# Patient Record
Sex: MALE | Race: BLACK OR AFRICAN AMERICAN | NOT HISPANIC OR LATINO | Employment: OTHER | URBAN - METROPOLITAN AREA
[De-identification: names, ages, dates, MRNs, and addresses within clinical notes are randomized per-mention and may not be internally consistent; named-entity substitution may affect disease eponyms.]

---

## 2017-01-10 ENCOUNTER — TELEPHONE (OUTPATIENT)
Dept: SMOKING CESSATION | Facility: CLINIC | Age: 67
End: 2017-01-10

## 2017-01-11 ENCOUNTER — TELEPHONE (OUTPATIENT)
Dept: SMOKING CESSATION | Facility: CLINIC | Age: 67
End: 2017-01-11

## 2017-01-17 ENCOUNTER — CLINICAL SUPPORT (OUTPATIENT)
Dept: SMOKING CESSATION | Facility: CLINIC | Age: 67
End: 2017-01-17
Payer: COMMERCIAL

## 2017-01-17 DIAGNOSIS — F17.200 NICOTINE DEPENDENCE: Primary | ICD-10-CM

## 2017-01-17 PROCEDURE — 99407 BEHAV CHNG SMOKING > 10 MIN: CPT | Mod: S$GLB,,,

## 2017-01-25 ENCOUNTER — TELEPHONE (OUTPATIENT)
Dept: SMOKING CESSATION | Facility: CLINIC | Age: 67
End: 2017-01-25

## 2017-02-15 ENCOUNTER — TELEPHONE (OUTPATIENT)
Dept: SMOKING CESSATION | Facility: CLINIC | Age: 67
End: 2017-02-15

## 2017-02-15 NOTE — TELEPHONE ENCOUNTER
Attempt to contact patient for missed scheduled appointment. He states that he forgot about his appointment and is at work. He stated that he was not in front of a calendar and could not reschedule at this time. Contact information given.

## 2017-02-20 ENCOUNTER — OFFICE VISIT (OUTPATIENT)
Dept: INTERNAL MEDICINE | Facility: CLINIC | Age: 67
End: 2017-02-20
Payer: MEDICARE

## 2017-02-20 ENCOUNTER — LAB VISIT (OUTPATIENT)
Dept: LAB | Facility: HOSPITAL | Age: 67
End: 2017-02-20
Attending: INTERNAL MEDICINE
Payer: MEDICARE

## 2017-02-20 VITALS
HEIGHT: 72 IN | SYSTOLIC BLOOD PRESSURE: 146 MMHG | TEMPERATURE: 99 F | WEIGHT: 182.75 LBS | OXYGEN SATURATION: 97 % | HEART RATE: 49 BPM | DIASTOLIC BLOOD PRESSURE: 82 MMHG | BODY MASS INDEX: 24.75 KG/M2

## 2017-02-20 DIAGNOSIS — Z12.5 SCREENING FOR PROSTATE CANCER: ICD-10-CM

## 2017-02-20 DIAGNOSIS — L29.0 ANAL PRURITUS: Primary | ICD-10-CM

## 2017-02-20 DIAGNOSIS — I10 ESSENTIAL HYPERTENSION, BENIGN: ICD-10-CM

## 2017-02-20 LAB
ALBUMIN SERPL BCP-MCNC: 3.5 G/DL
ALP SERPL-CCNC: 67 U/L
ALT SERPL W/O P-5'-P-CCNC: 23 U/L
ANION GAP SERPL CALC-SCNC: 3 MMOL/L
AST SERPL-CCNC: 22 U/L
BASOPHILS # BLD AUTO: 0.01 K/UL
BASOPHILS NFR BLD: 0.2 %
BILIRUB SERPL-MCNC: 0.4 MG/DL
BUN SERPL-MCNC: 12 MG/DL
CALCIUM SERPL-MCNC: 9.2 MG/DL
CHLORIDE SERPL-SCNC: 106 MMOL/L
CHOLEST/HDLC SERPL: 2.7 {RATIO}
CO2 SERPL-SCNC: 30 MMOL/L
COMPLEXED PSA SERPL-MCNC: 1.5 NG/ML
CREAT SERPL-MCNC: 1 MG/DL
DIFFERENTIAL METHOD: ABNORMAL
EOSINOPHIL # BLD AUTO: 0.2 K/UL
EOSINOPHIL NFR BLD: 4.5 %
ERYTHROCYTE [DISTWIDTH] IN BLOOD BY AUTOMATED COUNT: 13.5 %
EST. GFR  (AFRICAN AMERICAN): >60 ML/MIN/1.73 M^2
EST. GFR  (NON AFRICAN AMERICAN): >60 ML/MIN/1.73 M^2
GLUCOSE SERPL-MCNC: 91 MG/DL
HCT VFR BLD AUTO: 42.3 %
HDL/CHOLESTEROL RATIO: 36.4 %
HDLC SERPL-MCNC: 173 MG/DL
HDLC SERPL-MCNC: 63 MG/DL
HGB BLD-MCNC: 14.2 G/DL
LDLC SERPL CALC-MCNC: 86.2 MG/DL
LYMPHOCYTES # BLD AUTO: 1.6 K/UL
LYMPHOCYTES NFR BLD: 31.2 %
MCH RBC QN AUTO: 31.2 PG
MCHC RBC AUTO-ENTMCNC: 33.6 %
MCV RBC AUTO: 93 FL
MONOCYTES # BLD AUTO: 0.5 K/UL
MONOCYTES NFR BLD: 9.4 %
NEUTROPHILS # BLD AUTO: 2.8 K/UL
NEUTROPHILS NFR BLD: 54.5 %
NONHDLC SERPL-MCNC: 110 MG/DL
PLATELET # BLD AUTO: 221 K/UL
PMV BLD AUTO: 10.1 FL
POTASSIUM SERPL-SCNC: 4.3 MMOL/L
PROT SERPL-MCNC: 6.8 G/DL
RBC # BLD AUTO: 4.55 M/UL
SODIUM SERPL-SCNC: 139 MMOL/L
TRIGL SERPL-MCNC: 119 MG/DL
TSH SERPL DL<=0.005 MIU/L-ACNC: 0.62 UIU/ML
WBC # BLD AUTO: 5.13 K/UL

## 2017-02-20 PROCEDURE — 3079F DIAST BP 80-89 MM HG: CPT | Mod: S$GLB,,, | Performed by: NURSE PRACTITIONER

## 2017-02-20 PROCEDURE — 3077F SYST BP >= 140 MM HG: CPT | Mod: S$GLB,,, | Performed by: NURSE PRACTITIONER

## 2017-02-20 PROCEDURE — 1157F ADVNC CARE PLAN IN RCRD: CPT | Mod: S$GLB,,, | Performed by: NURSE PRACTITIONER

## 2017-02-20 PROCEDURE — 99999 PR PBB SHADOW E&M-EST. PATIENT-LVL III: CPT | Mod: PBBFAC,,, | Performed by: NURSE PRACTITIONER

## 2017-02-20 PROCEDURE — 1159F MED LIST DOCD IN RCRD: CPT | Mod: S$GLB,,, | Performed by: NURSE PRACTITIONER

## 2017-02-20 PROCEDURE — 99213 OFFICE O/P EST LOW 20 MIN: CPT | Mod: S$GLB,,, | Performed by: NURSE PRACTITIONER

## 2017-02-20 PROCEDURE — 1126F AMNT PAIN NOTED NONE PRSNT: CPT | Mod: S$GLB,,, | Performed by: NURSE PRACTITIONER

## 2017-02-20 RX ORDER — METHOCARBAMOL 500 MG/1
TABLET, FILM COATED ORAL
Refills: 0 | COMMUNITY
Start: 2017-01-29 | End: 2017-08-28

## 2017-02-20 RX ORDER — DIBUCAINE 0.28 G/28G
OINTMENT TOPICAL 2 TIMES DAILY
Refills: 0 | COMMUNITY
Start: 2017-02-20 | End: 2017-08-28

## 2017-02-20 NOTE — PROGRESS NOTES
Subjective:      Patient ID: Harpreet Perez is a 66 y.o. male.    Chief Complaint: Anal Itching    HPI:  Patient states he is having a lot of anal itching, especially at night.  He says he is constipated a lot, has tried prep H.  Not sure if he has hemorrhoids    Past Medical History   Diagnosis Date    Alcohol dependence     Back pain     History of colonic polyps     Hypertrophy of prostate without urinary obstruction and other lower urinary tract symptoms (LUTS)     Osteoarthritis of multiple joints     Tobacco dependence      quit then restarted       Past Surgical History   Procedure Laterality Date    Lipoma resection Right 8/17/12     posterior shoulder    Colonoscopy N/A 5/25/2016     Procedure: COLONOSCOPY;  Surgeon: Sergey Jensen MD;  Location: [facility];  Service: Endoscopy;  Laterality: N/A;       Lab Results   Component Value Date    WBC 5.82 02/19/2016    HGB 14.5 02/19/2016    HCT 42.6 02/19/2016     02/19/2016    CHOL 188 02/19/2016    TRIG 110 02/19/2016    HDL 75 02/19/2016    ALT 21 02/19/2016    AST 23 02/19/2016     02/19/2016    K 4.3 02/19/2016     02/19/2016    CREATININE 1.1 02/19/2016    BUN 13 02/19/2016    CO2 25 02/19/2016    TSH 0.829 02/19/2016    PSA 1.5 02/19/2016       Visit Vitals    BP (!) 146/82    Pulse (!) 49  Comment: radial    Temp 98.6 °F (37 °C) (Tympanic)    Ht 6' (1.829 m)    Wt 82.9 kg (182 lb 12.2 oz)    SpO2 97%    BMI 24.79 kg/m2         Review of Systems   Constitutional: Negative for appetite change, chills, diaphoresis and fever.   HENT: Negative for congestion, ear pain, postnasal drip, rhinorrhea, sneezing, sore throat and trouble swallowing.    Eyes: Negative for photophobia, pain and visual disturbance.   Respiratory: Negative for apnea, cough, choking, chest tightness, shortness of breath and wheezing.    Cardiovascular: Negative for chest pain, palpitations and leg swelling.   Gastrointestinal: Negative for abdominal pain,  constipation, diarrhea, nausea and vomiting.   Genitourinary: Negative for decreased urine volume, difficulty urinating, dysuria, hematuria and urgency.   Musculoskeletal: Negative for arthralgias, gait problem, joint swelling and myalgias.   Skin: Negative for rash.   Neurological: Negative for dizziness, tremors, seizures, syncope, weakness, light-headedness, numbness and headaches.   Psychiatric/Behavioral: Negative for agitation, confusion, decreased concentration, hallucinations and sleep disturbance. The patient is not nervous/anxious.       Objective:     Physical Exam   Constitutional: He is oriented to person, place, and time. He appears well-developed and well-nourished. No distress.   Genitourinary: Rectum normal.   Genitourinary Comments: No internal hemorrhoids noted, no fissures, redness, or swelling to anus   Musculoskeletal:   Normal gait   Neurological: He is alert and oriented to person, place, and time.   Skin: Skin is warm and dry.   Psychiatric: He has a normal mood and affect. His behavior is normal.     Assessment:      1. Anal pruritus      Plan:   Anal pruritus    Other orders  -     dibucaine 1% 1 % Oint; Place rectally 2 (two) times daily.; Refill: 0    Will use Clear lax one capful a day with 8 oz of water to follow    Can use stool softener also prn      Current Outpatient Prescriptions:     meloxicam (MOBIC) 15 MG tablet, Take 1 tablet (15 mg total) by mouth once daily., Disp: 30 tablet, Rfl: 11    tamsulosin (FLOMAX) 0.4 mg Cp24, Take 1 capsule (0.4 mg total) by mouth once daily., Disp: 30 capsule, Rfl: 11    dibucaine 1% 1 % Oint, Place rectally 2 (two) times daily., Disp: , Rfl: 0    methocarbamol (ROBAXIN) 500 MG Tab, , Disp: , Rfl: 0    nicotine (NICODERM CQ) 14 mg/24 hr, Place 1 patch onto the skin once daily., Disp: 21 patch, Rfl: 1

## 2017-02-20 NOTE — MR AVS SNAPSHOT
[facility]  [id number] [place] [address]  Phone: [phone]                  Harpreet Perez   2017 8:40 AM   Office Visit    Description:  Male : 1950   Provider:  Navin Chirinos NP   Department:  [facility]           Reason for Visit     Anal Itching                To Do List           Future Appointments        Provider Department Dept Phone    2017 7:40 AM Navin Chirinos NP [facility] Internal Medicine [phone]    2017 1:00 PM Alejandro Goins OD [facility] [phone]      Goals (5 Years of Data)     None      PURCHASE these Medications (No prescription required)        Start End    dibucaine 1% 1 % Oint 2017     Sig - Route: Place rectally 2 (two) times daily. - Rectal    Class: OTC      [facility]     [facility] Nurse Care [facility] -  Assistance  Registered nurses in the [facility] provide clinical advisement, health education, appointment booking, and other advisory services.  Call for this free service at [phone].             Medications           Message regarding Medications     Verify the changes and/or additions to your medication regime listed below are the same as discussed with your clinician today.  If any of these changes or additions are incorrect, please notify your healthcare provider.        START taking these NEW medications        Refills    dibucaine 1% 1 % Oint 0    Sig: Place rectally 2 (two) times daily.    Class: OTC    Route: Rectal           Verify that the below list of medications is an accurate representation of the medications you are currently taking.  If none reported, the list may be blank. If incorrect, please contact your healthcare provider. Carry this list with you in case of emergency.           Current Medications     dibucaine 1% 1 % Oint Place rectally 2 (two) times daily.    meloxicam (MOBIC) 15 MG tablet Take 1 tablet (15 mg total) by mouth  once daily.    methocarbamol (ROBAXIN) 500 MG Tab     nicotine (NICODERM CQ) 14 mg/24 hr Place 1 patch onto the skin once daily.    tamsulosin (FLOMAX) 0.4 mg Cp24 Take 1 capsule (0.4 mg total) by mouth once daily.           Clinical Reference Information           Your Vitals Were     BP Pulse Temp Height Weight SpO2    146/82 49 98.6 °F (37 °C) (Tympanic) 6' (1.829 m) 82.9 kg (182 lb 12.2 oz) 97%    BMI                24.79 kg/m2          Blood Pressure          Most Recent Value    BP  (!)  146/82      Allergies as of 2/20/2017     No Known Allergies      Immunizations Administered on Date of Encounter - 2/20/2017     None      Instructions    Mirilax one capful a day     Stool softener  Colace        Smoking Cessation     If you would like to quit smoking:   You may be eligible for free services if you are a [place] resident and started smoking cigarettes before September 1, 1988.  Call the Smoking Cessation Trust ([employer]) toll free at (052) 752-3270 or (552) 425-1271.   Call 1-800-QUIT-NOW if you do not meet the above criteria.            Language Assistance Services     ATTENTION: Language assistance services are available, free of charge. Please call [phone].      ATENCIÓN: Si habla miriamañol, tiene a velasquez disposición servicios gratuitos de asistencia lingüística. Llame al [phone].     CHÚ Ý: N?u b?n nói Ti?ng Vi?t, có các d?ch v? h? tr? ngôn ng? mi?n phí dành cho b?n. G?i s? [phone].         [facility] Internal Medicine complies with applicable Federal civil rights laws and does not discriminate on the basis of race, color, national origin, age, disability, or sex.

## 2017-02-27 ENCOUNTER — OFFICE VISIT (OUTPATIENT)
Dept: INTERNAL MEDICINE | Facility: CLINIC | Age: 67
End: 2017-02-27
Payer: MEDICARE

## 2017-02-27 ENCOUNTER — OFFICE VISIT (OUTPATIENT)
Dept: OPHTHALMOLOGY | Facility: CLINIC | Age: 67
End: 2017-02-27
Payer: MEDICARE

## 2017-02-27 VITALS
OXYGEN SATURATION: 96 % | DIASTOLIC BLOOD PRESSURE: 82 MMHG | WEIGHT: 185.19 LBS | HEIGHT: 72 IN | BODY MASS INDEX: 25.08 KG/M2 | TEMPERATURE: 98 F | SYSTOLIC BLOOD PRESSURE: 136 MMHG | HEART RATE: 66 BPM

## 2017-02-27 DIAGNOSIS — Z00.00 ROUTINE CHECK-UP: Primary | ICD-10-CM

## 2017-02-27 DIAGNOSIS — H52.03 HYPEROPIA, BILATERAL: ICD-10-CM

## 2017-02-27 DIAGNOSIS — H52.4 BILATERAL PRESBYOPIA: ICD-10-CM

## 2017-02-27 DIAGNOSIS — N40.0 BENIGN NON-NODULAR PROSTATIC HYPERPLASIA WITHOUT LOWER URINARY TRACT SYMPTOMS: Chronic | ICD-10-CM

## 2017-02-27 PROCEDURE — 3075F SYST BP GE 130 - 139MM HG: CPT | Mod: S$GLB,,, | Performed by: NURSE PRACTITIONER

## 2017-02-27 PROCEDURE — 92014 COMPRE OPH EXAM EST PT 1/>: CPT | Mod: S$GLB,,, | Performed by: OPTOMETRIST

## 2017-02-27 PROCEDURE — 1159F MED LIST DOCD IN RCRD: CPT | Mod: S$GLB,,, | Performed by: NURSE PRACTITIONER

## 2017-02-27 PROCEDURE — 1160F RVW MEDS BY RX/DR IN RCRD: CPT | Mod: S$GLB,,, | Performed by: NURSE PRACTITIONER

## 2017-02-27 PROCEDURE — 99999 PR PBB SHADOW E&M-EST. PATIENT-LVL I: CPT | Mod: PBBFAC,,, | Performed by: OPTOMETRIST

## 2017-02-27 PROCEDURE — 99214 OFFICE O/P EST MOD 30 MIN: CPT | Mod: S$GLB,,, | Performed by: NURSE PRACTITIONER

## 2017-02-27 PROCEDURE — 92015 DETERMINE REFRACTIVE STATE: CPT | Mod: S$GLB,,, | Performed by: OPTOMETRIST

## 2017-02-27 PROCEDURE — 3079F DIAST BP 80-89 MM HG: CPT | Mod: S$GLB,,, | Performed by: NURSE PRACTITIONER

## 2017-02-27 PROCEDURE — 1157F ADVNC CARE PLAN IN RCRD: CPT | Mod: S$GLB,,, | Performed by: NURSE PRACTITIONER

## 2017-02-27 PROCEDURE — 3078F DIAST BP <80 MM HG: CPT | Mod: S$GLB,,, | Performed by: OPTOMETRIST

## 2017-02-27 PROCEDURE — 1126F AMNT PAIN NOTED NONE PRSNT: CPT | Mod: S$GLB,,, | Performed by: NURSE PRACTITIONER

## 2017-02-27 PROCEDURE — 3074F SYST BP LT 130 MM HG: CPT | Mod: S$GLB,,, | Performed by: OPTOMETRIST

## 2017-02-27 PROCEDURE — 99999 PR PBB SHADOW E&M-EST. PATIENT-LVL III: CPT | Mod: PBBFAC,,, | Performed by: NURSE PRACTITIONER

## 2017-02-27 RX ORDER — TAMSULOSIN HYDROCHLORIDE 0.4 MG/1
0.4 CAPSULE ORAL DAILY
Qty: 90 CAPSULE | Refills: 3 | Status: SHIPPED | OUTPATIENT
Start: 2017-02-27 | End: 2017-08-28 | Stop reason: SDUPTHER

## 2017-02-27 NOTE — PROGRESS NOTES
HPI     No visual complaints. Annual eye exam//////NS check. Last eye exam   02/26/2016 TRF. Update glasses RX.       Last edited by Lea Fischer on 2/27/2017  1:06 PM.         Assessment /Plan     For exam results, see Encounter Report.    Nuclear cataract, bilateral    Hyperopia, bilateral    Bilateral presbyopia      Moderate cataracts OU, not surgical    Refresh Optive prn for stinging and itching.    Dispense Final Rx for glasses.  RTC 1 year

## 2017-02-27 NOTE — MR AVS SNAPSHOT
[facility]  [address] [address]  Phone: [phone]                  Harpreet Perez   2017 7:40 AM   Office Visit    Description:  Male : 1950   Provider:  Navin Chirinos NP   Department:  [facility]           Reason for Visit     Follow-up           Diagnoses this Visit        Comments    Routine check-up    -  Primary            To Do List           Future Appointments        Provider Department Dept Phone    2017 1:00 PM Alejandro Goins OD [facility] - Ophthalmology [phone]    2017 8:50 AM LABORATORY, [facility] Laboratory [phone]    2017 8:40 AM Srinivas Zabala MD [facility] Internal Medicine [phone]      Goals (5 Years of Data)     None      Follow-Up and Disposition     Return in about 6 months (around 2017).       These Medications        Disp Refills Start End    tamsulosin (FLOMAX) 0.4 mg Cp24 90 capsule 3 2017    Take 1 capsule (0.4 mg total) by mouth once daily. - Oral    [facility] [address] Ph #: [phone]         [facility] On [facility]     [facility] On Call Nurse Care Line -  Assistance  Registered nurses in the [facility] provide clinical advisement, health education, appointment booking, and other advisory services.  Call for this free service at [phone].             Medications           Message regarding Medications     Verify the changes and/or additions to your medication regime listed below are the same as discussed with your clinician today.  If any of these changes or additions are incorrect, please notify your healthcare provider.             Verify that the below list of medications is an accurate representation of the medications you are currently taking.  If none reported, the list may be blank. If incorrect, please contact your healthcare provider. Carry this list with you in case of  emergency.           Current Medications     dibucaine 1% 1 % Oint Place rectally 2 (two) times daily.    meloxicam (MOBIC) 15 MG tablet Take 1 tablet (15 mg total) by mouth once daily.    methocarbamol (ROBAXIN) 500 MG Tab     nicotine (NICODERM CQ) 14 mg/24 hr Place 1 patch onto the skin once daily.    tamsulosin (FLOMAX) 0.4 mg Cp24 Take 1 capsule (0.4 mg total) by mouth once daily.           Clinical Reference Information           Your Vitals Were     BP                   136/82           Blood Pressure          Most Recent Value    BP  136/82      Allergies as of 2/27/2017     No Known Allergies      Immunizations Administered on Date of Encounter - 2/27/2017     None      Orders Placed During Today's Visit     Future Labs/Procedures Expected by Expires    Comprehensive metabolic panel  8/26/2017 (Approximate) 4/28/2018      Smoking Cessation     If you would like to quit smoking:   You may be eligible for free services if you are a [place] resident and started smoking cigarettes before September 1, 1988.  Call the Smoking Cessation Trust ([facility]) toll free at (059) 279-6494 or (607) 910-8887.   Call 7-595-QUIT-NOW if you do not meet the above criteria.            Language Assistance Services     ATTENTION: Language assistance services are available, free of charge. Please call [phone].      ATENCIÓN: Si umm pinedadelvis, tiene a velasquez disposición servicios gratuitos de asistencia lingüística. Llame al [phone].     [facility] Ý: N?u b?n nói Ti?ng Vi?t, có các d?ch v? h? tr? ngôn ng? mi?n phí dành cho b?n. G?i s? [phone].         [facility] - Internal [facility] complies with applicable Federal civil rights laws and does not discriminate on the basis of race, color, national origin, age, disability, or sex.

## 2017-02-27 NOTE — PROGRESS NOTES
"Subjective:      Patient ID: Harpreet Perez is a 66 y.o. male.    Chief Complaint: Follow-up (6 month)    HPI:  Patient is here for a follow up of his labs.  He has no complaints today.  He is taking his meds as ordered.  His BP is controlled.      Past Medical History:   Diagnosis Date    Alcohol dependence     Back pain     History of colonic polyps     Hypertrophy of prostate without urinary obstruction and other lower urinary tract symptoms (LUTS)     Osteoarthritis of multiple joints     Tobacco dependence     quit then restarted       Past Surgical History:   Procedure Laterality Date    COLONOSCOPY N/A 5/25/2016    Procedure: COLONOSCOPY;  Surgeon: Sergey Jensen MD;  Location: [facility];  Service: Endoscopy;  Laterality: N/A;    LIPOMA RESECTION Right 8/17/12    posterior shoulder       Lab Results   Component Value Date    WBC 5.13 02/20/2017    HGB 14.2 02/20/2017    HCT 42.3 02/20/2017     02/20/2017    CHOL 173 02/20/2017    TRIG 119 02/20/2017    HDL 63 02/20/2017    ALT 23 02/20/2017    AST 22 02/20/2017     02/20/2017    K 4.3 02/20/2017     02/20/2017    CREATININE 1.0 02/20/2017    BUN 12 02/20/2017    CO2 30 (H) 02/20/2017    TSH 0.617 02/20/2017    PSA 1.5 02/20/2017       /82  Pulse 66  Temp 98.4 °F (36.9 °C) (Tympanic)   Ht 5' 11.5" (1.816 m)  Wt 84 kg (185 lb 3 oz)  SpO2 96%  BMI 25.47 kg/m2      Review of Systems   Constitutional: Negative for appetite change, chills, diaphoresis and fever.   HENT: Negative for congestion, ear pain, postnasal drip, rhinorrhea, sneezing, sore throat and trouble swallowing.    Eyes: Negative for photophobia, pain and visual disturbance.   Respiratory: Negative for apnea, cough, choking, chest tightness, shortness of breath and wheezing.    Cardiovascular: Negative for chest pain, palpitations and leg swelling.   Gastrointestinal: Negative for abdominal pain, constipation, diarrhea, nausea and vomiting.   Genitourinary: Negative " for decreased urine volume, difficulty urinating, dysuria, hematuria and urgency.   Musculoskeletal: Negative for arthralgias, gait problem, joint swelling and myalgias.   Skin: Negative for rash.   Neurological: Negative for dizziness, tremors, seizures, syncope, weakness, light-headedness, numbness and headaches.   Psychiatric/Behavioral: Negative for agitation, confusion, decreased concentration, hallucinations and sleep disturbance. The patient is not nervous/anxious.       Objective:     Physical Exam   Constitutional: He is oriented to person, place, and time. He appears well-developed and well-nourished. No distress.   Musculoskeletal:   Normal gait   Neurological: He is alert and oriented to person, place, and time.   Skin: Skin is warm and dry.   Psychiatric: He has a normal mood and affect. His behavior is normal.     Assessment:      1. Routine check-up      Plan:   Routine check-up  -     Comprehensive metabolic panel; Future; Expected date: 8/26/17    Other orders  -     tamsulosin (FLOMAX) 0.4 mg Cp24; Take 1 capsule (0.4 mg total) by mouth once daily.  Dispense: 90 capsule; Refill: 3    will see dr ayala in 6 months for a physical and labs      Current Outpatient Prescriptions:     dibucaine 1% 1 % Oint, Place rectally 2 (two) times daily., Disp: , Rfl: 0    meloxicam (MOBIC) 15 MG tablet, Take 1 tablet (15 mg total) by mouth once daily., Disp: 30 tablet, Rfl: 11    methocarbamol (ROBAXIN) 500 MG Tab, , Disp: , Rfl: 0    nicotine (NICODERM CQ) 14 mg/24 hr, Place 1 patch onto the skin once daily., Disp: 21 patch, Rfl: 1    tamsulosin (FLOMAX) 0.4 mg Cp24, Take 1 capsule (0.4 mg total) by mouth once daily., Disp: 90 capsule, Rfl: 3

## 2017-03-07 ENCOUNTER — TELEPHONE (OUTPATIENT)
Dept: INTERNAL MEDICINE | Facility: CLINIC | Age: 67
End: 2017-03-07

## 2017-03-07 NOTE — TELEPHONE ENCOUNTER
----- Message from Reymundo Fischer sent at 3/7/2017 12:05 PM CST -----  Contact: Pt   Pt states he is returning a missed call from yesterday./ Pt can be reached at [phone]

## 2017-03-07 NOTE — TELEPHONE ENCOUNTER
"Returned call to pt. Pt stated, " Someone called me to do something for Dr. Zabala yesterday". I advsied that I don't see any notes in system and checked with other staff and noone had info pertaining to this. Advised that I will contact pt if needed/TGD  "

## 2017-04-07 ENCOUNTER — TELEPHONE (OUTPATIENT)
Dept: INTERNAL MEDICINE | Facility: CLINIC | Age: 67
End: 2017-04-07

## 2017-04-07 RX ORDER — TRIAMCINOLONE ACETONIDE 1 MG/G
CREAM TOPICAL 2 TIMES DAILY
Qty: 45 G | Refills: 1 | Status: SHIPPED | OUTPATIENT
Start: 2017-04-07 | End: 2017-08-28

## 2017-04-07 NOTE — TELEPHONE ENCOUNTER
Returned pt's phone call and pt states that he was given a cream for itching of the rectum and and he hasn't seen any improvement since using the cream pt states that his itching is so severe that he unable to sleep at night. Pt is requesting a new cream be sent to his pharmacy. Please advise./db**

## 2017-04-07 NOTE — TELEPHONE ENCOUNTER
----- Message from Agatha Kerns sent at 4/7/2017  8:48 AM CDT -----  Pt needs to know if something else can be called in for the itching in his rectum because the meds he has arent  working / please call [phone]/ma

## 2017-04-13 ENCOUNTER — OFFICE VISIT (OUTPATIENT)
Dept: INTERNAL MEDICINE | Facility: CLINIC | Age: 67
End: 2017-04-13
Payer: MEDICARE

## 2017-04-13 VITALS
HEART RATE: 52 BPM | HEIGHT: 72 IN | OXYGEN SATURATION: 95 % | DIASTOLIC BLOOD PRESSURE: 82 MMHG | WEIGHT: 184.75 LBS | TEMPERATURE: 99 F | SYSTOLIC BLOOD PRESSURE: 142 MMHG | BODY MASS INDEX: 25.02 KG/M2

## 2017-04-13 DIAGNOSIS — L02.91 ABSCESS: Primary | ICD-10-CM

## 2017-04-13 PROCEDURE — 1125F AMNT PAIN NOTED PAIN PRSNT: CPT | Mod: S$GLB,,, | Performed by: NURSE PRACTITIONER

## 2017-04-13 PROCEDURE — 10060 I&D ABSCESS SIMPLE/SINGLE: CPT | Mod: S$GLB,,, | Performed by: NURSE PRACTITIONER

## 2017-04-13 PROCEDURE — 3079F DIAST BP 80-89 MM HG: CPT | Mod: S$GLB,,, | Performed by: NURSE PRACTITIONER

## 2017-04-13 PROCEDURE — 99999 PR PBB SHADOW E&M-EST. PATIENT-LVL III: CPT | Mod: PBBFAC,,, | Performed by: NURSE PRACTITIONER

## 2017-04-13 PROCEDURE — 1160F RVW MEDS BY RX/DR IN RCRD: CPT | Mod: S$GLB,,, | Performed by: NURSE PRACTITIONER

## 2017-04-13 PROCEDURE — 1159F MED LIST DOCD IN RCRD: CPT | Mod: S$GLB,,, | Performed by: NURSE PRACTITIONER

## 2017-04-13 PROCEDURE — 3077F SYST BP >= 140 MM HG: CPT | Mod: S$GLB,,, | Performed by: NURSE PRACTITIONER

## 2017-04-13 PROCEDURE — 1157F ADVNC CARE PLAN IN RCRD: CPT | Mod: S$GLB,,, | Performed by: NURSE PRACTITIONER

## 2017-04-13 PROCEDURE — 99214 OFFICE O/P EST MOD 30 MIN: CPT | Mod: 25,S$GLB,, | Performed by: NURSE PRACTITIONER

## 2017-04-13 RX ORDER — MUPIROCIN 20 MG/G
OINTMENT TOPICAL 2 TIMES DAILY
Qty: 22 G | Refills: 0 | Status: SHIPPED | OUTPATIENT
Start: 2017-04-13 | End: 2017-08-28

## 2017-04-13 RX ORDER — SULFAMETHOXAZOLE AND TRIMETHOPRIM 800; 160 MG/1; MG/1
1 TABLET ORAL 2 TIMES DAILY
Qty: 20 TABLET | Refills: 0 | Status: SHIPPED | OUTPATIENT
Start: 2017-04-13 | End: 2017-08-28

## 2017-04-13 NOTE — PROCEDURES
"Incision & Drainage  Date/Time: 4/13/2017 7:56 AM  Performed by: KWESI PUGA.  Authorized by: KWESI PUGA     Time out: Immediately prior to procedure a "time out" was called to verify the correct patient, procedure, equipment, support staff and site/side marked as required.    Consent Done?:  Yes (Verbal)    Type:  Abscess  Location: anal.  Anesthesia:  Local infiltration  Local anesthetic: lidocaine 1% without epinephrine  Anesthetic total (ml):  3  Scalpel size:  11  Incision type:  Single straight  Complexity:  Simple  Drainage:  Pus  Drainage amount:  Scant  Wound treatment:  Expression of material  Packing material:  None  Patient tolerance:  Patient tolerated the procedure well with no immediate complications      "

## 2017-04-13 NOTE — MR AVS SNAPSHOT
[facility] Internal Medicine  [address] [address]  Phone: [phone]                  Harpreet Perez   2017 7:20 AM   Office Visit    Description:  Male : 1950   Provider:  Navin Chirinos NP   Department:  [facility]           Reason for Visit     Recurrent Skin Infections           Diagnoses this Visit        Comments    Abscess    -  Primary            To Do List           Future Appointments        Provider Department Dept Phone    2017 8:50 AM LABORATORY, [facility] Laboratory [phone]    2017 8:40 AM Srinivas Zabala MD [facility] Internal Medicine [phone]    2018 1:00 PM Alejandro Goins OD [facility] - Ophthalmology [phone]      Goals (5 Years of Data)     None       These Medications        Disp Refills Start End    sulfamethoxazole-trimethoprim 800-160mg (BACTRIM DS) 800-160 mg Tab 20 tablet 0 2017     Take 1 tablet by mouth 2 (two) times daily. - Oral    [facility] [address] Ph #: [phone]       mupirocin (BACTROBAN) 2 % ointment 22 g 0 2017     Apply topically 2 (two) times daily. - Topical (Top)    Pharmacy: [facility] . - [address] Ph #: [phone]         [facility]     [facility] On Call Nurse Care Line -  Assistance  Unless otherwise directed by your provider, please contact [facility], our nurse care line that is available for  assistance.     Registered nurses in the [facility] provide: appointment scheduling, clinical advisement, health education, and other advisory services.  Call: [phone] (toll free)               Medications           Message regarding Medications     Verify the changes and/or additions to your medication regime listed below are the same as discussed with your clinician today.  If any of these changes or additions are incorrect, please notify  your healthcare provider.        START taking these NEW medications        Refills    sulfamethoxazole-trimethoprim 800-160mg (BACTRIM DS) 800-160 mg Tab 0    Sig: Take 1 tablet by mouth 2 (two) times daily.    Class: Normal    Route: Oral    mupirocin (BACTROBAN) 2 % ointment 0    Sig: Apply topically 2 (two) times daily.    Class: Normal    Route: Topical (Top)      STOP taking these medications     meloxicam (MOBIC) 15 MG tablet Take 1 tablet (15 mg total) by mouth once daily.    nicotine (NICODERM CQ) 14 mg/24 hr Place 1 patch onto the skin once daily.           Verify that the below list of medications is an accurate representation of the medications you are currently taking.  If none reported, the list may be blank. If incorrect, please contact your healthcare provider. Carry this list with you in case of emergency.           Current Medications     dibucaine 1% 1 % Oint Place rectally 2 (two) times daily.    methocarbamol (ROBAXIN) 500 MG Tab     tamsulosin (FLOMAX) 0.4 mg Cp24 Take 1 capsule (0.4 mg total) by mouth once daily.    triamcinolone acetonide 0.1% (KENALOG) 0.1 % cream Apply topically 2 (two) times daily.    mupirocin (BACTROBAN) 2 % ointment Apply topically 2 (two) times daily.    sulfamethoxazole-trimethoprim 800-160mg (BACTRIM DS) 800-160 mg Tab Take 1 tablet by mouth 2 (two) times daily.           Clinical Reference Information           Your Vitals Were     BP Pulse Temp Height Weight SpO2    142/82 52 98.5 °F (36.9 °C) (Tympanic) 6' (1.829 m) 83.8 kg (184 lb 11.9 oz) 95%    BMI                25.06 kg/m2          Blood Pressure          Most Recent Value    BP  (!)  142/82      Allergies as of 4/13/2017     No Known Allergies      Immunizations Administered on Date of Encounter - 4/13/2017     None      Orders Placed During Today's Visit      Normal Orders This Visit    INCISION AND DRAINAGE       Smoking Cessation     If you would like to quit smoking:   You may be eligible for free  services if you are a [place] resident and started smoking cigarettes before September 1, 1988.  Call the Smoking Cessation Trust ([employer]) toll free at (234) 074-0673 or (529) 945-6108.   Call 1-800-QUIT-NOW if you do not meet the above criteria.   Contact us via email: [email or web address] ([email or web address])   View our website for more information: [email or web address]        Language Assistance Services     ATTENTION: Language assistance services are available, free of charge. Please call [phone].      ATENCIÓN: Si habla español, tiene a velasquez disposición servicios gratuitos de asistencia lingüística. Llame al [phone].     CHÚ Ý: N?u b?n nói Ti?ng Vi?t, có các d?ch v? h? tr? ngôn ng? mi?n phí dành cho b?n. G?i s? [phone].         [facility] Internal Medicine complies with applicable Federal civil rights laws and does not discriminate on the basis of race, color, national origin, age, disability, or sex.

## 2017-04-13 NOTE — PROGRESS NOTES
Subjective:      Patient ID: Harpreet Perez is a 66 y.o. male.    Chief Complaint: Recurrent Skin Infections (buttocks)    HPI:  Patient states he thinks he has a boil on his buttocks.  He feels a tender lump to the buttock area.  He still has some anal pruritis, is using the cream.      Past Medical History:   Diagnosis Date    Alcohol dependence     Back pain     History of colonic polyps     Hypertrophy of prostate without urinary obstruction and other lower urinary tract symptoms (LUTS)     Osteoarthritis of multiple joints     Tobacco dependence     quit then restarted       Past Surgical History:   Procedure Laterality Date    COLONOSCOPY N/A 5/25/2016    Procedure: COLONOSCOPY;  Surgeon: Sergey Jensen MD;  Location: [facility];  Service: Endoscopy;  Laterality: N/A;    LIPOMA RESECTION Right 8/17/12    posterior shoulder       Lab Results   Component Value Date    WBC 5.13 02/20/2017    HGB 14.2 02/20/2017    HCT 42.3 02/20/2017     02/20/2017    CHOL 173 02/20/2017    TRIG 119 02/20/2017    HDL 63 02/20/2017    ALT 23 02/20/2017    AST 22 02/20/2017     02/20/2017    K 4.3 02/20/2017     02/20/2017    CREATININE 1.0 02/20/2017    BUN 12 02/20/2017    CO2 30 (H) 02/20/2017    TSH 0.617 02/20/2017    PSA 1.5 02/20/2017       BP (!) 142/82  Pulse (!) 52  Temp 98.5 °F (36.9 °C) (Tympanic)   Ht 6' (1.829 m)  Wt 83.8 kg (184 lb 11.9 oz)  SpO2 95%  BMI 25.06 kg/m2      Review of Systems   Constitutional: Negative for appetite change, chills, diaphoresis and fever.   HENT: Negative for congestion, ear pain, postnasal drip, rhinorrhea, sneezing, sore throat and trouble swallowing.    Eyes: Negative for photophobia, pain and visual disturbance.   Respiratory: Negative for apnea, cough, choking, chest tightness, shortness of breath and wheezing.    Cardiovascular: Negative for chest pain, palpitations and leg swelling.   Gastrointestinal: Negative for abdominal pain, constipation, diarrhea,  nausea and vomiting.   Genitourinary: Negative for decreased urine volume, difficulty urinating, dysuria, hematuria and urgency.   Musculoskeletal: Negative for arthralgias, gait problem, joint swelling and myalgias.   Skin: Negative for rash.   Neurological: Negative for dizziness, tremors, seizures, syncope, weakness, light-headedness, numbness and headaches.   Psychiatric/Behavioral: Negative for agitation, confusion, decreased concentration, hallucinations and sleep disturbance. The patient is not nervous/anxious.       Objective:     Physical Exam   Constitutional: He is oriented to person, place, and time. He appears well-developed and well-nourished. No distress.   Musculoskeletal:   Normal gait   Neurological: He is alert and oriented to person, place, and time.   Skin: Skin is warm and dry.   2cm infected skin cyst to the left gluteal fold.  See procedure note.  Tolerated well.  Antibacterial ointment and gauze applied.     Psychiatric: He has a normal mood and affect. His behavior is normal.     Assessment:      1. Abscess      Plan:   Abscess  -     INCISION AND DRAINAGE    Other orders  -     sulfamethoxazole-trimethoprim 800-160mg (BACTRIM DS) 800-160 mg Tab; Take 1 tablet by mouth 2 (two) times daily.  Dispense: 20 tablet; Refill: 0  -     mupirocin (BACTROBAN) 2 % ointment; Apply topically 2 (two) times daily.  Dispense: 22 g; Refill: 0    advised to clean well after each BM, reapply the ointment.  Can use Sitz baths.  Finish the antibiotic.  Change dressing daily and as needed.  To UC over the holiday if any problems.  Try [place] anti-itch cream      Current Outpatient Prescriptions:     dibucaine 1% 1 % Oint, Place rectally 2 (two) times daily., Disp: , Rfl: 0    methocarbamol (ROBAXIN) 500 MG Tab, , Disp: , Rfl: 0    tamsulosin (FLOMAX) 0.4 mg Cp24, Take 1 capsule (0.4 mg total) by mouth once daily., Disp: 90 capsule, Rfl: 3    triamcinolone acetonide 0.1% (KENALOG) 0.1 % cream, Apply  topically 2 (two) times daily., Disp: 45 g, Rfl: 1    mupirocin (BACTROBAN) 2 % ointment, Apply topically 2 (two) times daily., Disp: 22 g, Rfl: 0    sulfamethoxazole-trimethoprim 800-160mg (BACTRIM DS) 800-160 mg Tab, Take 1 tablet by mouth 2 (two) times daily., Disp: 20 tablet, Rfl: 0

## 2017-07-18 ENCOUNTER — TELEPHONE (OUTPATIENT)
Dept: SMOKING CESSATION | Facility: CLINIC | Age: 67
End: 2017-07-18

## 2017-07-19 ENCOUNTER — CLINICAL SUPPORT (OUTPATIENT)
Dept: SMOKING CESSATION | Facility: CLINIC | Age: 67
End: 2017-07-19
Payer: COMMERCIAL

## 2017-07-19 DIAGNOSIS — F17.200 NICOTINE DEPENDENCE: Primary | ICD-10-CM

## 2017-07-19 PROCEDURE — 99407 BEHAV CHNG SMOKING > 10 MIN: CPT | Mod: S$GLB,,,

## 2017-08-01 ENCOUNTER — TELEPHONE (OUTPATIENT)
Dept: SMOKING CESSATION | Facility: CLINIC | Age: 67
End: 2017-08-01

## 2017-08-01 NOTE — TELEPHONE ENCOUNTER
Attempt to contact patient in regards to his missed appointment. Recorded message left with return contact information.

## 2017-08-21 ENCOUNTER — LAB VISIT (OUTPATIENT)
Dept: LAB | Facility: HOSPITAL | Age: 67
End: 2017-08-21
Attending: INTERNAL MEDICINE
Payer: MEDICARE

## 2017-08-21 DIAGNOSIS — Z00.00 ROUTINE CHECK-UP: ICD-10-CM

## 2017-08-21 LAB
ALBUMIN SERPL BCP-MCNC: 3.3 G/DL
ALP SERPL-CCNC: 66 U/L
ALT SERPL W/O P-5'-P-CCNC: 16 U/L
ANION GAP SERPL CALC-SCNC: 11 MMOL/L
AST SERPL-CCNC: 18 U/L
BILIRUB SERPL-MCNC: 0.4 MG/DL
BUN SERPL-MCNC: 15 MG/DL
CALCIUM SERPL-MCNC: 9.1 MG/DL
CHLORIDE SERPL-SCNC: 106 MMOL/L
CO2 SERPL-SCNC: 25 MMOL/L
CREAT SERPL-MCNC: 1.1 MG/DL
EST. GFR  (AFRICAN AMERICAN): >60 ML/MIN/1.73 M^2
EST. GFR  (NON AFRICAN AMERICAN): >60 ML/MIN/1.73 M^2
GLUCOSE SERPL-MCNC: 56 MG/DL
POTASSIUM SERPL-SCNC: 4 MMOL/L
PROT SERPL-MCNC: 6.6 G/DL
SODIUM SERPL-SCNC: 142 MMOL/L

## 2017-08-21 PROCEDURE — 36415 COLL VENOUS BLD VENIPUNCTURE: CPT | Mod: PO

## 2017-08-21 PROCEDURE — 80053 COMPREHEN METABOLIC PANEL: CPT

## 2017-08-28 ENCOUNTER — OFFICE VISIT (OUTPATIENT)
Dept: INTERNAL MEDICINE | Facility: CLINIC | Age: 67
End: 2017-08-28
Payer: MEDICARE

## 2017-08-28 VITALS
DIASTOLIC BLOOD PRESSURE: 78 MMHG | OXYGEN SATURATION: 98 % | HEIGHT: 72 IN | HEART RATE: 53 BPM | TEMPERATURE: 98 F | SYSTOLIC BLOOD PRESSURE: 138 MMHG | BODY MASS INDEX: 25.14 KG/M2 | WEIGHT: 185.63 LBS

## 2017-08-28 DIAGNOSIS — M15.9 PRIMARY OSTEOARTHRITIS INVOLVING MULTIPLE JOINTS: ICD-10-CM

## 2017-08-28 DIAGNOSIS — Z72.0 TOBACCO ABUSE: Chronic | ICD-10-CM

## 2017-08-28 DIAGNOSIS — Z86.010 HISTORY OF COLONIC POLYPS: ICD-10-CM

## 2017-08-28 DIAGNOSIS — E78.5 HYPERLIPIDEMIA, UNSPECIFIED HYPERLIPIDEMIA TYPE: ICD-10-CM

## 2017-08-28 DIAGNOSIS — Z00.00 ROUTINE GENERAL MEDICAL EXAMINATION AT A HEALTH CARE FACILITY: Primary | ICD-10-CM

## 2017-08-28 DIAGNOSIS — Z12.5 PROSTATE CANCER SCREENING: ICD-10-CM

## 2017-08-28 DIAGNOSIS — N40.0 BENIGN PROSTATIC HYPERPLASIA WITHOUT LOWER URINARY TRACT SYMPTOMS: Chronic | ICD-10-CM

## 2017-08-28 PROCEDURE — 99999 PR PBB SHADOW E&M-EST. PATIENT-LVL III: CPT | Mod: PBBFAC,,, | Performed by: INTERNAL MEDICINE

## 2017-08-28 PROCEDURE — 99397 PER PM REEVAL EST PAT 65+ YR: CPT | Mod: S$GLB,,, | Performed by: INTERNAL MEDICINE

## 2017-08-28 PROCEDURE — 99499 UNLISTED E&M SERVICE: CPT | Mod: S$GLB,,, | Performed by: INTERNAL MEDICINE

## 2017-08-28 RX ORDER — TAMSULOSIN HYDROCHLORIDE 0.4 MG/1
0.4 CAPSULE ORAL DAILY
Qty: 90 CAPSULE | Refills: 3 | Status: SHIPPED | OUTPATIENT
Start: 2017-08-28 | End: 2018-08-28 | Stop reason: SDUPTHER

## 2017-08-28 RX ORDER — CELECOXIB 200 MG/1
200 CAPSULE ORAL 2 TIMES DAILY
Qty: 60 CAPSULE | Refills: 11 | Status: SHIPPED | OUTPATIENT
Start: 2017-08-28 | End: 2018-02-28

## 2017-08-28 NOTE — PROGRESS NOTES
HPI:  Patient is a 66-year-old man who comes in today for his annual physical exam.  At this time, he primarily has problems with arthritis in his knees.  He currently is not taking anything for it.  Otherwise, he is been doing well.  He's had no other problems or complaints.  He states that his wife checked his blood pressure once a week and it's always normal    Current MEDS: medcard review, verified and update  Allergies: Per the electronic medical record    Past Medical History:   Diagnosis Date    Alcohol dependence     Back pain     History of colonic polyps     Hypertrophy of prostate without urinary obstruction and other lower urinary tract symptoms (LUTS)     Osteoarthritis of multiple joints     Tobacco dependence     quit then restarted       Past Surgical History:   Procedure Laterality Date    COLONOSCOPY N/A 5/25/2016    Procedure: COLONOSCOPY;  Surgeon: Sergey Jensen MD;  Location: [facility];  Service: Endoscopy;  Laterality: N/A;    LIPOMA RESECTION Right 8/17/12    posterior shoulder       SHx: per the electronic medical record    FHx: recorded in the electronic medical record    ROS:    denies any chest pains or shortness of breath. Denies any nausea, vomiting or diarrhea. Denies any fever, chills or sweats. Denies any change in weight, voice, stool, skin or hair. Denies any dysuria, dyspepsia or dysphagia. Denies any change in vision, hearing or headaches. Denies any swollen lymph nodes or loss of memory.    PE:  /78   Pulse (!) 53   Temp 97.7 °F (36.5 °C) (Tympanic)   Ht 6' (1.829 m)   Wt 84.2 kg (185 lb 10 oz)   SpO2 98%   BMI 25.18 kg/m²   Gen: Well-developed, well-nourished, male, in no acute distress, oriented x3  HEENT: neck is supple, no adenopathy, carotids 2+ equal without bruits, thyroid exam normal size without nodules.  CHEST: clear to auscultation and percussion  CVS: regular rate and rhythm without significant murmur, gallop, or rubs  ABD: soft, benign, no rebound  no guarding, no distention.  Bowel sounds are normal.     nontender.  No palpable masses.  No organomegaly and no audible bruits.  RECTAL: no masses.  Prostate 30  Grams without nodules.  EXT: no clubbing, cyanosis, or edema  LYMPH: no cervical, inguinal, or axillary adenopathy  FEET: no loss of sensation.  No ulcers or pressure sores.  NEURO: gait normal.  Cranial nerves II- XII intact. No nystagmus.  Speech normal.   Gross motor and sensory unremarkable.    Lab Results   Component Value Date    WBC 5.13 02/20/2017    HGB 14.2 02/20/2017    HCT 42.3 02/20/2017     02/20/2017    CHOL 173 02/20/2017    TRIG 119 02/20/2017    HDL 63 02/20/2017    ALT 16 08/21/2017    AST 18 08/21/2017     08/21/2017    K 4.0 08/21/2017     08/21/2017    CREATININE 1.1 08/21/2017    BUN 15 08/21/2017    CO2 25 08/21/2017    TSH 0.617 02/20/2017    PSA 1.5 02/20/2017       Impression:  Multiple medical problems below, stable  Patient Active Problem List   Diagnosis    Benign prostatic hyperplasia without lower urinary tract symptoms    Tobacco abuse    Alcohol dependence    Spondylosis of lumbar region without myelopathy or radiculopathy    Osteoarthritis of multiple joints    DDD (degenerative disc disease), lumbar    History of colonic polyps       Plan:   Orders Placed This Encounter    Comprehensive metabolic panel    Lipid panel    TSH    CBC auto differential    PSA, Screening    celecoxib (CELEBREX) 200 MG capsule    tamsulosin (FLOMAX) 0.4 mg Cp24     He will have the above lab work and be seen in 6 months.  I've encouraged him to bring blood pressure readings with him.  He was started on Celebrex for his arthritis.  He will continue with the Flomax.

## 2017-10-30 ENCOUNTER — TELEPHONE (OUTPATIENT)
Dept: INTERNAL MEDICINE | Facility: CLINIC | Age: 67
End: 2017-10-30

## 2017-10-30 NOTE — TELEPHONE ENCOUNTER
Patient notified he is not due for pneumonia vaccine at this time. He is due for his flu shot and can call to schedule appointment.

## 2017-10-30 NOTE — TELEPHONE ENCOUNTER
----- Message from Eugenia Cyr sent at 10/30/2017  9:20 AM CDT -----  Contact: Pt  Pt called and stated he needed to speak to the nurse. He stated he needs to schedule a nurse visit for a  pneumonia shot. He also stated that he wants the shot that lasts 5 years. He can be reached at [phone].    Thanks,  TF

## 2018-01-05 RX ORDER — METHOCARBAMOL 500 MG/1
TABLET, FILM COATED ORAL
Qty: 90 TABLET | Refills: 3 | Status: SHIPPED | OUTPATIENT
Start: 2018-01-05 | End: 2018-02-28

## 2018-02-14 ENCOUNTER — PATIENT OUTREACH (OUTPATIENT)
Dept: ADMINISTRATIVE | Facility: HOSPITAL | Age: 68
End: 2018-02-14

## 2018-02-21 ENCOUNTER — LAB VISIT (OUTPATIENT)
Dept: LAB | Facility: HOSPITAL | Age: 68
End: 2018-02-21
Attending: INTERNAL MEDICINE
Payer: MEDICARE

## 2018-02-21 ENCOUNTER — PES CALL (OUTPATIENT)
Dept: ADMINISTRATIVE | Facility: CLINIC | Age: 68
End: 2018-02-21

## 2018-02-21 DIAGNOSIS — Z00.00 ROUTINE GENERAL MEDICAL EXAMINATION AT A HEALTH CARE FACILITY: ICD-10-CM

## 2018-02-21 DIAGNOSIS — N40.0 BENIGN PROSTATIC HYPERPLASIA WITHOUT LOWER URINARY TRACT SYMPTOMS: Chronic | ICD-10-CM

## 2018-02-21 DIAGNOSIS — E78.5 HYPERLIPIDEMIA, UNSPECIFIED HYPERLIPIDEMIA TYPE: ICD-10-CM

## 2018-02-21 DIAGNOSIS — Z72.0 TOBACCO ABUSE: Chronic | ICD-10-CM

## 2018-02-21 DIAGNOSIS — Z12.5 PROSTATE CANCER SCREENING: ICD-10-CM

## 2018-02-21 DIAGNOSIS — Z86.010 HISTORY OF COLONIC POLYPS: ICD-10-CM

## 2018-02-21 LAB
ALBUMIN SERPL BCP-MCNC: 3.4 G/DL
ALP SERPL-CCNC: 76 U/L
ALT SERPL W/O P-5'-P-CCNC: 16 U/L
ANION GAP SERPL CALC-SCNC: 7 MMOL/L
AST SERPL-CCNC: 21 U/L
BASOPHILS # BLD AUTO: 0.06 K/UL
BASOPHILS NFR BLD: 1.2 %
BILIRUB SERPL-MCNC: 0.4 MG/DL
BUN SERPL-MCNC: 13 MG/DL
CALCIUM SERPL-MCNC: 8.9 MG/DL
CHLORIDE SERPL-SCNC: 107 MMOL/L
CHOLEST SERPL-MCNC: 185 MG/DL
CHOLEST/HDLC SERPL: 2.5 {RATIO}
CO2 SERPL-SCNC: 27 MMOL/L
COMPLEXED PSA SERPL-MCNC: 1.6 NG/ML
CREAT SERPL-MCNC: 1 MG/DL
DIFFERENTIAL METHOD: ABNORMAL
EOSINOPHIL # BLD AUTO: 0.2 K/UL
EOSINOPHIL NFR BLD: 3.9 %
ERYTHROCYTE [DISTWIDTH] IN BLOOD BY AUTOMATED COUNT: 13 %
EST. GFR  (AFRICAN AMERICAN): >60 ML/MIN/1.73 M^2
EST. GFR  (NON AFRICAN AMERICAN): >60 ML/MIN/1.73 M^2
GLUCOSE SERPL-MCNC: 86 MG/DL
HCT VFR BLD AUTO: 41.4 %
HDLC SERPL-MCNC: 75 MG/DL
HDLC SERPL: 40.5 %
HGB BLD-MCNC: 13.9 G/DL
IMM GRANULOCYTES # BLD AUTO: 0.01 K/UL
IMM GRANULOCYTES NFR BLD AUTO: 0.2 %
LDLC SERPL CALC-MCNC: 98.6 MG/DL
LYMPHOCYTES # BLD AUTO: 1.6 K/UL
LYMPHOCYTES NFR BLD: 32.9 %
MCH RBC QN AUTO: 30.7 PG
MCHC RBC AUTO-ENTMCNC: 33.6 G/DL
MCV RBC AUTO: 91 FL
MONOCYTES # BLD AUTO: 0.5 K/UL
MONOCYTES NFR BLD: 9.6 %
NEUTROPHILS # BLD AUTO: 2.6 K/UL
NEUTROPHILS NFR BLD: 52.2 %
NONHDLC SERPL-MCNC: 110 MG/DL
NRBC BLD-RTO: 0 /100 WBC
PLATELET # BLD AUTO: 263 K/UL
PMV BLD AUTO: 10.2 FL
POTASSIUM SERPL-SCNC: 4.2 MMOL/L
PROT SERPL-MCNC: 6.7 G/DL
RBC # BLD AUTO: 4.53 M/UL
SODIUM SERPL-SCNC: 141 MMOL/L
TRIGL SERPL-MCNC: 57 MG/DL
TSH SERPL DL<=0.005 MIU/L-ACNC: 0.97 UIU/ML
WBC # BLD AUTO: 4.92 K/UL

## 2018-02-21 PROCEDURE — 80061 LIPID PANEL: CPT

## 2018-02-21 PROCEDURE — 85025 COMPLETE CBC W/AUTO DIFF WBC: CPT

## 2018-02-21 PROCEDURE — 84153 ASSAY OF PSA TOTAL: CPT

## 2018-02-21 PROCEDURE — 80053 COMPREHEN METABOLIC PANEL: CPT

## 2018-02-21 PROCEDURE — 36415 COLL VENOUS BLD VENIPUNCTURE: CPT | Mod: PO

## 2018-02-21 PROCEDURE — 84443 ASSAY THYROID STIM HORMONE: CPT

## 2018-02-28 ENCOUNTER — OFFICE VISIT (OUTPATIENT)
Dept: INTERNAL MEDICINE | Facility: CLINIC | Age: 68
End: 2018-02-28
Payer: MEDICARE

## 2018-02-28 VITALS
DIASTOLIC BLOOD PRESSURE: 70 MMHG | HEIGHT: 72 IN | SYSTOLIC BLOOD PRESSURE: 128 MMHG | TEMPERATURE: 97 F | HEART RATE: 50 BPM | WEIGHT: 181.88 LBS | OXYGEN SATURATION: 99 % | BODY MASS INDEX: 24.63 KG/M2

## 2018-02-28 DIAGNOSIS — M15.9 PRIMARY OSTEOARTHRITIS INVOLVING MULTIPLE JOINTS: ICD-10-CM

## 2018-02-28 DIAGNOSIS — H61.23 CERUMEN DEBRIS ON TYMPANIC MEMBRANE OF BOTH EARS: ICD-10-CM

## 2018-02-28 DIAGNOSIS — N40.0 BENIGN PROSTATIC HYPERPLASIA WITHOUT LOWER URINARY TRACT SYMPTOMS: Primary | Chronic | ICD-10-CM

## 2018-02-28 DIAGNOSIS — Z72.0 TOBACCO ABUSE: Chronic | ICD-10-CM

## 2018-02-28 PROCEDURE — 1159F MED LIST DOCD IN RCRD: CPT | Mod: S$GLB,,, | Performed by: NURSE PRACTITIONER

## 2018-02-28 PROCEDURE — 1126F AMNT PAIN NOTED NONE PRSNT: CPT | Mod: S$GLB,,, | Performed by: NURSE PRACTITIONER

## 2018-02-28 PROCEDURE — 99999 PR PBB SHADOW E&M-EST. PATIENT-LVL III: CPT | Mod: PBBFAC,,, | Performed by: NURSE PRACTITIONER

## 2018-02-28 PROCEDURE — 3008F BODY MASS INDEX DOCD: CPT | Mod: S$GLB,,, | Performed by: NURSE PRACTITIONER

## 2018-02-28 PROCEDURE — 99214 OFFICE O/P EST MOD 30 MIN: CPT | Mod: S$GLB,,, | Performed by: NURSE PRACTITIONER

## 2018-02-28 NOTE — PROGRESS NOTES
Subjective:      Patient ID: Harpreet Perez is a 67 y.o. male.    Chief Complaint: 6 month follow up    HPI: Patient is here for a follow up of his labs.  They are all acceptable.  Takes his flomax as ordered, says sometimes has pain to his wrists, he does [employer] for a living.  His BP is stable, is up to date with his immunizations.  Says ears feel clogged    Past Medical History:   Diagnosis Date    Alcohol dependence     Back pain     History of colonic polyps     Hypertrophy of prostate without urinary obstruction and other lower urinary tract symptoms (LUTS)     Osteoarthritis of multiple joints     Tobacco dependence     quit then restarted       Past Surgical History:   Procedure Laterality Date    COLONOSCOPY N/A 5/25/2016    Procedure: COLONOSCOPY;  Surgeon: Sergey Jensen MD;  Location: [facility];  Service: Endoscopy;  Laterality: N/A;    LIPOMA RESECTION Right 8/17/12    posterior shoulder       Lab Results   Component Value Date    WBC 4.92 02/21/2018    HGB 13.9 (L) 02/21/2018    HCT 41.4 02/21/2018     02/21/2018    CHOL 185 02/21/2018    TRIG 57 02/21/2018    HDL 75 02/21/2018    ALT 16 02/21/2018    AST 21 02/21/2018     02/21/2018    K 4.2 02/21/2018     02/21/2018    CREATININE 1.0 02/21/2018    BUN 13 02/21/2018    CO2 27 02/21/2018    TSH 0.975 02/21/2018    PSA 1.6 02/21/2018       /70 (BP Location: Left arm)   Pulse (!) 50   Temp 96.5 °F (35.8 °C) (Tympanic)   Ht 6' (1.829 m)   Wt 82.5 kg (181 lb 14.1 oz)   SpO2 99%   BMI 24.67 kg/m²       Review of Systems   Constitutional: Negative for appetite change, chills, diaphoresis and fever.   HENT: Negative for congestion, ear pain, postnasal drip, rhinorrhea, sneezing, sore throat and trouble swallowing.    Eyes: Negative for photophobia, pain and visual disturbance.   Respiratory: Negative for apnea, cough, choking, chest tightness, shortness of breath and wheezing.    Cardiovascular: Negative for chest pain,  palpitations and leg swelling.   Gastrointestinal: Negative for abdominal pain, constipation, diarrhea, nausea and vomiting.   Genitourinary: Negative for decreased urine volume, difficulty urinating, dysuria, hematuria and urgency.   Musculoskeletal: Negative for arthralgias, gait problem, joint swelling and myalgias.   Skin: Negative for rash.   Neurological: Negative for dizziness, tremors, seizures, syncope, weakness, light-headedness, numbness and headaches.   Psychiatric/Behavioral: Negative for agitation, confusion, decreased concentration, hallucinations and sleep disturbance. The patient is not nervous/anxious.       Objective:     Physical Exam   Constitutional: He is oriented to person, place, and time. He appears well-developed and well-nourished. No distress.   HENT:   Bilateral cerumen to ears, attempted flushing with 1/2 peroxide and warm water.  Unable to remove hard wax at this time   Musculoskeletal:   Normal gait   Neurological: He is alert and oriented to person, place, and time.   Skin: Skin is warm and dry.   Psychiatric: He has a normal mood and affect. His behavior is normal.     Assessment:      1. Benign prostatic hyperplasia without lower urinary tract symptoms    2. Primary osteoarthritis involving multiple joints    3. Tobacco abuse    4. Cerumen debris on tympanic membrane of both ears      Plan:   Benign prostatic hyperplasia without lower urinary tract symptoms    Primary osteoarthritis involving multiple joints    Tobacco abuse    Cerumen debris on tympanic membrane of both ears    will use Debrox later today since we have softened the wax,  Will see dr ayala in 6 months for a physical      Current Outpatient Prescriptions:     tamsulosin (FLOMAX) 0.4 mg Cp24, Take 1 capsule (0.4 mg total) by mouth once daily., Disp: 90 capsule, Rfl: 3

## 2018-03-16 ENCOUNTER — OFFICE VISIT (OUTPATIENT)
Dept: OPHTHALMOLOGY | Facility: CLINIC | Age: 68
End: 2018-03-16
Payer: MEDICARE

## 2018-03-16 DIAGNOSIS — H10.13 ALLERGIC CONJUNCTIVITIS, BILATERAL: ICD-10-CM

## 2018-03-16 DIAGNOSIS — H52.4 BILATERAL PRESBYOPIA: ICD-10-CM

## 2018-03-16 DIAGNOSIS — H52.03 HYPEROPIA, BILATERAL: ICD-10-CM

## 2018-03-16 DIAGNOSIS — H25.13 CATARACT, NUCLEAR SCLEROTIC SENILE, BILATERAL: Primary | ICD-10-CM

## 2018-03-16 PROCEDURE — 99999 PR PBB SHADOW E&M-EST. PATIENT-LVL II: CPT | Mod: PBBFAC,,, | Performed by: OPTOMETRIST

## 2018-03-16 PROCEDURE — 92014 COMPRE OPH EXAM EST PT 1/>: CPT | Mod: S$GLB,,, | Performed by: OPTOMETRIST

## 2018-03-16 PROCEDURE — 92015 DETERMINE REFRACTIVE STATE: CPT | Mod: S$GLB,,, | Performed by: OPTOMETRIST

## 2018-03-16 NOTE — PATIENT INSTRUCTIONS
Cataract, nuclear sclerotic senile, bilateral    Allergic conjunctivitis, bilateral    Hyperopia, bilateral    Bilateral presbyopia      Mild cataracts OU, not surgical.  Myopic shift OU    Alaway bid OU prn    Dispense Final Rx for glasses.  RTC 1 year  Discussed above and answered questions.

## 2018-03-16 NOTE — PROGRESS NOTES
HPI     Eyes burns and itchy sometimes that comes and goes for a while. Annual   eye exam.last eye exam 02/27/2017 TRF. Update glasses RX. HX of nuclear   cataract bilateral.    Last edited by Lea Fischer on 3/16/2018  9:11 AM. (History)            Assessment /Plan     For exam results, see Encounter Report.    Cataract, nuclear sclerotic senile, bilateral    Allergic conjunctivitis, bilateral    Hyperopia, bilateral    Bilateral presbyopia      Mild cataracts OU, not surgical.  Myopic shift OU    Alaway bid OU prn    Dispense Final Rx for glasses.  RTC 1 year  Discussed above and answered questions.

## 2018-03-27 ENCOUNTER — OFFICE VISIT (OUTPATIENT)
Dept: INTERNAL MEDICINE | Facility: CLINIC | Age: 68
End: 2018-03-27
Payer: MEDICARE

## 2018-03-27 VITALS
HEART RATE: 53 BPM | SYSTOLIC BLOOD PRESSURE: 138 MMHG | WEIGHT: 181.19 LBS | HEIGHT: 70 IN | BODY MASS INDEX: 25.94 KG/M2 | OXYGEN SATURATION: 98 % | TEMPERATURE: 97 F | DIASTOLIC BLOOD PRESSURE: 82 MMHG

## 2018-03-27 DIAGNOSIS — F17.200 TOBACCO DEPENDENCE: ICD-10-CM

## 2018-03-27 DIAGNOSIS — Z00.00 ENCOUNTER FOR PREVENTIVE HEALTH EXAMINATION: Primary | ICD-10-CM

## 2018-03-27 DIAGNOSIS — I77.1 TORTUOUS AORTA: ICD-10-CM

## 2018-03-27 DIAGNOSIS — M51.36 DDD (DEGENERATIVE DISC DISEASE), LUMBAR: ICD-10-CM

## 2018-03-27 DIAGNOSIS — Z72.0 TOBACCO ABUSE: Chronic | ICD-10-CM

## 2018-03-27 DIAGNOSIS — M15.9 PRIMARY OSTEOARTHRITIS INVOLVING MULTIPLE JOINTS: ICD-10-CM

## 2018-03-27 DIAGNOSIS — F10.20 UNCOMPLICATED ALCOHOL DEPENDENCE: Chronic | ICD-10-CM

## 2018-03-27 PROCEDURE — G0439 PPPS, SUBSEQ VISIT: HCPCS | Mod: S$GLB,,, | Performed by: NURSE PRACTITIONER

## 2018-03-27 PROCEDURE — 99999 PR PBB SHADOW E&M-EST. PATIENT-LVL IV: CPT | Mod: PBBFAC,,, | Performed by: NURSE PRACTITIONER

## 2018-03-27 PROCEDURE — 99499 UNLISTED E&M SERVICE: CPT | Mod: S$GLB,,, | Performed by: NURSE PRACTITIONER

## 2018-03-27 RX ORDER — MELOXICAM 15 MG/1
15 TABLET ORAL DAILY
Qty: 30 TABLET | Refills: 5 | Status: SHIPPED | OUTPATIENT
Start: 2018-03-27 | End: 2018-05-31

## 2018-03-27 NOTE — PATIENT INSTRUCTIONS
Counseling and Referral of Other Preventative  (Italic type indicates deductible and co-insurance are waived)    Patient Name: Harpreet Perez  Today's Date: 3/27/2018    Health Maintenance       Date Due Completion Date    High Dose Statin 12/27/1970 ---    Pneumococcal (65+) (2 of 2 - PPSV23) 12/04/2019 8/26/2016    TETANUS VACCINE 02/24/2021 2/24/2011    Colonoscopy 05/25/2021 5/25/2016    Lipid Panel 02/21/2023 2/21/2018        No orders of the defined types were placed in this encounter.    The following information is provided to all patients.  This information is to help you find resources for any of the problems found today that may be affecting your health:                Living healthy guide: www.[facility].[place].gov      Understanding Diabetes: www.diabetes.org      Eating healthy: www.cdc.gov/healthyweight      [facility] home safety checklist: www.cdc.gov/steadi/patient.html      Agency on Aging: www.goea.[place].gov      Alcoholics anonymous (AA): www.aa.org      Physical Activity: www.reanna.nih.gov/cp8jyiw      Tobacco use: www.quitwithusla.org

## 2018-03-27 NOTE — PROGRESS NOTES
"Harpreet Perez presented for a  Medicare AWV and comprehensive Health Risk Assessment today. The following components were reviewed and updated:    · Medical history  · Family History  · Social history  · Allergies and Current Medications  · Health Risk Assessment  · Health Maintenance  · Care Team     ** See Completed Assessments for Annual Wellness Visit within the encounter summary.**       The following assessments were completed:  · Living Situation  · CAGE  · Depression Screening  · Timed Get Up and Go  · Whisper Test  · Cognitive Function Screening  · Nutrition Screening  · ADL Screening  · PAQ Screening    Vitals:    03/27/18 0934   BP: 138/82   BP Location: Left arm   Patient Position: Sitting   BP Method: Medium (Manual)   Pulse: (!) 53   Temp: 97.3 °F (36.3 °C)   TempSrc: Tympanic   SpO2: 98%   Weight: 82.2 kg (181 lb 3.5 oz)   Height: 5' 10" (1.778 m)     Body mass index is 26 kg/m².  Physical Exam   Constitutional: He is oriented to person, place, and time. He appears well-developed and well-nourished. No distress.   HENT:   Head: Normocephalic and atraumatic.   Nose: Nose normal.   Mouth/Throat: Oropharynx is clear and moist. No oropharyngeal exudate.   Bilateral TMs normal   Eyes: Conjunctivae are normal.   Neck: Normal range of motion. Neck supple. No JVD present. No tracheal deviation present. No thyromegaly present.   No carotid bruits   Cardiovascular: Normal rate, regular rhythm, normal heart sounds and intact distal pulses.  Exam reveals no gallop and no friction rub.    No murmur heard.  Pulmonary/Chest: Effort normal and breath sounds normal. No respiratory distress. He has no wheezes. He has no rales. He exhibits no tenderness.   Abdominal: Soft. Bowel sounds are normal. He exhibits no distension and no mass. There is no tenderness. There is no rebound and no guarding. No hernia.   No abd bruits   Musculoskeletal: Normal range of motion. He exhibits no edema or tenderness.   Lymphadenopathy: "     He has no cervical adenopathy.   Neurological: He is alert and oriented to person, place, and time. No cranial nerve deficit.   Skin: Skin is warm and dry. He is not diaphoretic.   Psychiatric: He has a normal mood and affect. His behavior is normal.         Diagnoses and health risks identified today and associated recommendations/orders:    1. Tobacco dependence  Interested in quitting smoking, asking for help  - Ambulatory referral to Smoking Cessation Program    2. Encounter for preventive health examination  Screenings performed, as noted above.  Personal preventative testing needs reviewed.     3. Tortuous aorta  Monitored/treated on meds, continue the same tx, sees pcp regularly    4. Uncomplicated alcohol dependence  Monitored/treated on meds, continue the same tx, discuss with pcp    5. Tobacco abuse  Monitored/treated on meds, continue the same tx,  Smoking cessation ordered    6. Primary osteoarthritis involving multiple joints  Monitored/treated on meds, continue the same tx,  Takes mobic    7. DDD (degenerative disc disease), lumbar  Monitored/treated on meds, continue the same tx, no back complaints today      Provided Harpreet with a 5-10 year written screening schedule and personal prevention plan. Recommendations were developed using the USPSTF age appropriate recommendations. Education, counseling, and referrals were provided as needed. After Visit Summary printed and given to patient which includes a list of additional screenings\tests needed.    No Follow-up on file.    Navin Chirinos NP

## 2018-03-27 NOTE — PROGRESS NOTES
"Subjective:      Patient ID: Harpreet Perez is a 67 y.o. male.    Chief Complaint: Health Risk Assessment    HPI:    Past Medical History:   Diagnosis Date    Alcohol dependence     Back pain     History of colonic polyps     Hypertrophy of prostate without urinary obstruction and other lower urinary tract symptoms (LUTS)     Osteoarthritis of multiple joints     Tobacco dependence     quit then restarted       Past Surgical History:   Procedure Laterality Date    COLONOSCOPY N/A 5/25/2016    Procedure: COLONOSCOPY;  Surgeon: Sergey Jensen MD;  Location: [facility];  Service: Endoscopy;  Laterality: N/A;    LIPOMA RESECTION Right 8/17/12    posterior shoulder       Lab Results   Component Value Date    WBC 4.92 02/21/2018    HGB 13.9 (L) 02/21/2018    HCT 41.4 02/21/2018     02/21/2018    CHOL 185 02/21/2018    TRIG 57 02/21/2018    HDL 75 02/21/2018    ALT 16 02/21/2018    AST 21 02/21/2018     02/21/2018    K 4.2 02/21/2018     02/21/2018    CREATININE 1.0 02/21/2018    BUN 13 02/21/2018    CO2 27 02/21/2018    TSH 0.975 02/21/2018    PSA 1.6 02/21/2018       /82 (BP Location: Left arm, Patient Position: Sitting, BP Method: Medium (Manual))   Pulse (!) 53   Temp 97.3 °F (36.3 °C) (Tympanic)   Ht 5' 10" (1.778 m)   Wt 82.2 kg (181 lb 3.5 oz)   SpO2 98%   BMI 26.00 kg/m²       Review of Systems   Objective:     Physical Exam  Assessment:      1. Encounter for preventive health examination    2. Tobacco dependence    3. Tortuous aorta    4. Uncomplicated alcohol dependence    5. Tobacco abuse    6. Primary osteoarthritis involving multiple joints    7. DDD (degenerative disc disease), lumbar      Plan:   Encounter for preventive health examination    Tobacco dependence  -     Cancel: Ambulatory referral to Smoking Cessation Program  -     Ambulatory referral to Smoking Cessation Program    Tortuous aorta    Uncomplicated alcohol dependence    Tobacco abuse    Primary osteoarthritis " involving multiple joints    DDD (degenerative disc disease), lumbar    Other orders  -     meloxicam (MOBIC) 15 MG tablet; Take 1 tablet (15 mg total) by mouth once daily.  Dispense: 30 tablet; Refill: 5          Current Outpatient Prescriptions:     tamsulosin (FLOMAX) 0.4 mg Cp24, Take 1 capsule (0.4 mg total) by mouth once daily., Disp: 90 capsule, Rfl: 3    meloxicam (MOBIC) 15 MG tablet, Take 1 tablet (15 mg total) by mouth once daily., Disp: 30 tablet, Rfl: 5

## 2018-05-31 ENCOUNTER — HOSPITAL ENCOUNTER (EMERGENCY)
Facility: HOSPITAL | Age: 68
Discharge: HOME | End: 2018-05-31
Payer: MEDICARE

## 2018-05-31 VITALS
RESPIRATION RATE: 16 BRPM | BODY MASS INDEX: 25.68 KG/M2 | WEIGHT: 189.63 LBS | OXYGEN SATURATION: 95 % | SYSTOLIC BLOOD PRESSURE: 160 MMHG | DIASTOLIC BLOOD PRESSURE: 88 MMHG | HEIGHT: 72 IN | HEART RATE: 58 BPM | TEMPERATURE: 99 F

## 2018-05-31 DIAGNOSIS — S61.217A LACERATION OF LEFT LITTLE FINGER WITHOUT FOREIGN BODY WITHOUT DAMAGE TO NAIL, INITIAL ENCOUNTER: Primary | ICD-10-CM

## 2018-05-31 PROCEDURE — 12002 RPR S/N/AX/GEN/TRNK2.6-7.5CM: CPT | Mod: F4

## 2018-05-31 PROCEDURE — 90471 IMMUNIZATION ADMIN: CPT | Performed by: REGISTERED NURSE

## 2018-05-31 PROCEDURE — 63600175 PHARM REV CODE 636 W HCPCS: Performed by: REGISTERED NURSE

## 2018-05-31 PROCEDURE — 25000003 PHARM REV CODE 250: Performed by: REGISTERED NURSE

## 2018-05-31 PROCEDURE — 99283 EMERGENCY DEPT VISIT LOW MDM: CPT | Mod: 25

## 2018-05-31 PROCEDURE — 90715 TDAP VACCINE 7 YRS/> IM: CPT | Performed by: REGISTERED NURSE

## 2018-05-31 PROCEDURE — S0020 INJECTION, BUPIVICAINE HYDRO: HCPCS | Performed by: REGISTERED NURSE

## 2018-05-31 RX ORDER — BUPIVACAINE HYDROCHLORIDE 5 MG/ML
5 INJECTION, SOLUTION EPIDURAL; INTRACAUDAL
Status: COMPLETED | OUTPATIENT
Start: 2018-05-31 | End: 2018-05-31

## 2018-05-31 RX ORDER — HYDROCODONE BITARTRATE AND ACETAMINOPHEN 5; 325 MG/1; MG/1
1 TABLET ORAL EVERY 4 HOURS PRN
Qty: 18 TABLET | Refills: 0 | Status: SHIPPED | OUTPATIENT
Start: 2018-05-31 | End: 2019-02-21

## 2018-05-31 RX ORDER — CEPHALEXIN 500 MG/1
500 CAPSULE ORAL 4 TIMES DAILY
Qty: 20 CAPSULE | Refills: 0 | Status: SHIPPED | OUTPATIENT
Start: 2018-05-31 | End: 2018-06-05

## 2018-05-31 RX ORDER — LIDOCAINE HYDROCHLORIDE 10 MG/ML
5 INJECTION, SOLUTION EPIDURAL; INFILTRATION; INTRACAUDAL; PERINEURAL
Status: COMPLETED | OUTPATIENT
Start: 2018-05-31 | End: 2018-05-31

## 2018-05-31 RX ADMIN — BUPIVACAINE HYDROCHLORIDE 25 MG: 5 INJECTION, SOLUTION EPIDURAL; INTRACAUDAL; PERINEURAL at 04:05

## 2018-05-31 RX ADMIN — CLOSTRIDIUM TETANI TOXOID ANTIGEN (FORMALDEHYDE INACTIVATED), CORYNEBACTERIUM DIPHTHERIAE TOXOID ANTIGEN (FORMALDEHYDE INACTIVATED), BORDETELLA PERTUSSIS TOXOID ANTIGEN (GLUTARALDEHYDE INACTIVATED), BORDETELLA PERTUSSIS FILAMENTOUS HEMAGGLUTININ ANTIGEN (FORMALDEHYDE INACTIVATED), BORDETELLA PERTUSSIS PERTACTIN ANTIGEN, AND BORDETELLA PERTUSSIS FIMBRIAE 2/3 ANTIGEN 0.5 ML: 5; 2; 2.5; 5; 3; 5 INJECTION, SUSPENSION INTRAMUSCULAR at 04:05

## 2018-05-31 RX ADMIN — LIDOCAINE HYDROCHLORIDE 50 MG: 10 INJECTION, SOLUTION EPIDURAL; INFILTRATION; INTRACAUDAL; PERINEURAL at 04:05

## 2018-05-31 NOTE — ED NOTES
Wound cleaned w/ NS large bandaid applied and sven frog splint applied as directed by Dominic FATIMA

## 2018-05-31 NOTE — ED PROVIDER NOTES
SCRIBE #1 NOTE: I, Lelia Martinez, am scribing for, and in the presence of, Dominic Pérez NP. I have scribed the entire note.      History      Chief Complaint   Patient presents with    Finger Injury     Left 5th finger on chop saw.  Referred from urgent care       Review of patient's allergies indicates:  No Known Allergies     HPI   HPI    5/31/2018, 4:15 PM   History obtained from the patient      History of Present Illness: Harpreet Perez is a 67 y.o. male patient who presents to the Emergency Department for injury to L 5th finger which onset suddenly 1 hour PTA. Symptoms are constant and moderate in severity. Pt reports cutting his L 5th finger on a chop saw. Pt reports going to Urgent Care for sxs but was referred to ED. No mitigating or exacerbating factors reported. Pt reports not being UTD on tetanus shot. No associated sxs at this time. Patient denies any CP, SOB, fever, chills, n/v/d, numbness/weakness, dizziness, and all other sxs at this time. No prior Tx. No further complaints or concerns at this time.       Arrival mode: Personal vehicle    PCP: Srinivas Zabala MD       Past Medical History:  Past Medical History:   Diagnosis Date    Alcohol dependence     Back pain     History of colonic polyps     Hypertrophy of prostate without urinary obstruction and other lower urinary tract symptoms (LUTS)     Osteoarthritis of multiple joints     Tobacco dependence     quit then restarted       Past Surgical History:  Past Surgical History:   Procedure Laterality Date    COLONOSCOPY N/A 5/25/2016    Procedure: COLONOSCOPY;  Surgeon: Sergey Jensen MD;  Location: [facility];  Service: Endoscopy;  Laterality: N/A;    LIPOMA RESECTION Right 8/17/12    posterior shoulder         Family History:  Family History   Problem Relation Age of Onset    Stroke Father     Heart disease Brother 35        MI    Heart disease Other 33        MI    Hyperlipidemia Brother     Hypertension Brother     Hypertension  Brother     Heart disease Brother         cardiac stents    Kidney disease Other         dialysis    Cataracts Mother     Cataracts Sister     Alcohol abuse Neg Hx     Asthma Neg Hx     Birth defects Neg Hx     Cancer Neg Hx     COPD Neg Hx     Depression Neg Hx     Diabetes Neg Hx     Drug abuse Neg Hx     Mental retardation Neg Hx     Mental illness Neg Hx     Learning disabilities Neg Hx     Miscarriages / Stillbirths Neg Hx     Vision loss Neg Hx        Social History:  Social History     Social History Main Topics    Smoking status: Current Every Day Smoker     Packs/day: 1.00     Years: 35.00     Types: Cigarettes     Start date: 1/1/1979     Last attempt to quit: 11/27/2014    Smokeless tobacco: Never Used      Comment: resarted 2 weeks ago after being quit x 6 months. wife also smkes     Alcohol use 12.0 oz/week     20 Cans of beer per week      Comment: case per week or up to 30 per week    Drug use: No    Sexual activity: Yes     Partners: Female       ROS   Review of Systems   Constitutional: Negative for chills and fever.   HENT: Negative for sore throat.    Respiratory: Negative for shortness of breath.    Cardiovascular: Negative for chest pain.   Gastrointestinal: Negative for abdominal pain, diarrhea, nausea and vomiting.   Genitourinary: Negative for dysuria.   Musculoskeletal: Negative for back pain and neck pain.   Skin: Negative for rash.        (+) Laceration to L 5th finger   Neurological: Negative for dizziness, weakness, numbness and headaches.   Hematological: Does not bruise/bleed easily.   All other systems reviewed and are negative.      Physical Exam      Initial Vitals [05/31/18 1603]   BP Pulse Resp Temp SpO2   (!) 160/88 (!) 58 16 98.8 °F (37.1 °C) 95 %      MAP       112          Physical Exam  Nursing Notes and Vital Signs Reviewed.  Constitutional: Patient is in no acute distress. Well-developed and well-nourished.  Head: Atraumatic. Normocephalic.  Eyes:  PERRL. EOM intact. Conjunctivae are not pale. No scleral icterus.  ENT: Mucous membranes are moist. Oropharynx is clear and symmetric.    Neck: Supple. Full ROM. No lymphadenopathy.  Cardiovascular: Regular rate. Regular rhythm. No murmurs, rubs, or gallops. Distal pulses are 2+ and symmetric.  Pulmonary/Chest: No respiratory distress. Clear to auscultation bilaterally. No wheezing or rales.  Abdominal: Soft and non-distended.  There is no tenderness.  No rebound, guarding, or rigidity. Good bowel sounds.  Genitourinary: No CVA tenderness  Musculoskeletal: Moves all extremities. No obvious deformities. No edema. No calf tenderness.  Left Hand: No obvious deformity. 4 cm laceration to L 5th digit just over PIP on dorsum of finger. Full ROM of finger. Full flexion and extension of the wrist. Radial, median, and ulnar nerves are intact. Radial and ulnar pulses are 2+. Normal capillary refill.  Distal sensation is intact.  Skin: Warm and dry.  Neurological:  Alert, awake, and appropriate.  Normal speech.  No acute focal neurological deficits are appreciated.  Psychiatric: Normal affect. Good eye contact. Appropriate in content.    ED Course    Lac Repair  Date/Time: 5/31/2018 5:47 PM  Performed by: CHARIS MAE JR  Authorized by: DEVON MEDINA   Body area: upper extremity  Location details: left small finger  Laceration length: 4 cm  Foreign bodies: no foreign bodies  Tendon involvement: none  Nerve involvement: none  Vascular damage: no  Anesthesia: digital block    Anesthesia:  Local Anesthetic: lidocaine 1% without epinephrine  Patient sedated: no  Preparation: Patient was prepped and draped in the usual sterile fashion.  Irrigation solution: saline  Irrigation method: syringe  Amount of cleaning: standard  Debridement: none  Degree of undermining: none  Skin closure: Ethilon  Number of sutures: 7  Technique: simple  Approximation: close  Approximation difficulty: simple  Dressing: splint for  protection and non-stick sterile dressing  Patient tolerance: Patient tolerated the procedure well with no immediate complications        ED Vital Signs:  Vitals:    05/31/18 1603   BP: (!) 160/88   Pulse: (!) 58   Resp: 16   Temp: 98.8 °F (37.1 °C)   TempSrc: Oral   SpO2: 95%   Weight: 86 kg (189 lb 9.5 oz)   Height: 6' (1.829 m)         Imaging Results:  Imaging Results          X-Ray Finger 2 or More Views Left (Final result)  Result time 05/31/18 17:16:58    Final result by Spring Power MD (Timothy) (05/31/18 17:16:58)                 Impression:      No fractures.      Electronically signed by: Spring Power MD  Date:    05/31/2018  Time:    17:16             Narrative:    EXAMINATION:  XR FINGER 2 OR MORE VIEWS LEFT    CLINICAL HISTORY:  laceration finger;    COMPARISON:  None    FINDINGS:  No bony abnormalities.  No radiopaque foreign body.  There appears to be a laceration involving the soft tissues at the level of the middle phalanx of the left 5th finger.                                        The Emergency Provider reviewed the vital signs and test results, which are outlined above.    ED Discussion     5:49 PM: Reassessed pt at this time. Discussed with pt all pertinent ED information and results. Discussed pt dx and plan of tx. Gave pt all f/u and return to the ED instructions. All questions and concerns were addressed at this time. Pt expresses understanding of information and instructions, and is comfortable with plan to discharge. Pt is stable for discharge.    I discussed wound care precautions with patient and/or family/caretaker; specifically that all wounds have risk of infection despite efforts to cleanse and debride the wound; and there is a risk of an occult foreign body (and thus increased risk of infection) despite a negative examination.  I discussed with patient need to return for any signs of infection, specifically redness, increased pain, fever, drainage of pus, or any concern,  immediately.        ED Medication(s):  Medications   lidocaine (PF) 10 mg/ml (1%) injection 50 mg (50 mg Infiltration Given 5/31/18 1633)   bupivacaine (PF) injection 25 mg (25 mg Subcutaneous Given 5/31/18 1630)   Tdap vaccine injection 0.5 mL (0.5 mLs Intramuscular Given 5/31/18 1630)       Discharge Medication List as of 5/31/2018  5:53 PM      START taking these medications    Details   cephALEXin (KEFLEX) 500 MG capsule Take 1 capsule (500 mg total) by mouth 4 (four) times daily., Starting Thu 5/31/2018, Until Tue 6/5/2018, Print      HYDROcodone-acetaminophen (NORCO) 5-325 mg per tablet Take 1 tablet by mouth every 4 (four) hours as needed for Pain., Starting Thu 5/31/2018, Print             Follow-up Information     Srinivas Zabala MD In 10 days.    Specialty:  Internal Medicine  Why:  For suture removal  Contact information:  [address] [facility]  [address]  [place] [id number] [phone]                     Medical Decision Making    Medical Decision Making:   Clinical Tests:   Radiological Study: Ordered and Reviewed           Scribe Attestation:   Scribe #1: I performed the above scribed service and the documentation accurately describes the services I performed. I attest to the accuracy of the note.    Attending:   Physician Attestation Statement for Scribe #1: I, Dominic Pérez NP, personally performed the services described in this documentation, as scribed by Lelia Martinez, in my presence, and it is both accurate and complete.          Clinical Impression       ICD-10-CM ICD-9-CM   1. Laceration of left little finger without foreign body without damage to nail, initial encounter S61.217A 883.0       Disposition:   Disposition: Discharged  Condition: Stable         Dominic Pérez Jr., FNP  05/31/18 2101

## 2018-06-13 ENCOUNTER — OFFICE VISIT (OUTPATIENT)
Dept: INTERNAL MEDICINE | Facility: CLINIC | Age: 68
End: 2018-06-13
Payer: MEDICARE

## 2018-06-13 VITALS
SYSTOLIC BLOOD PRESSURE: 142 MMHG | HEART RATE: 50 BPM | TEMPERATURE: 99 F | BODY MASS INDEX: 24.13 KG/M2 | DIASTOLIC BLOOD PRESSURE: 84 MMHG | WEIGHT: 178.13 LBS | OXYGEN SATURATION: 98 % | HEIGHT: 72 IN

## 2018-06-13 DIAGNOSIS — S61.211A LACERATION OF LEFT INDEX FINGER WITHOUT FOREIGN BODY WITHOUT DAMAGE TO NAIL, INITIAL ENCOUNTER: Primary | ICD-10-CM

## 2018-06-13 PROCEDURE — 3079F DIAST BP 80-89 MM HG: CPT | Mod: CPTII,S$GLB,, | Performed by: NURSE PRACTITIONER

## 2018-06-13 PROCEDURE — 99999 PR PBB SHADOW E&M-EST. PATIENT-LVL III: CPT | Mod: PBBFAC,,, | Performed by: NURSE PRACTITIONER

## 2018-06-13 PROCEDURE — 3077F SYST BP >= 140 MM HG: CPT | Mod: CPTII,S$GLB,, | Performed by: NURSE PRACTITIONER

## 2018-06-13 PROCEDURE — 99213 OFFICE O/P EST LOW 20 MIN: CPT | Mod: S$GLB,,, | Performed by: NURSE PRACTITIONER

## 2018-06-13 NOTE — PROGRESS NOTES
"Subjective:      Patient ID: Harpreet Perez is a 67 y.o. male.    Chief Complaint: Suture / Staple Removal    HPI:  Patient was seen in the ER on 5/31 for a left index finger laceration from a chop saw required 7 stitches.  He is here today to remove the stitches.  Says he has been keeping it clean, he finished the antibiotics, he had a tetanus shot in the ER    Past Medical History:   Diagnosis Date    Alcohol dependence     Back pain     History of colonic polyps     Hypertrophy of prostate without urinary obstruction and other lower urinary tract symptoms (LUTS)     Osteoarthritis of multiple joints     Tobacco dependence     quit then restarted       Past Surgical History:   Procedure Laterality Date    COLONOSCOPY N/A 5/25/2016    Procedure: COLONOSCOPY;  Surgeon: Sergey Jensen MD;  Location: [facility];  Service: Endoscopy;  Laterality: N/A;    LIPOMA RESECTION Right 8/17/12    posterior shoulder       Lab Results   Component Value Date    WBC 4.92 02/21/2018    HGB 13.9 (L) 02/21/2018    HCT 41.4 02/21/2018     02/21/2018    CHOL 185 02/21/2018    TRIG 57 02/21/2018    HDL 75 02/21/2018    ALT 16 02/21/2018    AST 21 02/21/2018     02/21/2018    K 4.2 02/21/2018     02/21/2018    CREATININE 1.0 02/21/2018    BUN 13 02/21/2018    CO2 27 02/21/2018    TSH 0.975 02/21/2018    PSA 1.6 02/21/2018       BP (!) 142/84 (BP Location: Left arm, Patient Position: Sitting, BP Method: Medium (Manual))   Pulse (!) 50   Temp 98.6 °F (37 °C) (Tympanic)   Ht 5' 11.5" (1.816 m)   Wt 80.8 kg (178 lb 2.1 oz)   SpO2 98%   BMI 24.50 kg/m²       Review of Systems   Constitutional: Negative for appetite change, chills, diaphoresis and fever.   HENT: Negative for congestion, ear pain, postnasal drip, rhinorrhea, sneezing, sore throat and trouble swallowing.    Eyes: Negative for photophobia, pain and visual disturbance.   Respiratory: Negative for apnea, cough, choking, chest tightness, shortness of " breath and wheezing.    Cardiovascular: Negative for chest pain, palpitations and leg swelling.   Gastrointestinal: Negative for abdominal pain, constipation, diarrhea, nausea and vomiting.   Genitourinary: Negative for decreased urine volume, difficulty urinating, dysuria, hematuria and urgency.   Musculoskeletal: Negative for arthralgias, gait problem, joint swelling and myalgias.   Skin: Positive for wound. Negative for rash.   Neurological: Negative for dizziness, tremors, seizures, syncope, weakness, light-headedness, numbness and headaches.   Psychiatric/Behavioral: Negative for agitation, confusion, decreased concentration, hallucinations and sleep disturbance. The patient is not nervous/anxious.       Objective:     Physical Exam   Constitutional: He is oriented to person, place, and time. He appears well-developed and well-nourished. No distress.   Musculoskeletal:   Normal gait   Neurological: He is alert and oriented to person, place, and time.   Skin: Skin is warm and dry.   Left index finger 7 stitches easily removed, 2 steri-strips applied, dressed and finger immobilizer reapplied   Psychiatric: He has a normal mood and affect. His behavior is normal.     Assessment:      1. Laceration of left index finger without foreign body without damage to nail, initial encounter      Plan:   Laceration of left index finger without foreign body without damage to nail, initial encounter    instructed to let the steri-strips fall off on their own, keep clean      Current Outpatient Prescriptions:     tamsulosin (FLOMAX) 0.4 mg Cp24, Take 1 capsule (0.4 mg total) by mouth once daily., Disp: 90 capsule, Rfl: 3    HYDROcodone-acetaminophen (NORCO) 5-325 mg per tablet, Take 1 tablet by mouth every 4 (four) hours as needed for Pain., Disp: 18 tablet, Rfl: 0

## 2018-08-28 ENCOUNTER — OFFICE VISIT (OUTPATIENT)
Dept: INTERNAL MEDICINE | Facility: CLINIC | Age: 68
End: 2018-08-28
Payer: MEDICARE

## 2018-08-28 VITALS
RESPIRATION RATE: 16 BRPM | DIASTOLIC BLOOD PRESSURE: 70 MMHG | WEIGHT: 183 LBS | SYSTOLIC BLOOD PRESSURE: 130 MMHG | BODY MASS INDEX: 25.62 KG/M2 | OXYGEN SATURATION: 99 % | TEMPERATURE: 98 F | HEIGHT: 71 IN

## 2018-08-28 DIAGNOSIS — I77.1 TORTUOUS AORTA: ICD-10-CM

## 2018-08-28 DIAGNOSIS — Z00.00 ROUTINE GENERAL MEDICAL EXAMINATION AT A HEALTH CARE FACILITY: Primary | ICD-10-CM

## 2018-08-28 DIAGNOSIS — M15.9 PRIMARY OSTEOARTHRITIS INVOLVING MULTIPLE JOINTS: ICD-10-CM

## 2018-08-28 DIAGNOSIS — F10.20 UNCOMPLICATED ALCOHOL DEPENDENCE: Chronic | ICD-10-CM

## 2018-08-28 DIAGNOSIS — M47.816 SPONDYLOSIS OF LUMBAR REGION WITHOUT MYELOPATHY OR RADICULOPATHY: ICD-10-CM

## 2018-08-28 DIAGNOSIS — Z86.010 HISTORY OF COLONIC POLYPS: ICD-10-CM

## 2018-08-28 PROCEDURE — 99397 PER PM REEVAL EST PAT 65+ YR: CPT | Mod: S$GLB,,, | Performed by: INTERNAL MEDICINE

## 2018-08-28 PROCEDURE — 3075F SYST BP GE 130 - 139MM HG: CPT | Mod: CPTII,S$GLB,, | Performed by: INTERNAL MEDICINE

## 2018-08-28 PROCEDURE — 99999 PR PBB SHADOW E&M-EST. PATIENT-LVL III: CPT | Mod: PBBFAC,,, | Performed by: INTERNAL MEDICINE

## 2018-08-28 PROCEDURE — 3078F DIAST BP <80 MM HG: CPT | Mod: CPTII,S$GLB,, | Performed by: INTERNAL MEDICINE

## 2018-08-28 RX ORDER — TAMSULOSIN HYDROCHLORIDE 0.4 MG/1
0.4 CAPSULE ORAL DAILY
Qty: 90 CAPSULE | Refills: 3 | Status: SHIPPED | OUTPATIENT
Start: 2018-08-28 | End: 2019-06-03 | Stop reason: SDUPTHER

## 2018-08-28 NOTE — PROGRESS NOTES
"HPI:  Patient is a 67-year-old man who comes today for his annual physical.  Patient is been doing well.  He reports no problems or complaints.      Current MEDS: medcard review, verified and update  Allergies: Per the electronic medical record    Past Medical History:   Diagnosis Date    Alcohol dependence     Back pain     History of colonic polyps     Hypertrophy of prostate without urinary obstruction and other lower urinary tract symptoms (LUTS)     Osteoarthritis of multiple joints     Tobacco dependence     quit then restarted       Past Surgical History:   Procedure Laterality Date    LIPOMA RESECTION Right 8/17/12    posterior shoulder       SHx: per the electronic medical record    FHx: recorded in the electronic medical record    ROS:    denies any chest pains or shortness of breath. Denies any nausea, vomiting or diarrhea. Denies any fever, chills or sweats. Denies any change in weight, voice, stool, skin or hair. Denies any dysuria, dyspepsia or dysphagia. Denies any change in vision, hearing or headaches. Denies any swollen lymph nodes or loss of memory.    PE:  /70   Temp 97.7 °F (36.5 °C)   Resp 16   Ht 5' 11" (1.803 m)   Wt 83 kg (182 lb 15.7 oz)   SpO2 99%   BMI 25.52 kg/m²   Gen: Well-developed, well-nourished, male, in no acute distress, oriented x3  HEENT: neck is supple, no adenopathy, carotids 2+ equal without bruits, thyroid exam normal size without nodules.  CHEST: clear to auscultation and percussion  CVS: regular rate and rhythm without significant murmur, gallop, or rubs  ABD: soft, benign, no rebound no guarding, no distention.  Bowel sounds are normal.     nontender.  No palpable masses.  No organomegaly and no audible bruits.  RECTAL: no masses.  Prostate  30 Grams without nodules.  EXT: no clubbing, cyanosis, or edema  LYMPH: no cervical, inguinal, or axillary adenopathy  FEET: no loss of sensation.  No ulcers or pressure sores.  NEURO: gait normal.  Cranial nerves " II- XII intact. No nystagmus.  Speech normal.   Gross motor and sensory unremarkable.    Lab Results   Component Value Date    WBC 4.92 02/21/2018    HGB 13.9 (L) 02/21/2018    HCT 41.4 02/21/2018     02/21/2018    CHOL 185 02/21/2018    TRIG 57 02/21/2018    HDL 75 02/21/2018    ALT 16 02/21/2018    AST 21 02/21/2018     02/21/2018    K 4.2 02/21/2018     02/21/2018    CREATININE 1.0 02/21/2018    BUN 13 02/21/2018    CO2 27 02/21/2018    TSH 0.975 02/21/2018    PSA 1.6 02/21/2018       Impression:  Multiple problems below, stable  Patient Active Problem List   Diagnosis    Benign prostatic hyperplasia without lower urinary tract symptoms    Tobacco abuse    Alcohol dependence    Spondylosis of lumbar region without myelopathy or radiculopathy    Osteoarthritis of multiple joints    DDD (degenerative disc disease), lumbar    History of colonic polyps    Tortuous aorta       Plan:   Orders Placed This Encounter    tamsulosin (FLOMAX) 0.4 mg Cap     Medications remain the same.  He will be seen again in 1 year.  He has been encouraged to quit smoking.  He already has markedly cut back on his alcohol.  He will see me otherwise as needed.

## 2018-09-24 ENCOUNTER — OFFICE VISIT (OUTPATIENT)
Dept: INTERNAL MEDICINE | Facility: CLINIC | Age: 68
End: 2018-09-24
Payer: MEDICARE

## 2018-09-24 VITALS
DIASTOLIC BLOOD PRESSURE: 82 MMHG | SYSTOLIC BLOOD PRESSURE: 148 MMHG | OXYGEN SATURATION: 98 % | TEMPERATURE: 97 F | HEART RATE: 55 BPM | WEIGHT: 184.31 LBS | HEIGHT: 71 IN | BODY MASS INDEX: 25.8 KG/M2

## 2018-09-24 DIAGNOSIS — L30.9 DERMATITIS: Primary | ICD-10-CM

## 2018-09-24 PROCEDURE — 1101F PT FALLS ASSESS-DOCD LE1/YR: CPT | Mod: CPTII,,, | Performed by: NURSE PRACTITIONER

## 2018-09-24 PROCEDURE — 96372 THER/PROPH/DIAG INJ SC/IM: CPT | Mod: PBBFAC,PO

## 2018-09-24 PROCEDURE — 99213 OFFICE O/P EST LOW 20 MIN: CPT | Mod: PBBFAC,PO | Performed by: NURSE PRACTITIONER

## 2018-09-24 PROCEDURE — 99999 PR PBB SHADOW E&M-EST. PATIENT-LVL III: CPT | Mod: PBBFAC,,, | Performed by: NURSE PRACTITIONER

## 2018-09-24 PROCEDURE — 99213 OFFICE O/P EST LOW 20 MIN: CPT | Mod: 25,S$PBB,, | Performed by: NURSE PRACTITIONER

## 2018-09-24 PROCEDURE — 3077F SYST BP >= 140 MM HG: CPT | Mod: CPTII,,, | Performed by: NURSE PRACTITIONER

## 2018-09-24 PROCEDURE — 3079F DIAST BP 80-89 MM HG: CPT | Mod: CPTII,,, | Performed by: NURSE PRACTITIONER

## 2018-09-24 RX ORDER — HYDROXYZINE PAMOATE 25 MG/1
25 CAPSULE ORAL NIGHTLY PRN
Qty: 30 CAPSULE | Refills: 0 | Status: SHIPPED | OUTPATIENT
Start: 2018-09-24 | End: 2019-02-21

## 2018-09-24 RX ORDER — METHYLPREDNISOLONE ACETATE 80 MG/ML
80 INJECTION, SUSPENSION INTRA-ARTICULAR; INTRALESIONAL; INTRAMUSCULAR; SOFT TISSUE
Status: COMPLETED | OUTPATIENT
Start: 2018-09-24 | End: 2018-09-24

## 2018-09-24 RX ORDER — METHYLPREDNISOLONE 4 MG/1
TABLET ORAL
Qty: 1 PACKAGE | Refills: 0 | Status: SHIPPED | OUTPATIENT
Start: 2018-09-24 | End: 2018-10-15

## 2018-09-24 RX ADMIN — METHYLPREDNISOLONE ACETATE 80 MG: 80 INJECTION, SUSPENSION INTRALESIONAL; INTRAMUSCULAR; INTRASYNOVIAL; SOFT TISSUE at 08:09

## 2018-09-24 NOTE — PROGRESS NOTES
Depo-Medrol 80 mg/ml IM left ventrogluteal.  [place] [facility] [place] [id number].  Pt advised to wait in clinic 15 minutes to monitor for side effects.  Pt voiced understanding and tolerated injection well.

## 2018-09-24 NOTE — PROGRESS NOTES
"Subjective:      Patient ID: Harpreet Perez is a 67 y.o. male.    Chief Complaint: Rash (poss poision oak/ivy)    HPI:  Patient states about 3 days ago he was cleaning out his neighbor's yard, pulling kelly and weeds.  The next day noticed a rash to both arms, has been itching.  He says he has been trying not to scratch it, has seen a clear drainage from some of them.  Has not taken anything OTC for this.      Past Medical History:   Diagnosis Date    Alcohol dependence     Back pain     History of colonic polyps     Hypertrophy of prostate without urinary obstruction and other lower urinary tract symptoms (LUTS)     Osteoarthritis of multiple joints     Tobacco dependence     quit then restarted       Past Surgical History:   Procedure Laterality Date    COLONOSCOPY N/A 5/25/2016    Procedure: COLONOSCOPY;  Surgeon: Sergey Jensen MD;  Location: [facility] ENDO;  Service: Endoscopy;  Laterality: N/A;    COLONOSCOPY N/A 5/25/2016    Performed by Sergey Jensen MD at [facility] ENDO    LIPOMA RESECTION Right 8/17/12    posterior shoulder       Lab Results   Component Value Date    WBC 4.92 02/21/2018    HGB 13.9 (L) 02/21/2018    HCT 41.4 02/21/2018     02/21/2018    CHOL 185 02/21/2018    TRIG 57 02/21/2018    HDL 75 02/21/2018    ALT 16 02/21/2018    AST 21 02/21/2018     02/21/2018    K 4.2 02/21/2018     02/21/2018    CREATININE 1.0 02/21/2018    BUN 13 02/21/2018    CO2 27 02/21/2018    TSH 0.975 02/21/2018    PSA 1.6 02/21/2018       BP (!) 148/82 (BP Location: Right arm, Patient Position: Sitting, BP Method: Medium (Manual))   Pulse (!) 55   Temp 97.4 °F (36.3 °C) (Tympanic)   Ht 5' 10.5" (1.791 m)   Wt 83.6 kg (184 lb 4.9 oz)   SpO2 98%   BMI 26.07 kg/m²       Review of Systems   Constitutional: Negative for appetite change, chills, diaphoresis and fever.   HENT: Negative for congestion, ear pain, postnasal drip, rhinorrhea, sneezing, sore throat and trouble swallowing.    Eyes: Negative for " photophobia, pain and visual disturbance.   Respiratory: Negative for apnea, cough, choking, chest tightness, shortness of breath and wheezing.    Cardiovascular: Negative for chest pain, palpitations and leg swelling.   Gastrointestinal: Negative for abdominal pain, constipation, diarrhea, nausea and vomiting.   Genitourinary: Negative for decreased urine volume, difficulty urinating, dysuria, hematuria and urgency.   Musculoskeletal: Negative for arthralgias, gait problem, joint swelling and myalgias.   Skin: Positive for rash.   Neurological: Negative for dizziness, tremors, seizures, syncope, weakness, light-headedness, numbness and headaches.   Psychiatric/Behavioral: Negative for agitation, confusion, decreased concentration, hallucinations and sleep disturbance. The patient is not nervous/anxious.       Objective:     Physical Exam   Constitutional: He is oriented to person, place, and time. He appears well-developed and well-nourished. No distress.   Musculoskeletal:   Normal gait   Neurological: He is alert and oriented to person, place, and time.   Skin: Skin is warm and dry. Rash noted.   Red papular rash, some linear patterns noted to bilateral arms, no rash elsewhere on body, some clear drainage noted   Psychiatric: He has a normal mood and affect. His behavior is normal.     Assessment:      1. Dermatitis      Plan:   Dermatitis    Other orders  -     methylPREDNISolone acetate injection 80 mg; Inject 1 mL (80 mg total) into the muscle one time.  -     methylPREDNISolone (MEDROL DOSEPACK) 4 mg tablet; use as directed  Dispense: 1 Package; Refill: 0  -     hydrOXYzine pamoate (VISTARIL) 25 MG Cap; Take 1 capsule (25 mg total) by mouth nightly as needed.  Dispense: 30 capsule; Refill: 0    most likely plant-base allergic reaction.  Start pack tomorrow, voiced understanding.  Vistaril at night.  Cool compresses, wear long sleeve thin shirts to stop scratching, can spread, good handwashing      Current  Outpatient Medications:     HYDROcodone-acetaminophen (NORCO) 5-325 mg per tablet, Take 1 tablet by mouth every 4 (four) hours as needed for Pain., Disp: 18 tablet, Rfl: 0    tamsulosin (FLOMAX) 0.4 mg Cap, Take 1 capsule (0.4 mg total) by mouth once daily., Disp: 90 capsule, Rfl: 3    hydrOXYzine pamoate (VISTARIL) 25 MG Cap, Take 1 capsule (25 mg total) by mouth nightly as needed., Disp: 30 capsule, Rfl: 0    methylPREDNISolone (MEDROL DOSEPACK) 4 mg tablet, use as directed, Disp: 1 Package, Rfl: 0  No current facility-administered medications for this visit.

## 2018-10-24 ENCOUNTER — OFFICE VISIT (OUTPATIENT)
Dept: INTERNAL MEDICINE | Facility: CLINIC | Age: 68
End: 2018-10-24
Payer: MEDICARE

## 2018-10-24 VITALS
SYSTOLIC BLOOD PRESSURE: 140 MMHG | HEART RATE: 71 BPM | RESPIRATION RATE: 16 BRPM | HEIGHT: 71 IN | WEIGHT: 180.75 LBS | TEMPERATURE: 97 F | OXYGEN SATURATION: 99 % | BODY MASS INDEX: 25.31 KG/M2 | DIASTOLIC BLOOD PRESSURE: 80 MMHG

## 2018-10-24 DIAGNOSIS — M15.9 PRIMARY OSTEOARTHRITIS INVOLVING MULTIPLE JOINTS: Primary | ICD-10-CM

## 2018-10-24 PROCEDURE — 99999 PR PBB SHADOW E&M-EST. PATIENT-LVL III: CPT | Mod: PBBFAC,,, | Performed by: INTERNAL MEDICINE

## 2018-10-24 PROCEDURE — 90662 IIV NO PRSV INCREASED AG IM: CPT | Mod: PBBFAC,PO

## 2018-10-24 PROCEDURE — 3077F SYST BP >= 140 MM HG: CPT | Mod: CPTII,,, | Performed by: INTERNAL MEDICINE

## 2018-10-24 PROCEDURE — 3079F DIAST BP 80-89 MM HG: CPT | Mod: CPTII,,, | Performed by: INTERNAL MEDICINE

## 2018-10-24 PROCEDURE — 1101F PT FALLS ASSESS-DOCD LE1/YR: CPT | Mod: CPTII,,, | Performed by: INTERNAL MEDICINE

## 2018-10-24 PROCEDURE — 99213 OFFICE O/P EST LOW 20 MIN: CPT | Mod: PBBFAC,PO | Performed by: INTERNAL MEDICINE

## 2018-10-24 PROCEDURE — 99213 OFFICE O/P EST LOW 20 MIN: CPT | Mod: 25,S$PBB,, | Performed by: INTERNAL MEDICINE

## 2018-10-24 RX ORDER — MELOXICAM 15 MG/1
15 TABLET ORAL DAILY
Qty: 30 TABLET | Refills: 11 | Status: SHIPPED | OUTPATIENT
Start: 2018-10-24 | End: 2019-02-21

## 2018-10-24 NOTE — PROGRESS NOTES
"HPI:  Patient is a 67-year-old man who comes today for complaints of pain in his right wrist.  He states has been a long-term problem.  He is not taking anything for it.  He denies any trauma or currently are in the past to his wrist.    Current meds have been verified and updated per the EMR  Exam:BP (!) 140/80   Pulse 71   Temp 97.1 °F (36.2 °C)   Resp 16   Ht 5' 11" (1.803 m)   Wt 82 kg (180 lb 12.4 oz)   SpO2 99%   BMI 25.21 kg/m²   He has significant loss of range of motion in the wrist in all directions.  There is no warmth, erythema, or swelling in the wrist.    Lab Results   Component Value Date    WBC 4.92 02/21/2018    HGB 13.9 (L) 02/21/2018    HCT 41.4 02/21/2018     02/21/2018    CHOL 185 02/21/2018    TRIG 57 02/21/2018    HDL 75 02/21/2018    ALT 16 02/21/2018    AST 21 02/21/2018     02/21/2018    K 4.2 02/21/2018     02/21/2018    CREATININE 1.0 02/21/2018    BUN 13 02/21/2018    CO2 27 02/21/2018    TSH 0.975 02/21/2018    PSA 1.6 02/21/2018       Impression:  Significant osteoarthritis of the wrist  Patient Active Problem List   Diagnosis    Benign prostatic hyperplasia without lower urinary tract symptoms    Tobacco abuse    Alcohol dependence    Spondylosis of lumbar region without myelopathy or radiculopathy    Osteoarthritis of multiple joints    DDD (degenerative disc disease), lumbar    History of colonic polyps    Tortuous aorta       Plan:  Orders Placed This Encounter    Influenza - High Dose (65+) (PF) (IM)    meloxicam (MOBIC) 15 MG tablet     Patient was given some meloxicam to take once a day.  Patient was given high-dose influenza vaccine today.  He will see me at his regular scheduled appointment    "

## 2019-02-21 ENCOUNTER — OFFICE VISIT (OUTPATIENT)
Dept: INTERNAL MEDICINE | Facility: CLINIC | Age: 69
End: 2019-02-21
Payer: MEDICARE

## 2019-02-21 VITALS
DIASTOLIC BLOOD PRESSURE: 70 MMHG | SYSTOLIC BLOOD PRESSURE: 116 MMHG | RESPIRATION RATE: 16 BRPM | OXYGEN SATURATION: 99 % | WEIGHT: 185.88 LBS | HEIGHT: 71 IN | BODY MASS INDEX: 26.02 KG/M2 | HEART RATE: 53 BPM | TEMPERATURE: 98 F

## 2019-02-21 DIAGNOSIS — N40.0 BENIGN PROSTATIC HYPERPLASIA WITHOUT LOWER URINARY TRACT SYMPTOMS: Chronic | ICD-10-CM

## 2019-02-21 DIAGNOSIS — Z12.9 SCREENING FOR CANCER: ICD-10-CM

## 2019-02-21 DIAGNOSIS — M15.9 PRIMARY OSTEOARTHRITIS INVOLVING MULTIPLE JOINTS: ICD-10-CM

## 2019-02-21 DIAGNOSIS — Z87.891 PERSONAL HISTORY OF NICOTINE DEPENDENCE: ICD-10-CM

## 2019-02-21 DIAGNOSIS — Z72.0 TOBACCO USE: ICD-10-CM

## 2019-02-21 DIAGNOSIS — Z00.00 ENCOUNTER FOR PREVENTIVE HEALTH EXAMINATION: Primary | ICD-10-CM

## 2019-02-21 DIAGNOSIS — M51.36 DDD (DEGENERATIVE DISC DISEASE), LUMBAR: ICD-10-CM

## 2019-02-21 DIAGNOSIS — Z72.0 TOBACCO ABUSE: Chronic | ICD-10-CM

## 2019-02-21 DIAGNOSIS — I77.1 TORTUOUS AORTA: ICD-10-CM

## 2019-02-21 PROCEDURE — 3074F PR MOST RECENT SYSTOLIC BLOOD PRESSURE < 130 MM HG: ICD-10-PCS | Mod: HCNC,CPTII,S$GLB, | Performed by: NURSE PRACTITIONER

## 2019-02-21 PROCEDURE — 99499 UNLISTED E&M SERVICE: CPT | Mod: HCNC,S$GLB,, | Performed by: NURSE PRACTITIONER

## 2019-02-21 PROCEDURE — 3074F SYST BP LT 130 MM HG: CPT | Mod: HCNC,CPTII,S$GLB, | Performed by: NURSE PRACTITIONER

## 2019-02-21 PROCEDURE — 99999 PR PBB SHADOW E&M-EST. PATIENT-LVL IV: ICD-10-PCS | Mod: PBBFAC,HCNC,, | Performed by: NURSE PRACTITIONER

## 2019-02-21 PROCEDURE — G0439 PR MEDICARE ANNUAL WELLNESS SUBSEQUENT VISIT: ICD-10-PCS | Mod: HCNC,S$GLB,, | Performed by: NURSE PRACTITIONER

## 2019-02-21 PROCEDURE — G0439 PPPS, SUBSEQ VISIT: HCPCS | Mod: HCNC,S$GLB,, | Performed by: NURSE PRACTITIONER

## 2019-02-21 PROCEDURE — 99499 RISK ADDL DX/OHS AUDIT: ICD-10-PCS | Mod: HCNC,S$GLB,, | Performed by: NURSE PRACTITIONER

## 2019-02-21 PROCEDURE — 3078F DIAST BP <80 MM HG: CPT | Mod: HCNC,CPTII,S$GLB, | Performed by: NURSE PRACTITIONER

## 2019-02-21 PROCEDURE — 99999 PR PBB SHADOW E&M-EST. PATIENT-LVL IV: CPT | Mod: PBBFAC,HCNC,, | Performed by: NURSE PRACTITIONER

## 2019-02-21 PROCEDURE — 3078F PR MOST RECENT DIASTOLIC BLOOD PRESSURE < 80 MM HG: ICD-10-PCS | Mod: HCNC,CPTII,S$GLB, | Performed by: NURSE PRACTITIONER

## 2019-02-21 NOTE — PATIENT INSTRUCTIONS
Counseling and Referral of Other Preventative  (Italic type indicates deductible and co-insurance are waived)    Patient Name: Harpreet Perez  Today's Date: 2/21/2019    Health Maintenance       Date Due Completion Date    LDCT Lung Screen 12/27/2004 ---    Pneumococcal Vaccine (65+ Low/Medium Risk) (2 of 2 - PPSV23) 12/04/2019 8/26/2016    Colonoscopy 05/25/2021 5/25/2016    Lipid Panel 02/21/2023 2/21/2018    TETANUS VACCINE 05/31/2028 5/31/2018        Orders Placed This Encounter   Procedures    CT Chest Lung Screening Low Dose     The following information is provided to all patients.  This information is to help you find resources for any of the problems found today that may be affecting your health:                Living healthy guide: www.[facility].[place].gov      Understanding Diabetes: www.diabetes.org      Eating healthy: www.cdc.gov/healthyweight      [facility] home safety checklist: www.cdc.gov/steadi/patient.html      Agency on Aging: www.goea.[place].gov      Alcoholics anonymous (AA): www.aa.org      Physical Activity: www.reanna.nih.gov/hg4drod      Tobacco use: www.quitwithusla.org

## 2019-02-21 NOTE — PROGRESS NOTES
"Harpreet Perez presented for a  Medicare AWV and comprehensive Health Risk Assessment today. The following components were reviewed and updated:    · Medical history  · Family History  · Social history  · Allergies and Current Medications  · Health Risk Assessment  · Health Maintenance  · Care Team     ** See Completed Assessments for Annual Wellness Visit within the encounter summary.**       The following assessments were completed:  · Living Situation  · CAGE  · Depression Screening  · Timed Get Up and Go  · Whisper Test  · Cognitive Function Screening  · Nutrition Screening  · ADL Screening  · PAQ Screening    Vitals:    02/21/19 0844   BP: 116/70   Pulse: (!) 53   Resp: 16   Temp: 97.6 °F (36.4 °C)   SpO2: 99%   Weight: 84.3 kg (185 lb 13.6 oz)   Height: 5' 11.26" (1.81 m)     Body mass index is 25.73 kg/m².  Physical Exam   Constitutional: He is oriented to person, place, and time. He appears well-developed and well-nourished. No distress.   HENT:   Head: Normocephalic and atraumatic.   Nose: Nose normal.   Mouth/Throat: Oropharynx is clear and moist. No oropharyngeal exudate.   Eyes: Conjunctivae are normal.   Neck: Normal range of motion. Neck supple. No JVD present. No tracheal deviation present. No thyromegaly present.   No carotid bruits   Cardiovascular: Normal rate, regular rhythm and normal heart sounds. Exam reveals no gallop and no friction rub.   No murmur heard.  Pulmonary/Chest: Effort normal and breath sounds normal. No respiratory distress. He has no wheezes. He has no rales. He exhibits no tenderness.   Abdominal: Soft. Bowel sounds are normal. He exhibits no distension and no mass. There is no tenderness. There is no rebound and no guarding. No hernia.   No abd bruits   Musculoskeletal: Normal range of motion. He exhibits no edema or tenderness.   Lymphadenopathy:     He has no cervical adenopathy.   Neurological: He is alert and oriented to person, place, and time. No cranial nerve deficit. "   Skin: Skin is warm and dry. He is not diaphoretic.   Psychiatric: He has a normal mood and affect. His behavior is normal.         Diagnoses and health risks identified today and associated recommendations/orders:    1. Screening for cancer  - CT Chest Lung Screening Low Dose; Future    2. Tobacco use  - CT Chest Lung Screening Low Dose; Future, refused smoking cessation today, says cut back to 1/2 ppd    3. Personal history of nicotine dependence   - CT Chest Lung Screening Low Dose; Future    4. Encounter for preventive health examination  Screenings performed, as noted above.  Personal preventative testing needs reviewed.     5. Tortuous aorta  Monitored/treated on meds, continue the same tx, no chest pain    6. Benign prostatic hyperplasia without lower urinary tract symptoms  Monitored/treated on meds, continue the same tx, stable    7. Primary osteoarthritis involving multiple joints  Monitored/treated on meds, continue the same tx, no c/o pain today    8. Tobacco abuse  Monitored/treated on meds, continue the same tx, advised we have several options to help quit smoking, will let us know when he is ready    9. DDD (degenerative disc disease), lumbar  Monitored/treated on meds, continue the same tx, stable, will try tumeric      Provided Harpreet with a 5-10 year written screening schedule and personal prevention plan. Recommendations were developed using the USPSTF age appropriate recommendations. Education, counseling, and referrals were provided as needed. After Visit Summary printed and given to patient which includes a list of additional screenings\tests needed.    No Follow-up on file.    Navin Chirinos NP

## 2019-03-20 ENCOUNTER — TELEPHONE (OUTPATIENT)
Dept: INTERNAL MEDICINE | Facility: CLINIC | Age: 69
End: 2019-03-20

## 2019-03-20 ENCOUNTER — HOSPITAL ENCOUNTER (OUTPATIENT)
Dept: RADIOLOGY | Facility: HOSPITAL | Age: 69
Discharge: HOME | End: 2019-03-20
Attending: NURSE PRACTITIONER
Payer: MEDICARE

## 2019-03-20 DIAGNOSIS — Z72.0 TOBACCO USE: ICD-10-CM

## 2019-03-20 DIAGNOSIS — Z87.891 PERSONAL HISTORY OF NICOTINE DEPENDENCE: ICD-10-CM

## 2019-03-20 DIAGNOSIS — Z12.9 SCREENING FOR CANCER: ICD-10-CM

## 2019-03-20 PROCEDURE — G0297 CT CHEST LUNG SCREENING LOW DOSE: ICD-10-PCS | Mod: 26,HCNC,, | Performed by: RADIOLOGY

## 2019-03-20 PROCEDURE — G0297 LDCT FOR LUNG CA SCREEN: HCPCS | Mod: 26,HCNC,, | Performed by: RADIOLOGY

## 2019-03-20 PROCEDURE — G0297 LDCT FOR LUNG CA SCREEN: HCPCS | Mod: TC,HCNC

## 2019-03-20 NOTE — TELEPHONE ENCOUNTER
----- Message from Srinivas Zabala MD sent at 3/20/2019 11:10 AM CDT -----  Your lung CT scan was normal.

## 2019-03-29 ENCOUNTER — OFFICE VISIT (OUTPATIENT)
Dept: OPHTHALMOLOGY | Facility: CLINIC | Age: 69
End: 2019-03-29
Payer: MEDICARE

## 2019-03-29 DIAGNOSIS — H52.4 BILATERAL PRESBYOPIA: ICD-10-CM

## 2019-03-29 DIAGNOSIS — H04.123 DRY EYES, BILATERAL: ICD-10-CM

## 2019-03-29 DIAGNOSIS — H52.03 HYPEROPIA, BILATERAL: ICD-10-CM

## 2019-03-29 DIAGNOSIS — H25.13 CATARACT, NUCLEAR SCLEROTIC SENILE, BILATERAL: Primary | ICD-10-CM

## 2019-03-29 PROCEDURE — 99499 UNLISTED E&M SERVICE: CPT | Mod: HCNC,S$GLB,, | Performed by: OPTOMETRIST

## 2019-03-29 PROCEDURE — 92015 DETERMINE REFRACTIVE STATE: CPT | Mod: HCNC,S$GLB,, | Performed by: OPTOMETRIST

## 2019-03-29 PROCEDURE — 92014 PR EYE EXAM, EST PATIENT,COMPREHESV: ICD-10-PCS | Mod: HCNC,S$GLB,, | Performed by: OPTOMETRIST

## 2019-03-29 PROCEDURE — 92015 PR REFRACTION: ICD-10-PCS | Mod: HCNC,S$GLB,, | Performed by: OPTOMETRIST

## 2019-03-29 PROCEDURE — 99499 RISK ADDL DX/OHS AUDIT: ICD-10-PCS | Mod: HCNC,S$GLB,, | Performed by: OPTOMETRIST

## 2019-03-29 PROCEDURE — 99999 PR PBB SHADOW E&M-EST. PATIENT-LVL I: CPT | Mod: PBBFAC,HCNC,, | Performed by: OPTOMETRIST

## 2019-03-29 PROCEDURE — 99999 PR PBB SHADOW E&M-EST. PATIENT-LVL I: ICD-10-PCS | Mod: PBBFAC,HCNC,, | Performed by: OPTOMETRIST

## 2019-03-29 PROCEDURE — 92014 COMPRE OPH EXAM EST PT 1/>: CPT | Mod: HCNC,S$GLB,, | Performed by: OPTOMETRIST

## 2019-03-29 NOTE — PROGRESS NOTES
HPI     No visual complaints.   Eyes burns and sting sometimes in the morning.  Last eye exam 03/16/2018 TRF.   Update glasses RX.    Last edited by Lea Fischer on 3/29/2019  8:48 AM. (History)            Assessment /Plan     For exam results, see Encounter Report.    Cataract, nuclear sclerotic senile, bilateral    Dry eyes, bilateral    Hyperopia, bilateral    Bilateral presbyopia      Moderate cataracts OU, not surgical    Systane Gel hs OU, coupon given    Dispense Final Rx for glasses.  RTC 1 year  Discussed above and answered questions.

## 2019-05-20 ENCOUNTER — NURSE TRIAGE (OUTPATIENT)
Dept: ADMINISTRATIVE | Facility: CLINIC | Age: 69
End: 2019-05-20

## 2019-05-20 NOTE — TELEPHONE ENCOUNTER
Pt quit smoking cold turkey and has felt fatigued, he wants an appointment with Dr. Zabala, I scheduled one for him on May 29th at 2 pm, advised caller to call back with any needs or concerns, caller agrees

## 2019-05-30 ENCOUNTER — OFFICE VISIT (OUTPATIENT)
Dept: INTERNAL MEDICINE | Facility: CLINIC | Age: 69
End: 2019-05-30
Payer: MEDICARE

## 2019-05-30 VITALS
WEIGHT: 191.81 LBS | HEIGHT: 71 IN | BODY MASS INDEX: 26.85 KG/M2 | OXYGEN SATURATION: 97 % | HEART RATE: 96 BPM | SYSTOLIC BLOOD PRESSURE: 120 MMHG | TEMPERATURE: 98 F | DIASTOLIC BLOOD PRESSURE: 68 MMHG

## 2019-05-30 DIAGNOSIS — Z00.00 ROUTINE ADULT HEALTH MAINTENANCE: Primary | ICD-10-CM

## 2019-05-30 DIAGNOSIS — R53.83 FATIGUE, UNSPECIFIED TYPE: ICD-10-CM

## 2019-05-30 DIAGNOSIS — Z12.5 SCREENING FOR PROSTATE CANCER: ICD-10-CM

## 2019-05-30 DIAGNOSIS — E78.5 HYPERLIPIDEMIA, UNSPECIFIED HYPERLIPIDEMIA TYPE: ICD-10-CM

## 2019-05-30 PROCEDURE — 99999 PR PBB SHADOW E&M-EST. PATIENT-LVL III: CPT | Mod: PBBFAC,HCNC,, | Performed by: NURSE PRACTITIONER

## 2019-05-30 PROCEDURE — 3074F SYST BP LT 130 MM HG: CPT | Mod: HCNC,CPTII,S$GLB, | Performed by: NURSE PRACTITIONER

## 2019-05-30 PROCEDURE — 1101F PT FALLS ASSESS-DOCD LE1/YR: CPT | Mod: HCNC,CPTII,S$GLB, | Performed by: NURSE PRACTITIONER

## 2019-05-30 PROCEDURE — 3078F PR MOST RECENT DIASTOLIC BLOOD PRESSURE < 80 MM HG: ICD-10-PCS | Mod: HCNC,CPTII,S$GLB, | Performed by: NURSE PRACTITIONER

## 2019-05-30 PROCEDURE — 3074F PR MOST RECENT SYSTOLIC BLOOD PRESSURE < 130 MM HG: ICD-10-PCS | Mod: HCNC,CPTII,S$GLB, | Performed by: NURSE PRACTITIONER

## 2019-05-30 PROCEDURE — 99213 PR OFFICE/OUTPT VISIT, EST, LEVL III, 20-29 MIN: ICD-10-PCS | Mod: HCNC,S$GLB,, | Performed by: NURSE PRACTITIONER

## 2019-05-30 PROCEDURE — 99999 PR PBB SHADOW E&M-EST. PATIENT-LVL III: ICD-10-PCS | Mod: PBBFAC,HCNC,, | Performed by: NURSE PRACTITIONER

## 2019-05-30 PROCEDURE — 3078F DIAST BP <80 MM HG: CPT | Mod: HCNC,CPTII,S$GLB, | Performed by: NURSE PRACTITIONER

## 2019-05-30 PROCEDURE — 99213 OFFICE O/P EST LOW 20 MIN: CPT | Mod: HCNC,S$GLB,, | Performed by: NURSE PRACTITIONER

## 2019-05-30 PROCEDURE — 1101F PR PT FALLS ASSESS DOC 0-1 FALLS W/OUT INJ PAST YR: ICD-10-PCS | Mod: HCNC,CPTII,S$GLB, | Performed by: NURSE PRACTITIONER

## 2019-05-30 NOTE — PROGRESS NOTES
"Subjective:      Patient ID: Harperet Perez is a 68 y.o. male.    Chief Complaint: Fatigue    HPI:  Patient states he feels fatigued.  Feels he is not bouncing back as he used to.  Says he was sick a lot in Feb with sinus and colds, really has been fatigued since then.  Has a hx of bradycardia, denies dizziness, denies any ED trouble, sleep apnea, or depression.  Says has the same exercise tolerance, no increase sob.  Recently quit smoking and cut back a lot on his drinking.    Past Medical History:   Diagnosis Date    Alcohol dependence     Back pain     History of colonic polyps     Hypertrophy of prostate without urinary obstruction and other lower urinary tract symptoms (LUTS)     Osteoarthritis of multiple joints     Tobacco dependence     quit then restarted       Past Surgical History:   Procedure Laterality Date    COLONOSCOPY N/A 5/25/2016    [facility]    LIPOMA RESECTION Right 8/17/12    posterior shoulder       Lab Results   Component Value Date    WBC 4.92 02/21/2018    HGB 13.9 (L) 02/21/2018    HCT 41.4 02/21/2018     02/21/2018    CHOL 185 02/21/2018    TRIG 57 02/21/2018    HDL 75 02/21/2018    ALT 16 02/21/2018    AST 21 02/21/2018     02/21/2018    K 4.2 02/21/2018     02/21/2018    CREATININE 1.0 02/21/2018    BUN 13 02/21/2018    CO2 27 02/21/2018    TSH 0.975 02/21/2018    PSA 1.6 02/21/2018       /68   Pulse 96   Temp 98.4 °F (36.9 °C)   Ht 5' 11" (1.803 m)   Wt 87 kg (191 lb 12.8 oz)   SpO2 97%   BMI 26.75 kg/m²       Review of Systems   Constitutional: Positive for fatigue. Negative for appetite change, chills, diaphoresis and fever.   HENT: Negative for congestion, ear pain, postnasal drip, rhinorrhea, sneezing, sore throat and trouble swallowing.    Eyes: Negative for photophobia, pain and visual disturbance.   Respiratory: Negative for apnea, cough, choking, chest tightness, shortness of breath and wheezing.  "   Cardiovascular: Negative for chest pain, palpitations and leg swelling.   Gastrointestinal: Negative for abdominal pain, constipation, diarrhea, nausea and vomiting.   Genitourinary: Negative for decreased urine volume, difficulty urinating, dysuria, hematuria and urgency.   Musculoskeletal: Negative for arthralgias, gait problem, joint swelling and myalgias.   Skin: Negative for rash.   Neurological: Negative for dizziness, tremors, seizures, syncope, weakness, light-headedness, numbness and headaches.   Psychiatric/Behavioral: Negative for agitation, confusion, decreased concentration, hallucinations and sleep disturbance. The patient is not nervous/anxious.       Objective:     Physical Exam   Constitutional: He is oriented to person, place, and time. He appears well-developed and well-nourished. No distress.   HENT:   Head: Normocephalic and atraumatic.   Cardiovascular: Normal rate, regular rhythm and normal heart sounds. Exam reveals no gallop and no friction rub.   No murmur heard.  Pulmonary/Chest: Effort normal and breath sounds normal. No respiratory distress. He has no wheezes. He has no rales. He exhibits no tenderness.   Musculoskeletal: Normal range of motion. He exhibits no edema or tenderness.   Neurological: He is alert and oriented to person, place, and time. No cranial nerve deficit.   Skin: Skin is warm and dry. He is not diaphoretic.   Psychiatric: He has a normal mood and affect. His behavior is normal.     Assessment:      1. Routine adult health maintenance    2. Fatigue, unspecified type    3. Screening for prostate cancer    4. Hyperlipidemia, unspecified hyperlipidemia type      Plan:   Routine adult health maintenance  -     Lipid panel; Future; Expected date: 05/30/2019  -     Comprehensive metabolic panel; Future; Expected date: 05/30/2019  -     TSH; Future; Expected date: 05/30/2019  -     CBC auto differential; Future; Expected date: 05/30/2019  -     PSA, Screening; Future;  Expected date: 05/30/2019  -     Testosterone Panel; Future; Expected date: 05/30/2019    Fatigue, unspecified type  -     TSH; Future; Expected date: 05/30/2019  -     CBC auto differential; Future; Expected date: 05/30/2019    Screening for prostate cancer  -     PSA, Screening; Future; Expected date: 05/30/2019    Hyperlipidemia, unspecified hyperlipidemia type  -     Lipid panel; Future; Expected date: 05/30/2019      Will have fasting labs and see his pcp for a physical, he is due    Current Outpatient Medications:     tamsulosin (FLOMAX) 0.4 mg Cap, Take 1 capsule (0.4 mg total) by mouth once daily., Disp: 90 capsule, Rfl: 3

## 2019-05-31 ENCOUNTER — LAB VISIT (OUTPATIENT)
Dept: LAB | Facility: HOSPITAL | Age: 69
End: 2019-05-31
Attending: INTERNAL MEDICINE
Payer: MEDICARE

## 2019-05-31 DIAGNOSIS — E78.5 HYPERLIPIDEMIA, UNSPECIFIED HYPERLIPIDEMIA TYPE: ICD-10-CM

## 2019-05-31 DIAGNOSIS — R53.83 FATIGUE, UNSPECIFIED TYPE: ICD-10-CM

## 2019-05-31 DIAGNOSIS — Z00.00 ROUTINE ADULT HEALTH MAINTENANCE: ICD-10-CM

## 2019-05-31 DIAGNOSIS — Z12.5 SCREENING FOR PROSTATE CANCER: ICD-10-CM

## 2019-05-31 LAB
ALBUMIN SERPL BCP-MCNC: 3.5 G/DL (ref 3.5–5.2)
ALP SERPL-CCNC: 77 U/L (ref 55–135)
ALT SERPL W/O P-5'-P-CCNC: 15 U/L (ref 10–44)
ANION GAP SERPL CALC-SCNC: 6 MMOL/L (ref 8–16)
AST SERPL-CCNC: 18 U/L (ref 10–40)
BASOPHILS # BLD AUTO: 0.05 K/UL (ref 0–0.2)
BASOPHILS NFR BLD: 1 % (ref 0–1.9)
BILIRUB SERPL-MCNC: 0.4 MG/DL (ref 0.1–1)
BUN SERPL-MCNC: 14 MG/DL (ref 8–23)
CALCIUM SERPL-MCNC: 9.5 MG/DL (ref 8.7–10.5)
CHLORIDE SERPL-SCNC: 108 MMOL/L (ref 95–110)
CHOLEST SERPL-MCNC: 207 MG/DL (ref 120–199)
CHOLEST/HDLC SERPL: 2.9 {RATIO} (ref 2–5)
CO2 SERPL-SCNC: 29 MMOL/L (ref 23–29)
COMPLEXED PSA SERPL-MCNC: 1.6 NG/ML (ref 0–4)
CREAT SERPL-MCNC: 1 MG/DL (ref 0.5–1.4)
DIFFERENTIAL METHOD: NORMAL
EOSINOPHIL # BLD AUTO: 0.2 K/UL (ref 0–0.5)
EOSINOPHIL NFR BLD: 3.3 % (ref 0–8)
ERYTHROCYTE [DISTWIDTH] IN BLOOD BY AUTOMATED COUNT: 12.9 % (ref 11.5–14.5)
EST. GFR  (AFRICAN AMERICAN): >60 ML/MIN/1.73 M^2
EST. GFR  (NON AFRICAN AMERICAN): >60 ML/MIN/1.73 M^2
GLUCOSE SERPL-MCNC: 90 MG/DL (ref 70–110)
HCT VFR BLD AUTO: 43.3 % (ref 40–54)
HDLC SERPL-MCNC: 72 MG/DL (ref 40–75)
HDLC SERPL: 34.8 % (ref 20–50)
HGB BLD-MCNC: 14.4 G/DL (ref 14–18)
IMM GRANULOCYTES # BLD AUTO: 0.02 K/UL (ref 0–0.04)
IMM GRANULOCYTES NFR BLD AUTO: 0.4 % (ref 0–0.5)
LDLC SERPL CALC-MCNC: 116.6 MG/DL (ref 63–159)
LYMPHOCYTES # BLD AUTO: 1.6 K/UL (ref 1–4.8)
LYMPHOCYTES NFR BLD: 32.5 % (ref 18–48)
MCH RBC QN AUTO: 31 PG (ref 27–31)
MCHC RBC AUTO-ENTMCNC: 33.3 G/DL (ref 32–36)
MCV RBC AUTO: 93 FL (ref 82–98)
MONOCYTES # BLD AUTO: 0.5 K/UL (ref 0.3–1)
MONOCYTES NFR BLD: 9.3 % (ref 4–15)
NEUTROPHILS # BLD AUTO: 2.6 K/UL (ref 1.8–7.7)
NEUTROPHILS NFR BLD: 53.5 % (ref 38–73)
NONHDLC SERPL-MCNC: 135 MG/DL
NRBC BLD-RTO: 0 /100 WBC
PLATELET # BLD AUTO: 268 K/UL (ref 150–350)
PMV BLD AUTO: 10.1 FL (ref 9.2–12.9)
POTASSIUM SERPL-SCNC: 4.9 MMOL/L (ref 3.5–5.1)
PROT SERPL-MCNC: 6.9 G/DL (ref 6–8.4)
RBC # BLD AUTO: 4.65 M/UL (ref 4.6–6.2)
SODIUM SERPL-SCNC: 143 MMOL/L (ref 136–145)
TRIGL SERPL-MCNC: 92 MG/DL (ref 30–150)
TSH SERPL DL<=0.005 MIU/L-ACNC: 0.84 UIU/ML (ref 0.4–4)
WBC # BLD AUTO: 4.83 K/UL (ref 3.9–12.7)

## 2019-05-31 PROCEDURE — 85025 COMPLETE CBC W/AUTO DIFF WBC: CPT | Mod: HCNC

## 2019-05-31 PROCEDURE — 80053 COMPREHEN METABOLIC PANEL: CPT | Mod: HCNC

## 2019-05-31 PROCEDURE — 84443 ASSAY THYROID STIM HORMONE: CPT | Mod: HCNC

## 2019-05-31 PROCEDURE — 80061 LIPID PANEL: CPT | Mod: HCNC

## 2019-05-31 PROCEDURE — 82040 ASSAY OF SERUM ALBUMIN: CPT | Mod: HCNC

## 2019-05-31 PROCEDURE — 84153 ASSAY OF PSA TOTAL: CPT | Mod: HCNC

## 2019-06-03 ENCOUNTER — OFFICE VISIT (OUTPATIENT)
Dept: INTERNAL MEDICINE | Facility: CLINIC | Age: 69
End: 2019-06-03
Payer: MEDICARE

## 2019-06-03 VITALS
OXYGEN SATURATION: 98 % | BODY MASS INDEX: 26.64 KG/M2 | HEIGHT: 71 IN | SYSTOLIC BLOOD PRESSURE: 132 MMHG | WEIGHT: 190.25 LBS | HEART RATE: 56 BPM | TEMPERATURE: 98 F | DIASTOLIC BLOOD PRESSURE: 74 MMHG

## 2019-06-03 DIAGNOSIS — Z86.010 HISTORY OF COLONIC POLYPS: ICD-10-CM

## 2019-06-03 DIAGNOSIS — I77.1 TORTUOUS AORTA: ICD-10-CM

## 2019-06-03 DIAGNOSIS — Z00.00 ROUTINE GENERAL MEDICAL EXAMINATION AT A HEALTH CARE FACILITY: Primary | ICD-10-CM

## 2019-06-03 DIAGNOSIS — F10.20 UNCOMPLICATED ALCOHOL DEPENDENCE: Chronic | ICD-10-CM

## 2019-06-03 DIAGNOSIS — N40.0 BENIGN PROSTATIC HYPERPLASIA WITHOUT LOWER URINARY TRACT SYMPTOMS: Chronic | ICD-10-CM

## 2019-06-03 DIAGNOSIS — Z72.0 TOBACCO ABUSE: Chronic | ICD-10-CM

## 2019-06-03 PROCEDURE — 99999 PR PBB SHADOW E&M-EST. PATIENT-LVL III: ICD-10-PCS | Mod: PBBFAC,HCNC,, | Performed by: INTERNAL MEDICINE

## 2019-06-03 PROCEDURE — 3078F DIAST BP <80 MM HG: CPT | Mod: HCNC,CPTII,S$GLB, | Performed by: INTERNAL MEDICINE

## 2019-06-03 PROCEDURE — 3075F SYST BP GE 130 - 139MM HG: CPT | Mod: HCNC,CPTII,S$GLB, | Performed by: INTERNAL MEDICINE

## 2019-06-03 PROCEDURE — 3078F PR MOST RECENT DIASTOLIC BLOOD PRESSURE < 80 MM HG: ICD-10-PCS | Mod: HCNC,CPTII,S$GLB, | Performed by: INTERNAL MEDICINE

## 2019-06-03 PROCEDURE — 99499 RISK ADDL DX/OHS AUDIT: ICD-10-PCS | Mod: HCNC,S$GLB,, | Performed by: INTERNAL MEDICINE

## 2019-06-03 PROCEDURE — 99397 PR PREVENTIVE VISIT,EST,65 & OVER: ICD-10-PCS | Mod: HCNC,S$GLB,, | Performed by: INTERNAL MEDICINE

## 2019-06-03 PROCEDURE — 99397 PER PM REEVAL EST PAT 65+ YR: CPT | Mod: HCNC,S$GLB,, | Performed by: INTERNAL MEDICINE

## 2019-06-03 PROCEDURE — 99999 PR PBB SHADOW E&M-EST. PATIENT-LVL III: CPT | Mod: PBBFAC,HCNC,, | Performed by: INTERNAL MEDICINE

## 2019-06-03 PROCEDURE — 99499 UNLISTED E&M SERVICE: CPT | Mod: HCNC,S$GLB,, | Performed by: INTERNAL MEDICINE

## 2019-06-03 PROCEDURE — 3075F PR MOST RECENT SYSTOLIC BLOOD PRESS GE 130-139MM HG: ICD-10-PCS | Mod: HCNC,CPTII,S$GLB, | Performed by: INTERNAL MEDICINE

## 2019-06-03 RX ORDER — TAMSULOSIN HYDROCHLORIDE 0.4 MG/1
0.4 CAPSULE ORAL DAILY
Qty: 90 CAPSULE | Refills: 3 | Status: SHIPPED | OUTPATIENT
Start: 2019-06-03 | End: 2019-10-29 | Stop reason: SDUPTHER

## 2019-06-03 NOTE — PROGRESS NOTES
"HPI:  Patient is a 68-year-old man who comes today for his annual physical.  He continues do very well.  He has no significant problems at this time.      Current MEDS: medcard review, verified and update  Allergies: Per the electronic medical record    Past Medical History:   Diagnosis Date    Alcohol dependence     Back pain     History of colonic polyps     Hypertrophy of prostate without urinary obstruction and other lower urinary tract symptoms (LUTS)     Osteoarthritis of multiple joints     Tobacco dependence     quit then restarted       Past Surgical History:   Procedure Laterality Date    COLONOSCOPY N/A 5/25/2016    Performed [facility] Sergye [facility] at [facility] ENDO    LIPOMA RESECTION Right 8/17/12    posterior shoulder       SHx: per the electronic medical record    FHx: recorded in the electronic medical record    ROS:    denies any chest pains or shortness of breath. Denies any nausea, vomiting or diarrhea. Denies any fever, chills or sweats. Denies any change in weight, voice, stool, skin or hair. Denies any dysuria, dyspepsia or dysphagia. Denies any change in vision, hearing or headaches. Denies any swollen lymph nodes or loss of memory.    PE:  /74   Pulse (!) 56   Temp 97.5 °F (36.4 °C) (Tympanic)   Ht 5' 11" (1.803 m)   Wt 86.3 kg (190 lb 4.1 oz)   SpO2 98%   BMI 26.54 kg/m²   Gen: Well-developed, well-nourished, male, in no acute distress, oriented x3  HEENT: neck is supple, no adenopathy, carotids 2+ equal without bruits, thyroid exam normal size without nodules.  CHEST: clear to auscultation and percussion  CVS: regular rate and rhythm without significant murmur, gallop, or rubs  ABD: soft, benign, no rebound no guarding, no distention.  Bowel sounds are normal.     nontender.  No palpable masses.  No organomegaly and no audible bruits.  RECTAL: no masses.  Prostate 40  Grams without nodules.  EXT: no clubbing, cyanosis, or edema  LYMPH: no cervical, inguinal, or axillary " adenopathy  FEET: no loss of sensation.  No ulcers or pressure sores.  NEURO: gait normal.  Cranial nerves II- XII intact. No nystagmus.  Speech normal.   Gross motor and sensory unremarkable.    Lab Results   Component Value Date    WBC 4.83 05/31/2019    HGB 14.4 05/31/2019    HCT 43.3 05/31/2019     05/31/2019    CHOL 207 (H) 05/31/2019    TRIG 92 05/31/2019    HDL 72 05/31/2019    ALT 15 05/31/2019    AST 18 05/31/2019     05/31/2019    K 4.9 05/31/2019     05/31/2019    CREATININE 1.0 05/31/2019    BUN 14 05/31/2019    CO2 29 05/31/2019    TSH 0.837 05/31/2019    PSA 1.6 05/31/2019       Impression:  Multiple medical problems below, stable  Patient Active Problem List   Diagnosis    Benign prostatic hyperplasia without lower urinary tract symptoms    Tobacco abuse    Alcohol dependence    Spondylosis of lumbar region without myelopathy or radiculopathy    Osteoarthritis of multiple joints    DDD (degenerative disc disease), lumbar    History of colonic polyps    Tortuous aorta       Plan:   Orders Placed This Encounter    tamsulosin (FLOMAX) 0.4 mg Cap    Medications remain the same.  He will see me again in 1 year otherwise as needed.    This note is generated with speech recognition software and is subject to transcription error and sound alike phrases that may be missed by proofreading.

## 2019-06-05 ENCOUNTER — TELEPHONE (OUTPATIENT)
Dept: INTERNAL MEDICINE | Facility: CLINIC | Age: 69
End: 2019-06-05

## 2019-06-05 LAB
ALBUMIN SERPL-MCNC: 4 G/DL (ref 3.6–5.1)
SHBG SERPL-SCNC: 50 NMOL/L (ref 22–77)
TESTOST FREE SERPL-MCNC: 67.8 PG/ML (ref 46–224)
TESTOST SERPL-MCNC: 673 NG/DL (ref 250–1100)
TESTOSTERONE.FREE+WB SERPL-MCNC: 124.6 NG/DL (ref 110–575)

## 2019-06-24 ENCOUNTER — OFFICE VISIT (OUTPATIENT)
Dept: INTERNAL MEDICINE | Facility: CLINIC | Age: 69
End: 2019-06-24
Payer: MEDICARE

## 2019-06-24 VITALS
BODY MASS INDEX: 26.91 KG/M2 | HEART RATE: 52 BPM | DIASTOLIC BLOOD PRESSURE: 82 MMHG | SYSTOLIC BLOOD PRESSURE: 130 MMHG | RESPIRATION RATE: 16 BRPM | TEMPERATURE: 98 F | OXYGEN SATURATION: 98 % | HEIGHT: 71 IN | WEIGHT: 192.25 LBS

## 2019-06-24 DIAGNOSIS — M54.50 LUMBAR BACK PAIN: Primary | ICD-10-CM

## 2019-06-24 PROCEDURE — 1101F PT FALLS ASSESS-DOCD LE1/YR: CPT | Mod: HCNC,CPTII,S$GLB, | Performed by: INTERNAL MEDICINE

## 2019-06-24 PROCEDURE — 99213 OFFICE O/P EST LOW 20 MIN: CPT | Mod: HCNC,S$GLB,, | Performed by: INTERNAL MEDICINE

## 2019-06-24 PROCEDURE — 3079F PR MOST RECENT DIASTOLIC BLOOD PRESSURE 80-89 MM HG: ICD-10-PCS | Mod: HCNC,CPTII,S$GLB, | Performed by: INTERNAL MEDICINE

## 2019-06-24 PROCEDURE — 3079F DIAST BP 80-89 MM HG: CPT | Mod: HCNC,CPTII,S$GLB, | Performed by: INTERNAL MEDICINE

## 2019-06-24 PROCEDURE — 99999 PR PBB SHADOW E&M-EST. PATIENT-LVL III: CPT | Mod: PBBFAC,HCNC,, | Performed by: INTERNAL MEDICINE

## 2019-06-24 PROCEDURE — 1101F PR PT FALLS ASSESS DOC 0-1 FALLS W/OUT INJ PAST YR: ICD-10-PCS | Mod: HCNC,CPTII,S$GLB, | Performed by: INTERNAL MEDICINE

## 2019-06-24 PROCEDURE — 3075F SYST BP GE 130 - 139MM HG: CPT | Mod: HCNC,CPTII,S$GLB, | Performed by: INTERNAL MEDICINE

## 2019-06-24 PROCEDURE — 3075F PR MOST RECENT SYSTOLIC BLOOD PRESS GE 130-139MM HG: ICD-10-PCS | Mod: HCNC,CPTII,S$GLB, | Performed by: INTERNAL MEDICINE

## 2019-06-24 PROCEDURE — 99999 PR PBB SHADOW E&M-EST. PATIENT-LVL III: ICD-10-PCS | Mod: PBBFAC,HCNC,, | Performed by: INTERNAL MEDICINE

## 2019-06-24 PROCEDURE — 99213 PR OFFICE/OUTPT VISIT, EST, LEVL III, 20-29 MIN: ICD-10-PCS | Mod: HCNC,S$GLB,, | Performed by: INTERNAL MEDICINE

## 2019-06-24 RX ORDER — MELOXICAM 15 MG/1
TABLET ORAL
Qty: 90 TABLET | Refills: 1 | Status: SHIPPED | OUTPATIENT
Start: 2019-06-24 | End: 2019-10-15

## 2019-06-24 RX ORDER — TIZANIDINE 4 MG/1
TABLET ORAL
Qty: 360 TABLET | Refills: 5 | OUTPATIENT
Start: 2019-06-24

## 2019-06-24 RX ORDER — METHYLPREDNISOLONE ACETATE 80 MG/ML
80 INJECTION, SUSPENSION INTRA-ARTICULAR; INTRALESIONAL; INTRAMUSCULAR; SOFT TISSUE ONCE
Status: DISCONTINUED | OUTPATIENT
Start: 2019-06-24 | End: 2019-06-24

## 2019-06-24 RX ORDER — MELOXICAM 15 MG/1
15 TABLET ORAL DAILY
Qty: 30 TABLET | Refills: 1 | Status: SHIPPED | OUTPATIENT
Start: 2019-06-24 | End: 2019-06-24 | Stop reason: SDUPTHER

## 2019-06-24 RX ORDER — TIZANIDINE 4 MG/1
4 TABLET ORAL EVERY 6 HOURS PRN
Qty: 40 TABLET | Refills: 5 | Status: SHIPPED | OUTPATIENT
Start: 2019-06-24 | End: 2019-07-04

## 2019-06-24 NOTE — PROGRESS NOTES
"HPI:  Patient is a 68-year-old man who comes today with complaints of lower left lumbar back pain for last several weeks.  He denies any trauma or injury to the back.  He denies any radiation of pain to his legs.  Patient otherwise has no other problems.    Current meds have been verified and updated per the EMR  Exam:/82   Pulse (!) 52   Temp 98.2 °F (36.8 °C)   Resp 16   Ht 5' 11" (1.803 m)   Wt 87.2 kg (192 lb 3.9 oz)   SpO2 98%   BMI 26.81 kg/m²   He has muscle tenderness over the left lower lumbar region.  There is no vertebral tenderness.  His straight leg raises are normal.  Reflexes at the knees her normal.  He has equal motor and sensory exam in both lower extremities.    Lab Results   Component Value Date    WBC 4.83 05/31/2019    HGB 14.4 05/31/2019    HCT 43.3 05/31/2019     05/31/2019    CHOL 207 (H) 05/31/2019    TRIG 92 05/31/2019    HDL 72 05/31/2019    ALT 15 05/31/2019    AST 18 05/31/2019     05/31/2019    K 4.9 05/31/2019     05/31/2019    CREATININE 1.0 05/31/2019    BUN 14 05/31/2019    CO2 29 05/31/2019    TSH 0.837 05/31/2019    PSA 1.6 05/31/2019       Impression:  Muscular lumbar back pain  Patient Active Problem List   Diagnosis    Benign prostatic hyperplasia without lower urinary tract symptoms    Tobacco abuse    Alcohol dependence    Spondylosis of lumbar region without myelopathy or radiculopathy    Osteoarthritis of multiple joints    DDD (degenerative disc disease), lumbar    History of colonic polyps    Tortuous aorta       Plan:  Orders Placed This Encounter    meloxicam (MOBIC) 15 MG tablet    tiZANidine (ZANAFLEX) 4 MG tablet    He was given Mobic and Zanaflex to use as needed.  If he is not better in a few days weeks he will let me know      This note is generated with speech recognition software and is subject to transcription error and sound alike phrases that may be missed by proofreading.    "

## 2019-09-19 RX ORDER — TAMSULOSIN HYDROCHLORIDE 0.4 MG/1
CAPSULE ORAL
Qty: 90 CAPSULE | Refills: 2 | Status: SHIPPED | OUTPATIENT
Start: 2019-09-19 | End: 2019-10-15

## 2019-10-15 ENCOUNTER — OFFICE VISIT (OUTPATIENT)
Dept: INTERNAL MEDICINE | Facility: CLINIC | Age: 69
End: 2019-10-15
Payer: MEDICARE

## 2019-10-15 VITALS
WEIGHT: 187.19 LBS | TEMPERATURE: 98 F | SYSTOLIC BLOOD PRESSURE: 128 MMHG | HEIGHT: 71 IN | DIASTOLIC BLOOD PRESSURE: 78 MMHG | HEART RATE: 46 BPM | OXYGEN SATURATION: 98 % | BODY MASS INDEX: 26.21 KG/M2

## 2019-10-15 DIAGNOSIS — S81.811A LACERATION OF SKIN OF RIGHT LOWER LEG, INITIAL ENCOUNTER: ICD-10-CM

## 2019-10-15 DIAGNOSIS — M19.90 ARTHRITIS: ICD-10-CM

## 2019-10-15 DIAGNOSIS — R21 SKIN RASH: Primary | ICD-10-CM

## 2019-10-15 PROCEDURE — G0008 ADMIN INFLUENZA VIRUS VAC: HCPCS | Mod: HCNC,S$GLB,, | Performed by: NURSE PRACTITIONER

## 2019-10-15 PROCEDURE — 99214 OFFICE O/P EST MOD 30 MIN: CPT | Mod: 25,HCNC,S$GLB, | Performed by: NURSE PRACTITIONER

## 2019-10-15 PROCEDURE — 3078F PR MOST RECENT DIASTOLIC BLOOD PRESSURE < 80 MM HG: ICD-10-PCS | Mod: HCNC,CPTII,S$GLB, | Performed by: NURSE PRACTITIONER

## 2019-10-15 PROCEDURE — 99999 PR PBB SHADOW E&M-EST. PATIENT-LVL III: ICD-10-PCS | Mod: PBBFAC,HCNC,, | Performed by: NURSE PRACTITIONER

## 2019-10-15 PROCEDURE — 3074F SYST BP LT 130 MM HG: CPT | Mod: HCNC,CPTII,S$GLB, | Performed by: NURSE PRACTITIONER

## 2019-10-15 PROCEDURE — 1101F PR PT FALLS ASSESS DOC 0-1 FALLS W/OUT INJ PAST YR: ICD-10-PCS | Mod: HCNC,CPTII,S$GLB, | Performed by: NURSE PRACTITIONER

## 2019-10-15 PROCEDURE — G0008 FLU VACCINE - HIGH DOSE (65+) PRESERVATIVE FREE IM: ICD-10-PCS | Mod: HCNC,S$GLB,, | Performed by: NURSE PRACTITIONER

## 2019-10-15 PROCEDURE — 1101F PT FALLS ASSESS-DOCD LE1/YR: CPT | Mod: HCNC,CPTII,S$GLB, | Performed by: NURSE PRACTITIONER

## 2019-10-15 PROCEDURE — 3074F PR MOST RECENT SYSTOLIC BLOOD PRESSURE < 130 MM HG: ICD-10-PCS | Mod: HCNC,CPTII,S$GLB, | Performed by: NURSE PRACTITIONER

## 2019-10-15 PROCEDURE — 90662 IIV NO PRSV INCREASED AG IM: CPT | Mod: HCNC,S$GLB,, | Performed by: NURSE PRACTITIONER

## 2019-10-15 PROCEDURE — 3078F DIAST BP <80 MM HG: CPT | Mod: HCNC,CPTII,S$GLB, | Performed by: NURSE PRACTITIONER

## 2019-10-15 PROCEDURE — 99999 PR PBB SHADOW E&M-EST. PATIENT-LVL III: CPT | Mod: PBBFAC,HCNC,, | Performed by: NURSE PRACTITIONER

## 2019-10-15 PROCEDURE — 90662 FLU VACCINE - HIGH DOSE (65+) PRESERVATIVE FREE IM: ICD-10-PCS | Mod: HCNC,S$GLB,, | Performed by: NURSE PRACTITIONER

## 2019-10-15 PROCEDURE — 99214 PR OFFICE/OUTPT VISIT, EST, LEVL IV, 30-39 MIN: ICD-10-PCS | Mod: 25,HCNC,S$GLB, | Performed by: NURSE PRACTITIONER

## 2019-10-15 RX ORDER — TRIAMCINOLONE ACETONIDE 1 MG/G
CREAM TOPICAL 2 TIMES DAILY
Qty: 28.4 G | Refills: 0 | Status: SHIPPED | OUTPATIENT
Start: 2019-10-15 | End: 2020-03-02 | Stop reason: ALTCHOICE

## 2019-10-15 RX ORDER — MELOXICAM 15 MG/1
TABLET ORAL
Qty: 90 TABLET | Refills: 1
Start: 2019-10-15 | End: 2020-01-03 | Stop reason: SDUPTHER

## 2019-10-15 NOTE — PROGRESS NOTES
"Subjective:      Patient ID: Harpreet Perez is a 68 y.o. male.    Chief Complaint: Rash    HPI:   Patient states he noticed a rash on his right ankle.  Says it is itchy, doesn't remember any new exposures.  Recently cut his leg on glass just above it, that is healing ok.  He also has one spot on his left inner forearm that is itching.  Wants his flu shot.  Also says his right wrist is hurting, miriam at night.  Has not been taking his mobic    Past Medical History:   Diagnosis Date    Alcohol dependence     Back pain     History of colonic polyps     Hypertrophy of prostate without urinary obstruction and other lower urinary tract symptoms (LUTS)     Osteoarthritis of multiple joints     Tobacco dependence     quit then restarted       Past Surgical History:   Procedure Laterality Date    COLONOSCOPY N/A 5/25/2016    Procedure: COLONOSCOPY;  Surgeon: Sergey Jensen MD;  [facility];  Service: Endoscopy;  Laterality: N/A;    LIPOMA RESECTION Right 8/17/12    posterior shoulder       Lab Results   Component Value Date    WBC 4.83 05/31/2019    HGB 14.4 05/31/2019    HCT 43.3 05/31/2019     05/31/2019    CHOL 207 (H) 05/31/2019    TRIG 92 05/31/2019    HDL 72 05/31/2019    ALT 15 05/31/2019    AST 18 05/31/2019     05/31/2019    K 4.9 05/31/2019     05/31/2019    CREATININE 1.0 05/31/2019    BUN 14 05/31/2019    CO2 29 05/31/2019    TSH 0.837 05/31/2019    PSA 1.6 05/31/2019       /78 (BP Location: Right arm)   Pulse (!) 46   Temp 97.6 °F (36.4 °C) (Tympanic)   Ht 5' 11" (1.803 m)   Wt 84.9 kg (187 lb 2.7 oz)   SpO2 98%   BMI 26.11 kg/m²       Review of Systems   Constitutional: Negative for appetite change, chills, diaphoresis and fever.   HENT: Negative for congestion, ear pain, postnasal drip, rhinorrhea, sneezing, sore throat and trouble swallowing.    Eyes: Negative for photophobia, pain and visual disturbance.   Respiratory: Negative for apnea, cough, choking, chest " tightness, shortness of breath and wheezing.    Cardiovascular: Negative for chest pain, palpitations and leg swelling.   Gastrointestinal: Negative for abdominal pain, constipation, diarrhea, nausea and vomiting.   Genitourinary: Negative for decreased urine volume, difficulty urinating, dysuria, hematuria and urgency.   Musculoskeletal: Negative for arthralgias, gait problem, joint swelling and myalgias.   Skin: Positive for rash.   Neurological: Negative for dizziness, tremors, seizures, syncope, weakness, light-headedness, numbness and headaches.   Psychiatric/Behavioral: Negative for agitation, confusion, decreased concentration, hallucinations and sleep disturbance. The patient is not nervous/anxious.       Objective:     Physical Exam   Constitutional: He is oriented to person, place, and time. He appears well-developed and well-nourished. No distress.   Musculoskeletal:   Normal gait     Neurological: He is alert and oriented to person, place, and time.   Skin: Skin is warm and dry.   Right ankle has several small papules.  There is no erythema at all, no swelling, no oozing.  Appears to be ant or bug bites.  Skin lac above it has a scab, no signs of infection   Psychiatric: He has a normal mood and affect. His behavior is normal.     Assessment:      1. Skin rash    2. Laceration of skin of right lower leg, initial encounter    3. Arthritis      Plan:   Skin rash    Laceration of skin of right lower leg, initial encounter    Arthritis    Other orders  -     triamcinolone acetonide 0.1% (KENALOG) 0.1 % cream; Apply topically 2 (two) times daily.  Dispense: 28.4 g; Refill: 0  -     meloxicam (MOBIC) 15 MG tablet; TAKE 1 TABLET(15 MG) BY MOUTH EVERY DAY  Dispense: 90 tablet; Refill: 1  -     Influenza - High Dose (65+) (PF) (IM)    will apply his mupirocin to the skin lac.  Will try the triamcinolone to the rash. Will lmk if worse or not improving in the next week or two.  Will take his mobic more consistently  for his right wrist pain/  Had a flu shot today      Current Outpatient Medications:     tamsulosin (FLOMAX) 0.4 mg Cap, Take 1 capsule (0.4 mg total) by mouth once daily., Disp: 90 capsule, Rfl: 3    meloxicam (MOBIC) 15 MG tablet, TAKE 1 TABLET(15 MG) BY MOUTH EVERY DAY, Disp: 90 tablet, Rfl: 1    triamcinolone acetonide 0.1% (KENALOG) 0.1 % cream, Apply topically 2 (two) times daily., Disp: 28.4 g, Rfl: 0

## 2019-10-29 ENCOUNTER — TELEPHONE (OUTPATIENT)
Dept: INTERNAL MEDICINE | Facility: CLINIC | Age: 69
End: 2019-10-29

## 2019-10-29 RX ORDER — TAMSULOSIN HYDROCHLORIDE 0.4 MG/1
0.4 CAPSULE ORAL DAILY
Qty: 90 CAPSULE | Refills: 2 | Status: SHIPPED | OUTPATIENT
Start: 2019-10-29 | End: 2020-05-28

## 2019-10-29 NOTE — TELEPHONE ENCOUNTER
----- Message from Beverly Brown sent at 10/29/2019  3:53 PM CDT -----  .Type:  RX Refill Request    Who Called: self  Refill or New Rx:refills  RX Name and Strength:tamsulosin 0.4mg, arthritis med (unsure of name)  How is the patient currently taking it? (ex. 1XDay):1/day, 1/day  Is this a 30 day or 90 day RX: 90, 90  Preferred Pharmacy with phone number:.  [id number] - [place], [place] - [address] AT [facility] [address]  [address]  [place] [address]  Phone: [phone] Fax: [phone]    Local or Mail Order:local  Ordering Provider:erin  Would the patient rather a call back or a response via [facility]? call  Best Call Back Number:.[phone]  Additional Information:

## 2020-01-03 ENCOUNTER — OFFICE VISIT (OUTPATIENT)
Dept: INTERNAL MEDICINE | Facility: CLINIC | Age: 70
End: 2020-01-03
Payer: MEDICARE

## 2020-01-03 VITALS
SYSTOLIC BLOOD PRESSURE: 158 MMHG | HEIGHT: 71 IN | OXYGEN SATURATION: 98 % | TEMPERATURE: 98 F | BODY MASS INDEX: 25.71 KG/M2 | DIASTOLIC BLOOD PRESSURE: 82 MMHG | HEART RATE: 53 BPM | WEIGHT: 183.63 LBS

## 2020-01-03 DIAGNOSIS — I10 HYPERTENSION, BENIGN: Primary | ICD-10-CM

## 2020-01-03 DIAGNOSIS — F10.20 UNCOMPLICATED ALCOHOL DEPENDENCE: Chronic | ICD-10-CM

## 2020-01-03 PROCEDURE — 99213 PR OFFICE/OUTPT VISIT, EST, LEVL III, 20-29 MIN: ICD-10-PCS | Mod: HCNC,S$GLB,, | Performed by: NURSE PRACTITIONER

## 2020-01-03 PROCEDURE — 1159F PR MEDICATION LIST DOCUMENTED IN MEDICAL RECORD: ICD-10-PCS | Mod: HCNC,S$GLB,, | Performed by: NURSE PRACTITIONER

## 2020-01-03 PROCEDURE — 99999 PR PBB SHADOW E&M-EST. PATIENT-LVL III: ICD-10-PCS | Mod: PBBFAC,HCNC,, | Performed by: NURSE PRACTITIONER

## 2020-01-03 PROCEDURE — 1159F MED LIST DOCD IN RCRD: CPT | Mod: HCNC,S$GLB,, | Performed by: NURSE PRACTITIONER

## 2020-01-03 PROCEDURE — 1126F PR PAIN SEVERITY QUANTIFIED, NO PAIN PRESENT: ICD-10-PCS | Mod: HCNC,S$GLB,, | Performed by: NURSE PRACTITIONER

## 2020-01-03 PROCEDURE — 3077F SYST BP >= 140 MM HG: CPT | Mod: HCNC,CPTII,S$GLB, | Performed by: NURSE PRACTITIONER

## 2020-01-03 PROCEDURE — 1101F PT FALLS ASSESS-DOCD LE1/YR: CPT | Mod: HCNC,CPTII,S$GLB, | Performed by: NURSE PRACTITIONER

## 2020-01-03 PROCEDURE — 99999 PR PBB SHADOW E&M-EST. PATIENT-LVL III: CPT | Mod: PBBFAC,HCNC,, | Performed by: NURSE PRACTITIONER

## 2020-01-03 PROCEDURE — 1126F AMNT PAIN NOTED NONE PRSNT: CPT | Mod: HCNC,S$GLB,, | Performed by: NURSE PRACTITIONER

## 2020-01-03 PROCEDURE — 3077F PR MOST RECENT SYSTOLIC BLOOD PRESSURE >= 140 MM HG: ICD-10-PCS | Mod: HCNC,CPTII,S$GLB, | Performed by: NURSE PRACTITIONER

## 2020-01-03 PROCEDURE — 1101F PR PT FALLS ASSESS DOC 0-1 FALLS W/OUT INJ PAST YR: ICD-10-PCS | Mod: HCNC,CPTII,S$GLB, | Performed by: NURSE PRACTITIONER

## 2020-01-03 PROCEDURE — 99213 OFFICE O/P EST LOW 20 MIN: CPT | Mod: HCNC,S$GLB,, | Performed by: NURSE PRACTITIONER

## 2020-01-03 PROCEDURE — 3079F PR MOST RECENT DIASTOLIC BLOOD PRESSURE 80-89 MM HG: ICD-10-PCS | Mod: HCNC,CPTII,S$GLB, | Performed by: NURSE PRACTITIONER

## 2020-01-03 PROCEDURE — 3079F DIAST BP 80-89 MM HG: CPT | Mod: HCNC,CPTII,S$GLB, | Performed by: NURSE PRACTITIONER

## 2020-01-03 RX ORDER — MELOXICAM 15 MG/1
TABLET ORAL
Qty: 90 TABLET | Refills: 0
Start: 2020-01-03 | End: 2020-03-02 | Stop reason: SDUPTHER

## 2020-01-03 RX ORDER — LISINOPRIL 10 MG/1
10 TABLET ORAL DAILY
Qty: 30 TABLET | Refills: 0 | Status: SHIPPED | OUTPATIENT
Start: 2020-01-03 | End: 2020-02-03

## 2020-01-03 RX ORDER — LISINOPRIL 10 MG/1
TABLET ORAL
Qty: 90 TABLET | OUTPATIENT
Start: 2020-01-03

## 2020-01-03 NOTE — PROGRESS NOTES
"Subjective:      Patient ID: Harpreet Perez is a 69 y.o. male.    Chief Complaint: Hospital Follow Up    HPI:  Patient was seen at [facility] on Dec 24th.  He had been drinking a lot of alcohol.  He was dizzy, his BP was elevated to 183/101.  He has not been on BP meds in the past.  He says the dizziness is better now, he is not experiencing any chest pain or sob.  He has a BP cuff at home and checks it at times, it has been high lately.      Past Medical History:   Diagnosis Date    Alcohol dependence     Back pain     History of colonic polyps     Hypertrophy of prostate without urinary obstruction and other lower urinary tract symptoms (LUTS)     Osteoarthritis of multiple joints     Tobacco dependence     quit then restarted       Past Surgical History:   Procedure Laterality Date    COLONOSCOPY N/A 5/25/2016    Procedure: COLONOSCOPY;  Surgeon: Sergey Jensen MD;  Location: [facility];  Service: Endoscopy;  Laterality: N/A;    LIPOMA RESECTION Right 8/17/12    posterior shoulder       Lab Results   Component Value Date    WBC 4.83 05/31/2019    HGB 14.4 05/31/2019    HCT 43.3 05/31/2019     05/31/2019    CHOL 207 (H) 05/31/2019    TRIG 92 05/31/2019    HDL 72 05/31/2019    ALT 15 05/31/2019    AST 18 05/31/2019     05/31/2019    K 4.9 05/31/2019     05/31/2019    CREATININE 1.0 05/31/2019    BUN 14 05/31/2019    CO2 29 05/31/2019    TSH 0.837 05/31/2019    PSA 1.6 05/31/2019       BP (!) 158/82   Pulse (!) 53   Temp 97.8 °F (36.6 °C) (Tympanic)   Ht 5' 11" (1.803 m)   Wt 83.3 kg (183 lb 10.3 oz)   SpO2 98%   BMI 25.61 kg/m²       Review of Systems   Constitutional: Negative for appetite change, chills, diaphoresis and fever.   HENT: Negative for congestion, ear pain, postnasal drip, rhinorrhea, sneezing, sore throat and trouble swallowing.    Eyes: Negative for photophobia, pain and visual disturbance.   Respiratory: Negative for apnea, cough, choking, chest tightness, shortness of breath " and wheezing.    Cardiovascular: Negative for chest pain, palpitations and leg swelling.   Gastrointestinal: Negative for abdominal pain, constipation, diarrhea, nausea and vomiting.   Genitourinary: Negative for decreased urine volume, difficulty urinating, dysuria, hematuria and urgency.   Musculoskeletal: Negative for arthralgias, gait problem, joint swelling and myalgias.   Skin: Negative for rash.   Neurological: Negative for dizziness, tremors, seizures, syncope, weakness, light-headedness, numbness and headaches.   Psychiatric/Behavioral: Negative for agitation, confusion, decreased concentration, hallucinations and sleep disturbance. The patient is not nervous/anxious.       Objective:     Physical Exam   Constitutional: He is oriented to person, place, and time. He appears well-developed and well-nourished. No distress.   Cardiovascular: Normal rate, regular rhythm and normal heart sounds. Exam reveals no gallop and no friction rub.   No murmur heard.  Musculoskeletal:   Normal gait   Neurological: He is alert and oriented to person, place, and time.   Skin: Skin is warm and dry.   Psychiatric: He has a normal mood and affect. His behavior is normal.     Assessment:      1. Hypertension, benign    2. Uncomplicated alcohol dependence      Plan:   Hypertension, benign    Uncomplicated alcohol dependence    Other orders  -     lisinopril 10 MG tablet; Take 1 tablet (10 mg total) by mouth once daily.  Dispense: 30 tablet; Refill: 0  -     meloxicam (MOBIC) 15 MG tablet; TAKE 1 TABLET(15 MG) BY MOUTH EVERY DAY  Dispense: 90 tablet; Refill: 0    he says this was a wake up call and he is not going to drink anymore.  Will keep a record of his readings, start the above med, see dr ayala in 2 weeks to eval BP.  His labs at [facility] on 12/24 were noted      Current Outpatient Medications:     tamsulosin (FLOMAX) 0.4 mg Cap, Take 1 capsule (0.4 mg total) by mouth once daily., Disp: 90 capsule, Rfl: 2    lisinopril 10 MG  tablet, Take 1 tablet (10 mg total) by mouth once daily., Disp: 30 tablet, Rfl: 0    meloxicam (MOBIC) 15 MG tablet, TAKE 1 TABLET(15 MG) BY MOUTH EVERY DAY, Disp: 90 tablet, Rfl: 0    triamcinolone acetonide 0.1% (KENALOG) 0.1 % cream, Apply topically 2 (two) times daily. (Patient not taking: Reported on 1/3/2020), Disp: 28.4 g, Rfl: 0

## 2020-01-17 ENCOUNTER — OFFICE VISIT (OUTPATIENT)
Dept: INTERNAL MEDICINE | Facility: CLINIC | Age: 70
End: 2020-01-17
Payer: MEDICARE

## 2020-01-17 VITALS
HEART RATE: 63 BPM | WEIGHT: 184.5 LBS | SYSTOLIC BLOOD PRESSURE: 132 MMHG | TEMPERATURE: 98 F | BODY MASS INDEX: 25.83 KG/M2 | HEIGHT: 71 IN | OXYGEN SATURATION: 99 % | DIASTOLIC BLOOD PRESSURE: 70 MMHG

## 2020-01-17 DIAGNOSIS — Z12.5 PROSTATE CANCER SCREENING: Primary | ICD-10-CM

## 2020-01-17 DIAGNOSIS — I10 ESSENTIAL HYPERTENSION: ICD-10-CM

## 2020-01-17 PROCEDURE — 1101F PR PT FALLS ASSESS DOC 0-1 FALLS W/OUT INJ PAST YR: ICD-10-PCS | Mod: HCNC,CPTII,S$GLB, | Performed by: INTERNAL MEDICINE

## 2020-01-17 PROCEDURE — 3078F PR MOST RECENT DIASTOLIC BLOOD PRESSURE < 80 MM HG: ICD-10-PCS | Mod: HCNC,CPTII,S$GLB, | Performed by: INTERNAL MEDICINE

## 2020-01-17 PROCEDURE — 99213 PR OFFICE/OUTPT VISIT, EST, LEVL III, 20-29 MIN: ICD-10-PCS | Mod: HCNC,S$GLB,, | Performed by: INTERNAL MEDICINE

## 2020-01-17 PROCEDURE — 1159F MED LIST DOCD IN RCRD: CPT | Mod: HCNC,S$GLB,, | Performed by: INTERNAL MEDICINE

## 2020-01-17 PROCEDURE — 99999 PR PBB SHADOW E&M-EST. PATIENT-LVL IV: CPT | Mod: PBBFAC,HCNC,, | Performed by: INTERNAL MEDICINE

## 2020-01-17 PROCEDURE — 3075F PR MOST RECENT SYSTOLIC BLOOD PRESS GE 130-139MM HG: ICD-10-PCS | Mod: HCNC,CPTII,S$GLB, | Performed by: INTERNAL MEDICINE

## 2020-01-17 PROCEDURE — 1125F AMNT PAIN NOTED PAIN PRSNT: CPT | Mod: HCNC,S$GLB,, | Performed by: INTERNAL MEDICINE

## 2020-01-17 PROCEDURE — 99213 OFFICE O/P EST LOW 20 MIN: CPT | Mod: HCNC,S$GLB,, | Performed by: INTERNAL MEDICINE

## 2020-01-17 PROCEDURE — 3078F DIAST BP <80 MM HG: CPT | Mod: HCNC,CPTII,S$GLB, | Performed by: INTERNAL MEDICINE

## 2020-01-17 PROCEDURE — 1159F PR MEDICATION LIST DOCUMENTED IN MEDICAL RECORD: ICD-10-PCS | Mod: HCNC,S$GLB,, | Performed by: INTERNAL MEDICINE

## 2020-01-17 PROCEDURE — 1125F PR PAIN SEVERITY QUANTIFIED, PAIN PRESENT: ICD-10-PCS | Mod: HCNC,S$GLB,, | Performed by: INTERNAL MEDICINE

## 2020-01-17 PROCEDURE — 1101F PT FALLS ASSESS-DOCD LE1/YR: CPT | Mod: HCNC,CPTII,S$GLB, | Performed by: INTERNAL MEDICINE

## 2020-01-17 PROCEDURE — 99999 PR PBB SHADOW E&M-EST. PATIENT-LVL IV: ICD-10-PCS | Mod: PBBFAC,HCNC,, | Performed by: INTERNAL MEDICINE

## 2020-01-17 PROCEDURE — 3075F SYST BP GE 130 - 139MM HG: CPT | Mod: HCNC,CPTII,S$GLB, | Performed by: INTERNAL MEDICINE

## 2020-01-17 NOTE — PROGRESS NOTES
"HPI:  Patient is a 69-year-old man who comes today for follow-up on elevated blood pressure.  He was seen with ER at [facility] on new year's Rhonda for dizziness and lightheadedness.  His blood pressure was 180/101.  He was then seen by the nurse practitioner about 2 weeks ago in his blood pressure was 152/82.  She started him on lisinopril.  His blood pressure readings at home by a wrist machine and been in the 150-170/80 range.  I explained him I really would prefer the blood pressure machine a goes around the arm and not the wrist.  His wife has 1 at home.    Current meds have been verified and updated per the EMR  Exam:/70   Pulse 63   Temp 97.6 °F (36.4 °C) (Tympanic)   Ht 5' 11" (1.803 m)   Wt 83.7 kg (184 lb 8.4 oz)   SpO2 99%   BMI 25.74 kg/m²   Exam deferred    Lab Results   Component Value Date    WBC 4.83 05/31/2019    HGB 14.4 05/31/2019    HCT 43.3 05/31/2019     05/31/2019    CHOL 207 (H) 05/31/2019    TRIG 92 05/31/2019    HDL 72 05/31/2019    ALT 15 05/31/2019    AST 18 05/31/2019     05/31/2019    K 4.9 05/31/2019     05/31/2019    CREATININE 1.0 05/31/2019    BUN 14 05/31/2019    CO2 29 05/31/2019    TSH 0.837 05/31/2019    PSA 1.6 05/31/2019       Impression:  Hypertension  Patient Active Problem List   Diagnosis    Benign prostatic hyperplasia without lower urinary tract symptoms    Tobacco abuse    Alcohol dependence    Spondylosis of lumbar region without myelopathy or radiculopathy    Osteoarthritis of multiple joints    DDD (degenerative disc disease), lumbar    History of colonic polyps    Tortuous aorta    Hypertension       Plan:  Orders Placed This Encounter    Comprehensive metabolic panel    Lipid panel    TSH    CBC auto differential    PSA, Screening    Patient will see me in 2 weeks.  He will bring his machine with him along with TM blood pressure readings.  He will use the machine that goes around the arm and not the wrist.      This " note is generated with speech recognition software and is subject to transcription error and sound alike phrases that may be missed by proofreading.

## 2020-01-31 ENCOUNTER — OFFICE VISIT (OUTPATIENT)
Dept: INTERNAL MEDICINE | Facility: CLINIC | Age: 70
End: 2020-01-31
Payer: MEDICARE

## 2020-01-31 VITALS
TEMPERATURE: 98 F | DIASTOLIC BLOOD PRESSURE: 70 MMHG | SYSTOLIC BLOOD PRESSURE: 130 MMHG | OXYGEN SATURATION: 98 % | BODY MASS INDEX: 25.62 KG/M2 | HEIGHT: 71 IN | WEIGHT: 183 LBS | HEART RATE: 50 BPM

## 2020-01-31 DIAGNOSIS — I10 ESSENTIAL HYPERTENSION: Primary | ICD-10-CM

## 2020-01-31 PROCEDURE — 99999 PR PBB SHADOW E&M-EST. PATIENT-LVL III: CPT | Mod: PBBFAC,HCNC,, | Performed by: INTERNAL MEDICINE

## 2020-01-31 PROCEDURE — 3075F SYST BP GE 130 - 139MM HG: CPT | Mod: HCNC,CPTII,S$GLB, | Performed by: INTERNAL MEDICINE

## 2020-01-31 PROCEDURE — 99213 OFFICE O/P EST LOW 20 MIN: CPT | Mod: HCNC,S$GLB,, | Performed by: INTERNAL MEDICINE

## 2020-01-31 PROCEDURE — 3078F PR MOST RECENT DIASTOLIC BLOOD PRESSURE < 80 MM HG: ICD-10-PCS | Mod: HCNC,CPTII,S$GLB, | Performed by: INTERNAL MEDICINE

## 2020-01-31 PROCEDURE — 1101F PR PT FALLS ASSESS DOC 0-1 FALLS W/OUT INJ PAST YR: ICD-10-PCS | Mod: HCNC,CPTII,S$GLB, | Performed by: INTERNAL MEDICINE

## 2020-01-31 PROCEDURE — 1159F MED LIST DOCD IN RCRD: CPT | Mod: HCNC,S$GLB,, | Performed by: INTERNAL MEDICINE

## 2020-01-31 PROCEDURE — 1159F PR MEDICATION LIST DOCUMENTED IN MEDICAL RECORD: ICD-10-PCS | Mod: HCNC,S$GLB,, | Performed by: INTERNAL MEDICINE

## 2020-01-31 PROCEDURE — 3078F DIAST BP <80 MM HG: CPT | Mod: HCNC,CPTII,S$GLB, | Performed by: INTERNAL MEDICINE

## 2020-01-31 PROCEDURE — 1126F PR PAIN SEVERITY QUANTIFIED, NO PAIN PRESENT: ICD-10-PCS | Mod: HCNC,S$GLB,, | Performed by: INTERNAL MEDICINE

## 2020-01-31 PROCEDURE — 1126F AMNT PAIN NOTED NONE PRSNT: CPT | Mod: HCNC,S$GLB,, | Performed by: INTERNAL MEDICINE

## 2020-01-31 PROCEDURE — 99999 PR PBB SHADOW E&M-EST. PATIENT-LVL III: ICD-10-PCS | Mod: PBBFAC,HCNC,, | Performed by: INTERNAL MEDICINE

## 2020-01-31 PROCEDURE — 1101F PT FALLS ASSESS-DOCD LE1/YR: CPT | Mod: HCNC,CPTII,S$GLB, | Performed by: INTERNAL MEDICINE

## 2020-01-31 PROCEDURE — 3075F PR MOST RECENT SYSTOLIC BLOOD PRESS GE 130-139MM HG: ICD-10-PCS | Mod: HCNC,CPTII,S$GLB, | Performed by: INTERNAL MEDICINE

## 2020-01-31 PROCEDURE — 99213 PR OFFICE/OUTPT VISIT, EST, LEVL III, 20-29 MIN: ICD-10-PCS | Mod: HCNC,S$GLB,, | Performed by: INTERNAL MEDICINE

## 2020-01-31 NOTE — PROGRESS NOTES
"HPI:  Patient is a 69-year-old man who comes today for follow-up of his hypertension.  Patient brings his home blood pressure device with him here today.  His reading by me with his devise was 152/76.  This very much correlates with the reading we got here today, which was 156/76 by the nurse.  His home blood pressure readings are between 120-140 over 60-75.    Current meds have been verified and updated per the EMR  Exam:/70   Pulse (!) 50   Temp 97.7 °F (36.5 °C) (Tympanic)   Ht 5' 11" (1.803 m)   Wt 83 kg (182 lb 15.7 oz)   SpO2 98%   BMI 25.52 kg/m²   Exam deferred    Lab Results   Component Value Date    WBC 4.83 05/31/2019    HGB 14.4 05/31/2019    HCT 43.3 05/31/2019     05/31/2019    CHOL 207 (H) 05/31/2019    TRIG 92 05/31/2019    HDL 72 05/31/2019    ALT 15 05/31/2019    AST 18 05/31/2019     05/31/2019    K 4.9 05/31/2019     05/31/2019    CREATININE 1.0 05/31/2019    BUN 14 05/31/2019    CO2 29 05/31/2019    TSH 0.837 05/31/2019    PSA 1.6 05/31/2019       Impression:  Hypertension, controlled by home blood pressure readings  Patient Active Problem List   Diagnosis    Benign prostatic hyperplasia without lower urinary tract symptoms    Tobacco abuse    Alcohol dependence    Spondylosis of lumbar region without myelopathy or radiculopathy    Osteoarthritis of multiple joints    DDD (degenerative disc disease), lumbar    History of colonic polyps    Tortuous aorta    Hypertension       Plan:     Medications remain the same.  He will see me again in July with lab work for his physical exam    This note is generated with speech recognition software and is subject to transcription error and sound alike phrases that may be missed by proofreading.    "

## 2020-02-03 RX ORDER — LISINOPRIL 10 MG/1
TABLET ORAL
Qty: 30 TABLET | Refills: 11 | Status: SHIPPED | OUTPATIENT
Start: 2020-02-03 | End: 2020-07-31

## 2020-02-03 RX ORDER — LISINOPRIL 10 MG/1
TABLET ORAL
Qty: 30 TABLET | Refills: 11 | Status: SHIPPED | OUTPATIENT
Start: 2020-02-03 | End: 2020-03-02 | Stop reason: SDUPTHER

## 2020-03-02 ENCOUNTER — OFFICE VISIT (OUTPATIENT)
Dept: INTERNAL MEDICINE | Facility: CLINIC | Age: 70
End: 2020-03-02
Payer: MEDICARE

## 2020-03-02 VITALS
BODY MASS INDEX: 25.24 KG/M2 | HEIGHT: 72 IN | WEIGHT: 186.31 LBS | TEMPERATURE: 98 F | SYSTOLIC BLOOD PRESSURE: 134 MMHG | OXYGEN SATURATION: 97 % | HEART RATE: 52 BPM | DIASTOLIC BLOOD PRESSURE: 78 MMHG

## 2020-03-02 DIAGNOSIS — N40.0 BENIGN PROSTATIC HYPERPLASIA WITHOUT LOWER URINARY TRACT SYMPTOMS: Chronic | ICD-10-CM

## 2020-03-02 DIAGNOSIS — Z72.0 TOBACCO ABUSE: Chronic | ICD-10-CM

## 2020-03-02 DIAGNOSIS — M15.9 PRIMARY OSTEOARTHRITIS INVOLVING MULTIPLE JOINTS: ICD-10-CM

## 2020-03-02 DIAGNOSIS — I77.1 TORTUOUS AORTA: ICD-10-CM

## 2020-03-02 DIAGNOSIS — I10 ESSENTIAL HYPERTENSION: ICD-10-CM

## 2020-03-02 DIAGNOSIS — Z00.00 ENCOUNTER FOR PREVENTIVE HEALTH EXAMINATION: Primary | ICD-10-CM

## 2020-03-02 DIAGNOSIS — Z20.2 POSSIBLE EXPOSURE TO STD: ICD-10-CM

## 2020-03-02 DIAGNOSIS — F10.20 UNCOMPLICATED ALCOHOL DEPENDENCE: Chronic | ICD-10-CM

## 2020-03-02 DIAGNOSIS — N32.9 URINARY BLADDER DISORDER: ICD-10-CM

## 2020-03-02 DIAGNOSIS — N53.8 OTHER MALE SEXUAL DYSFUNCTION: ICD-10-CM

## 2020-03-02 DIAGNOSIS — M47.816 SPONDYLOSIS OF LUMBAR REGION WITHOUT MYELOPATHY OR RADICULOPATHY: ICD-10-CM

## 2020-03-02 DIAGNOSIS — M51.36 DDD (DEGENERATIVE DISC DISEASE), LUMBAR: ICD-10-CM

## 2020-03-02 PROCEDURE — 1101F PT FALLS ASSESS-DOCD LE1/YR: CPT | Mod: HCNC,CPTII,S$GLB, | Performed by: NURSE PRACTITIONER

## 2020-03-02 PROCEDURE — 3078F DIAST BP <80 MM HG: CPT | Mod: HCNC,CPTII,S$GLB, | Performed by: NURSE PRACTITIONER

## 2020-03-02 PROCEDURE — 99213 OFFICE O/P EST LOW 20 MIN: CPT | Mod: 25,HCNC,S$GLB, | Performed by: NURSE PRACTITIONER

## 2020-03-02 PROCEDURE — 99213 PR OFFICE/OUTPT VISIT, EST, LEVL III, 20-29 MIN: ICD-10-PCS | Mod: 25,HCNC,S$GLB, | Performed by: NURSE PRACTITIONER

## 2020-03-02 PROCEDURE — 1159F PR MEDICATION LIST DOCUMENTED IN MEDICAL RECORD: ICD-10-PCS | Mod: HCNC,S$GLB,, | Performed by: NURSE PRACTITIONER

## 2020-03-02 PROCEDURE — 99999 PR PBB SHADOW E&M-EST. PATIENT-LVL III: ICD-10-PCS | Mod: PBBFAC,HCNC,, | Performed by: NURSE PRACTITIONER

## 2020-03-02 PROCEDURE — 3075F PR MOST RECENT SYSTOLIC BLOOD PRESS GE 130-139MM HG: ICD-10-PCS | Mod: HCNC,CPTII,S$GLB, | Performed by: NURSE PRACTITIONER

## 2020-03-02 PROCEDURE — 1125F PR PAIN SEVERITY QUANTIFIED, PAIN PRESENT: ICD-10-PCS | Mod: HCNC,S$GLB,, | Performed by: NURSE PRACTITIONER

## 2020-03-02 PROCEDURE — 1125F AMNT PAIN NOTED PAIN PRSNT: CPT | Mod: HCNC,S$GLB,, | Performed by: NURSE PRACTITIONER

## 2020-03-02 PROCEDURE — 3078F PR MOST RECENT DIASTOLIC BLOOD PRESSURE < 80 MM HG: ICD-10-PCS | Mod: HCNC,CPTII,S$GLB, | Performed by: NURSE PRACTITIONER

## 2020-03-02 PROCEDURE — 99499 UNLISTED E&M SERVICE: CPT | Mod: HCNC,S$GLB,, | Performed by: NURSE PRACTITIONER

## 2020-03-02 PROCEDURE — 1101F PR PT FALLS ASSESS DOC 0-1 FALLS W/OUT INJ PAST YR: ICD-10-PCS | Mod: HCNC,CPTII,S$GLB, | Performed by: NURSE PRACTITIONER

## 2020-03-02 PROCEDURE — 3075F SYST BP GE 130 - 139MM HG: CPT | Mod: HCNC,CPTII,S$GLB, | Performed by: NURSE PRACTITIONER

## 2020-03-02 PROCEDURE — 87491 CHLMYD TRACH DNA AMP PROBE: CPT | Mod: HCNC

## 2020-03-02 PROCEDURE — G0439 PR MEDICARE ANNUAL WELLNESS SUBSEQUENT VISIT: ICD-10-PCS | Mod: HCNC,S$GLB,, | Performed by: NURSE PRACTITIONER

## 2020-03-02 PROCEDURE — 99999 PR PBB SHADOW E&M-EST. PATIENT-LVL III: CPT | Mod: PBBFAC,HCNC,, | Performed by: NURSE PRACTITIONER

## 2020-03-02 PROCEDURE — 1159F MED LIST DOCD IN RCRD: CPT | Mod: HCNC,S$GLB,, | Performed by: NURSE PRACTITIONER

## 2020-03-02 PROCEDURE — G0439 PPPS, SUBSEQ VISIT: HCPCS | Mod: HCNC,S$GLB,, | Performed by: NURSE PRACTITIONER

## 2020-03-02 PROCEDURE — 99499 RISK ADDL DX/OHS AUDIT: ICD-10-PCS | Mod: HCNC,S$GLB,, | Performed by: NURSE PRACTITIONER

## 2020-03-02 RX ORDER — MELOXICAM 15 MG/1
TABLET ORAL
Qty: 30 TABLET | Refills: 0
Start: 2020-03-02 | End: 2020-05-15 | Stop reason: SDUPTHER

## 2020-03-02 NOTE — PROGRESS NOTES
Harpreet Perez presented for a  Medicare AWV and comprehensive Health Risk Assessment today. The following components were reviewed and updated:    · Medical history  · Family History  · Social history  · Allergies and Current Medications  · Health Risk Assessment  · Health Maintenance  · Care Team     ** See Completed Assessments for Annual Wellness Visit within the encounter summary.**       The following assessments were completed:  · Living Situation  · CAGE  · Depression Screening  · Timed Get Up and Go  · Whisper Test  · Cognitive Function Screening  · Nutrition Screening  · ADL Screening  · PAQ Screening    Vitals:    03/02/20 1345   BP: 134/78   Pulse: (!) 52   Temp: 98.3 °F (36.8 °C)   TempSrc: Tympanic   SpO2: 97%   Weight: 84.5 kg (186 lb 4.6 oz)   Height: 6' (1.829 m)     Body mass index is 25.27 kg/m².  Physical Exam   Constitutional: He is oriented to person, place, and time. He appears well-developed and well-nourished. No distress.   HENT:   Head: Normocephalic and atraumatic.   Mouth/Throat: Oropharynx is clear and moist. No oropharyngeal exudate.   Eyes: Conjunctivae are normal.   Neck: Normal range of motion. Neck supple. No JVD present. No tracheal deviation present. No thyromegaly present.   No carotid bruits   Cardiovascular: Normal rate, regular rhythm, normal heart sounds and intact distal pulses. Exam reveals no gallop and no friction rub.   No murmur heard.  Pulmonary/Chest: Effort normal and breath sounds normal. No stridor. No respiratory distress. He has no wheezes. He has no rales. He exhibits no tenderness.   Abdominal: Soft. Bowel sounds are normal. He exhibits no distension and no mass. There is no tenderness. There is no rebound and no guarding. No hernia.   Musculoskeletal: Normal range of motion. He exhibits no edema or tenderness.   Lymphadenopathy:     He has no cervical adenopathy.   Neurological: He is alert and oriented to person, place, and time. No cranial nerve deficit.    Skin: Skin is warm and dry. He is not diaphoretic.   Psychiatric: He has a normal mood and affect. His behavior is normal.         Diagnoses and health risks identified today and associated recommendations/orders:        4. Encounter for preventive health examination  Screenings performed, as noted above.  Personal preventative testing needs reviewed.     5. Essential hypertension  Monitored/treated on meds, continue the same tx, stable    6. Tortuous aorta  Monitored/treated on meds, continue the same tx, stable, discussed stopping smoking, good diet and exercise    7. Uncomplicated alcohol dependence  Monitored/treated on meds, continue the same tx, stable, states is no longer drinking    8. DDD (degenerative disc disease), lumbar  Monitored/treated on meds, continue the same tx, stable    9. Spondylosis of lumbar region without myelopathy or radiculopathy  Monitored/treated on meds, continue the same tx, stable    10. Benign prostatic hyperplasia without lower urinary tract symptoms  Monitored/treated on meds, continue the same tx, stable    11. Primary osteoarthritis involving multiple joints  Monitored/treated on meds, continue the same tx, stable    12. Tobacco abuse  Monitored/treated on meds, continue the same tx, stable, states is cutting down, doesn't want smoking cessation class at this time      Provided Harpreet with a 5-10 year written screening schedule and personal prevention plan. Recommendations were developed using the USPSTF age appropriate recommendations. Education, counseling, and referrals were provided as needed. After Visit Summary printed and given to patient which includes a list of additional screenings\tests needed.    No follow-ups on file.    Navin Chirinos NP

## 2020-03-02 NOTE — PROGRESS NOTES
Subjective:      Patient ID: Harpreet Perez is a 69 y.o. male.    Chief Complaint: Health Risk Assessment    HPI:  Patient states his wife was dx with chlamydia several days ago, is taking antibiotics. He desires testing, he denies any symptoms    Past Medical History:   Diagnosis Date    Alcohol dependence     Back pain     History of colonic polyps     Hypertension     Hypertrophy of prostate without urinary obstruction and other lower urinary tract symptoms (LUTS)     Osteoarthritis of multiple joints     Tobacco dependence     quit then restarted       Past Surgical History:   Procedure Laterality Date    COLONOSCOPY N/A 5/25/2016    Procedure: COLONOSCOPY;  Surgeon: Sergey Jensen MD;  Location: [facility];  Service: Endoscopy;  Laterality: N/A;    LIPOMA RESECTION Right 8/17/12    posterior shoulder       Lab Results   Component Value Date    WBC 4.83 05/31/2019    HGB 14.4 05/31/2019    HCT 43.3 05/31/2019     05/31/2019    CHOL 207 (H) 05/31/2019    TRIG 92 05/31/2019    HDL 72 05/31/2019    ALT 15 05/31/2019    AST 18 05/31/2019     05/31/2019    K 4.9 05/31/2019     05/31/2019    CREATININE 1.0 05/31/2019    BUN 14 05/31/2019    CO2 29 05/31/2019    TSH 0.837 05/31/2019    PSA 1.6 05/31/2019       /78   Pulse (!) 52   Temp 98.3 °F (36.8 °C) (Tympanic)   Ht 6' (1.829 m)   Wt 84.5 kg (186 lb 4.6 oz)   SpO2 97%   BMI 25.27 kg/m²       Review of Systems   Objective:     Physical Exam   Constitutional: He is oriented to person, place, and time. He appears well-developed and well-nourished. No distress.   Musculoskeletal:   Normal gait   Neurological: He is alert and oriented to person, place, and time.   Skin: Skin is warm and dry.   Psychiatric: He has a normal mood and affect. His behavior is normal.     Assessment:      1. Encounter for preventive health examination    2. Possible exposure to STD    3. Urinary bladder disorder    4. Other male sexual dysfunction     5.  Essential hypertension    6. Tortuous aorta    7. Uncomplicated alcohol dependence    8. DDD (degenerative disc disease), lumbar    9. Spondylosis of lumbar region without myelopathy or radiculopathy    10. Benign prostatic hyperplasia without lower urinary tract symptoms    11. Primary osteoarthritis involving multiple joints    12. Tobacco abuse      Plan:   Encounter for preventive health examination    Possible exposure to STD  -     C. trachomatis/N. gonorrhoeae by AMP DNA    Urinary bladder disorder  -     C. trachomatis/N. gonorrhoeae by AMP DNA    Other male sexual dysfunction   -     C. trachomatis/N. gonorrhoeae by AMP DNA    Essential hypertension    Tortuous aorta    Uncomplicated alcohol dependence    DDD (degenerative disc disease), lumbar    Spondylosis of lumbar region without myelopathy or radiculopathy    Benign prostatic hyperplasia without lower urinary tract symptoms    Primary osteoarthritis involving multiple joints    Tobacco abuse    Other orders  -     meloxicam (MOBIC) 15 MG tablet; TAKE 1 TABLET(15 MG) BY MOUTH EVERY DAY  Dispense: 30 tablet; Refill: 0    will call with the std results tomorrow.  Advised to obstain from intercourse until results obtained and treated      Current Outpatient Medications:     lisinopril 10 MG tablet, TAKE 1 TABLET BY MOUTH ONCE DAILY, Disp: 30 tablet, Rfl: 11    meloxicam (MOBIC) 15 MG tablet, TAKE 1 TABLET(15 MG) BY MOUTH EVERY DAY, Disp: 30 tablet, Rfl: 0    tamsulosin (FLOMAX) 0.4 mg Cap, Take 1 capsule (0.4 mg total) by mouth once daily., Disp: 90 capsule, Rfl: 2

## 2020-03-02 NOTE — PATIENT INSTRUCTIONS
Counseling and Referral of Other Preventative  (Italic type indicates deductible and co-insurance are waived)    Patient Name: Harpreet Perez  Today's Date: 3/2/2020    Health Maintenance       Date Due Completion Date    Shingles Vaccine (2 of 3) 10/17/2011 8/22/2011    Pneumococcal Vaccine (65+ Low/Medium Risk) (2 of 2 - PPSV23) 12/04/2019 8/26/2016    LDCT Lung Screen 03/20/2020 3/20/2019    Colonoscopy 05/25/2021 5/25/2016    Lipid Panel 05/31/2024 5/31/2019    TETANUS VACCINE 05/31/2028 5/31/2018        Orders Placed This Encounter   Procedures    C. trachomatis/N. gonorrhoeae by AMP DNA     The following information is provided to all patients.  This information is to help you find resources for any of the problems found today that may be affecting your health:                Living healthy guide: www.[facility].[place].gov      Understanding Diabetes: www.diabetes.org      Eating healthy: www.cdc.gov/healthyweight      [facility] home safety checklist: www.cdc.gov/steadi/patient.html      Agency on Aging: www.goea.[place].[facility]      Alcoholics anonymous (AA): www.aa.org      Physical Activity: www.reanna.nih.gov/xj7nbnp      Tobacco use: www.quitwithusla.org

## 2020-03-03 LAB
C TRACH DNA SPEC QL NAA+PROBE: NOT DETECTED
N GONORRHOEA DNA SPEC QL NAA+PROBE: NOT DETECTED

## 2020-03-04 ENCOUNTER — TELEPHONE (OUTPATIENT)
Dept: INTERNAL MEDICINE | Facility: CLINIC | Age: 70
End: 2020-03-04

## 2020-05-06 ENCOUNTER — PATIENT OUTREACH (OUTPATIENT)
Dept: ADMINISTRATIVE | Facility: OTHER | Age: 70
End: 2020-05-06

## 2020-05-08 ENCOUNTER — OFFICE VISIT (OUTPATIENT)
Dept: OPHTHALMOLOGY | Facility: CLINIC | Age: 70
End: 2020-05-08
Payer: MEDICARE

## 2020-05-08 DIAGNOSIS — H52.03 HYPEROPIA, BILATERAL: ICD-10-CM

## 2020-05-08 DIAGNOSIS — H04.123 DRY EYES, BILATERAL: ICD-10-CM

## 2020-05-08 DIAGNOSIS — H25.13 CATARACT, NUCLEAR SCLEROTIC SENILE, BILATERAL: Primary | ICD-10-CM

## 2020-05-08 DIAGNOSIS — H52.4 BILATERAL PRESBYOPIA: ICD-10-CM

## 2020-05-08 PROCEDURE — 92014 PR EYE EXAM, EST PATIENT,COMPREHESV: ICD-10-PCS | Mod: HCNC,S$GLB,, | Performed by: OPTOMETRIST

## 2020-05-08 PROCEDURE — 92015 DETERMINE REFRACTIVE STATE: CPT | Mod: HCNC,S$GLB,, | Performed by: OPTOMETRIST

## 2020-05-08 PROCEDURE — 92015 PR REFRACTION: ICD-10-PCS | Mod: HCNC,S$GLB,, | Performed by: OPTOMETRIST

## 2020-05-08 PROCEDURE — 99999 PR PBB SHADOW E&M-EST. PATIENT-LVL I: ICD-10-PCS | Mod: PBBFAC,HCNC,, | Performed by: OPTOMETRIST

## 2020-05-08 PROCEDURE — 99999 PR PBB SHADOW E&M-EST. PATIENT-LVL I: CPT | Mod: PBBFAC,HCNC,, | Performed by: OPTOMETRIST

## 2020-05-08 PROCEDURE — 92014 COMPRE OPH EXAM EST PT 1/>: CPT | Mod: HCNC,S$GLB,, | Performed by: OPTOMETRIST

## 2020-05-14 ENCOUNTER — TELEPHONE (OUTPATIENT)
Dept: INTERNAL MEDICINE | Facility: CLINIC | Age: 70
End: 2020-05-14

## 2020-05-14 NOTE — TELEPHONE ENCOUNTER
Patient called stating he's been experiencing pain in his right foot that comes and goes since Sunday. He states the pain is on top of his foot. He said the top of his foot also swells up. It is better today than yesterday. He said he soaks it in epsom salt which helps relieve the pain. He mentioned it is sensitive to touch the top of his foot. He is not sure what could be causing this pain. He said at times it will also hurt to walk on that foot. He wanted your opinion on what it could be. He is hoping it's not gout.

## 2020-05-14 NOTE — TELEPHONE ENCOUNTER
----- Message from Mabel Jane sent at 5/14/2020  8:01 AM CDT -----  Contact: Patient  Patient would like a call back concerning pain and swelling in his RT foot, he can not put any weight on his foot. Patient would like the nurse to give him a call back to advise at Ph .[phone].

## 2020-05-15 ENCOUNTER — OFFICE VISIT (OUTPATIENT)
Dept: INTERNAL MEDICINE | Facility: CLINIC | Age: 70
End: 2020-05-15
Payer: MEDICARE

## 2020-05-15 VITALS
HEIGHT: 72 IN | SYSTOLIC BLOOD PRESSURE: 134 MMHG | OXYGEN SATURATION: 97 % | DIASTOLIC BLOOD PRESSURE: 76 MMHG | BODY MASS INDEX: 24.96 KG/M2 | TEMPERATURE: 97 F | WEIGHT: 184.31 LBS | HEART RATE: 58 BPM

## 2020-05-15 DIAGNOSIS — M79.671 RIGHT FOOT PAIN: Primary | ICD-10-CM

## 2020-05-15 PROCEDURE — 1101F PR PT FALLS ASSESS DOC 0-1 FALLS W/OUT INJ PAST YR: ICD-10-PCS | Mod: HCNC,CPTII,S$GLB, | Performed by: INTERNAL MEDICINE

## 2020-05-15 PROCEDURE — 1159F PR MEDICATION LIST DOCUMENTED IN MEDICAL RECORD: ICD-10-PCS | Mod: HCNC,S$GLB,, | Performed by: INTERNAL MEDICINE

## 2020-05-15 PROCEDURE — 3075F PR MOST RECENT SYSTOLIC BLOOD PRESS GE 130-139MM HG: ICD-10-PCS | Mod: HCNC,CPTII,S$GLB, | Performed by: INTERNAL MEDICINE

## 2020-05-15 PROCEDURE — 99499 UNLISTED E&M SERVICE: CPT | Mod: HCNC,S$GLB,, | Performed by: INTERNAL MEDICINE

## 2020-05-15 PROCEDURE — 99213 OFFICE O/P EST LOW 20 MIN: CPT | Mod: HCNC,S$GLB,, | Performed by: INTERNAL MEDICINE

## 2020-05-15 PROCEDURE — 99213 PR OFFICE/OUTPT VISIT, EST, LEVL III, 20-29 MIN: ICD-10-PCS | Mod: HCNC,S$GLB,, | Performed by: INTERNAL MEDICINE

## 2020-05-15 PROCEDURE — 3078F PR MOST RECENT DIASTOLIC BLOOD PRESSURE < 80 MM HG: ICD-10-PCS | Mod: HCNC,CPTII,S$GLB, | Performed by: INTERNAL MEDICINE

## 2020-05-15 PROCEDURE — 1159F MED LIST DOCD IN RCRD: CPT | Mod: HCNC,S$GLB,, | Performed by: INTERNAL MEDICINE

## 2020-05-15 PROCEDURE — 99499 RISK ADDL DX/OHS AUDIT: ICD-10-PCS | Mod: HCNC,S$GLB,, | Performed by: INTERNAL MEDICINE

## 2020-05-15 PROCEDURE — 3078F DIAST BP <80 MM HG: CPT | Mod: HCNC,CPTII,S$GLB, | Performed by: INTERNAL MEDICINE

## 2020-05-15 PROCEDURE — 1125F PR PAIN SEVERITY QUANTIFIED, PAIN PRESENT: ICD-10-PCS | Mod: HCNC,S$GLB,, | Performed by: INTERNAL MEDICINE

## 2020-05-15 PROCEDURE — 99999 PR PBB SHADOW E&M-EST. PATIENT-LVL III: CPT | Mod: PBBFAC,HCNC,, | Performed by: INTERNAL MEDICINE

## 2020-05-15 PROCEDURE — 99999 PR PBB SHADOW E&M-EST. PATIENT-LVL III: ICD-10-PCS | Mod: PBBFAC,HCNC,, | Performed by: INTERNAL MEDICINE

## 2020-05-15 PROCEDURE — 3075F SYST BP GE 130 - 139MM HG: CPT | Mod: HCNC,CPTII,S$GLB, | Performed by: INTERNAL MEDICINE

## 2020-05-15 PROCEDURE — 1101F PT FALLS ASSESS-DOCD LE1/YR: CPT | Mod: HCNC,CPTII,S$GLB, | Performed by: INTERNAL MEDICINE

## 2020-05-15 PROCEDURE — 1125F AMNT PAIN NOTED PAIN PRSNT: CPT | Mod: HCNC,S$GLB,, | Performed by: INTERNAL MEDICINE

## 2020-05-15 RX ORDER — MELOXICAM 15 MG/1
TABLET ORAL
Qty: 30 TABLET | Refills: 11 | Status: SHIPPED | OUTPATIENT
Start: 2020-05-15 | End: 2021-04-30

## 2020-05-15 NOTE — PROGRESS NOTES
HPI:  Pt c/o right mid foot pain off/on for the last 6-7 days. He denies any injury or trauma.  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  He is not taking anything for it    Current meds have been verified and updated per the EMR  Exam:/76 (BP Location: Left arm)   Pulse (!) 58   Temp 97.4 °F (36.3 °C) (Tympanic)   Ht 6' (1.829 m)   Wt 83.6 kg (184 lb 4.9 oz)   SpO2 97%   BMI 25.00 kg/m²   He has a large bony tuberosity over the dorsal aspect of the 1st metatarsal proximally.  That is the point where he has the most pain and discomfort.  There is no warmth, erythema, or soft tissue swelling.    Lab Results   Component Value Date    WBC 4.83 05/31/2019    HGB 14.4 05/31/2019    HCT 43.3 05/31/2019     05/31/2019    CHOL 207 (H) 05/31/2019    TRIG 92 05/31/2019    HDL 72 05/31/2019    ALT 15 05/31/2019    AST 18 05/31/2019     05/31/2019    K 4.9 05/31/2019     05/31/2019    CREATININE 1.0 05/31/2019    BUN 14 05/31/2019    CO2 29 05/31/2019    TSH 0.837 05/31/2019    PSA 1.6 05/31/2019       Impression:  Suspect osteoarthritis of the proximal head of the 1st metatarsal  Patient Active Problem List   Diagnosis    Benign prostatic hyperplasia without lower urinary tract symptoms    Tobacco abuse    Alcohol dependence    Spondylosis of lumbar region without myelopathy or radiculopathy    Osteoarthritis of multiple joints    DDD (degenerative disc disease), lumbar    History of colonic polyps    Tortuous aorta    Hypertension       Plan:  Orders Placed This Encounter    meloxicam (MOBIC) 15 MG tablet     He was given meloxicam to use daily as needed.  If not better in the next week or so he should let me know    This note is generated with speech recognition software and is subject to transcription error and sound alike phrases that may be missed by proofreading.

## 2020-05-28 RX ORDER — TAMSULOSIN HYDROCHLORIDE 0.4 MG/1
CAPSULE ORAL
Qty: 90 CAPSULE | Refills: 3 | Status: SHIPPED | OUTPATIENT
Start: 2020-05-28 | End: 2021-06-08

## 2020-05-28 RX ORDER — TAMSULOSIN HYDROCHLORIDE 0.4 MG/1
CAPSULE ORAL
Qty: 90 CAPSULE | Refills: 2 | OUTPATIENT
Start: 2020-05-28

## 2020-06-17 ENCOUNTER — OFFICE VISIT (OUTPATIENT)
Dept: INTERNAL MEDICINE | Facility: CLINIC | Age: 70
End: 2020-06-17
Payer: MEDICARE

## 2020-06-17 VITALS
WEIGHT: 184.31 LBS | TEMPERATURE: 98 F | DIASTOLIC BLOOD PRESSURE: 90 MMHG | OXYGEN SATURATION: 98 % | HEIGHT: 72 IN | HEART RATE: 55 BPM | SYSTOLIC BLOOD PRESSURE: 150 MMHG | BODY MASS INDEX: 24.96 KG/M2

## 2020-06-17 DIAGNOSIS — L25.9 CONTACT DERMATITIS, UNSPECIFIED CONTACT DERMATITIS TYPE, UNSPECIFIED TRIGGER: Primary | ICD-10-CM

## 2020-06-17 PROCEDURE — 99999 PR PBB SHADOW E&M-EST. PATIENT-LVL IV: CPT | Mod: PBBFAC,HCNC,, | Performed by: FAMILY MEDICINE

## 2020-06-17 PROCEDURE — 99213 OFFICE O/P EST LOW 20 MIN: CPT | Mod: HCNC,25,S$GLB, | Performed by: FAMILY MEDICINE

## 2020-06-17 PROCEDURE — 3077F PR MOST RECENT SYSTOLIC BLOOD PRESSURE >= 140 MM HG: ICD-10-PCS | Mod: HCNC,CPTII,S$GLB, | Performed by: FAMILY MEDICINE

## 2020-06-17 PROCEDURE — 1126F AMNT PAIN NOTED NONE PRSNT: CPT | Mod: HCNC,S$GLB,, | Performed by: FAMILY MEDICINE

## 2020-06-17 PROCEDURE — 99999 PR PBB SHADOW E&M-EST. PATIENT-LVL IV: ICD-10-PCS | Mod: PBBFAC,HCNC,, | Performed by: FAMILY MEDICINE

## 2020-06-17 PROCEDURE — 1101F PR PT FALLS ASSESS DOC 0-1 FALLS W/OUT INJ PAST YR: ICD-10-PCS | Mod: HCNC,CPTII,S$GLB, | Performed by: FAMILY MEDICINE

## 2020-06-17 PROCEDURE — 3077F SYST BP >= 140 MM HG: CPT | Mod: HCNC,CPTII,S$GLB, | Performed by: FAMILY MEDICINE

## 2020-06-17 PROCEDURE — 1101F PT FALLS ASSESS-DOCD LE1/YR: CPT | Mod: HCNC,CPTII,S$GLB, | Performed by: FAMILY MEDICINE

## 2020-06-17 PROCEDURE — 96372 PR INJECTION,THERAP/PROPH/DIAG2ST, IM OR SUBCUT: ICD-10-PCS | Mod: HCNC,S$GLB,, | Performed by: FAMILY MEDICINE

## 2020-06-17 PROCEDURE — 99213 PR OFFICE/OUTPT VISIT, EST, LEVL III, 20-29 MIN: ICD-10-PCS | Mod: HCNC,25,S$GLB, | Performed by: FAMILY MEDICINE

## 2020-06-17 PROCEDURE — 1159F PR MEDICATION LIST DOCUMENTED IN MEDICAL RECORD: ICD-10-PCS | Mod: HCNC,S$GLB,, | Performed by: FAMILY MEDICINE

## 2020-06-17 PROCEDURE — 3080F PR MOST RECENT DIASTOLIC BLOOD PRESSURE >= 90 MM HG: ICD-10-PCS | Mod: HCNC,CPTII,S$GLB, | Performed by: FAMILY MEDICINE

## 2020-06-17 PROCEDURE — 3080F DIAST BP >= 90 MM HG: CPT | Mod: HCNC,CPTII,S$GLB, | Performed by: FAMILY MEDICINE

## 2020-06-17 PROCEDURE — 1126F PR PAIN SEVERITY QUANTIFIED, NO PAIN PRESENT: ICD-10-PCS | Mod: HCNC,S$GLB,, | Performed by: FAMILY MEDICINE

## 2020-06-17 PROCEDURE — 1159F MED LIST DOCD IN RCRD: CPT | Mod: HCNC,S$GLB,, | Performed by: FAMILY MEDICINE

## 2020-06-17 PROCEDURE — 96372 THER/PROPH/DIAG INJ SC/IM: CPT | Mod: HCNC,S$GLB,, | Performed by: FAMILY MEDICINE

## 2020-06-17 RX ORDER — METHYLPREDNISOLONE ACETATE 80 MG/ML
80 INJECTION, SUSPENSION INTRA-ARTICULAR; INTRALESIONAL; INTRAMUSCULAR; SOFT TISSUE ONCE
Status: COMPLETED | OUTPATIENT
Start: 2020-06-17 | End: 2020-06-17

## 2020-06-17 RX ORDER — TRIAMCINOLONE ACETONIDE 1 MG/G
OINTMENT TOPICAL 2 TIMES DAILY
Qty: 80 G | Refills: 1 | Status: SHIPPED | OUTPATIENT
Start: 2020-06-17 | End: 2021-04-30

## 2020-06-17 RX ORDER — PREDNISONE 20 MG/1
40 TABLET ORAL DAILY
Qty: 8 TABLET | Refills: 0 | Status: SHIPPED | OUTPATIENT
Start: 2020-06-17 | End: 2020-06-21

## 2020-06-17 RX ADMIN — METHYLPREDNISOLONE ACETATE 80 MG: 80 INJECTION, SUSPENSION INTRA-ARTICULAR; INTRALESIONAL; INTRAMUSCULAR; SOFT TISSUE at 10:06

## 2020-06-17 NOTE — PROGRESS NOTES
Subjective:      Patient ID: Harpreet Perez is a 69 y.o. male.    Chief Complaint: Rash (legs)      Patient reports pruritic rash on both legs, left worse than right, now also on his right arm.  He reports having similar rash about a year ago.  He reports he is frequently outdoors, no denies any known allergens.  He has tried Bactroban, Vaseline and Hydrogen peroxide with no relief.    Review of Systems   Skin: Positive for color change and rash.     Past Medical History:   Diagnosis Date    Alcohol dependence     Back pain     History of colonic polyps     Hypertension     Hypertrophy of prostate without urinary obstruction and other lower urinary tract symptoms (LUTS)     Osteoarthritis of multiple joints     Tobacco dependence     quit then restarted          Past Surgical History:   Procedure Laterality Date    COLONOSCOPY N/A 5/25/2016    Procedure: COLONOSCOPY;  Surgeon: Sergey Jensen MD;  Location: [facility];  Service: Endoscopy;  Laterality: N/A;    LIPOMA RESECTION Right 8/17/12    posterior shoulder     Family History   Problem Relation Age of Onset    Stroke Father     Heart disease Brother 35        MI    Heart disease Other 33        MI    Hyperlipidemia Brother     Hypertension Brother     Hypertension Brother     Heart disease Brother         cardiac stents    Kidney disease Other         dialysis    Cataracts Mother     Hypertension Mother     Cataracts Sister     Alcohol abuse Neg Hx     Asthma Neg Hx     Birth defects Neg Hx     Cancer Neg Hx     COPD Neg Hx     Depression Neg Hx     Diabetes Neg Hx     Drug abuse Neg Hx     Mental retardation Neg Hx     Mental illness Neg Hx     Learning disabilities Neg Hx     Miscarriages / Stillbirths Neg Hx     Vision loss Neg Hx      Social History     Socioeconomic History    Marital status:      Spouse name: Not on file    Number of children: Not on file    Years of education: Not on file    Highest education level:  Not on file   Occupational History    Not on file   Social Needs    Financial resource strain: Not on file    Food insecurity     Worry: Not on file     Inability: Not on file    Transportation needs     Medical: Not on file     Non-medical: Not on file   Tobacco Use    Smoking status: Current Every Day Smoker     Packs/day: 1.00     Years: 35.00     Pack years: 35.00     Types: Cigarettes     Start date: 1979     Last attempt to quit: 2014     Years since quittin.5    Smokeless tobacco: Never Used    Tobacco comment: resarted 2 weeks ago after being quit x 6 months. wife also smkes    Substance and Sexual Activity    Alcohol use: Yes     Alcohol/week: 20.0 standard drinks     Types: 20 Cans of beer per week     Comment: case per week or up to 30 per week    Drug use: No    Sexual activity: Yes     Partners: Female   Lifestyle    Physical activity     Days per week: Not on file     Minutes per session: Not on file    Stress: Not on file   Relationships    Social connections     Talks on phone: Not on file     Gets together: Not on file     Attends Congregation service: Not on file     Active member of club or organization: Not on file     Attends meetings of clubs or organizations: Not on file     Relationship status: Not on file   Other Topics Concern    Not on file   Social History Narrative    Not on file     Review of patient's allergies indicates:  No Known Allergies    Objective:       BP (!) 150/90 (BP Location: Left arm, Patient Position: Sitting, BP Method: Large (Manual))   Pulse (!) 55   Temp 98.3 °F (36.8 °C) (Tympanic)   Ht 6' (1.829 m)   Wt 83.6 kg (184 lb 4.9 oz)   SpO2 98%   BMI 25.00 kg/m²   Physical Exam  Skin:     Findings: Rash (Erythematous patches, irregular with some linear pattern on left leg and to a lesser degree right leg and right forearm.  Small vesicles seen as well.) present.       Assessment:     1. Contact dermatitis, unspecified contact dermatitis  type, unspecified trigger      Plan:   Contact dermatitis, unspecified contact dermatitis type, unspecified trigger    Other orders  -     methylPREDNISolone acetate injection 80 mg  -     predniSONE (DELTASONE) 20 MG tablet; Take 2 tablets (40 mg total) by mouth once daily. for 4 days  Dispense: 8 tablet; Refill: 0  -     triamcinolone acetonide 0.1% (KENALOG) 0.1 % ointment; Apply topically 2 (two) times daily.  Dispense: 80 g; Refill: 1      Medication List with Changes/Refills   New Medications    PREDNISONE (DELTASONE) 20 MG TABLET    Take 2 tablets (40 mg total) by mouth once daily. for 4 days    TRIAMCINOLONE ACETONIDE 0.1% (KENALOG) 0.1 % OINTMENT    Apply topically 2 (two) times daily.   Current Medications    LISINOPRIL 10 MG TABLET    TAKE 1 TABLET BY MOUTH ONCE DAILY    MELOXICAM (MOBIC) 15 MG TABLET    TAKE 1 TABLET(15 MG) BY MOUTH EVERY DAY as needed    TAMSULOSIN (FLOMAX) 0.4 MG CAP    TAKE 1 CAPSULE(0.4 MG) BY MOUTH EVERY DAY

## 2020-06-17 NOTE — PROGRESS NOTES
06/17/2020 10:09AM    METHYLPREDNISOLONE ACETATE 80 MG/ML INJ SUSP GIVEN LVG, PATIENT TOLERATED WELL, PATIENT ADVISED TO WAIT 15 MINUTES

## 2020-07-17 ENCOUNTER — PATIENT OUTREACH (OUTPATIENT)
Dept: ADMINISTRATIVE | Facility: HOSPITAL | Age: 70
End: 2020-07-17

## 2020-07-17 NOTE — PROGRESS NOTES
Chart audit completed.   Updated.  Care Everywhere-No new records abstracted/entered.   Lab Tod-No records found.  Links-Immunizations-abstracted/updated.  Annual Wellness visit-Completed March 2020

## 2020-07-24 ENCOUNTER — LAB VISIT (OUTPATIENT)
Dept: LAB | Facility: HOSPITAL | Age: 70
End: 2020-07-24
Attending: INTERNAL MEDICINE
Payer: MEDICARE

## 2020-07-24 DIAGNOSIS — Z12.5 PROSTATE CANCER SCREENING: ICD-10-CM

## 2020-07-24 DIAGNOSIS — I10 ESSENTIAL HYPERTENSION: ICD-10-CM

## 2020-07-24 LAB
ALBUMIN SERPL BCP-MCNC: 3.6 G/DL (ref 3.5–5.2)
ALP SERPL-CCNC: 69 U/L (ref 55–135)
ALT SERPL W/O P-5'-P-CCNC: 17 U/L (ref 10–44)
ANION GAP SERPL CALC-SCNC: 5 MMOL/L (ref 8–16)
AST SERPL-CCNC: 22 U/L (ref 10–40)
BASOPHILS # BLD AUTO: 0.05 K/UL (ref 0–0.2)
BASOPHILS NFR BLD: 1 % (ref 0–1.9)
BILIRUB SERPL-MCNC: 0.5 MG/DL (ref 0.1–1)
BUN SERPL-MCNC: 17 MG/DL (ref 8–23)
CALCIUM SERPL-MCNC: 9.2 MG/DL (ref 8.7–10.5)
CHLORIDE SERPL-SCNC: 107 MMOL/L (ref 95–110)
CHOLEST SERPL-MCNC: 196 MG/DL (ref 120–199)
CHOLEST/HDLC SERPL: 2.9 {RATIO} (ref 2–5)
CO2 SERPL-SCNC: 29 MMOL/L (ref 23–29)
CREAT SERPL-MCNC: 1 MG/DL (ref 0.5–1.4)
DIFFERENTIAL METHOD: ABNORMAL
EOSINOPHIL # BLD AUTO: 0.2 K/UL (ref 0–0.5)
EOSINOPHIL NFR BLD: 3.9 % (ref 0–8)
ERYTHROCYTE [DISTWIDTH] IN BLOOD BY AUTOMATED COUNT: 13.3 % (ref 11.5–14.5)
EST. GFR  (AFRICAN AMERICAN): >60 ML/MIN/1.73 M^2
EST. GFR  (NON AFRICAN AMERICAN): >60 ML/MIN/1.73 M^2
GLUCOSE SERPL-MCNC: 78 MG/DL (ref 70–110)
HCT VFR BLD AUTO: 41.5 % (ref 40–54)
HDLC SERPL-MCNC: 67 MG/DL (ref 40–75)
HDLC SERPL: 34.2 % (ref 20–50)
HGB BLD-MCNC: 13.5 G/DL (ref 14–18)
IMM GRANULOCYTES # BLD AUTO: 0.01 K/UL (ref 0–0.04)
IMM GRANULOCYTES NFR BLD AUTO: 0.2 % (ref 0–0.5)
LDLC SERPL CALC-MCNC: 117.2 MG/DL (ref 63–159)
LYMPHOCYTES # BLD AUTO: 1.6 K/UL (ref 1–4.8)
LYMPHOCYTES NFR BLD: 31.4 % (ref 18–48)
MCH RBC QN AUTO: 30.9 PG (ref 27–31)
MCHC RBC AUTO-ENTMCNC: 32.5 G/DL (ref 32–36)
MCV RBC AUTO: 95 FL (ref 82–98)
MONOCYTES # BLD AUTO: 0.5 K/UL (ref 0.3–1)
MONOCYTES NFR BLD: 9.7 % (ref 4–15)
NEUTROPHILS # BLD AUTO: 2.7 K/UL (ref 1.8–7.7)
NEUTROPHILS NFR BLD: 53.8 % (ref 38–73)
NONHDLC SERPL-MCNC: 129 MG/DL
NRBC BLD-RTO: 0 /100 WBC
PLATELET # BLD AUTO: 247 K/UL (ref 150–350)
PMV BLD AUTO: 10.4 FL (ref 9.2–12.9)
POTASSIUM SERPL-SCNC: 4.6 MMOL/L (ref 3.5–5.1)
PROT SERPL-MCNC: 6.4 G/DL (ref 6–8.4)
RBC # BLD AUTO: 4.37 M/UL (ref 4.6–6.2)
SODIUM SERPL-SCNC: 141 MMOL/L (ref 136–145)
TRIGL SERPL-MCNC: 59 MG/DL (ref 30–150)
TSH SERPL DL<=0.005 MIU/L-ACNC: 0.63 UIU/ML (ref 0.4–4)
WBC # BLD AUTO: 5.07 K/UL (ref 3.9–12.7)

## 2020-07-24 PROCEDURE — 80053 COMPREHEN METABOLIC PANEL: CPT | Mod: HCNC

## 2020-07-24 PROCEDURE — 85025 COMPLETE CBC W/AUTO DIFF WBC: CPT | Mod: HCNC

## 2020-07-24 PROCEDURE — 84153 ASSAY OF PSA TOTAL: CPT | Mod: HCNC

## 2020-07-24 PROCEDURE — 80061 LIPID PANEL: CPT | Mod: HCNC

## 2020-07-24 PROCEDURE — 36415 COLL VENOUS BLD VENIPUNCTURE: CPT | Mod: HCNC,PO

## 2020-07-24 PROCEDURE — 84443 ASSAY THYROID STIM HORMONE: CPT | Mod: HCNC

## 2020-07-25 LAB — COMPLEXED PSA SERPL-MCNC: 1.3 NG/ML (ref 0–4)

## 2020-07-31 ENCOUNTER — OFFICE VISIT (OUTPATIENT)
Dept: INTERNAL MEDICINE | Facility: CLINIC | Age: 70
End: 2020-07-31
Payer: MEDICARE

## 2020-07-31 VITALS
WEIGHT: 183.63 LBS | HEART RATE: 52 BPM | HEIGHT: 72 IN | OXYGEN SATURATION: 97 % | TEMPERATURE: 99 F | BODY MASS INDEX: 24.87 KG/M2 | DIASTOLIC BLOOD PRESSURE: 72 MMHG | SYSTOLIC BLOOD PRESSURE: 144 MMHG

## 2020-07-31 DIAGNOSIS — I77.1 TORTUOUS AORTA: ICD-10-CM

## 2020-07-31 DIAGNOSIS — Z86.010 HISTORY OF COLONIC POLYPS: ICD-10-CM

## 2020-07-31 DIAGNOSIS — N40.0 BENIGN PROSTATIC HYPERPLASIA WITHOUT LOWER URINARY TRACT SYMPTOMS: Chronic | ICD-10-CM

## 2020-07-31 DIAGNOSIS — F10.20 UNCOMPLICATED ALCOHOL DEPENDENCE: Chronic | ICD-10-CM

## 2020-07-31 DIAGNOSIS — Z00.00 ROUTINE GENERAL MEDICAL EXAMINATION AT A HEALTH CARE FACILITY: Primary | ICD-10-CM

## 2020-07-31 DIAGNOSIS — Z72.0 TOBACCO ABUSE: Chronic | ICD-10-CM

## 2020-07-31 DIAGNOSIS — I10 ESSENTIAL HYPERTENSION: ICD-10-CM

## 2020-07-31 PROCEDURE — 3078F PR MOST RECENT DIASTOLIC BLOOD PRESSURE < 80 MM HG: ICD-10-PCS | Mod: HCNC,CPTII,S$GLB, | Performed by: INTERNAL MEDICINE

## 2020-07-31 PROCEDURE — 3077F PR MOST RECENT SYSTOLIC BLOOD PRESSURE >= 140 MM HG: ICD-10-PCS | Mod: HCNC,CPTII,S$GLB, | Performed by: INTERNAL MEDICINE

## 2020-07-31 PROCEDURE — 99499 RISK ADDL DX/OHS AUDIT: ICD-10-PCS | Mod: HCNC,S$GLB,, | Performed by: INTERNAL MEDICINE

## 2020-07-31 PROCEDURE — 99397 PER PM REEVAL EST PAT 65+ YR: CPT | Mod: HCNC,S$GLB,, | Performed by: INTERNAL MEDICINE

## 2020-07-31 PROCEDURE — 99999 PR PBB SHADOW E&M-EST. PATIENT-LVL III: ICD-10-PCS | Mod: PBBFAC,HCNC,, | Performed by: INTERNAL MEDICINE

## 2020-07-31 PROCEDURE — 3077F SYST BP >= 140 MM HG: CPT | Mod: HCNC,CPTII,S$GLB, | Performed by: INTERNAL MEDICINE

## 2020-07-31 PROCEDURE — 99397 PR PREVENTIVE VISIT,EST,65 & OVER: ICD-10-PCS | Mod: HCNC,S$GLB,, | Performed by: INTERNAL MEDICINE

## 2020-07-31 PROCEDURE — 99499 UNLISTED E&M SERVICE: CPT | Mod: HCNC,S$GLB,, | Performed by: INTERNAL MEDICINE

## 2020-07-31 PROCEDURE — 3078F DIAST BP <80 MM HG: CPT | Mod: HCNC,CPTII,S$GLB, | Performed by: INTERNAL MEDICINE

## 2020-07-31 PROCEDURE — 99999 PR PBB SHADOW E&M-EST. PATIENT-LVL III: CPT | Mod: PBBFAC,HCNC,, | Performed by: INTERNAL MEDICINE

## 2020-07-31 RX ORDER — LISINOPRIL AND HYDROCHLOROTHIAZIDE 12.5; 2 MG/1; MG/1
1 TABLET ORAL DAILY
Qty: 90 TABLET | Refills: 3 | Status: SHIPPED | OUTPATIENT
Start: 2020-07-31 | End: 2021-04-30

## 2020-07-31 NOTE — PROGRESS NOTES
HPI:  Patient is 69-year-old man who comes today for follow-up of his hypertension and for his annual physical exam.  He has been doing very well.  He has no problems or complaints.  He unfortunately does continue to smoke and also to drink alcohol.      Current MEDS: medcard review, verified and update  Allergies: Per the electronic medical record    Past Medical History:   Diagnosis Date    Alcohol dependence     Back pain     History of colonic polyps     Hypertension     Hypertrophy of prostate without urinary obstruction and other lower urinary tract symptoms (LUTS)     Osteoarthritis of multiple joints     Tobacco dependence     quit then restarted       Past Surgical History:   Procedure Laterality Date    COLONOSCOPY N/A 5/25/2016    Procedure: COLONOSCOPY;  Surgeon: Sergey Jensen MD;  Location: [facility];  Service: Endoscopy;  Laterality: N/A;    LIPOMA RESECTION Right 8/17/12    posterior shoulder       SHx: per the electronic medical record    FHx: recorded in the electronic medical record    ROS:    denies any chest pains or shortness of breath. Denies any nausea, vomiting or diarrhea. Denies any fever, chills or sweats. Denies any change in weight, voice, stool, skin or hair. Denies any dysuria, dyspepsia or dysphagia. Denies any change in vision, hearing or headaches. Denies any swollen lymph nodes or loss of memory.    PE:  BP (!) 144/72 (BP Location: Left arm)   Pulse (!) 52   Temp 98.9 °F (37.2 °C) (Tympanic)   Ht 6' (1.829 m)   Wt 83.3 kg (183 lb 10.3 oz)   SpO2 97%   BMI 24.91 kg/m²   Gen: Well-developed, well-nourished, male, in no acute distress, oriented x3  HEENT: neck is supple, no adenopathy, carotids 2+ equal without bruits, thyroid exam normal size without nodules.  CHEST: clear to auscultation and percussion  CVS: regular rate and rhythm without significant murmur, gallop, or rubs  ABD: soft, benign, no rebound no guarding, no distention.  Bowel sounds are normal.      nontender.  No palpable masses.  No organomegaly and no audible bruits.  RECTAL:  Deferred  EXT: no clubbing, cyanosis, or edema  LYMPH: no cervical, inguinal, or axillary adenopathy  FEET: no loss of sensation.  No ulcers or pressure sores.  NEURO: gait normal.  Cranial nerves II- XII intact. No nystagmus.  Speech normal.   Gross motor and sensory unremarkable.    Lab Results   Component Value Date    WBC 5.07 07/24/2020    HGB 13.5 (L) 07/24/2020    HCT 41.5 07/24/2020     07/24/2020    CHOL 196 07/24/2020    TRIG 59 07/24/2020    HDL 67 07/24/2020    ALT 17 07/24/2020    AST 22 07/24/2020     07/24/2020    K 4.6 07/24/2020     07/24/2020    CREATININE 1.0 07/24/2020    BUN 17 07/24/2020    CO2 29 07/24/2020    TSH 0.631 07/24/2020    PSA 1.3 07/24/2020       Impression:  Stable medical problems below  Patient Active Problem List   Diagnosis    Benign prostatic hyperplasia without lower urinary tract symptoms    Tobacco abuse    Alcohol dependence    Spondylosis of lumbar region without myelopathy or radiculopathy    Osteoarthritis of multiple joints    DDD (degenerative disc disease), lumbar    History of colonic polyps    Tortuous aorta    Hypertension       Plan:   Orders Placed This Encounter    Lipid Panel    Basic metabolic panel    TSH    lisinopriL-hydrochlorothiazide (PRINZIDE,ZESTORETIC) 20-12.5 mg per tablet    His lisinopril was increased slightly.  He will see me again in 6 months with above lab work.  This note is generated with speech recognition software and is subject to transcription error and sound alike phrases that may be missed by proofreading.

## 2020-08-17 ENCOUNTER — TELEPHONE (OUTPATIENT)
Dept: INTERNAL MEDICINE | Facility: CLINIC | Age: 70
End: 2020-08-17

## 2020-08-17 NOTE — TELEPHONE ENCOUNTER
----- Message from Du Burnham sent at 8/17/2020 11:26 AM CDT -----  Pt would like return call regarding side effects of medication.  Please call back at [phone].  Cori Yap

## 2020-08-17 NOTE — TELEPHONE ENCOUNTER
Patient called to state that he's been having fainting spells, dizziness, SOB and low bp (87/70 something). He is asking if it can be from the medication that was prescribed (increased) 7/31/20. He states that he is feeling better right now, this episode happened yesterday. Please advise

## 2020-08-17 NOTE — TELEPHONE ENCOUNTER
Tell him to hold the lisinopril for 3 days and then to take only half a tablet per day.  Tell him to call me with his blood pressure readings next Monday.

## 2020-10-02 ENCOUNTER — IMMUNIZATION (OUTPATIENT)
Dept: INTERNAL MEDICINE | Facility: CLINIC | Age: 70
End: 2020-10-02
Payer: MEDICARE

## 2020-10-02 PROCEDURE — 90694 VACC AIIV4 NO PRSRV 0.5ML IM: CPT | Mod: HCNC,S$GLB,, | Performed by: FAMILY MEDICINE

## 2020-10-02 PROCEDURE — G0008 FLU VACCINE - QUADRIVALENT - ADJUVANTED: ICD-10-PCS | Mod: HCNC,S$GLB,, | Performed by: FAMILY MEDICINE

## 2020-10-02 PROCEDURE — 90694 FLU VACCINE - QUADRIVALENT - ADJUVANTED: ICD-10-PCS | Mod: HCNC,S$GLB,, | Performed by: FAMILY MEDICINE

## 2020-10-02 PROCEDURE — G0008 ADMIN INFLUENZA VIRUS VAC: HCPCS | Mod: HCNC,S$GLB,, | Performed by: FAMILY MEDICINE

## 2020-11-17 ENCOUNTER — PATIENT OUTREACH (OUTPATIENT)
Dept: ADMINISTRATIVE | Facility: HOSPITAL | Age: 70
End: 2020-11-17

## 2020-11-17 NOTE — PROGRESS NOTES
[facility] HTN REPORT:Attempting to contact pt to see if pt takes home BP readings.. Unable to reach patient at this time. Left voicemail.

## 2020-11-24 ENCOUNTER — PATIENT OUTREACH (OUTPATIENT)
Dept: ADMINISTRATIVE | Facility: HOSPITAL | Age: 70
End: 2020-11-24

## 2020-11-24 NOTE — PROGRESS NOTES
HTN Report. Patient contacted to obtain an updated home BP reading. Patient states he hasn't checked his BP in over a week but his BP has been good. Patient states he is not home at the moment but can call back later with a current reading. Advised to please call back with reading so that chart can be updated with a current BP.

## 2020-12-03 ENCOUNTER — PATIENT OUTREACH (OUTPATIENT)
Dept: ADMINISTRATIVE | Facility: HOSPITAL | Age: 70
End: 2020-12-03

## 2020-12-03 NOTE — PROGRESS NOTES
I spoke with pt that stated his BP has been running in the 145's he will call back with actual reading.

## 2020-12-10 ENCOUNTER — PES CALL (OUTPATIENT)
Dept: ADMINISTRATIVE | Facility: CLINIC | Age: 70
End: 2020-12-10

## 2021-01-11 ENCOUNTER — IMMUNIZATION (OUTPATIENT)
Dept: INTERNAL MEDICINE | Facility: CLINIC | Age: 71
End: 2021-01-11
Payer: MEDICARE

## 2021-01-11 DIAGNOSIS — Z23 NEED FOR VACCINATION: ICD-10-CM

## 2021-01-11 PROCEDURE — 91300 COVID-19, MRNA, LNP-S, PF, 30 MCG/0.3 ML DOSE VACCINE: CPT | Mod: PBBFAC | Performed by: FAMILY MEDICINE

## 2021-01-26 ENCOUNTER — LAB VISIT (OUTPATIENT)
Dept: LAB | Facility: HOSPITAL | Age: 71
End: 2021-01-26
Attending: INTERNAL MEDICINE
Payer: MEDICARE

## 2021-01-26 DIAGNOSIS — I10 ESSENTIAL HYPERTENSION: ICD-10-CM

## 2021-01-26 PROCEDURE — 36415 COLL VENOUS BLD VENIPUNCTURE: CPT | Mod: PO

## 2021-01-26 PROCEDURE — 80048 BASIC METABOLIC PNL TOTAL CA: CPT

## 2021-01-26 PROCEDURE — 84443 ASSAY THYROID STIM HORMONE: CPT

## 2021-01-26 PROCEDURE — 80061 LIPID PANEL: CPT

## 2021-01-27 LAB
ANION GAP SERPL CALC-SCNC: 8 MMOL/L (ref 8–16)
BUN SERPL-MCNC: 16 MG/DL (ref 8–23)
CALCIUM SERPL-MCNC: 8.8 MG/DL (ref 8.7–10.5)
CHLORIDE SERPL-SCNC: 106 MMOL/L (ref 95–110)
CHOLEST SERPL-MCNC: 185 MG/DL (ref 120–199)
CHOLEST/HDLC SERPL: 2.9 {RATIO} (ref 2–5)
CO2 SERPL-SCNC: 27 MMOL/L (ref 23–29)
CREAT SERPL-MCNC: 1.1 MG/DL (ref 0.5–1.4)
EST. GFR  (AFRICAN AMERICAN): >60 ML/MIN/1.73 M^2
EST. GFR  (NON AFRICAN AMERICAN): >60 ML/MIN/1.73 M^2
GLUCOSE SERPL-MCNC: 80 MG/DL (ref 70–110)
HDLC SERPL-MCNC: 63 MG/DL (ref 40–75)
HDLC SERPL: 34.1 % (ref 20–50)
LDLC SERPL CALC-MCNC: 103.4 MG/DL (ref 63–159)
NONHDLC SERPL-MCNC: 122 MG/DL
POTASSIUM SERPL-SCNC: 3.8 MMOL/L (ref 3.5–5.1)
SODIUM SERPL-SCNC: 141 MMOL/L (ref 136–145)
TRIGL SERPL-MCNC: 93 MG/DL (ref 30–150)
TSH SERPL DL<=0.005 MIU/L-ACNC: 1.12 UIU/ML (ref 0.4–4)

## 2021-02-01 ENCOUNTER — IMMUNIZATION (OUTPATIENT)
Dept: INTERNAL MEDICINE | Facility: CLINIC | Age: 71
End: 2021-02-01
Payer: MEDICARE

## 2021-02-01 ENCOUNTER — PES CALL (OUTPATIENT)
Dept: ADMINISTRATIVE | Facility: CLINIC | Age: 71
End: 2021-02-01

## 2021-02-01 DIAGNOSIS — Z23 NEED FOR VACCINATION: Primary | ICD-10-CM

## 2021-02-01 PROCEDURE — 91300 COVID-19, MRNA, LNP-S, PF, 30 MCG/0.3 ML DOSE VACCINE: CPT | Mod: PBBFAC | Performed by: FAMILY MEDICINE

## 2021-02-01 PROCEDURE — 0002A COVID-19, MRNA, LNP-S, PF, 30 MCG/0.3 ML DOSE VACCINE: CPT | Mod: PBBFAC | Performed by: FAMILY MEDICINE

## 2021-02-02 ENCOUNTER — OFFICE VISIT (OUTPATIENT)
Dept: INTERNAL MEDICINE | Facility: CLINIC | Age: 71
End: 2021-02-02
Payer: MEDICARE

## 2021-02-02 VITALS
TEMPERATURE: 98 F | SYSTOLIC BLOOD PRESSURE: 137 MMHG | HEART RATE: 62 BPM | OXYGEN SATURATION: 100 % | HEIGHT: 72 IN | BODY MASS INDEX: 25.14 KG/M2 | DIASTOLIC BLOOD PRESSURE: 69 MMHG | WEIGHT: 185.63 LBS

## 2021-02-02 DIAGNOSIS — I77.1 TORTUOUS AORTA: ICD-10-CM

## 2021-02-02 DIAGNOSIS — Z86.010 HISTORY OF COLONIC POLYPS: ICD-10-CM

## 2021-02-02 DIAGNOSIS — F10.20 UNCOMPLICATED ALCOHOL DEPENDENCE: Chronic | ICD-10-CM

## 2021-02-02 DIAGNOSIS — I10 ESSENTIAL HYPERTENSION: Primary | ICD-10-CM

## 2021-02-02 DIAGNOSIS — Z12.5 PROSTATE CANCER SCREENING: ICD-10-CM

## 2021-02-02 DIAGNOSIS — Z72.0 TOBACCO ABUSE: Chronic | ICD-10-CM

## 2021-02-02 DIAGNOSIS — M15.9 PRIMARY OSTEOARTHRITIS INVOLVING MULTIPLE JOINTS: ICD-10-CM

## 2021-02-02 PROCEDURE — 99499 UNLISTED E&M SERVICE: CPT | Mod: S$GLB,,, | Performed by: INTERNAL MEDICINE

## 2021-02-02 PROCEDURE — 1159F PR MEDICATION LIST DOCUMENTED IN MEDICAL RECORD: ICD-10-PCS | Mod: S$GLB,,, | Performed by: INTERNAL MEDICINE

## 2021-02-02 PROCEDURE — 3078F PR MOST RECENT DIASTOLIC BLOOD PRESSURE < 80 MM HG: ICD-10-PCS | Mod: CPTII,S$GLB,, | Performed by: INTERNAL MEDICINE

## 2021-02-02 PROCEDURE — 3288F PR FALLS RISK ASSESSMENT DOCUMENTED: ICD-10-PCS | Mod: CPTII,S$GLB,, | Performed by: INTERNAL MEDICINE

## 2021-02-02 PROCEDURE — 1125F PR PAIN SEVERITY QUANTIFIED, PAIN PRESENT: ICD-10-PCS | Mod: S$GLB,,, | Performed by: INTERNAL MEDICINE

## 2021-02-02 PROCEDURE — 3075F SYST BP GE 130 - 139MM HG: CPT | Mod: CPTII,S$GLB,, | Performed by: INTERNAL MEDICINE

## 2021-02-02 PROCEDURE — 3075F PR MOST RECENT SYSTOLIC BLOOD PRESS GE 130-139MM HG: ICD-10-PCS | Mod: CPTII,S$GLB,, | Performed by: INTERNAL MEDICINE

## 2021-02-02 PROCEDURE — 1101F PR PT FALLS ASSESS DOC 0-1 FALLS W/OUT INJ PAST YR: ICD-10-PCS | Mod: CPTII,S$GLB,, | Performed by: INTERNAL MEDICINE

## 2021-02-02 PROCEDURE — 99999 PR PBB SHADOW E&M-EST. PATIENT-LVL III: CPT | Mod: PBBFAC,,, | Performed by: INTERNAL MEDICINE

## 2021-02-02 PROCEDURE — 3288F FALL RISK ASSESSMENT DOCD: CPT | Mod: CPTII,S$GLB,, | Performed by: INTERNAL MEDICINE

## 2021-02-02 PROCEDURE — 99499 RISK ADDL DX/OHS AUDIT: ICD-10-PCS | Mod: S$GLB,,, | Performed by: INTERNAL MEDICINE

## 2021-02-02 PROCEDURE — 3008F PR BODY MASS INDEX (BMI) DOCUMENTED: ICD-10-PCS | Mod: CPTII,S$GLB,, | Performed by: INTERNAL MEDICINE

## 2021-02-02 PROCEDURE — 1101F PT FALLS ASSESS-DOCD LE1/YR: CPT | Mod: CPTII,S$GLB,, | Performed by: INTERNAL MEDICINE

## 2021-02-02 PROCEDURE — 1159F MED LIST DOCD IN RCRD: CPT | Mod: S$GLB,,, | Performed by: INTERNAL MEDICINE

## 2021-02-02 PROCEDURE — 99213 PR OFFICE/OUTPT VISIT, EST, LEVL III, 20-29 MIN: ICD-10-PCS | Mod: S$GLB,,, | Performed by: INTERNAL MEDICINE

## 2021-02-02 PROCEDURE — 99213 OFFICE O/P EST LOW 20 MIN: CPT | Mod: S$GLB,,, | Performed by: INTERNAL MEDICINE

## 2021-02-02 PROCEDURE — 3008F BODY MASS INDEX DOCD: CPT | Mod: CPTII,S$GLB,, | Performed by: INTERNAL MEDICINE

## 2021-02-02 PROCEDURE — 1125F AMNT PAIN NOTED PAIN PRSNT: CPT | Mod: S$GLB,,, | Performed by: INTERNAL MEDICINE

## 2021-02-02 PROCEDURE — 99999 PR PBB SHADOW E&M-EST. PATIENT-LVL III: ICD-10-PCS | Mod: PBBFAC,,, | Performed by: INTERNAL MEDICINE

## 2021-02-02 PROCEDURE — 3078F DIAST BP <80 MM HG: CPT | Mod: CPTII,S$GLB,, | Performed by: INTERNAL MEDICINE

## 2021-02-02 RX ORDER — LISINOPRIL 10 MG/1
5 TABLET ORAL DAILY PRN
COMMUNITY
Start: 2020-11-19 | End: 2021-02-09 | Stop reason: SDUPTHER

## 2021-02-09 ENCOUNTER — PES CALL (OUTPATIENT)
Dept: ADMINISTRATIVE | Facility: CLINIC | Age: 71
End: 2021-02-09

## 2021-02-09 RX ORDER — LISINOPRIL 10 MG/1
10 TABLET ORAL DAILY
Qty: 90 TABLET | Refills: 3 | Status: SHIPPED | OUTPATIENT
Start: 2021-02-09 | End: 2021-04-30

## 2021-02-17 ENCOUNTER — TELEPHONE (OUTPATIENT)
Dept: SMOKING CESSATION | Facility: CLINIC | Age: 71
End: 2021-02-17

## 2021-02-22 ENCOUNTER — TELEPHONE (OUTPATIENT)
Dept: SMOKING CESSATION | Facility: CLINIC | Age: 71
End: 2021-02-22

## 2021-03-03 ENCOUNTER — TELEPHONE (OUTPATIENT)
Dept: SMOKING CESSATION | Facility: CLINIC | Age: 71
End: 2021-03-03

## 2021-04-21 ENCOUNTER — PES CALL (OUTPATIENT)
Dept: ADMINISTRATIVE | Facility: CLINIC | Age: 71
End: 2021-04-21

## 2021-04-29 ENCOUNTER — TELEPHONE (OUTPATIENT)
Dept: INTERNAL MEDICINE | Facility: CLINIC | Age: 71
End: 2021-04-29

## 2021-04-30 ENCOUNTER — OFFICE VISIT (OUTPATIENT)
Dept: INTERNAL MEDICINE | Facility: CLINIC | Age: 71
End: 2021-04-30
Payer: MEDICARE

## 2021-04-30 VITALS
TEMPERATURE: 98 F | WEIGHT: 180.31 LBS | BODY MASS INDEX: 24.42 KG/M2 | HEIGHT: 72 IN | DIASTOLIC BLOOD PRESSURE: 70 MMHG | SYSTOLIC BLOOD PRESSURE: 116 MMHG | OXYGEN SATURATION: 97 % | HEART RATE: 58 BPM

## 2021-04-30 DIAGNOSIS — F17.200 TOBACCO DEPENDENCE: ICD-10-CM

## 2021-04-30 DIAGNOSIS — Z12.2 ENCOUNTER FOR SCREENING FOR MALIGNANT NEOPLASM OF RESPIRATORY ORGANS: ICD-10-CM

## 2021-04-30 DIAGNOSIS — J43.2 CENTRILOBULAR EMPHYSEMA: Primary | ICD-10-CM

## 2021-04-30 PROCEDURE — 3008F BODY MASS INDEX DOCD: CPT | Mod: CPTII,S$GLB,, | Performed by: INTERNAL MEDICINE

## 2021-04-30 PROCEDURE — 99499 UNLISTED E&M SERVICE: CPT | Mod: HCNC,S$GLB,, | Performed by: INTERNAL MEDICINE

## 2021-04-30 PROCEDURE — 99213 PR OFFICE/OUTPT VISIT, EST, LEVL III, 20-29 MIN: ICD-10-PCS | Mod: S$GLB,,, | Performed by: INTERNAL MEDICINE

## 2021-04-30 PROCEDURE — 99999 PR PBB SHADOW E&M-EST. PATIENT-LVL III: CPT | Mod: PBBFAC,,, | Performed by: INTERNAL MEDICINE

## 2021-04-30 PROCEDURE — 1159F PR MEDICATION LIST DOCUMENTED IN MEDICAL RECORD: ICD-10-PCS | Mod: S$GLB,,, | Performed by: INTERNAL MEDICINE

## 2021-04-30 PROCEDURE — 99213 OFFICE O/P EST LOW 20 MIN: CPT | Mod: S$GLB,,, | Performed by: INTERNAL MEDICINE

## 2021-04-30 PROCEDURE — 3008F PR BODY MASS INDEX (BMI) DOCUMENTED: ICD-10-PCS | Mod: CPTII,S$GLB,, | Performed by: INTERNAL MEDICINE

## 2021-04-30 PROCEDURE — 1159F MED LIST DOCD IN RCRD: CPT | Mod: S$GLB,,, | Performed by: INTERNAL MEDICINE

## 2021-04-30 PROCEDURE — 99999 PR PBB SHADOW E&M-EST. PATIENT-LVL III: ICD-10-PCS | Mod: PBBFAC,,, | Performed by: INTERNAL MEDICINE

## 2021-04-30 PROCEDURE — 99499 RISK ADDL DX/OHS AUDIT: ICD-10-PCS | Mod: HCNC,S$GLB,, | Performed by: INTERNAL MEDICINE

## 2021-04-30 RX ORDER — LISINOPRIL AND HYDROCHLOROTHIAZIDE 12.5; 2 MG/1; MG/1
2 TABLET ORAL DAILY
Qty: 180 TABLET | Refills: 3 | Status: SHIPPED | OUTPATIENT
Start: 2021-04-30 | End: 2021-08-10

## 2021-04-30 RX ORDER — ALBUTEROL SULFATE 90 UG/1
2 AEROSOL, METERED RESPIRATORY (INHALATION) EVERY 6 HOURS PRN
Qty: 18 G | Refills: 11 | Status: SHIPPED | OUTPATIENT
Start: 2021-04-30 | End: 2022-10-05

## 2021-04-30 RX ORDER — FLUTICASONE PROPIONATE AND SALMETEROL 100; 50 UG/1; UG/1
1 POWDER RESPIRATORY (INHALATION) 2 TIMES DAILY
Qty: 1 EACH | Refills: 11 | Status: ON HOLD | OUTPATIENT
Start: 2021-04-30 | End: 2021-11-15 | Stop reason: HOSPADM

## 2021-05-10 ENCOUNTER — HOSPITAL ENCOUNTER (EMERGENCY)
Facility: HOSPITAL | Age: 71
Discharge: HOME | End: 2021-05-10
Attending: EMERGENCY MEDICINE
Payer: MEDICARE

## 2021-05-10 VITALS
OXYGEN SATURATION: 98 % | DIASTOLIC BLOOD PRESSURE: 80 MMHG | WEIGHT: 175.63 LBS | HEIGHT: 72 IN | BODY MASS INDEX: 23.79 KG/M2 | RESPIRATION RATE: 18 BRPM | TEMPERATURE: 99 F | HEART RATE: 73 BPM | SYSTOLIC BLOOD PRESSURE: 163 MMHG

## 2021-05-10 DIAGNOSIS — S81.812A LACERATION OF LEFT LOWER EXTREMITY, INITIAL ENCOUNTER: Primary | ICD-10-CM

## 2021-05-10 PROCEDURE — 12004 RPR S/N/AX/GEN/TRK7.6-12.5CM: CPT

## 2021-05-10 PROCEDURE — 25000003 PHARM REV CODE 250: Performed by: NURSE PRACTITIONER

## 2021-05-10 PROCEDURE — 99282 EMERGENCY DEPT VISIT SF MDM: CPT | Mod: 25

## 2021-05-10 RX ORDER — LIDOCAINE HYDROCHLORIDE 10 MG/ML
10 INJECTION, SOLUTION EPIDURAL; INFILTRATION; INTRACAUDAL; PERINEURAL
Status: COMPLETED | OUTPATIENT
Start: 2021-05-10 | End: 2021-05-10

## 2021-05-10 RX ADMIN — LIDOCAINE HYDROCHLORIDE 100 MG: 10 INJECTION, SOLUTION EPIDURAL; INFILTRATION; INTRACAUDAL at 06:05

## 2021-05-10 RX ADMIN — Medication 1 ML: at 06:05

## 2021-05-12 ENCOUNTER — PATIENT OUTREACH (OUTPATIENT)
Dept: ADMINISTRATIVE | Facility: OTHER | Age: 71
End: 2021-05-12

## 2021-05-13 ENCOUNTER — HOSPITAL ENCOUNTER (OUTPATIENT)
Dept: RADIOLOGY | Facility: HOSPITAL | Age: 71
Discharge: HOME | End: 2021-05-13
Attending: INTERNAL MEDICINE
Payer: MEDICARE

## 2021-05-13 DIAGNOSIS — Z12.2 ENCOUNTER FOR SCREENING FOR MALIGNANT NEOPLASM OF RESPIRATORY ORGANS: ICD-10-CM

## 2021-05-13 PROCEDURE — 71271 CT CHEST LUNG SCREENING LOW DOSE: ICD-10-PCS | Mod: 26,GA,, | Performed by: RADIOLOGY

## 2021-05-13 PROCEDURE — 71271 CT THORAX LUNG CANCER SCR C-: CPT | Mod: GA,TC

## 2021-05-13 PROCEDURE — 71271 CT THORAX LUNG CANCER SCR C-: CPT | Mod: 26,GA,, | Performed by: RADIOLOGY

## 2021-05-14 ENCOUNTER — OFFICE VISIT (OUTPATIENT)
Dept: OPHTHALMOLOGY | Facility: CLINIC | Age: 71
End: 2021-05-14
Payer: MEDICARE

## 2021-05-14 DIAGNOSIS — H52.03 HYPEROPIA, BILATERAL: ICD-10-CM

## 2021-05-14 DIAGNOSIS — H52.4 BILATERAL PRESBYOPIA: ICD-10-CM

## 2021-05-14 DIAGNOSIS — H25.13 CATARACT, NUCLEAR SCLEROTIC SENILE, BILATERAL: Primary | ICD-10-CM

## 2021-05-14 DIAGNOSIS — G51.4 EYELID MYOKYMIA: ICD-10-CM

## 2021-05-14 PROCEDURE — 92015 DETERMINE REFRACTIVE STATE: CPT | Mod: S$GLB,,, | Performed by: OPTOMETRIST

## 2021-05-14 PROCEDURE — 92014 COMPRE OPH EXAM EST PT 1/>: CPT | Mod: S$GLB,,, | Performed by: OPTOMETRIST

## 2021-05-14 PROCEDURE — 99999 PR PBB SHADOW E&M-EST. PATIENT-LVL II: ICD-10-PCS | Mod: PBBFAC,,, | Performed by: OPTOMETRIST

## 2021-05-14 PROCEDURE — 92015 PR REFRACTION: ICD-10-PCS | Mod: S$GLB,,, | Performed by: OPTOMETRIST

## 2021-05-14 PROCEDURE — 92014 PR EYE EXAM, EST PATIENT,COMPREHESV: ICD-10-PCS | Mod: S$GLB,,, | Performed by: OPTOMETRIST

## 2021-05-14 PROCEDURE — 99999 PR PBB SHADOW E&M-EST. PATIENT-LVL II: CPT | Mod: PBBFAC,,, | Performed by: OPTOMETRIST

## 2021-05-17 ENCOUNTER — OFFICE VISIT (OUTPATIENT)
Dept: INTERNAL MEDICINE | Facility: CLINIC | Age: 71
End: 2021-05-17
Payer: MEDICARE

## 2021-05-17 VITALS
HEART RATE: 61 BPM | SYSTOLIC BLOOD PRESSURE: 120 MMHG | BODY MASS INDEX: 23.89 KG/M2 | DIASTOLIC BLOOD PRESSURE: 64 MMHG | WEIGHT: 176.38 LBS | HEIGHT: 72 IN | OXYGEN SATURATION: 98 %

## 2021-05-17 DIAGNOSIS — S71.112D LACERATION OF LEFT THIGH, SUBSEQUENT ENCOUNTER: Primary | ICD-10-CM

## 2021-05-17 PROCEDURE — 3008F PR BODY MASS INDEX (BMI) DOCUMENTED: ICD-10-PCS | Mod: CPTII,S$GLB,, | Performed by: INTERNAL MEDICINE

## 2021-05-17 PROCEDURE — 99213 OFFICE O/P EST LOW 20 MIN: CPT | Mod: S$GLB,,, | Performed by: INTERNAL MEDICINE

## 2021-05-17 PROCEDURE — 1101F PR PT FALLS ASSESS DOC 0-1 FALLS W/OUT INJ PAST YR: ICD-10-PCS | Mod: CPTII,S$GLB,, | Performed by: INTERNAL MEDICINE

## 2021-05-17 PROCEDURE — 1159F MED LIST DOCD IN RCRD: CPT | Mod: S$GLB,,, | Performed by: INTERNAL MEDICINE

## 2021-05-17 PROCEDURE — 3288F FALL RISK ASSESSMENT DOCD: CPT | Mod: CPTII,S$GLB,, | Performed by: INTERNAL MEDICINE

## 2021-05-17 PROCEDURE — 1101F PT FALLS ASSESS-DOCD LE1/YR: CPT | Mod: CPTII,S$GLB,, | Performed by: INTERNAL MEDICINE

## 2021-05-17 PROCEDURE — 3288F PR FALLS RISK ASSESSMENT DOCUMENTED: ICD-10-PCS | Mod: CPTII,S$GLB,, | Performed by: INTERNAL MEDICINE

## 2021-05-17 PROCEDURE — 1159F PR MEDICATION LIST DOCUMENTED IN MEDICAL RECORD: ICD-10-PCS | Mod: S$GLB,,, | Performed by: INTERNAL MEDICINE

## 2021-05-17 PROCEDURE — 1126F AMNT PAIN NOTED NONE PRSNT: CPT | Mod: S$GLB,,, | Performed by: INTERNAL MEDICINE

## 2021-05-17 PROCEDURE — 3008F BODY MASS INDEX DOCD: CPT | Mod: CPTII,S$GLB,, | Performed by: INTERNAL MEDICINE

## 2021-05-17 PROCEDURE — 1126F PR PAIN SEVERITY QUANTIFIED, NO PAIN PRESENT: ICD-10-PCS | Mod: S$GLB,,, | Performed by: INTERNAL MEDICINE

## 2021-05-17 PROCEDURE — 99213 PR OFFICE/OUTPT VISIT, EST, LEVL III, 20-29 MIN: ICD-10-PCS | Mod: S$GLB,,, | Performed by: INTERNAL MEDICINE

## 2021-05-17 PROCEDURE — 99999 PR PBB SHADOW E&M-EST. PATIENT-LVL III: ICD-10-PCS | Mod: PBBFAC,,, | Performed by: INTERNAL MEDICINE

## 2021-05-17 PROCEDURE — 99999 PR PBB SHADOW E&M-EST. PATIENT-LVL III: CPT | Mod: PBBFAC,,, | Performed by: INTERNAL MEDICINE

## 2021-05-17 RX ORDER — MELOXICAM 15 MG/1
15 TABLET ORAL DAILY
COMMUNITY
Start: 2021-05-08 | End: 2021-07-16

## 2021-05-21 ENCOUNTER — PES CALL (OUTPATIENT)
Dept: ADMINISTRATIVE | Facility: CLINIC | Age: 71
End: 2021-05-21

## 2021-05-24 ENCOUNTER — OFFICE VISIT (OUTPATIENT)
Dept: INTERNAL MEDICINE | Facility: CLINIC | Age: 71
End: 2021-05-24
Payer: MEDICARE

## 2021-05-24 VITALS
HEART RATE: 68 BPM | TEMPERATURE: 98 F | WEIGHT: 177 LBS | DIASTOLIC BLOOD PRESSURE: 70 MMHG | SYSTOLIC BLOOD PRESSURE: 110 MMHG | HEIGHT: 72 IN | OXYGEN SATURATION: 98 % | BODY MASS INDEX: 23.98 KG/M2

## 2021-05-24 DIAGNOSIS — Z48.02 VISIT FOR SUTURE REMOVAL: Primary | ICD-10-CM

## 2021-05-24 PROCEDURE — 99213 PR OFFICE/OUTPT VISIT, EST, LEVL III, 20-29 MIN: ICD-10-PCS | Mod: S$GLB,,, | Performed by: INTERNAL MEDICINE

## 2021-05-24 PROCEDURE — 1101F PT FALLS ASSESS-DOCD LE1/YR: CPT | Mod: CPTII,S$GLB,, | Performed by: INTERNAL MEDICINE

## 2021-05-24 PROCEDURE — 99213 OFFICE O/P EST LOW 20 MIN: CPT | Mod: S$GLB,,, | Performed by: INTERNAL MEDICINE

## 2021-05-24 PROCEDURE — 1159F PR MEDICATION LIST DOCUMENTED IN MEDICAL RECORD: ICD-10-PCS | Mod: S$GLB,,, | Performed by: INTERNAL MEDICINE

## 2021-05-24 PROCEDURE — 99999 PR PBB SHADOW E&M-EST. PATIENT-LVL III: ICD-10-PCS | Mod: PBBFAC,,, | Performed by: INTERNAL MEDICINE

## 2021-05-24 PROCEDURE — 1101F PR PT FALLS ASSESS DOC 0-1 FALLS W/OUT INJ PAST YR: ICD-10-PCS | Mod: CPTII,S$GLB,, | Performed by: INTERNAL MEDICINE

## 2021-05-24 PROCEDURE — 99999 PR PBB SHADOW E&M-EST. PATIENT-LVL III: CPT | Mod: PBBFAC,,, | Performed by: INTERNAL MEDICINE

## 2021-05-24 PROCEDURE — 3008F PR BODY MASS INDEX (BMI) DOCUMENTED: ICD-10-PCS | Mod: CPTII,S$GLB,, | Performed by: INTERNAL MEDICINE

## 2021-05-24 PROCEDURE — 3288F FALL RISK ASSESSMENT DOCD: CPT | Mod: CPTII,S$GLB,, | Performed by: INTERNAL MEDICINE

## 2021-05-24 PROCEDURE — 1126F AMNT PAIN NOTED NONE PRSNT: CPT | Mod: S$GLB,,, | Performed by: INTERNAL MEDICINE

## 2021-05-24 PROCEDURE — 3008F BODY MASS INDEX DOCD: CPT | Mod: CPTII,S$GLB,, | Performed by: INTERNAL MEDICINE

## 2021-05-24 PROCEDURE — 1126F PR PAIN SEVERITY QUANTIFIED, NO PAIN PRESENT: ICD-10-PCS | Mod: S$GLB,,, | Performed by: INTERNAL MEDICINE

## 2021-05-24 PROCEDURE — 3288F PR FALLS RISK ASSESSMENT DOCUMENTED: ICD-10-PCS | Mod: CPTII,S$GLB,, | Performed by: INTERNAL MEDICINE

## 2021-05-24 PROCEDURE — 1159F MED LIST DOCD IN RCRD: CPT | Mod: S$GLB,,, | Performed by: INTERNAL MEDICINE

## 2021-07-28 ENCOUNTER — LAB VISIT (OUTPATIENT)
Dept: LAB | Facility: HOSPITAL | Age: 71
End: 2021-07-28
Attending: INTERNAL MEDICINE
Payer: MEDICARE

## 2021-07-28 DIAGNOSIS — I10 ESSENTIAL HYPERTENSION: ICD-10-CM

## 2021-07-28 DIAGNOSIS — Z12.5 PROSTATE CANCER SCREENING: ICD-10-CM

## 2021-07-28 PROCEDURE — 80061 LIPID PANEL: CPT | Performed by: INTERNAL MEDICINE

## 2021-07-28 PROCEDURE — 85025 COMPLETE CBC W/AUTO DIFF WBC: CPT | Performed by: INTERNAL MEDICINE

## 2021-07-28 PROCEDURE — 36415 COLL VENOUS BLD VENIPUNCTURE: CPT | Mod: PO | Performed by: INTERNAL MEDICINE

## 2021-07-28 PROCEDURE — 80053 COMPREHEN METABOLIC PANEL: CPT | Performed by: INTERNAL MEDICINE

## 2021-07-28 PROCEDURE — 84443 ASSAY THYROID STIM HORMONE: CPT | Performed by: INTERNAL MEDICINE

## 2021-07-28 PROCEDURE — 84153 ASSAY OF PSA TOTAL: CPT | Mod: GA | Performed by: INTERNAL MEDICINE

## 2021-07-29 LAB
ALBUMIN SERPL BCP-MCNC: 3.5 G/DL (ref 3.5–5.2)
ALP SERPL-CCNC: 65 U/L (ref 55–135)
ALT SERPL W/O P-5'-P-CCNC: 19 U/L (ref 10–44)
ANION GAP SERPL CALC-SCNC: 8 MMOL/L (ref 8–16)
AST SERPL-CCNC: 23 U/L (ref 10–40)
BASOPHILS # BLD AUTO: 0.07 K/UL (ref 0–0.2)
BASOPHILS NFR BLD: 1.2 % (ref 0–1.9)
BILIRUB SERPL-MCNC: 0.5 MG/DL (ref 0.1–1)
BUN SERPL-MCNC: 13 MG/DL (ref 8–23)
CALCIUM SERPL-MCNC: 9.5 MG/DL (ref 8.7–10.5)
CHLORIDE SERPL-SCNC: 107 MMOL/L (ref 95–110)
CHOLEST SERPL-MCNC: 185 MG/DL (ref 120–199)
CHOLEST/HDLC SERPL: 2.8 {RATIO} (ref 2–5)
CO2 SERPL-SCNC: 25 MMOL/L (ref 23–29)
COMPLEXED PSA SERPL-MCNC: 2.1 NG/ML (ref 0–4)
CREAT SERPL-MCNC: 1 MG/DL (ref 0.5–1.4)
DIFFERENTIAL METHOD: ABNORMAL
EOSINOPHIL # BLD AUTO: 0.2 K/UL (ref 0–0.5)
EOSINOPHIL NFR BLD: 4.1 % (ref 0–8)
ERYTHROCYTE [DISTWIDTH] IN BLOOD BY AUTOMATED COUNT: 14 % (ref 11.5–14.5)
EST. GFR  (AFRICAN AMERICAN): >60 ML/MIN/1.73 M^2
EST. GFR  (NON AFRICAN AMERICAN): >60 ML/MIN/1.73 M^2
GLUCOSE SERPL-MCNC: 72 MG/DL (ref 70–110)
HCT VFR BLD AUTO: 42.4 % (ref 40–54)
HDLC SERPL-MCNC: 66 MG/DL (ref 40–75)
HDLC SERPL: 35.7 % (ref 20–50)
HGB BLD-MCNC: 13.7 G/DL (ref 14–18)
IMM GRANULOCYTES # BLD AUTO: 0.02 K/UL (ref 0–0.04)
IMM GRANULOCYTES NFR BLD AUTO: 0.3 % (ref 0–0.5)
LDLC SERPL CALC-MCNC: 95.4 MG/DL (ref 63–159)
LYMPHOCYTES # BLD AUTO: 1.7 K/UL (ref 1–4.8)
LYMPHOCYTES NFR BLD: 28.7 % (ref 18–48)
MCH RBC QN AUTO: 30.4 PG (ref 27–31)
MCHC RBC AUTO-ENTMCNC: 32.3 G/DL (ref 32–36)
MCV RBC AUTO: 94 FL (ref 82–98)
MONOCYTES # BLD AUTO: 0.6 K/UL (ref 0.3–1)
MONOCYTES NFR BLD: 10.2 % (ref 4–15)
NEUTROPHILS # BLD AUTO: 3.2 K/UL (ref 1.8–7.7)
NEUTROPHILS NFR BLD: 55.5 % (ref 38–73)
NONHDLC SERPL-MCNC: 119 MG/DL
NRBC BLD-RTO: 0 /100 WBC
PLATELET # BLD AUTO: 256 K/UL (ref 150–450)
PMV BLD AUTO: 10.3 FL (ref 9.2–12.9)
POTASSIUM SERPL-SCNC: 4 MMOL/L (ref 3.5–5.1)
PROT SERPL-MCNC: 6.5 G/DL (ref 6–8.4)
RBC # BLD AUTO: 4.5 M/UL (ref 4.6–6.2)
SODIUM SERPL-SCNC: 140 MMOL/L (ref 136–145)
TRIGL SERPL-MCNC: 118 MG/DL (ref 30–150)
TSH SERPL DL<=0.005 MIU/L-ACNC: 0.88 UIU/ML (ref 0.4–4)
WBC # BLD AUTO: 5.81 K/UL (ref 3.9–12.7)

## 2021-07-30 ENCOUNTER — PES CALL (OUTPATIENT)
Dept: ADMINISTRATIVE | Facility: CLINIC | Age: 71
End: 2021-07-30

## 2021-08-10 ENCOUNTER — OFFICE VISIT (OUTPATIENT)
Dept: INTERNAL MEDICINE | Facility: CLINIC | Age: 71
End: 2021-08-10
Payer: MEDICARE

## 2021-08-10 VITALS
TEMPERATURE: 99 F | OXYGEN SATURATION: 98 % | DIASTOLIC BLOOD PRESSURE: 80 MMHG | SYSTOLIC BLOOD PRESSURE: 132 MMHG | HEIGHT: 72 IN | HEART RATE: 48 BPM | BODY MASS INDEX: 24.3 KG/M2 | WEIGHT: 179.44 LBS

## 2021-08-10 DIAGNOSIS — I10 ESSENTIAL HYPERTENSION: ICD-10-CM

## 2021-08-10 DIAGNOSIS — N40.0 BENIGN PROSTATIC HYPERPLASIA WITHOUT LOWER URINARY TRACT SYMPTOMS: Chronic | ICD-10-CM

## 2021-08-10 DIAGNOSIS — Z72.0 TOBACCO ABUSE: Chronic | ICD-10-CM

## 2021-08-10 DIAGNOSIS — Z86.010 HISTORY OF COLONIC POLYPS: ICD-10-CM

## 2021-08-10 DIAGNOSIS — J43.2 CENTRILOBULAR EMPHYSEMA: ICD-10-CM

## 2021-08-10 DIAGNOSIS — I77.1 TORTUOUS AORTA: ICD-10-CM

## 2021-08-10 DIAGNOSIS — Z00.00 ROUTINE GENERAL MEDICAL EXAMINATION AT A HEALTH CARE FACILITY: Primary | ICD-10-CM

## 2021-08-10 PROCEDURE — 99397 PR PREVENTIVE VISIT,EST,65 & OVER: ICD-10-PCS | Mod: S$GLB,,, | Performed by: INTERNAL MEDICINE

## 2021-08-10 PROCEDURE — 3288F PR FALLS RISK ASSESSMENT DOCUMENTED: ICD-10-PCS | Mod: CPTII,S$GLB,, | Performed by: INTERNAL MEDICINE

## 2021-08-10 PROCEDURE — 1159F MED LIST DOCD IN RCRD: CPT | Mod: CPTII,S$GLB,, | Performed by: INTERNAL MEDICINE

## 2021-08-10 PROCEDURE — 1101F PT FALLS ASSESS-DOCD LE1/YR: CPT | Mod: CPTII,S$GLB,, | Performed by: INTERNAL MEDICINE

## 2021-08-10 PROCEDURE — 3008F PR BODY MASS INDEX (BMI) DOCUMENTED: ICD-10-PCS | Mod: CPTII,S$GLB,, | Performed by: INTERNAL MEDICINE

## 2021-08-10 PROCEDURE — 3008F BODY MASS INDEX DOCD: CPT | Mod: CPTII,S$GLB,, | Performed by: INTERNAL MEDICINE

## 2021-08-10 PROCEDURE — 3075F SYST BP GE 130 - 139MM HG: CPT | Mod: CPTII,S$GLB,, | Performed by: INTERNAL MEDICINE

## 2021-08-10 PROCEDURE — 99499 UNLISTED E&M SERVICE: CPT | Mod: HCNC,S$GLB,, | Performed by: INTERNAL MEDICINE

## 2021-08-10 PROCEDURE — 1159F PR MEDICATION LIST DOCUMENTED IN MEDICAL RECORD: ICD-10-PCS | Mod: CPTII,S$GLB,, | Performed by: INTERNAL MEDICINE

## 2021-08-10 PROCEDURE — 3075F PR MOST RECENT SYSTOLIC BLOOD PRESS GE 130-139MM HG: ICD-10-PCS | Mod: CPTII,S$GLB,, | Performed by: INTERNAL MEDICINE

## 2021-08-10 PROCEDURE — 99499 RISK ADDL DX/OHS AUDIT: ICD-10-PCS | Mod: HCNC,S$GLB,, | Performed by: INTERNAL MEDICINE

## 2021-08-10 PROCEDURE — 3079F PR MOST RECENT DIASTOLIC BLOOD PRESSURE 80-89 MM HG: ICD-10-PCS | Mod: CPTII,S$GLB,, | Performed by: INTERNAL MEDICINE

## 2021-08-10 PROCEDURE — 1160F RVW MEDS BY RX/DR IN RCRD: CPT | Mod: CPTII,S$GLB,, | Performed by: INTERNAL MEDICINE

## 2021-08-10 PROCEDURE — 3079F DIAST BP 80-89 MM HG: CPT | Mod: CPTII,S$GLB,, | Performed by: INTERNAL MEDICINE

## 2021-08-10 PROCEDURE — 1126F PR PAIN SEVERITY QUANTIFIED, NO PAIN PRESENT: ICD-10-PCS | Mod: CPTII,S$GLB,, | Performed by: INTERNAL MEDICINE

## 2021-08-10 PROCEDURE — 99999 PR PBB SHADOW E&M-EST. PATIENT-LVL III: ICD-10-PCS | Mod: PBBFAC,,, | Performed by: INTERNAL MEDICINE

## 2021-08-10 PROCEDURE — 3288F FALL RISK ASSESSMENT DOCD: CPT | Mod: CPTII,S$GLB,, | Performed by: INTERNAL MEDICINE

## 2021-08-10 PROCEDURE — 1160F PR REVIEW ALL MEDS BY PRESCRIBER/CLIN PHARMACIST DOCUMENTED: ICD-10-PCS | Mod: CPTII,S$GLB,, | Performed by: INTERNAL MEDICINE

## 2021-08-10 PROCEDURE — 99397 PER PM REEVAL EST PAT 65+ YR: CPT | Mod: S$GLB,,, | Performed by: INTERNAL MEDICINE

## 2021-08-10 PROCEDURE — 1101F PR PT FALLS ASSESS DOC 0-1 FALLS W/OUT INJ PAST YR: ICD-10-PCS | Mod: CPTII,S$GLB,, | Performed by: INTERNAL MEDICINE

## 2021-08-10 PROCEDURE — 99999 PR PBB SHADOW E&M-EST. PATIENT-LVL III: CPT | Mod: PBBFAC,,, | Performed by: INTERNAL MEDICINE

## 2021-08-10 PROCEDURE — 1126F AMNT PAIN NOTED NONE PRSNT: CPT | Mod: CPTII,S$GLB,, | Performed by: INTERNAL MEDICINE

## 2021-08-24 ENCOUNTER — PES CALL (OUTPATIENT)
Dept: ADMINISTRATIVE | Facility: CLINIC | Age: 71
End: 2021-08-24

## 2021-09-24 RX ORDER — TAMSULOSIN HYDROCHLORIDE 0.4 MG/1
0.4 CAPSULE ORAL DAILY
Qty: 90 CAPSULE | Refills: 3 | Status: SHIPPED | OUTPATIENT
Start: 2021-09-24 | End: 2021-09-24

## 2021-09-24 RX ORDER — LISINOPRIL AND HYDROCHLOROTHIAZIDE 12.5; 2 MG/1; MG/1
1 TABLET ORAL DAILY
Qty: 90 TABLET | Refills: 3 | Status: SHIPPED | OUTPATIENT
Start: 2021-09-24 | End: 2021-10-06

## 2021-09-24 RX ORDER — LISINOPRIL AND HYDROCHLOROTHIAZIDE 12.5; 2 MG/1; MG/1
TABLET ORAL
COMMUNITY
Start: 2021-09-20 | End: 2021-09-24 | Stop reason: SDUPTHER

## 2021-10-06 ENCOUNTER — OFFICE VISIT (OUTPATIENT)
Dept: INTERNAL MEDICINE | Facility: CLINIC | Age: 71
End: 2021-10-06
Payer: MEDICARE

## 2021-10-06 VITALS
WEIGHT: 180.31 LBS | DIASTOLIC BLOOD PRESSURE: 78 MMHG | SYSTOLIC BLOOD PRESSURE: 138 MMHG | HEART RATE: 47 BPM | OXYGEN SATURATION: 98 % | HEIGHT: 72 IN | TEMPERATURE: 98 F | BODY MASS INDEX: 24.42 KG/M2

## 2021-10-06 DIAGNOSIS — M51.36 DDD (DEGENERATIVE DISC DISEASE), LUMBAR: ICD-10-CM

## 2021-10-06 DIAGNOSIS — M15.9 PRIMARY OSTEOARTHRITIS INVOLVING MULTIPLE JOINTS: ICD-10-CM

## 2021-10-06 DIAGNOSIS — F17.210 CIGARETTE NICOTINE DEPENDENCE WITHOUT COMPLICATION: ICD-10-CM

## 2021-10-06 DIAGNOSIS — J43.2 CENTRILOBULAR EMPHYSEMA: ICD-10-CM

## 2021-10-06 DIAGNOSIS — I10 PRIMARY HYPERTENSION: ICD-10-CM

## 2021-10-06 DIAGNOSIS — I77.1 TORTUOUS AORTA: ICD-10-CM

## 2021-10-06 DIAGNOSIS — F10.20 UNCOMPLICATED ALCOHOL DEPENDENCE: Chronic | ICD-10-CM

## 2021-10-06 DIAGNOSIS — Z00.00 ENCOUNTER FOR PREVENTIVE HEALTH EXAMINATION: Primary | ICD-10-CM

## 2021-10-06 PROCEDURE — 1101F PR PT FALLS ASSESS DOC 0-1 FALLS W/OUT INJ PAST YR: ICD-10-PCS | Mod: HCNC,CPTII,S$GLB, | Performed by: NURSE PRACTITIONER

## 2021-10-06 PROCEDURE — 3008F BODY MASS INDEX DOCD: CPT | Mod: HCNC,CPTII,S$GLB, | Performed by: NURSE PRACTITIONER

## 2021-10-06 PROCEDURE — 90694 VACC AIIV4 NO PRSRV 0.5ML IM: CPT | Mod: HCNC,S$GLB,, | Performed by: NURSE PRACTITIONER

## 2021-10-06 PROCEDURE — G0008 ADMIN INFLUENZA VIRUS VAC: HCPCS | Mod: HCNC,S$GLB,, | Performed by: NURSE PRACTITIONER

## 2021-10-06 PROCEDURE — 4010F PR ACE/ARB THEARPY RXD/TAKEN: ICD-10-PCS | Mod: HCNC,CPTII,S$GLB, | Performed by: NURSE PRACTITIONER

## 2021-10-06 PROCEDURE — 3078F PR MOST RECENT DIASTOLIC BLOOD PRESSURE < 80 MM HG: ICD-10-PCS | Mod: HCNC,CPTII,S$GLB, | Performed by: NURSE PRACTITIONER

## 2021-10-06 PROCEDURE — 1126F AMNT PAIN NOTED NONE PRSNT: CPT | Mod: HCNC,CPTII,S$GLB, | Performed by: NURSE PRACTITIONER

## 2021-10-06 PROCEDURE — 3078F DIAST BP <80 MM HG: CPT | Mod: HCNC,CPTII,S$GLB, | Performed by: NURSE PRACTITIONER

## 2021-10-06 PROCEDURE — 99999 PR PBB SHADOW E&M-EST. PATIENT-LVL IV: CPT | Mod: PBBFAC,HCNC,, | Performed by: NURSE PRACTITIONER

## 2021-10-06 PROCEDURE — 90694 FLU VACCINE - QUADRIVALENT - ADJUVANTED: ICD-10-PCS | Mod: HCNC,S$GLB,, | Performed by: NURSE PRACTITIONER

## 2021-10-06 PROCEDURE — G0439 PR MEDICARE ANNUAL WELLNESS SUBSEQUENT VISIT: ICD-10-PCS | Mod: HCNC,S$GLB,, | Performed by: NURSE PRACTITIONER

## 2021-10-06 PROCEDURE — 3075F PR MOST RECENT SYSTOLIC BLOOD PRESS GE 130-139MM HG: ICD-10-PCS | Mod: HCNC,CPTII,S$GLB, | Performed by: NURSE PRACTITIONER

## 2021-10-06 PROCEDURE — 1159F PR MEDICATION LIST DOCUMENTED IN MEDICAL RECORD: ICD-10-PCS | Mod: HCNC,CPTII,S$GLB, | Performed by: NURSE PRACTITIONER

## 2021-10-06 PROCEDURE — 99499 UNLISTED E&M SERVICE: CPT | Mod: HCNC,S$GLB,, | Performed by: NURSE PRACTITIONER

## 2021-10-06 PROCEDURE — 1160F RVW MEDS BY RX/DR IN RCRD: CPT | Mod: HCNC,CPTII,S$GLB, | Performed by: NURSE PRACTITIONER

## 2021-10-06 PROCEDURE — 1160F PR REVIEW ALL MEDS BY PRESCRIBER/CLIN PHARMACIST DOCUMENTED: ICD-10-PCS | Mod: HCNC,CPTII,S$GLB, | Performed by: NURSE PRACTITIONER

## 2021-10-06 PROCEDURE — 3008F PR BODY MASS INDEX (BMI) DOCUMENTED: ICD-10-PCS | Mod: HCNC,CPTII,S$GLB, | Performed by: NURSE PRACTITIONER

## 2021-10-06 PROCEDURE — 1159F MED LIST DOCD IN RCRD: CPT | Mod: HCNC,CPTII,S$GLB, | Performed by: NURSE PRACTITIONER

## 2021-10-06 PROCEDURE — 1170F FXNL STATUS ASSESSED: CPT | Mod: HCNC,CPTII,S$GLB, | Performed by: NURSE PRACTITIONER

## 2021-10-06 PROCEDURE — 1170F PR FUNCTIONAL STATUS ASSESSED: ICD-10-PCS | Mod: HCNC,CPTII,S$GLB, | Performed by: NURSE PRACTITIONER

## 2021-10-06 PROCEDURE — 1101F PT FALLS ASSESS-DOCD LE1/YR: CPT | Mod: HCNC,CPTII,S$GLB, | Performed by: NURSE PRACTITIONER

## 2021-10-06 PROCEDURE — 99999 PR PBB SHADOW E&M-EST. PATIENT-LVL IV: ICD-10-PCS | Mod: PBBFAC,HCNC,, | Performed by: NURSE PRACTITIONER

## 2021-10-06 PROCEDURE — 4010F ACE/ARB THERAPY RXD/TAKEN: CPT | Mod: HCNC,CPTII,S$GLB, | Performed by: NURSE PRACTITIONER

## 2021-10-06 PROCEDURE — 3288F PR FALLS RISK ASSESSMENT DOCUMENTED: ICD-10-PCS | Mod: HCNC,CPTII,S$GLB, | Performed by: NURSE PRACTITIONER

## 2021-10-06 PROCEDURE — G0008 FLU VACCINE - QUADRIVALENT - ADJUVANTED: ICD-10-PCS | Mod: HCNC,S$GLB,, | Performed by: NURSE PRACTITIONER

## 2021-10-06 PROCEDURE — 1126F PR PAIN SEVERITY QUANTIFIED, NO PAIN PRESENT: ICD-10-PCS | Mod: HCNC,CPTII,S$GLB, | Performed by: NURSE PRACTITIONER

## 2021-10-06 PROCEDURE — 3288F FALL RISK ASSESSMENT DOCD: CPT | Mod: HCNC,CPTII,S$GLB, | Performed by: NURSE PRACTITIONER

## 2021-10-06 PROCEDURE — G0439 PPPS, SUBSEQ VISIT: HCPCS | Mod: HCNC,S$GLB,, | Performed by: NURSE PRACTITIONER

## 2021-10-06 PROCEDURE — 3075F SYST BP GE 130 - 139MM HG: CPT | Mod: HCNC,CPTII,S$GLB, | Performed by: NURSE PRACTITIONER

## 2021-10-06 PROCEDURE — 99499 RISK ADDL DX/OHS AUDIT: ICD-10-PCS | Mod: HCNC,S$GLB,, | Performed by: NURSE PRACTITIONER

## 2021-10-17 ENCOUNTER — IMMUNIZATION (OUTPATIENT)
Dept: PRIMARY CARE CLINIC | Facility: CLINIC | Age: 71
End: 2021-10-17
Payer: MEDICARE

## 2021-10-17 DIAGNOSIS — Z23 NEED FOR VACCINATION: Primary | ICD-10-CM

## 2021-10-17 PROCEDURE — 0003A COVID-19, MRNA, LNP-S, PF, 30 MCG/0.3 ML DOSE VACCINE: CPT | Mod: CV19,PBBFAC | Performed by: FAMILY MEDICINE

## 2021-10-17 PROCEDURE — 91300 COVID-19, MRNA, LNP-S, PF, 30 MCG/0.3 ML DOSE VACCINE: CPT | Mod: PBBFAC | Performed by: FAMILY MEDICINE

## 2021-10-18 RX ORDER — SODIUM, POTASSIUM,MAG SULFATES 17.5-3.13G
1 SOLUTION, RECONSTITUTED, ORAL ORAL DAILY
Qty: 1 KIT | Refills: 0 | Status: SHIPPED | OUTPATIENT
Start: 2021-10-18 | End: 2021-10-20

## 2021-10-20 ENCOUNTER — TELEPHONE (OUTPATIENT)
Dept: SMOKING CESSATION | Facility: CLINIC | Age: 71
End: 2021-10-20

## 2021-10-26 ENCOUNTER — TELEPHONE (OUTPATIENT)
Dept: SMOKING CESSATION | Facility: CLINIC | Age: 71
End: 2021-10-26
Payer: MEDICARE

## 2021-11-15 ENCOUNTER — ANESTHESIA EVENT (OUTPATIENT)
Dept: ENDOSCOPY | Facility: HOSPITAL | Age: 71
End: 2021-11-15
Payer: MEDICARE

## 2021-11-15 ENCOUNTER — HOSPITAL ENCOUNTER (OUTPATIENT)
Facility: HOSPITAL | Age: 71
Discharge: HOME | End: 2021-11-15
Attending: INTERNAL MEDICINE | Admitting: INTERNAL MEDICINE
Payer: MEDICARE

## 2021-11-15 ENCOUNTER — ANESTHESIA (OUTPATIENT)
Dept: ENDOSCOPY | Facility: HOSPITAL | Age: 71
End: 2021-11-15
Payer: MEDICARE

## 2021-11-15 DIAGNOSIS — Z86.010 HISTORY OF COLONIC POLYPS: Primary | ICD-10-CM

## 2021-11-15 PROCEDURE — 45385 PR COLONOSCOPY,REMV LESN,SNARE: ICD-10-PCS | Mod: PT,HCNC,, | Performed by: INTERNAL MEDICINE

## 2021-11-15 PROCEDURE — 25000003 PHARM REV CODE 250: Mod: HCNC | Performed by: NURSE ANESTHETIST, CERTIFIED REGISTERED

## 2021-11-15 PROCEDURE — 45380 COLONOSCOPY AND BIOPSY: CPT | Mod: 59,HCNC,, | Performed by: INTERNAL MEDICINE

## 2021-11-15 PROCEDURE — 63600175 PHARM REV CODE 636 W HCPCS: Mod: HCNC | Performed by: NURSE ANESTHETIST, CERTIFIED REGISTERED

## 2021-11-15 PROCEDURE — 45385 COLONOSCOPY W/LESION REMOVAL: CPT | Mod: PT,HCNC,, | Performed by: INTERNAL MEDICINE

## 2021-11-15 PROCEDURE — 45385 COLONOSCOPY W/LESION REMOVAL: CPT | Mod: HCNC | Performed by: INTERNAL MEDICINE

## 2021-11-15 PROCEDURE — 88305 TISSUE EXAM BY PATHOLOGIST: CPT | Mod: HCNC | Performed by: PATHOLOGY

## 2021-11-15 PROCEDURE — 45380 COLONOSCOPY AND BIOPSY: CPT | Mod: HCNC,59 | Performed by: INTERNAL MEDICINE

## 2021-11-15 PROCEDURE — 45380 PR COLONOSCOPY,BIOPSY: ICD-10-PCS | Mod: 59,HCNC,, | Performed by: INTERNAL MEDICINE

## 2021-11-15 PROCEDURE — 27201089 HC SNARE, DISP (ANY): Mod: HCNC | Performed by: INTERNAL MEDICINE

## 2021-11-15 PROCEDURE — 37000008 HC ANESTHESIA 1ST 15 MINUTES: Mod: HCNC | Performed by: INTERNAL MEDICINE

## 2021-11-15 PROCEDURE — 88305 TISSUE EXAM BY PATHOLOGIST: CPT | Mod: 26,HCNC,, | Performed by: PATHOLOGY

## 2021-11-15 PROCEDURE — 37000009 HC ANESTHESIA EA ADD 15 MINS: Mod: HCNC | Performed by: INTERNAL MEDICINE

## 2021-11-15 PROCEDURE — 27201012 HC FORCEPS, HOT/COLD, DISP: Mod: HCNC | Performed by: INTERNAL MEDICINE

## 2021-11-15 PROCEDURE — 88305 TISSUE EXAM BY PATHOLOGIST: ICD-10-PCS | Mod: 26,HCNC,, | Performed by: PATHOLOGY

## 2021-11-15 RX ORDER — PROPOFOL 10 MG/ML
VIAL (ML) INTRAVENOUS
Status: DISCONTINUED | OUTPATIENT
Start: 2021-11-15 | End: 2021-11-15

## 2021-11-15 RX ORDER — LIDOCAINE HYDROCHLORIDE 10 MG/ML
INJECTION, SOLUTION EPIDURAL; INFILTRATION; INTRACAUDAL; PERINEURAL
Status: DISCONTINUED | OUTPATIENT
Start: 2021-11-15 | End: 2021-11-15

## 2021-11-15 RX ADMIN — PROPOFOL 30 MG: 10 INJECTION, EMULSION INTRAVENOUS at 11:11

## 2021-11-15 RX ADMIN — LIDOCAINE HYDROCHLORIDE 50 MG: 10 INJECTION, SOLUTION EPIDURAL; INFILTRATION; INTRACAUDAL; PERINEURAL at 10:11

## 2021-11-15 RX ADMIN — SODIUM CHLORIDE, SODIUM LACTATE, POTASSIUM CHLORIDE, AND CALCIUM CHLORIDE: .6; .31; .03; .02 INJECTION, SOLUTION INTRAVENOUS at 10:11

## 2021-11-15 RX ADMIN — PROPOFOL 80 MG: 10 INJECTION, EMULSION INTRAVENOUS at 10:11

## 2021-11-16 VITALS
WEIGHT: 180 LBS | RESPIRATION RATE: 17 BRPM | DIASTOLIC BLOOD PRESSURE: 65 MMHG | HEIGHT: 72 IN | TEMPERATURE: 97 F | SYSTOLIC BLOOD PRESSURE: 94 MMHG | BODY MASS INDEX: 24.38 KG/M2 | OXYGEN SATURATION: 98 % | HEART RATE: 60 BPM

## 2021-11-22 LAB
FINAL PATHOLOGIC DIAGNOSIS: NORMAL
GROSS: NORMAL
Lab: NORMAL

## 2022-01-03 RX ORDER — MELOXICAM 15 MG/1
TABLET ORAL
Qty: 90 TABLET | Refills: 3 | Status: SHIPPED | OUTPATIENT
Start: 2022-01-03 | End: 2023-03-08

## 2022-01-03 NOTE — TELEPHONE ENCOUNTER
----- Message from Rigoberto Gastelum sent at 1/3/2022 12:36 PM CST -----  Contact: [facility](tutu)[phone]  Type:  RX Refill Request    Who Called:tutu  Refill or New Rx:refill  RX Name and Strength:meloxicam (MOBIC) 15 MG tablet  How is the patient currently taking it? (ex. 1XDay):once a day  Is this a 30 day or 90 day RX:90  [facility] - [facility] [address]  [address] [facility] [address]  Phone: [phone] Fax: [phone]   Local or Mail Order:local  Ordering Provider:Dr Zabala  Would the patient rather a call back or a response via [facility]? Call back  Best Call Back Number:[phone]  Additional Information:

## 2022-02-10 ENCOUNTER — OFFICE VISIT (OUTPATIENT)
Dept: INTERNAL MEDICINE | Facility: CLINIC | Age: 72
End: 2022-02-10
Payer: MEDICARE

## 2022-02-10 VITALS
HEART RATE: 50 BPM | SYSTOLIC BLOOD PRESSURE: 130 MMHG | BODY MASS INDEX: 24.75 KG/M2 | DIASTOLIC BLOOD PRESSURE: 70 MMHG | HEIGHT: 72 IN | OXYGEN SATURATION: 100 % | WEIGHT: 182.75 LBS

## 2022-02-10 DIAGNOSIS — I77.1 TORTUOUS AORTA: ICD-10-CM

## 2022-02-10 DIAGNOSIS — Z86.010 HISTORY OF COLONIC POLYPS: ICD-10-CM

## 2022-02-10 DIAGNOSIS — I10 PRIMARY HYPERTENSION: Primary | ICD-10-CM

## 2022-02-10 DIAGNOSIS — J43.2 CENTRILOBULAR EMPHYSEMA: ICD-10-CM

## 2022-02-10 DIAGNOSIS — F10.20 UNCOMPLICATED ALCOHOL DEPENDENCE: Chronic | ICD-10-CM

## 2022-02-10 DIAGNOSIS — Z12.5 PROSTATE CANCER SCREENING: ICD-10-CM

## 2022-02-10 DIAGNOSIS — Z72.0 TOBACCO ABUSE: Chronic | ICD-10-CM

## 2022-02-10 PROCEDURE — 3288F FALL RISK ASSESSMENT DOCD: CPT | Mod: HCNC,CPTII,S$GLB, | Performed by: INTERNAL MEDICINE

## 2022-02-10 PROCEDURE — 3075F PR MOST RECENT SYSTOLIC BLOOD PRESS GE 130-139MM HG: ICD-10-PCS | Mod: HCNC,CPTII,S$GLB, | Performed by: INTERNAL MEDICINE

## 2022-02-10 PROCEDURE — 3008F PR BODY MASS INDEX (BMI) DOCUMENTED: ICD-10-PCS | Mod: HCNC,CPTII,S$GLB, | Performed by: INTERNAL MEDICINE

## 2022-02-10 PROCEDURE — 99214 PR OFFICE/OUTPT VISIT, EST, LEVL IV, 30-39 MIN: ICD-10-PCS | Mod: HCNC,S$GLB,, | Performed by: INTERNAL MEDICINE

## 2022-02-10 PROCEDURE — 3288F PR FALLS RISK ASSESSMENT DOCUMENTED: ICD-10-PCS | Mod: HCNC,CPTII,S$GLB, | Performed by: INTERNAL MEDICINE

## 2022-02-10 PROCEDURE — 1159F PR MEDICATION LIST DOCUMENTED IN MEDICAL RECORD: ICD-10-PCS | Mod: HCNC,CPTII,S$GLB, | Performed by: INTERNAL MEDICINE

## 2022-02-10 PROCEDURE — 1126F PR PAIN SEVERITY QUANTIFIED, NO PAIN PRESENT: ICD-10-PCS | Mod: HCNC,CPTII,S$GLB, | Performed by: INTERNAL MEDICINE

## 2022-02-10 PROCEDURE — 3078F PR MOST RECENT DIASTOLIC BLOOD PRESSURE < 80 MM HG: ICD-10-PCS | Mod: HCNC,CPTII,S$GLB, | Performed by: INTERNAL MEDICINE

## 2022-02-10 PROCEDURE — 99499 UNLISTED E&M SERVICE: CPT | Mod: HCNC,S$GLB,, | Performed by: INTERNAL MEDICINE

## 2022-02-10 PROCEDURE — 3078F DIAST BP <80 MM HG: CPT | Mod: HCNC,CPTII,S$GLB, | Performed by: INTERNAL MEDICINE

## 2022-02-10 PROCEDURE — 99214 OFFICE O/P EST MOD 30 MIN: CPT | Mod: HCNC,S$GLB,, | Performed by: INTERNAL MEDICINE

## 2022-02-10 PROCEDURE — 1160F PR REVIEW ALL MEDS BY PRESCRIBER/CLIN PHARMACIST DOCUMENTED: ICD-10-PCS | Mod: HCNC,CPTII,S$GLB, | Performed by: INTERNAL MEDICINE

## 2022-02-10 PROCEDURE — 1159F MED LIST DOCD IN RCRD: CPT | Mod: HCNC,CPTII,S$GLB, | Performed by: INTERNAL MEDICINE

## 2022-02-10 PROCEDURE — 1101F PR PT FALLS ASSESS DOC 0-1 FALLS W/OUT INJ PAST YR: ICD-10-PCS | Mod: HCNC,CPTII,S$GLB, | Performed by: INTERNAL MEDICINE

## 2022-02-10 PROCEDURE — 3075F SYST BP GE 130 - 139MM HG: CPT | Mod: HCNC,CPTII,S$GLB, | Performed by: INTERNAL MEDICINE

## 2022-02-10 PROCEDURE — 99999 PR PBB SHADOW E&M-EST. PATIENT-LVL III: ICD-10-PCS | Mod: PBBFAC,HCNC,, | Performed by: INTERNAL MEDICINE

## 2022-02-10 PROCEDURE — 99999 PR PBB SHADOW E&M-EST. PATIENT-LVL III: CPT | Mod: PBBFAC,HCNC,, | Performed by: INTERNAL MEDICINE

## 2022-02-10 PROCEDURE — 1126F AMNT PAIN NOTED NONE PRSNT: CPT | Mod: HCNC,CPTII,S$GLB, | Performed by: INTERNAL MEDICINE

## 2022-02-10 PROCEDURE — 3008F BODY MASS INDEX DOCD: CPT | Mod: HCNC,CPTII,S$GLB, | Performed by: INTERNAL MEDICINE

## 2022-02-10 PROCEDURE — 1101F PT FALLS ASSESS-DOCD LE1/YR: CPT | Mod: HCNC,CPTII,S$GLB, | Performed by: INTERNAL MEDICINE

## 2022-02-10 PROCEDURE — 1160F RVW MEDS BY RX/DR IN RCRD: CPT | Mod: HCNC,CPTII,S$GLB, | Performed by: INTERNAL MEDICINE

## 2022-02-10 PROCEDURE — 99499 RISK ADDL DX/OHS AUDIT: ICD-10-PCS | Mod: HCNC,S$GLB,, | Performed by: INTERNAL MEDICINE

## 2022-02-10 RX ORDER — TAMSULOSIN HYDROCHLORIDE 0.4 MG/1
0.4 CAPSULE ORAL DAILY
Qty: 90 CAPSULE | Refills: 3 | Status: SHIPPED | OUTPATIENT
Start: 2022-02-10 | End: 2023-03-22 | Stop reason: SDUPTHER

## 2022-02-10 RX ORDER — LISINOPRIL AND HYDROCHLOROTHIAZIDE 10; 12.5 MG/1; MG/1
1 TABLET ORAL DAILY
Qty: 90 TABLET | Refills: 3 | Status: SHIPPED | OUTPATIENT
Start: 2022-02-10 | End: 2022-08-12

## 2022-02-10 NOTE — PROGRESS NOTES
HPI:  Patient is a 71-year-old man who comes today for follow-up of emphysema, hypertension and BPH.  Patient unfortunately continues to smoke and drink.  He states he has cut back about 50%.  He has plans to make this is last year and be done with both by the end of the year.  Patient states his blood pressure home is usually 130/80.  His breathing has been doing well.  He has had no flares of emphysema    Current meds have been verified and updated per the EMR  Exam:/70 (BP Location: Right arm)   Pulse (!) 50   Ht 6' (1.829 m)   Wt 82.9 kg (182 lb 12.2 oz)   SpO2 100%   BMI 24.79 kg/m²   Chest clear  Cardiovascular regular rate and rhythm without murmur gallop or rub    Lab Results   Component Value Date    WBC 5.81 07/28/2021    HGB 13.7 (L) 07/28/2021    HCT 42.4 07/28/2021     07/28/2021    CHOL 185 07/28/2021    TRIG 118 07/28/2021    HDL 66 07/28/2021    ALT 19 07/28/2021    AST 23 07/28/2021     07/28/2021    K 4.0 07/28/2021     07/28/2021    CREATININE 1.0 07/28/2021    BUN 13 07/28/2021    CO2 25 07/28/2021    TSH 0.880 07/28/2021    PSA 2.1 07/28/2021       Impression:  Stable problems below  Patient Active Problem List   Diagnosis    Benign prostatic hyperplasia without lower urinary tract symptoms    Tobacco abuse    Alcohol dependence    Spondylosis of lumbar region without myelopathy or radiculopathy    Osteoarthritis of multiple joints    DDD (degenerative disc disease), lumbar    History of colonic polyps    Tortuous aorta    Hypertension    COPD (chronic obstructive pulmonary disease)       Plan:  Orders Placed This Encounter    Comprehensive Metabolic Panel    TSH    CBC Auto Differential    PSA, Screening    lisinopriL-hydrochlorothiazide (PRINZIDE,ZESTORETIC) 10-12.5 mg per tablet    tamsulosin (FLOMAX) 0.4 mg Cap     He will be seen again in 6 months with above lab work.  He has been strongly encouraged to try to quit smoking and drinking  alcohol.    This note is generated with speech recognition software and is subject to transcription error and sound alike phrases that may be missed by proofreading.

## 2022-03-23 ENCOUNTER — OFFICE VISIT (OUTPATIENT)
Dept: INTERNAL MEDICINE | Facility: CLINIC | Age: 72
End: 2022-03-23
Payer: MEDICARE

## 2022-03-23 VITALS
SYSTOLIC BLOOD PRESSURE: 128 MMHG | OXYGEN SATURATION: 98 % | BODY MASS INDEX: 25.47 KG/M2 | HEIGHT: 72 IN | HEART RATE: 51 BPM | WEIGHT: 188.06 LBS | DIASTOLIC BLOOD PRESSURE: 82 MMHG

## 2022-03-23 DIAGNOSIS — T15.91XA FOREIGN BODY OF RIGHT EYE, INITIAL ENCOUNTER: Primary | ICD-10-CM

## 2022-03-23 PROCEDURE — 3079F PR MOST RECENT DIASTOLIC BLOOD PRESSURE 80-89 MM HG: ICD-10-PCS | Mod: CPTII,S$GLB,, | Performed by: INTERNAL MEDICINE

## 2022-03-23 PROCEDURE — 99213 PR OFFICE/OUTPT VISIT, EST, LEVL III, 20-29 MIN: ICD-10-PCS | Mod: S$GLB,,, | Performed by: INTERNAL MEDICINE

## 2022-03-23 PROCEDURE — 1126F PR PAIN SEVERITY QUANTIFIED, NO PAIN PRESENT: ICD-10-PCS | Mod: CPTII,S$GLB,, | Performed by: INTERNAL MEDICINE

## 2022-03-23 PROCEDURE — 1101F PR PT FALLS ASSESS DOC 0-1 FALLS W/OUT INJ PAST YR: ICD-10-PCS | Mod: CPTII,S$GLB,, | Performed by: INTERNAL MEDICINE

## 2022-03-23 PROCEDURE — 4010F PR ACE/ARB THEARPY RXD/TAKEN: ICD-10-PCS | Mod: CPTII,S$GLB,, | Performed by: INTERNAL MEDICINE

## 2022-03-23 PROCEDURE — 3288F FALL RISK ASSESSMENT DOCD: CPT | Mod: CPTII,S$GLB,, | Performed by: INTERNAL MEDICINE

## 2022-03-23 PROCEDURE — 1159F PR MEDICATION LIST DOCUMENTED IN MEDICAL RECORD: ICD-10-PCS | Mod: CPTII,S$GLB,, | Performed by: INTERNAL MEDICINE

## 2022-03-23 PROCEDURE — 1101F PT FALLS ASSESS-DOCD LE1/YR: CPT | Mod: CPTII,S$GLB,, | Performed by: INTERNAL MEDICINE

## 2022-03-23 PROCEDURE — 3008F PR BODY MASS INDEX (BMI) DOCUMENTED: ICD-10-PCS | Mod: CPTII,S$GLB,, | Performed by: INTERNAL MEDICINE

## 2022-03-23 PROCEDURE — 3074F PR MOST RECENT SYSTOLIC BLOOD PRESSURE < 130 MM HG: ICD-10-PCS | Mod: CPTII,S$GLB,, | Performed by: INTERNAL MEDICINE

## 2022-03-23 PROCEDURE — 99999 PR PBB SHADOW E&M-EST. PATIENT-LVL III: CPT | Mod: PBBFAC,,, | Performed by: INTERNAL MEDICINE

## 2022-03-23 PROCEDURE — 3074F SYST BP LT 130 MM HG: CPT | Mod: CPTII,S$GLB,, | Performed by: INTERNAL MEDICINE

## 2022-03-23 PROCEDURE — 3288F PR FALLS RISK ASSESSMENT DOCUMENTED: ICD-10-PCS | Mod: CPTII,S$GLB,, | Performed by: INTERNAL MEDICINE

## 2022-03-23 PROCEDURE — 4010F ACE/ARB THERAPY RXD/TAKEN: CPT | Mod: CPTII,S$GLB,, | Performed by: INTERNAL MEDICINE

## 2022-03-23 PROCEDURE — 3079F DIAST BP 80-89 MM HG: CPT | Mod: CPTII,S$GLB,, | Performed by: INTERNAL MEDICINE

## 2022-03-23 PROCEDURE — 1159F MED LIST DOCD IN RCRD: CPT | Mod: CPTII,S$GLB,, | Performed by: INTERNAL MEDICINE

## 2022-03-23 PROCEDURE — 99213 OFFICE O/P EST LOW 20 MIN: CPT | Mod: S$GLB,,, | Performed by: INTERNAL MEDICINE

## 2022-03-23 PROCEDURE — 99999 PR PBB SHADOW E&M-EST. PATIENT-LVL III: ICD-10-PCS | Mod: PBBFAC,,, | Performed by: INTERNAL MEDICINE

## 2022-03-23 PROCEDURE — 3008F BODY MASS INDEX DOCD: CPT | Mod: CPTII,S$GLB,, | Performed by: INTERNAL MEDICINE

## 2022-03-23 PROCEDURE — 1126F AMNT PAIN NOTED NONE PRSNT: CPT | Mod: CPTII,S$GLB,, | Performed by: INTERNAL MEDICINE

## 2022-03-23 NOTE — PROGRESS NOTES
HPI:  Patient is a 71-year-old man who comes today with complaints trash and my right eye.  He states he noticed the sensation of something in the eye last evening.  Is continued throughout.    Current meds have been verified and updated per the EMR  Exam:/82 (BP Location: Left arm)   Pulse (!) 51   Ht 6' (1.829 m)   Wt 85.3 kg (188 lb 0.8 oz)   SpO2 98%   BMI 25.50 kg/m²   There is mild injection of the right conjunctiva.  Examine of the right eye does reveal small piece of trash underneath the right eye upper eyelid.  The eyelid was able to be inverted and the foreign body removed.    Lab Results   Component Value Date    WBC 5.81 07/28/2021    HGB 13.7 (L) 07/28/2021    HCT 42.4 07/28/2021     07/28/2021    CHOL 185 07/28/2021    TRIG 118 07/28/2021    HDL 66 07/28/2021    ALT 19 07/28/2021    AST 23 07/28/2021     07/28/2021    K 4.0 07/28/2021     07/28/2021    CREATININE 1.0 07/28/2021    BUN 13 07/28/2021    CO2 25 07/28/2021    TSH 0.880 07/28/2021    PSA 2.1 07/28/2021       Impression:  Foreign body right eye  Patient Active Problem List   Diagnosis    Benign prostatic hyperplasia without lower urinary tract symptoms    Tobacco abuse    Alcohol dependence    Spondylosis of lumbar region without myelopathy or radiculopathy    Osteoarthritis of multiple joints    DDD (degenerative disc disease), lumbar    History of colonic polyps    Tortuous aorta    Hypertension    COPD (chronic obstructive pulmonary disease)       Plan:     Patient should use artificial tears just to keep the eye lubricated for the next 24 hours.  Irritation should rapidly improved    This note is generated with speech recognition software and is subject to transcription error and sound alike phrases that may be missed by proofreading.

## 2022-05-31 ENCOUNTER — TELEPHONE (OUTPATIENT)
Dept: INTERNAL MEDICINE | Facility: CLINIC | Age: 72
End: 2022-05-31
Payer: MEDICARE

## 2022-05-31 ENCOUNTER — OFFICE VISIT (OUTPATIENT)
Dept: OPHTHALMOLOGY | Facility: CLINIC | Age: 72
End: 2022-05-31
Payer: COMMERCIAL

## 2022-05-31 ENCOUNTER — IMMUNIZATION (OUTPATIENT)
Dept: PRIMARY CARE CLINIC | Facility: CLINIC | Age: 72
End: 2022-05-31
Payer: MEDICARE

## 2022-05-31 DIAGNOSIS — Z23 NEED FOR VACCINATION: Primary | ICD-10-CM

## 2022-05-31 DIAGNOSIS — H52.4 BILATERAL PRESBYOPIA: ICD-10-CM

## 2022-05-31 DIAGNOSIS — H25.13 CATARACT, NUCLEAR SCLEROTIC SENILE, BILATERAL: Primary | ICD-10-CM

## 2022-05-31 DIAGNOSIS — Z72.0 TOBACCO ABUSE DISORDER: Primary | ICD-10-CM

## 2022-05-31 DIAGNOSIS — H04.123 DRY EYES, BILATERAL: ICD-10-CM

## 2022-05-31 PROCEDURE — 92014 PR EYE EXAM, EST PATIENT,COMPREHESV: ICD-10-PCS | Mod: S$GLB,,, | Performed by: OPTOMETRIST

## 2022-05-31 PROCEDURE — 92015 PR REFRACTION: ICD-10-PCS | Mod: S$GLB,,, | Performed by: OPTOMETRIST

## 2022-05-31 PROCEDURE — 99999 PR PBB SHADOW E&M-EST. PATIENT-LVL II: ICD-10-PCS | Mod: PBBFAC,,, | Performed by: OPTOMETRIST

## 2022-05-31 PROCEDURE — 92014 COMPRE OPH EXAM EST PT 1/>: CPT | Mod: S$GLB,,, | Performed by: OPTOMETRIST

## 2022-05-31 PROCEDURE — 99999 PR PBB SHADOW E&M-EST. PATIENT-LVL II: CPT | Mod: PBBFAC,,, | Performed by: OPTOMETRIST

## 2022-05-31 PROCEDURE — 91305 COVID-19, MRNA, LNP-S, PF, 30 MCG/0.3 ML DOSE VACCINE (PFIZER): CPT | Mod: PBBFAC | Performed by: FAMILY MEDICINE

## 2022-05-31 PROCEDURE — 92015 DETERMINE REFRACTIVE STATE: CPT | Mod: S$GLB,,, | Performed by: OPTOMETRIST

## 2022-05-31 RX ORDER — VARENICLINE TARTRATE 0.5 (11)-1
KIT ORAL
Qty: 1 EACH | Refills: 0 | Status: SHIPPED | OUTPATIENT
Start: 2022-05-31 | End: 2022-08-12

## 2022-05-31 NOTE — TELEPHONE ENCOUNTER
----- Message from Alejandrina Apple sent at 5/31/2022  8:22 AM CDT -----  Patient Call Back    Who Called: PT     What is the request in detail: Pt calling to speak with someone regarding him stop smoking. The pt would like to get information on the Smoking Cessation. Pls advise.    Can the clinic reply by [facility]?    Best Call Back Number: [phone]

## 2022-05-31 NOTE — PROGRESS NOTES
HPI     No vision complaints, sting and burn occasionally    Last edited by Alejandro Goins, OD on 5/31/2022  8:05 AM. (History)            Assessment /Plan     For exam results, see Encounter Report.    Cataract, nuclear sclerotic senile, bilateral    Dry eyes, bilateral    Bilateral presbyopia      Moderate cataracts OU, not surgical    AT prn    Dispense Final Rx for glasses.  RTC 1 year  Discussed above and answered questions.

## 2022-05-31 NOTE — TELEPHONE ENCOUNTER
Pt states he is on his 2nd month without a cigarette, he is starting to have urges and wants to know what he can do, he wants to get in the cessation program if possible/ please advise

## 2022-07-06 ENCOUNTER — OFFICE VISIT (OUTPATIENT)
Dept: INTERNAL MEDICINE | Facility: CLINIC | Age: 72
End: 2022-07-06
Payer: MEDICARE

## 2022-07-06 VITALS
TEMPERATURE: 97 F | SYSTOLIC BLOOD PRESSURE: 110 MMHG | DIASTOLIC BLOOD PRESSURE: 70 MMHG | OXYGEN SATURATION: 99 % | WEIGHT: 178.56 LBS | HEIGHT: 72 IN | BODY MASS INDEX: 24.18 KG/M2 | HEART RATE: 52 BPM

## 2022-07-06 DIAGNOSIS — K58.1 IRRITABLE BOWEL SYNDROME WITH CONSTIPATION: Primary | ICD-10-CM

## 2022-07-06 PROCEDURE — 3074F SYST BP LT 130 MM HG: CPT | Mod: CPTII,S$GLB,, | Performed by: INTERNAL MEDICINE

## 2022-07-06 PROCEDURE — 3288F FALL RISK ASSESSMENT DOCD: CPT | Mod: CPTII,S$GLB,, | Performed by: INTERNAL MEDICINE

## 2022-07-06 PROCEDURE — 99213 OFFICE O/P EST LOW 20 MIN: CPT | Mod: S$GLB,,, | Performed by: INTERNAL MEDICINE

## 2022-07-06 PROCEDURE — 1159F MED LIST DOCD IN RCRD: CPT | Mod: CPTII,S$GLB,, | Performed by: INTERNAL MEDICINE

## 2022-07-06 PROCEDURE — 99213 PR OFFICE/OUTPT VISIT, EST, LEVL III, 20-29 MIN: ICD-10-PCS | Mod: S$GLB,,, | Performed by: INTERNAL MEDICINE

## 2022-07-06 PROCEDURE — 1101F PT FALLS ASSESS-DOCD LE1/YR: CPT | Mod: CPTII,S$GLB,, | Performed by: INTERNAL MEDICINE

## 2022-07-06 PROCEDURE — 3288F PR FALLS RISK ASSESSMENT DOCUMENTED: ICD-10-PCS | Mod: CPTII,S$GLB,, | Performed by: INTERNAL MEDICINE

## 2022-07-06 PROCEDURE — 3008F PR BODY MASS INDEX (BMI) DOCUMENTED: ICD-10-PCS | Mod: CPTII,S$GLB,, | Performed by: INTERNAL MEDICINE

## 2022-07-06 PROCEDURE — 4010F PR ACE/ARB THEARPY RXD/TAKEN: ICD-10-PCS | Mod: CPTII,S$GLB,, | Performed by: INTERNAL MEDICINE

## 2022-07-06 PROCEDURE — 3078F PR MOST RECENT DIASTOLIC BLOOD PRESSURE < 80 MM HG: ICD-10-PCS | Mod: CPTII,S$GLB,, | Performed by: INTERNAL MEDICINE

## 2022-07-06 PROCEDURE — 99999 PR PBB SHADOW E&M-EST. PATIENT-LVL III: CPT | Mod: PBBFAC,,, | Performed by: INTERNAL MEDICINE

## 2022-07-06 PROCEDURE — 3008F BODY MASS INDEX DOCD: CPT | Mod: CPTII,S$GLB,, | Performed by: INTERNAL MEDICINE

## 2022-07-06 PROCEDURE — 99999 PR PBB SHADOW E&M-EST. PATIENT-LVL III: ICD-10-PCS | Mod: PBBFAC,,, | Performed by: INTERNAL MEDICINE

## 2022-07-06 PROCEDURE — 3078F DIAST BP <80 MM HG: CPT | Mod: CPTII,S$GLB,, | Performed by: INTERNAL MEDICINE

## 2022-07-06 PROCEDURE — 1101F PR PT FALLS ASSESS DOC 0-1 FALLS W/OUT INJ PAST YR: ICD-10-PCS | Mod: CPTII,S$GLB,, | Performed by: INTERNAL MEDICINE

## 2022-07-06 PROCEDURE — 4010F ACE/ARB THERAPY RXD/TAKEN: CPT | Mod: CPTII,S$GLB,, | Performed by: INTERNAL MEDICINE

## 2022-07-06 PROCEDURE — 3074F PR MOST RECENT SYSTOLIC BLOOD PRESSURE < 130 MM HG: ICD-10-PCS | Mod: CPTII,S$GLB,, | Performed by: INTERNAL MEDICINE

## 2022-07-06 PROCEDURE — 1159F PR MEDICATION LIST DOCUMENTED IN MEDICAL RECORD: ICD-10-PCS | Mod: CPTII,S$GLB,, | Performed by: INTERNAL MEDICINE

## 2022-07-06 RX ORDER — DICYCLOMINE HYDROCHLORIDE 20 MG/1
20 TABLET ORAL 3 TIMES DAILY
Qty: 90 TABLET | Refills: 1 | Status: SHIPPED | OUTPATIENT
Start: 2022-07-06 | End: 2022-08-05

## 2022-07-06 NOTE — PROGRESS NOTES
HPI:  Patient is a 71-year-old man who comes today for complaints of abdominal cramping intermittently for the last month.  It happens every day.  Associated with that he frequently is constipated.  He has recently started taking the stool softener which is helped.  He denies any nausea or vomiting.  There has been no weight loss.  No decreased appetite    Current meds have been verified and updated per the EMR  Exam:/70   Pulse (!) 52   Temp 97.4 °F (36.3 °C) (Temporal)   Ht 6' (1.829 m)   Wt 81 kg (178 lb 9.2 oz)   SpO2 99%   BMI 24.22 kg/m²   Abdomen soft and benign.  No rebound or guarding or distention.  Bowel sounds are normal.  No audible bruits.  No palpable masses.    Lab Results   Component Value Date    WBC 5.81 07/28/2021    HGB 13.7 (L) 07/28/2021    HCT 42.4 07/28/2021     07/28/2021    CHOL 185 07/28/2021    TRIG 118 07/28/2021    HDL 66 07/28/2021    ALT 19 07/28/2021    AST 23 07/28/2021     07/28/2021    K 4.0 07/28/2021     07/28/2021    CREATININE 1.0 07/28/2021    BUN 13 07/28/2021    CO2 25 07/28/2021    TSH 0.880 07/28/2021    PSA 2.1 07/28/2021       Impression:  Highly suspect irritable bowel syndrome  Patient Active Problem List   Diagnosis    Benign prostatic hyperplasia without lower urinary tract symptoms    Tobacco abuse    Alcohol dependence    Spondylosis of lumbar region without myelopathy or radiculopathy    Osteoarthritis of multiple joints    DDD (degenerative disc disease), lumbar    History of colonic polyps    Tortuous aorta    Hypertension    COPD (chronic obstructive pulmonary disease)       Plan:  Orders Placed This Encounter    dicyclomine (BENTYL) 20 mg tablet     Patient was encouraged to increase the fiber in his diet.  He was also encouraged to take Metamucil daily.  He was started on Bentyl 3 times a day and may titrate down to twice a day and stop in 1 month if he is doing well.    This note is generated with speech  recognition software and is subject to transcription error and sound alike phrases that may be missed by proofreading.

## 2022-08-05 ENCOUNTER — LAB VISIT (OUTPATIENT)
Dept: LAB | Facility: HOSPITAL | Age: 72
End: 2022-08-05
Attending: INTERNAL MEDICINE
Payer: MEDICARE

## 2022-08-05 DIAGNOSIS — Z12.5 PROSTATE CANCER SCREENING: ICD-10-CM

## 2022-08-05 DIAGNOSIS — I10 PRIMARY HYPERTENSION: ICD-10-CM

## 2022-08-05 LAB
ALBUMIN SERPL BCP-MCNC: 3.6 G/DL (ref 3.5–5.2)
ALP SERPL-CCNC: 88 U/L (ref 55–135)
ALT SERPL W/O P-5'-P-CCNC: 23 U/L (ref 10–44)
ANION GAP SERPL CALC-SCNC: 6 MMOL/L (ref 8–16)
AST SERPL-CCNC: 16 U/L (ref 10–40)
BASOPHILS # BLD AUTO: 0.05 K/UL (ref 0–0.2)
BASOPHILS NFR BLD: 0.9 % (ref 0–1.9)
BILIRUB SERPL-MCNC: 0.3 MG/DL (ref 0.1–1)
BUN SERPL-MCNC: 23 MG/DL (ref 8–23)
CALCIUM SERPL-MCNC: 9.6 MG/DL (ref 8.7–10.5)
CHLORIDE SERPL-SCNC: 103 MMOL/L (ref 95–110)
CO2 SERPL-SCNC: 29 MMOL/L (ref 23–29)
COMPLEXED PSA SERPL-MCNC: 3.8 NG/ML (ref 0–4)
CREAT SERPL-MCNC: 1.1 MG/DL (ref 0.5–1.4)
DIFFERENTIAL METHOD: ABNORMAL
EOSINOPHIL # BLD AUTO: 0.3 K/UL (ref 0–0.5)
EOSINOPHIL NFR BLD: 4.3 % (ref 0–8)
ERYTHROCYTE [DISTWIDTH] IN BLOOD BY AUTOMATED COUNT: 12.4 % (ref 11.5–14.5)
EST. GFR  (NO RACE VARIABLE): >60 ML/MIN/1.73 M^2
GLUCOSE SERPL-MCNC: 87 MG/DL (ref 70–110)
HCT VFR BLD AUTO: 36.6 % (ref 40–54)
HGB BLD-MCNC: 12.4 G/DL (ref 14–18)
IMM GRANULOCYTES # BLD AUTO: 0.01 K/UL (ref 0–0.04)
IMM GRANULOCYTES NFR BLD AUTO: 0.2 % (ref 0–0.5)
LYMPHOCYTES # BLD AUTO: 1.7 K/UL (ref 1–4.8)
LYMPHOCYTES NFR BLD: 29.4 % (ref 18–48)
MCH RBC QN AUTO: 30.8 PG (ref 27–31)
MCHC RBC AUTO-ENTMCNC: 33.9 G/DL (ref 32–36)
MCV RBC AUTO: 91 FL (ref 82–98)
MONOCYTES # BLD AUTO: 0.7 K/UL (ref 0.3–1)
MONOCYTES NFR BLD: 12.3 % (ref 4–15)
NEUTROPHILS # BLD AUTO: 3.1 K/UL (ref 1.8–7.7)
NEUTROPHILS NFR BLD: 52.9 % (ref 38–73)
NRBC BLD-RTO: 0 /100 WBC
PLATELET # BLD AUTO: 267 K/UL (ref 150–450)
PMV BLD AUTO: 9.4 FL (ref 9.2–12.9)
POTASSIUM SERPL-SCNC: 4.4 MMOL/L (ref 3.5–5.1)
PROT SERPL-MCNC: 6.4 G/DL (ref 6–8.4)
RBC # BLD AUTO: 4.03 M/UL (ref 4.6–6.2)
SODIUM SERPL-SCNC: 138 MMOL/L (ref 136–145)
T4 FREE SERPL-MCNC: 1.62 NG/DL (ref 0.71–1.51)
TSH SERPL DL<=0.005 MIU/L-ACNC: <0.01 UIU/ML (ref 0.4–4)
WBC # BLD AUTO: 5.85 K/UL (ref 3.9–12.7)

## 2022-08-05 PROCEDURE — 36415 COLL VENOUS BLD VENIPUNCTURE: CPT | Mod: PO | Performed by: INTERNAL MEDICINE

## 2022-08-05 PROCEDURE — 84443 ASSAY THYROID STIM HORMONE: CPT | Performed by: INTERNAL MEDICINE

## 2022-08-05 PROCEDURE — 80053 COMPREHEN METABOLIC PANEL: CPT | Performed by: INTERNAL MEDICINE

## 2022-08-05 PROCEDURE — 84439 ASSAY OF FREE THYROXINE: CPT | Performed by: INTERNAL MEDICINE

## 2022-08-05 PROCEDURE — 84153 ASSAY OF PSA TOTAL: CPT | Performed by: INTERNAL MEDICINE

## 2022-08-05 PROCEDURE — 85025 COMPLETE CBC W/AUTO DIFF WBC: CPT | Performed by: INTERNAL MEDICINE

## 2022-08-12 ENCOUNTER — OFFICE VISIT (OUTPATIENT)
Dept: INTERNAL MEDICINE | Facility: CLINIC | Age: 72
End: 2022-08-12
Payer: MEDICARE

## 2022-08-12 ENCOUNTER — LAB VISIT (OUTPATIENT)
Dept: LAB | Facility: HOSPITAL | Age: 72
End: 2022-08-12
Attending: INTERNAL MEDICINE
Payer: MEDICARE

## 2022-08-12 VITALS
SYSTOLIC BLOOD PRESSURE: 90 MMHG | DIASTOLIC BLOOD PRESSURE: 68 MMHG | HEART RATE: 65 BPM | WEIGHT: 174.81 LBS | HEIGHT: 72 IN | OXYGEN SATURATION: 98 % | BODY MASS INDEX: 23.68 KG/M2

## 2022-08-12 DIAGNOSIS — I77.1 TORTUOUS AORTA: ICD-10-CM

## 2022-08-12 DIAGNOSIS — N40.0 BENIGN PROSTATIC HYPERPLASIA WITHOUT LOWER URINARY TRACT SYMPTOMS: Chronic | ICD-10-CM

## 2022-08-12 DIAGNOSIS — I10 PRIMARY HYPERTENSION: ICD-10-CM

## 2022-08-12 DIAGNOSIS — E05.90 HYPERTHYROIDISM: ICD-10-CM

## 2022-08-12 DIAGNOSIS — Z86.010 HISTORY OF COLONIC POLYPS: ICD-10-CM

## 2022-08-12 DIAGNOSIS — F10.20 UNCOMPLICATED ALCOHOL DEPENDENCE: Chronic | ICD-10-CM

## 2022-08-12 DIAGNOSIS — J43.2 CENTRILOBULAR EMPHYSEMA: ICD-10-CM

## 2022-08-12 DIAGNOSIS — Z72.0 TOBACCO ABUSE: Chronic | ICD-10-CM

## 2022-08-12 DIAGNOSIS — M51.36 DDD (DEGENERATIVE DISC DISEASE), LUMBAR: ICD-10-CM

## 2022-08-12 DIAGNOSIS — Z00.00 ROUTINE GENERAL MEDICAL EXAMINATION AT A HEALTH CARE FACILITY: Primary | ICD-10-CM

## 2022-08-12 LAB — T3 SERPL-MCNC: 215 NG/DL (ref 60–180)

## 2022-08-12 PROCEDURE — 99999 PR PBB SHADOW E&M-EST. PATIENT-LVL III: CPT | Mod: PBBFAC,,, | Performed by: INTERNAL MEDICINE

## 2022-08-12 PROCEDURE — 1101F PT FALLS ASSESS-DOCD LE1/YR: CPT | Mod: CPTII,S$GLB,, | Performed by: INTERNAL MEDICINE

## 2022-08-12 PROCEDURE — 99397 PR PREVENTIVE VISIT,EST,65 & OVER: ICD-10-PCS | Mod: S$GLB,,, | Performed by: INTERNAL MEDICINE

## 2022-08-12 PROCEDURE — 99999 PR PBB SHADOW E&M-EST. PATIENT-LVL III: ICD-10-PCS | Mod: PBBFAC,,, | Performed by: INTERNAL MEDICINE

## 2022-08-12 PROCEDURE — 99499 UNLISTED E&M SERVICE: CPT | Mod: S$GLB,,, | Performed by: INTERNAL MEDICINE

## 2022-08-12 PROCEDURE — 3288F FALL RISK ASSESSMENT DOCD: CPT | Mod: CPTII,S$GLB,, | Performed by: INTERNAL MEDICINE

## 2022-08-12 PROCEDURE — 1126F AMNT PAIN NOTED NONE PRSNT: CPT | Mod: CPTII,S$GLB,, | Performed by: INTERNAL MEDICINE

## 2022-08-12 PROCEDURE — 4010F ACE/ARB THERAPY RXD/TAKEN: CPT | Mod: CPTII,S$GLB,, | Performed by: INTERNAL MEDICINE

## 2022-08-12 PROCEDURE — 1126F PR PAIN SEVERITY QUANTIFIED, NO PAIN PRESENT: ICD-10-PCS | Mod: CPTII,S$GLB,, | Performed by: INTERNAL MEDICINE

## 2022-08-12 PROCEDURE — 4010F PR ACE/ARB THEARPY RXD/TAKEN: ICD-10-PCS | Mod: CPTII,S$GLB,, | Performed by: INTERNAL MEDICINE

## 2022-08-12 PROCEDURE — 36415 COLL VENOUS BLD VENIPUNCTURE: CPT | Mod: PO | Performed by: INTERNAL MEDICINE

## 2022-08-12 PROCEDURE — 99499 RISK ADDL DX/OHS AUDIT: ICD-10-PCS | Mod: S$GLB,,, | Performed by: INTERNAL MEDICINE

## 2022-08-12 PROCEDURE — 1159F PR MEDICATION LIST DOCUMENTED IN MEDICAL RECORD: ICD-10-PCS | Mod: CPTII,S$GLB,, | Performed by: INTERNAL MEDICINE

## 2022-08-12 PROCEDURE — 83520 IMMUNOASSAY QUANT NOS NONAB: CPT | Performed by: INTERNAL MEDICINE

## 2022-08-12 PROCEDURE — 84480 ASSAY TRIIODOTHYRONINE (T3): CPT | Performed by: INTERNAL MEDICINE

## 2022-08-12 PROCEDURE — 1101F PR PT FALLS ASSESS DOC 0-1 FALLS W/OUT INJ PAST YR: ICD-10-PCS | Mod: CPTII,S$GLB,, | Performed by: INTERNAL MEDICINE

## 2022-08-12 PROCEDURE — 3078F DIAST BP <80 MM HG: CPT | Mod: CPTII,S$GLB,, | Performed by: INTERNAL MEDICINE

## 2022-08-12 PROCEDURE — 3288F PR FALLS RISK ASSESSMENT DOCUMENTED: ICD-10-PCS | Mod: CPTII,S$GLB,, | Performed by: INTERNAL MEDICINE

## 2022-08-12 PROCEDURE — 3074F PR MOST RECENT SYSTOLIC BLOOD PRESSURE < 130 MM HG: ICD-10-PCS | Mod: CPTII,S$GLB,, | Performed by: INTERNAL MEDICINE

## 2022-08-12 PROCEDURE — 99397 PER PM REEVAL EST PAT 65+ YR: CPT | Mod: S$GLB,,, | Performed by: INTERNAL MEDICINE

## 2022-08-12 PROCEDURE — 3078F PR MOST RECENT DIASTOLIC BLOOD PRESSURE < 80 MM HG: ICD-10-PCS | Mod: CPTII,S$GLB,, | Performed by: INTERNAL MEDICINE

## 2022-08-12 PROCEDURE — 3008F PR BODY MASS INDEX (BMI) DOCUMENTED: ICD-10-PCS | Mod: CPTII,S$GLB,, | Performed by: INTERNAL MEDICINE

## 2022-08-12 PROCEDURE — 3074F SYST BP LT 130 MM HG: CPT | Mod: CPTII,S$GLB,, | Performed by: INTERNAL MEDICINE

## 2022-08-12 PROCEDURE — 1159F MED LIST DOCD IN RCRD: CPT | Mod: CPTII,S$GLB,, | Performed by: INTERNAL MEDICINE

## 2022-08-12 PROCEDURE — 3008F BODY MASS INDEX DOCD: CPT | Mod: CPTII,S$GLB,, | Performed by: INTERNAL MEDICINE

## 2022-08-12 RX ORDER — LISINOPRIL AND HYDROCHLOROTHIAZIDE 12.5; 2 MG/1; MG/1
TABLET ORAL
COMMUNITY
Start: 2022-03-23 | End: 2023-03-17 | Stop reason: DRUGHIGH

## 2022-08-12 NOTE — PROGRESS NOTES
HPI:  Patient is a 71-year-old man who comes today for follow-up of hypertension and for his annual physical.  Patient is totally quit smoking.  He is also markedly decreased his alcohol consumption.  Rarely does he have more than 2 or 3 beers per week.  He generally though feels tired.  He has lost 14 lb in the last 5 months.  He denies any palpitations.  His blood pressure home is usually 120-140/80    Current MEDS: medcard review, verified and update  Allergies: Per the electronic medical record    Past Medical History:   Diagnosis Date    Alcohol dependence     Back pain     COPD (chronic obstructive pulmonary disease)     History of colonic polyps     Hypertension     Hypertrophy of prostate without urinary obstruction and other lower urinary tract symptoms (LUTS)     Osteoarthritis of multiple joints     Tobacco dependence     quit then restarted       Past Surgical History:   Procedure Laterality Date    COLONOSCOPY N/A 5/25/2016    Procedure: COLONOSCOPY;  Surgeon: Sergey Jensen MD;  Location: [facility];  Service: Endoscopy;  Laterality: N/A;    COLONOSCOPY N/A 11/15/2021    Procedure: COLONOSCOPY;  Surgeon: Francia Lucio MD;  Location: [facility] ENDO;  Service: Endoscopy;  Laterality: N/A;    LIPOMA RESECTION Right 8/17/12    posterior shoulder       SHx: per the electronic medical record    FHx: recorded in the electronic medical record    ROS:    denies any chest pains or shortness of breath. Denies any nausea, vomiting or diarrhea. Denies any fever, chills or sweats. Denies any change in weight, voice, stool, skin or hair. Denies any dysuria, dyspepsia or dysphagia. Denies any change in vision, hearing or headaches. Denies any swollen lymph nodes or loss of memory.    PE:  BP 90/68 (BP Location: Right arm)   Pulse 65   Ht 6' (1.829 m)   Wt 79.3 kg (174 lb 13.2 oz)   SpO2 98%   BMI 23.71 kg/m²   Gen: Well-developed, well-nourished, male, in no acute distress, oriented x3  HEENT: neck is  supple, no adenopathy, carotids 2+ equal without bruits, thyroid exam normal size without nodules.  CHEST: clear to auscultation and percussion  CVS: regular rate and rhythm without significant murmur, gallop, or rubs  ABD: soft, benign, no rebound no guarding, no distention.  Bowel sounds are normal.     nontender.  No palpable masses.  No organomegaly and no audible bruits.  RECTAL:  Deferred.  EXT: no clubbing, cyanosis, or edema  LYMPH: no cervical, inguinal, or axillary adenopathy  FEET: no loss of sensation.  No ulcers or pressure sores.  NEURO: gait normal.  Cranial nerves II- XII intact. No nystagmus.  Speech normal.   Gross motor and sensory unremarkable.    Lab Results   Component Value Date    WBC 5.85 08/05/2022    HGB 12.4 (L) 08/05/2022    HCT 36.6 (L) 08/05/2022     08/05/2022    CHOL 185 07/28/2021    TRIG 118 07/28/2021    HDL 66 07/28/2021    ALT 23 08/05/2022    AST 16 08/05/2022     08/05/2022    K 4.4 08/05/2022     08/05/2022    CREATININE 1.1 08/05/2022    BUN 23 08/05/2022    CO2 29 08/05/2022    TSH <0.010 (L) 08/05/2022    PSA 3.8 08/05/2022   Free T4 was 1.6    Impression:  Hyperthyroidism, fatigue and weight loss  Hypertension well controlled  Patient Active Problem List   Diagnosis    Benign prostatic hyperplasia without lower urinary tract symptoms    Tobacco abuse    Alcohol dependence    Spondylosis of lumbar region without myelopathy or radiculopathy    Osteoarthritis of multiple joints    DDD (degenerative disc disease), lumbar    History of colonic polyps    Tortuous aorta    Hypertension    COPD (chronic obstructive pulmonary disease)       Plan:   Orders Placed This Encounter    NM Thyroid Uptake and Scan    THYROTROPIN RECEPTOR ANTIBODY    T3   Patient will have the above lab work done today.  He will be set up to have a nuclear medicine thyroid scan done.  He will see me back a few days after those are done.    This note is generated with speech  recognition software and is subject to transcription error and sound alike phrases that may be missed by proofreading.

## 2022-08-15 LAB — TSH RECEP AB SER-ACNC: 5.03 IU/L (ref 0–1.75)

## 2022-08-18 ENCOUNTER — TELEPHONE (OUTPATIENT)
Dept: INTERNAL MEDICINE | Facility: CLINIC | Age: 72
End: 2022-08-18

## 2022-08-18 NOTE — TELEPHONE ENCOUNTER
Called patient appt 8/30/22 for nuclear scan arrive at 845 am fasting nothing to eat or drink after mid night patient stated okay . Educated he will have to return at 2 pm and again the next morning at 845 am . Nuclear medicine called back they had a cancellation and was able to schedule an earlier appt  Patient stated okay thank you

## 2022-08-18 NOTE — TELEPHONE ENCOUNTER
The test can not be moved to this month , I called the hospital nuclear medicine and [facility] there is no opening .

## 2022-08-30 ENCOUNTER — HOSPITAL ENCOUNTER (OUTPATIENT)
Dept: RADIOLOGY | Facility: HOSPITAL | Age: 72
Discharge: HOME | End: 2022-08-30
Attending: INTERNAL MEDICINE
Payer: MEDICARE

## 2022-08-30 DIAGNOSIS — E05.90 HYPERTHYROIDISM: ICD-10-CM

## 2022-08-30 PROCEDURE — 78014 NM THYROID UPTAKE AND SCAN: ICD-10-PCS | Mod: 26,,, | Performed by: RADIOLOGY

## 2022-08-30 PROCEDURE — A9516 IODINE I-123 SOD IODIDE MIC: HCPCS

## 2022-08-30 PROCEDURE — 78014 THYROID IMAGING W/BLOOD FLOW: CPT | Mod: 26,,, | Performed by: RADIOLOGY

## 2022-08-31 ENCOUNTER — HOSPITAL ENCOUNTER (OUTPATIENT)
Dept: RADIOLOGY | Facility: HOSPITAL | Age: 72
Discharge: HOME | End: 2022-08-31
Attending: INTERNAL MEDICINE
Payer: MEDICARE

## 2022-09-01 ENCOUNTER — PATIENT MESSAGE (OUTPATIENT)
Dept: INTERNAL MEDICINE | Facility: CLINIC | Age: 72
End: 2022-09-01
Payer: MEDICARE

## 2022-09-02 ENCOUNTER — TELEPHONE (OUTPATIENT)
Dept: ADMINISTRATIVE | Facility: HOSPITAL | Age: 72
End: 2022-09-02
Payer: MEDICARE

## 2022-09-02 ENCOUNTER — OFFICE VISIT (OUTPATIENT)
Dept: INTERNAL MEDICINE | Facility: CLINIC | Age: 72
End: 2022-09-02
Payer: MEDICARE

## 2022-09-02 VITALS
DIASTOLIC BLOOD PRESSURE: 60 MMHG | BODY MASS INDEX: 22.72 KG/M2 | SYSTOLIC BLOOD PRESSURE: 117 MMHG | OXYGEN SATURATION: 98 % | HEIGHT: 72 IN | HEART RATE: 74 BPM | WEIGHT: 167.75 LBS

## 2022-09-02 DIAGNOSIS — E05.00 GRAVES' DISEASE: Primary | ICD-10-CM

## 2022-09-02 PROCEDURE — 1126F PR PAIN SEVERITY QUANTIFIED, NO PAIN PRESENT: ICD-10-PCS | Mod: CPTII,S$GLB,, | Performed by: INTERNAL MEDICINE

## 2022-09-02 PROCEDURE — 1126F AMNT PAIN NOTED NONE PRSNT: CPT | Mod: CPTII,S$GLB,, | Performed by: INTERNAL MEDICINE

## 2022-09-02 PROCEDURE — 1101F PT FALLS ASSESS-DOCD LE1/YR: CPT | Mod: CPTII,S$GLB,, | Performed by: INTERNAL MEDICINE

## 2022-09-02 PROCEDURE — 3008F BODY MASS INDEX DOCD: CPT | Mod: CPTII,S$GLB,, | Performed by: INTERNAL MEDICINE

## 2022-09-02 PROCEDURE — 1159F PR MEDICATION LIST DOCUMENTED IN MEDICAL RECORD: ICD-10-PCS | Mod: CPTII,S$GLB,, | Performed by: INTERNAL MEDICINE

## 2022-09-02 PROCEDURE — 3078F DIAST BP <80 MM HG: CPT | Mod: CPTII,S$GLB,, | Performed by: INTERNAL MEDICINE

## 2022-09-02 PROCEDURE — 99999 PR PBB SHADOW E&M-EST. PATIENT-LVL III: CPT | Mod: PBBFAC,,, | Performed by: INTERNAL MEDICINE

## 2022-09-02 PROCEDURE — 1101F PR PT FALLS ASSESS DOC 0-1 FALLS W/OUT INJ PAST YR: ICD-10-PCS | Mod: CPTII,S$GLB,, | Performed by: INTERNAL MEDICINE

## 2022-09-02 PROCEDURE — 99999 PR PBB SHADOW E&M-EST. PATIENT-LVL III: ICD-10-PCS | Mod: PBBFAC,,, | Performed by: INTERNAL MEDICINE

## 2022-09-02 PROCEDURE — 3008F PR BODY MASS INDEX (BMI) DOCUMENTED: ICD-10-PCS | Mod: CPTII,S$GLB,, | Performed by: INTERNAL MEDICINE

## 2022-09-02 PROCEDURE — 99214 PR OFFICE/OUTPT VISIT, EST, LEVL IV, 30-39 MIN: ICD-10-PCS | Mod: S$GLB,,, | Performed by: INTERNAL MEDICINE

## 2022-09-02 PROCEDURE — 1159F MED LIST DOCD IN RCRD: CPT | Mod: CPTII,S$GLB,, | Performed by: INTERNAL MEDICINE

## 2022-09-02 PROCEDURE — 3074F PR MOST RECENT SYSTOLIC BLOOD PRESSURE < 130 MM HG: ICD-10-PCS | Mod: CPTII,S$GLB,, | Performed by: INTERNAL MEDICINE

## 2022-09-02 PROCEDURE — 3078F PR MOST RECENT DIASTOLIC BLOOD PRESSURE < 80 MM HG: ICD-10-PCS | Mod: CPTII,S$GLB,, | Performed by: INTERNAL MEDICINE

## 2022-09-02 PROCEDURE — 3288F PR FALLS RISK ASSESSMENT DOCUMENTED: ICD-10-PCS | Mod: CPTII,S$GLB,, | Performed by: INTERNAL MEDICINE

## 2022-09-02 PROCEDURE — 4010F PR ACE/ARB THEARPY RXD/TAKEN: ICD-10-PCS | Mod: CPTII,S$GLB,, | Performed by: INTERNAL MEDICINE

## 2022-09-02 PROCEDURE — 99214 OFFICE O/P EST MOD 30 MIN: CPT | Mod: S$GLB,,, | Performed by: INTERNAL MEDICINE

## 2022-09-02 PROCEDURE — 4010F ACE/ARB THERAPY RXD/TAKEN: CPT | Mod: CPTII,S$GLB,, | Performed by: INTERNAL MEDICINE

## 2022-09-02 PROCEDURE — 3074F SYST BP LT 130 MM HG: CPT | Mod: CPTII,S$GLB,, | Performed by: INTERNAL MEDICINE

## 2022-09-02 PROCEDURE — 3288F FALL RISK ASSESSMENT DOCD: CPT | Mod: CPTII,S$GLB,, | Performed by: INTERNAL MEDICINE

## 2022-09-02 RX ORDER — METHIMAZOLE 10 MG/1
10 TABLET ORAL 2 TIMES DAILY
Qty: 60 TABLET | Refills: 11 | Status: SHIPPED | OUTPATIENT
Start: 2022-09-02 | End: 2023-03-13 | Stop reason: SDUPTHER

## 2022-09-02 RX ORDER — ATENOLOL 50 MG/1
50 TABLET ORAL DAILY
Qty: 30 TABLET | Refills: 11 | Status: SHIPPED | OUTPATIENT
Start: 2022-09-02 | End: 2023-03-13 | Stop reason: SDUPTHER

## 2022-09-02 NOTE — PROGRESS NOTES
HPI:  Patient is a 71-year-old man who comes today for follow-up of his lab work and studies for hyperthyroidism.  Patient found to have Graves disease based on positive antibody assay,    Thyrotropin Receptor Ab 0.00 - 1.75 IU/L 5.03 High     as well as a thyroid nuclear scan showing a significant increased uptake.  Patient has lost about 20 lb of weight.  He does feel jittery and nervous at times.    Current meds have been verified and updated per the EMR  Exam:/60 (BP Location: Right arm)   Pulse 74   Ht 6' (1.829 m)   Wt 76.1 kg (167 lb 12.3 oz)   SpO2 98%   BMI 22.75 kg/m²   Thyroid gland is moderately enlarged, diffusely, no nodule    Lab Results   Component Value Date    WBC 5.85 08/05/2022    HGB 12.4 (L) 08/05/2022    HCT 36.6 (L) 08/05/2022     08/05/2022    CHOL 185 07/28/2021    TRIG 118 07/28/2021    HDL 66 07/28/2021    ALT 23 08/05/2022    AST 16 08/05/2022     08/05/2022    K 4.4 08/05/2022     08/05/2022    CREATININE 1.1 08/05/2022    BUN 23 08/05/2022    CO2 29 08/05/2022    TSH <0.010 (L) 08/05/2022    PSA 3.8 08/05/2022       Impression:  Graves disease, we had a long discussion about the 3 different approaches for treatment.  We discussed surgical options, radioactive iodine and medical therapy with Tapazole.  Patient actually has 2 other family members at take Tapazole for Graves disease.  He is very familiar with that and he prefers that route of treatment.  He understands potential side effects.  Patient Active Problem List   Diagnosis    Benign prostatic hyperplasia without lower urinary tract symptoms    Tobacco abuse    Alcohol dependence    Spondylosis of lumbar region without myelopathy or radiculopathy    Osteoarthritis of multiple joints    DDD (degenerative disc disease), lumbar    History of colonic polyps    Tortuous aorta    Hypertension    COPD (chronic obstructive pulmonary disease)       Plan:  Orders Placed This Encounter    CBC Auto Differential     TSH    T4, FREE    methIMAzole (TAPAZOLE) 10 MG Tab    atenoloL (TENORMIN) 50 MG tablet     Patient was started on Tapazole 10 mg twice a day.  He will have lab work done in 6 weeks and see me at that time.  He was started on sotalol today to help control his hyperthyroid symptoms.    This note is generated with speech recognition software and is subject to transcription error and sound alike phrases that may be missed by proofreading.

## 2022-09-27 ENCOUNTER — TELEPHONE (OUTPATIENT)
Dept: ADMINISTRATIVE | Facility: HOSPITAL | Age: 72
End: 2022-09-27
Payer: MEDICARE

## 2022-10-05 ENCOUNTER — OFFICE VISIT (OUTPATIENT)
Dept: INTERNAL MEDICINE | Facility: CLINIC | Age: 72
End: 2022-10-05
Payer: MEDICARE

## 2022-10-05 VITALS
HEART RATE: 54 BPM | SYSTOLIC BLOOD PRESSURE: 120 MMHG | RESPIRATION RATE: 18 BRPM | BODY MASS INDEX: 24.52 KG/M2 | WEIGHT: 181 LBS | OXYGEN SATURATION: 98 % | HEIGHT: 72 IN | TEMPERATURE: 98 F | DIASTOLIC BLOOD PRESSURE: 64 MMHG

## 2022-10-05 DIAGNOSIS — M15.9 PRIMARY OSTEOARTHRITIS INVOLVING MULTIPLE JOINTS: ICD-10-CM

## 2022-10-05 DIAGNOSIS — M51.36 DDD (DEGENERATIVE DISC DISEASE), LUMBAR: ICD-10-CM

## 2022-10-05 DIAGNOSIS — Z00.00 ENCOUNTER FOR PREVENTIVE HEALTH EXAMINATION: Primary | ICD-10-CM

## 2022-10-05 DIAGNOSIS — N40.0 BENIGN PROSTATIC HYPERPLASIA WITHOUT LOWER URINARY TRACT SYMPTOMS: Chronic | ICD-10-CM

## 2022-10-05 DIAGNOSIS — I70.0 AORTIC ATHEROSCLEROSIS: ICD-10-CM

## 2022-10-05 DIAGNOSIS — J43.2 CENTRILOBULAR EMPHYSEMA: ICD-10-CM

## 2022-10-05 DIAGNOSIS — Z87.898 HISTORY OF ALCOHOL USE: ICD-10-CM

## 2022-10-05 DIAGNOSIS — E05.90 HYPERTHYROIDISM: ICD-10-CM

## 2022-10-05 DIAGNOSIS — Z87.891 HISTORY OF TOBACCO ABUSE: ICD-10-CM

## 2022-10-05 DIAGNOSIS — I10 PRIMARY HYPERTENSION: ICD-10-CM

## 2022-10-05 PROCEDURE — 3078F DIAST BP <80 MM HG: CPT | Mod: CPTII,S$GLB,, | Performed by: NURSE PRACTITIONER

## 2022-10-05 PROCEDURE — 99999 PR PBB SHADOW E&M-EST. PATIENT-LVL IV: ICD-10-PCS | Mod: PBBFAC,,, | Performed by: NURSE PRACTITIONER

## 2022-10-05 PROCEDURE — G0439 PPPS, SUBSEQ VISIT: HCPCS | Mod: S$GLB,,, | Performed by: NURSE PRACTITIONER

## 2022-10-05 PROCEDURE — 3008F BODY MASS INDEX DOCD: CPT | Mod: CPTII,S$GLB,, | Performed by: NURSE PRACTITIONER

## 2022-10-05 PROCEDURE — G0009 ADMIN PNEUMOCOCCAL VACCINE: HCPCS | Mod: S$GLB,,, | Performed by: NURSE PRACTITIONER

## 2022-10-05 PROCEDURE — G0439 PR MEDICARE ANNUAL WELLNESS SUBSEQUENT VISIT: ICD-10-PCS | Mod: S$GLB,,, | Performed by: NURSE PRACTITIONER

## 2022-10-05 PROCEDURE — 4010F ACE/ARB THERAPY RXD/TAKEN: CPT | Mod: CPTII,S$GLB,, | Performed by: NURSE PRACTITIONER

## 2022-10-05 PROCEDURE — 3074F PR MOST RECENT SYSTOLIC BLOOD PRESSURE < 130 MM HG: ICD-10-PCS | Mod: CPTII,S$GLB,, | Performed by: NURSE PRACTITIONER

## 2022-10-05 PROCEDURE — 3288F PR FALLS RISK ASSESSMENT DOCUMENTED: ICD-10-PCS | Mod: CPTII,S$GLB,, | Performed by: NURSE PRACTITIONER

## 2022-10-05 PROCEDURE — 3288F FALL RISK ASSESSMENT DOCD: CPT | Mod: CPTII,S$GLB,, | Performed by: NURSE PRACTITIONER

## 2022-10-05 PROCEDURE — 4010F PR ACE/ARB THEARPY RXD/TAKEN: ICD-10-PCS | Mod: CPTII,S$GLB,, | Performed by: NURSE PRACTITIONER

## 2022-10-05 PROCEDURE — 1101F PR PT FALLS ASSESS DOC 0-1 FALLS W/OUT INJ PAST YR: ICD-10-PCS | Mod: CPTII,S$GLB,, | Performed by: NURSE PRACTITIONER

## 2022-10-05 PROCEDURE — 1170F FXNL STATUS ASSESSED: CPT | Mod: CPTII,S$GLB,, | Performed by: NURSE PRACTITIONER

## 2022-10-05 PROCEDURE — 99499 UNLISTED E&M SERVICE: CPT | Mod: HCNC,S$GLB,, | Performed by: NURSE PRACTITIONER

## 2022-10-05 PROCEDURE — 90677 PCV20 VACCINE IM: CPT | Mod: S$GLB,,, | Performed by: NURSE PRACTITIONER

## 2022-10-05 PROCEDURE — 1126F PR PAIN SEVERITY QUANTIFIED, NO PAIN PRESENT: ICD-10-PCS | Mod: CPTII,S$GLB,, | Performed by: NURSE PRACTITIONER

## 2022-10-05 PROCEDURE — 3074F SYST BP LT 130 MM HG: CPT | Mod: CPTII,S$GLB,, | Performed by: NURSE PRACTITIONER

## 2022-10-05 PROCEDURE — 99999 PR PBB SHADOW E&M-EST. PATIENT-LVL IV: CPT | Mod: PBBFAC,,, | Performed by: NURSE PRACTITIONER

## 2022-10-05 PROCEDURE — 1159F PR MEDICATION LIST DOCUMENTED IN MEDICAL RECORD: ICD-10-PCS | Mod: CPTII,S$GLB,, | Performed by: NURSE PRACTITIONER

## 2022-10-05 PROCEDURE — 1159F MED LIST DOCD IN RCRD: CPT | Mod: CPTII,S$GLB,, | Performed by: NURSE PRACTITIONER

## 2022-10-05 PROCEDURE — 1126F AMNT PAIN NOTED NONE PRSNT: CPT | Mod: CPTII,S$GLB,, | Performed by: NURSE PRACTITIONER

## 2022-10-05 PROCEDURE — G0009 PNEUMOCOCCAL CONJUGATE VACCINE 20-VALENT: ICD-10-PCS | Mod: S$GLB,,, | Performed by: NURSE PRACTITIONER

## 2022-10-05 PROCEDURE — 1170F PR FUNCTIONAL STATUS ASSESSED: ICD-10-PCS | Mod: CPTII,S$GLB,, | Performed by: NURSE PRACTITIONER

## 2022-10-05 PROCEDURE — 1101F PT FALLS ASSESS-DOCD LE1/YR: CPT | Mod: CPTII,S$GLB,, | Performed by: NURSE PRACTITIONER

## 2022-10-05 PROCEDURE — 3008F PR BODY MASS INDEX (BMI) DOCUMENTED: ICD-10-PCS | Mod: CPTII,S$GLB,, | Performed by: NURSE PRACTITIONER

## 2022-10-05 PROCEDURE — 3078F PR MOST RECENT DIASTOLIC BLOOD PRESSURE < 80 MM HG: ICD-10-PCS | Mod: CPTII,S$GLB,, | Performed by: NURSE PRACTITIONER

## 2022-10-05 PROCEDURE — 90677 PNEUMOCOCCAL CONJUGATE VACCINE 20-VALENT: ICD-10-PCS | Mod: S$GLB,,, | Performed by: NURSE PRACTITIONER

## 2022-10-05 NOTE — PROGRESS NOTES
Harpreet Perez presented for a  Medicare AWV and comprehensive Health Risk Assessment today. The following components were reviewed and updated:    Medical history  Family History  Social history  Allergies and Current Medications  Health Risk Assessment  Health Maintenance  Care Team         ** See Completed Assessments for Annual Wellness Visit within the encounter summary.**         The following assessments were completed:  Living Situation  CAGE  Depression Screening  Timed Get Up and Go  Whisper Test  Cognitive Function Screening    Nutrition Screening  ADL Screening  PAQ Screening        Vitals:    10/05/22 0706   BP: 120/64   Pulse: (!) 54   Resp: 18   Temp: 98 °F (36.7 °C)   TempSrc: Temporal   SpO2: 98%   Weight: 82.1 kg (181 lb)   Height: 6' (1.829 m)     Body mass index is 24.55 kg/m².  Physical Exam  Constitutional:       General: He is not in acute distress.     Appearance: Normal appearance. He is well-developed. He is not ill-appearing, toxic-appearing or diaphoretic.   HENT:      Head: Normocephalic and atraumatic.      Right Ear: External ear normal.      Left Ear: External ear normal.      Mouth/Throat:      Mouth: Mucous membranes are moist.      Pharynx: No posterior oropharyngeal erythema.   Eyes:      Extraocular Movements: Extraocular movements intact.      Conjunctiva/sclera: Conjunctivae normal.   Neck:      Vascular: No carotid bruit.   Cardiovascular:      Rate and Rhythm: Normal rate and regular rhythm.      Pulses: Normal pulses.      Heart sounds: Normal heart sounds. No murmur heard.    No friction rub. No gallop.   Pulmonary:      Effort: Pulmonary effort is normal. No respiratory distress.      Breath sounds: Normal breath sounds. No stridor. No wheezing, rhonchi or rales.   Chest:      Chest wall: No tenderness.   Abdominal:      General: Bowel sounds are normal. There is no distension.      Palpations: Abdomen is soft. There is no mass.      Tenderness: There is no abdominal  tenderness.      Hernia: No hernia is present.   Musculoskeletal:         General: No tenderness. Normal range of motion.      Cervical back: Normal range of motion and neck supple. No tenderness.   Lymphadenopathy:      Cervical: No cervical adenopathy.   Skin:     General: Skin is warm and dry.   Neurological:      Mental Status: He is alert and oriented to person, place, and time.      Cranial Nerves: No cranial nerve deficit.   Psychiatric:         Mood and Affect: Mood normal.         Behavior: Behavior normal.             Diagnoses and health risks identified today and associated recommendations/orders:    1. Encounter for preventive health examination  Screenings performed, as noted above.  Personal preventative testing needs reviewed.      2. Centrilobular emphysema  Monitored/stable, states he has stopped smoking, continue the same tx     3. Aortic atherosclerosis  Monitored/stable on current meds, continue the same tx     4. DDD (degenerative disc disease), lumbar  Treated with occasional Meloxicam, stable, continue current tx    5. Primary hypertension  Treated with lisinopril/hctz, stable, continue current tx    6. Benign prostatic hyperplasia without lower urinary tract symptoms  Treated with Flomax, stable, may get up once a night, continue current tx    7. Primary osteoarthritis involving multiple joints  See#4    8. History of alcohol use  Monitored, stable, denies any alcohol use    9. History of tobacco abuse  Monitored,stable, states has stopped smoking    10. Hyperthyroidism  Treated with Tapazole, stable and monitored by his pcp, states is feeling better, has been able to gain back some of his weight, will follow up with his pcp    On no opioids, no sub abuse, had pneumonia 20 shot today, discussed Shingrix vaccine      Provided Harpreet with a 5-10 year written screening schedule and personal prevention plan. Recommendations were developed using the USPSTF age appropriate recommendations.  Education, counseling, and referrals were provided as needed. After Visit Summary printed and given to patient which includes a list of additional screenings\tests needed.    No follow-ups on file.    Navin Chirinos, NP    I offered to discuss advanced care planning, including how to pick a person who would make decisions for you if you were unable to make them for yourself, called a health care power of , and what kind of decisions you might make such as use of life sustaining treatments such as ventilators and tube feeding when faced with a life limiting illness recorded on a living will that they will need to know. (How you want to be cared for as you near the end of your natural life)     X Patient is interested in learning more about how to make advanced directives.  I provided them paperwork and offered to discuss this with them.

## 2022-10-05 NOTE — PROGRESS NOTES
Administered Pneumonia 20  vaccine into  right   deltoid.  Patient tolerated well, no adverse reaction noted. Advised 15 min wait

## 2022-10-05 NOTE — PATIENT INSTRUCTIONS
Counseling and Referral of Other Preventative  (Italic type indicates deductible and co-insurance are waived)    Patient Name: Harpreet Perez  Today's Date: 10/5/2022    Health Maintenance       Date Due Completion Date    Shingles Vaccine (2 of 3) 10/17/2011 8/22/2011    Colorectal Cancer Screening 11/15/2024 11/15/2021    Lipid Panel 07/28/2026 7/28/2021    TETANUS VACCINE 05/31/2028 5/31/2018        Orders Placed This Encounter   Procedures    (In Office Administered) Pneumococcal Conjugate Vaccine (20 Valent) (IM)       The following information is provided to all patients.  This information is to help you find resources for any of the problems found today that may be affecting your health:                Living healthy guide: www.[facility].[place].gov      Understanding Diabetes: www.diabetes.org      Eating healthy: www.cdc.gov/healthyweight      [facility] home safety checklist: www.cdc.gov/steadi/patient.html      Agency on Aging: www.goea.[place].[facility]      Alcoholics anonymous (AA): www.aa.org      Physical Activity: www.reanna.nih.gov/du6nisn      Tobacco use: www.quitwithusla.org

## 2022-10-14 ENCOUNTER — LAB VISIT (OUTPATIENT)
Dept: LAB | Facility: HOSPITAL | Age: 72
End: 2022-10-14
Attending: INTERNAL MEDICINE
Payer: MEDICARE

## 2022-10-14 DIAGNOSIS — E05.00 GRAVES' DISEASE: ICD-10-CM

## 2022-10-14 LAB
BASOPHILS # BLD AUTO: 0.08 K/UL (ref 0–0.2)
BASOPHILS NFR BLD: 1.3 % (ref 0–1.9)
DIFFERENTIAL METHOD: ABNORMAL
EOSINOPHIL # BLD AUTO: 0.5 K/UL (ref 0–0.5)
EOSINOPHIL NFR BLD: 7.9 % (ref 0–8)
ERYTHROCYTE [DISTWIDTH] IN BLOOD BY AUTOMATED COUNT: 13.6 % (ref 11.5–14.5)
HCT VFR BLD AUTO: 38.3 % (ref 40–54)
HGB BLD-MCNC: 12 G/DL (ref 14–18)
IMM GRANULOCYTES # BLD AUTO: 0.02 K/UL (ref 0–0.04)
IMM GRANULOCYTES NFR BLD AUTO: 0.3 % (ref 0–0.5)
LYMPHOCYTES # BLD AUTO: 1.8 K/UL (ref 1–4.8)
LYMPHOCYTES NFR BLD: 30 % (ref 18–48)
MCH RBC QN AUTO: 28.9 PG (ref 27–31)
MCHC RBC AUTO-ENTMCNC: 31.3 G/DL (ref 32–36)
MCV RBC AUTO: 92 FL (ref 82–98)
MONOCYTES # BLD AUTO: 0.6 K/UL (ref 0.3–1)
MONOCYTES NFR BLD: 10.4 % (ref 4–15)
NEUTROPHILS # BLD AUTO: 3 K/UL (ref 1.8–7.7)
NEUTROPHILS NFR BLD: 50.1 % (ref 38–73)
NRBC BLD-RTO: 0 /100 WBC
PLATELET # BLD AUTO: 280 K/UL (ref 150–450)
PMV BLD AUTO: 10.3 FL (ref 9.2–12.9)
RBC # BLD AUTO: 4.15 M/UL (ref 4.6–6.2)
T4 FREE SERPL-MCNC: 0.93 NG/DL (ref 0.71–1.51)
TSH SERPL DL<=0.005 MIU/L-ACNC: <0.01 UIU/ML (ref 0.4–4)
WBC # BLD AUTO: 6.07 K/UL (ref 3.9–12.7)

## 2022-10-14 PROCEDURE — 36415 COLL VENOUS BLD VENIPUNCTURE: CPT | Mod: PO | Performed by: INTERNAL MEDICINE

## 2022-10-14 PROCEDURE — 85025 COMPLETE CBC W/AUTO DIFF WBC: CPT | Performed by: INTERNAL MEDICINE

## 2022-10-14 PROCEDURE — 84443 ASSAY THYROID STIM HORMONE: CPT | Performed by: INTERNAL MEDICINE

## 2022-10-14 PROCEDURE — 84439 ASSAY OF FREE THYROXINE: CPT | Performed by: INTERNAL MEDICINE

## 2022-10-17 ENCOUNTER — OFFICE VISIT (OUTPATIENT)
Dept: INTERNAL MEDICINE | Facility: CLINIC | Age: 72
End: 2022-10-17
Payer: MEDICARE

## 2022-10-17 VITALS
HEIGHT: 72 IN | DIASTOLIC BLOOD PRESSURE: 68 MMHG | SYSTOLIC BLOOD PRESSURE: 128 MMHG | BODY MASS INDEX: 24.16 KG/M2 | WEIGHT: 178.38 LBS | OXYGEN SATURATION: 98 % | HEART RATE: 54 BPM | TEMPERATURE: 97 F

## 2022-10-17 DIAGNOSIS — E05.90 HYPERTHYROIDISM: ICD-10-CM

## 2022-10-17 DIAGNOSIS — I10 PRIMARY HYPERTENSION: Primary | ICD-10-CM

## 2022-10-17 PROCEDURE — 4010F PR ACE/ARB THEARPY RXD/TAKEN: ICD-10-PCS | Mod: CPTII,S$GLB,, | Performed by: INTERNAL MEDICINE

## 2022-10-17 PROCEDURE — 99999 PR PBB SHADOW E&M-EST. PATIENT-LVL III: CPT | Mod: PBBFAC,,, | Performed by: INTERNAL MEDICINE

## 2022-10-17 PROCEDURE — 3288F PR FALLS RISK ASSESSMENT DOCUMENTED: ICD-10-PCS | Mod: CPTII,S$GLB,, | Performed by: INTERNAL MEDICINE

## 2022-10-17 PROCEDURE — 3074F PR MOST RECENT SYSTOLIC BLOOD PRESSURE < 130 MM HG: ICD-10-PCS | Mod: CPTII,S$GLB,, | Performed by: INTERNAL MEDICINE

## 2022-10-17 PROCEDURE — 1159F MED LIST DOCD IN RCRD: CPT | Mod: CPTII,S$GLB,, | Performed by: INTERNAL MEDICINE

## 2022-10-17 PROCEDURE — 3288F FALL RISK ASSESSMENT DOCD: CPT | Mod: CPTII,S$GLB,, | Performed by: INTERNAL MEDICINE

## 2022-10-17 PROCEDURE — 1101F PT FALLS ASSESS-DOCD LE1/YR: CPT | Mod: CPTII,S$GLB,, | Performed by: INTERNAL MEDICINE

## 2022-10-17 PROCEDURE — 99999 PR PBB SHADOW E&M-EST. PATIENT-LVL III: ICD-10-PCS | Mod: PBBFAC,,, | Performed by: INTERNAL MEDICINE

## 2022-10-17 PROCEDURE — 3078F DIAST BP <80 MM HG: CPT | Mod: CPTII,S$GLB,, | Performed by: INTERNAL MEDICINE

## 2022-10-17 PROCEDURE — 4010F ACE/ARB THERAPY RXD/TAKEN: CPT | Mod: CPTII,S$GLB,, | Performed by: INTERNAL MEDICINE

## 2022-10-17 PROCEDURE — 3074F SYST BP LT 130 MM HG: CPT | Mod: CPTII,S$GLB,, | Performed by: INTERNAL MEDICINE

## 2022-10-17 PROCEDURE — 1101F PR PT FALLS ASSESS DOC 0-1 FALLS W/OUT INJ PAST YR: ICD-10-PCS | Mod: CPTII,S$GLB,, | Performed by: INTERNAL MEDICINE

## 2022-10-17 PROCEDURE — 1159F PR MEDICATION LIST DOCUMENTED IN MEDICAL RECORD: ICD-10-PCS | Mod: CPTII,S$GLB,, | Performed by: INTERNAL MEDICINE

## 2022-10-17 PROCEDURE — 3078F PR MOST RECENT DIASTOLIC BLOOD PRESSURE < 80 MM HG: ICD-10-PCS | Mod: CPTII,S$GLB,, | Performed by: INTERNAL MEDICINE

## 2022-10-17 PROCEDURE — 1125F AMNT PAIN NOTED PAIN PRSNT: CPT | Mod: CPTII,S$GLB,, | Performed by: INTERNAL MEDICINE

## 2022-10-17 PROCEDURE — 99213 OFFICE O/P EST LOW 20 MIN: CPT | Mod: S$GLB,,, | Performed by: INTERNAL MEDICINE

## 2022-10-17 PROCEDURE — 1125F PR PAIN SEVERITY QUANTIFIED, PAIN PRESENT: ICD-10-PCS | Mod: CPTII,S$GLB,, | Performed by: INTERNAL MEDICINE

## 2022-10-17 PROCEDURE — 99213 PR OFFICE/OUTPT VISIT, EST, LEVL III, 20-29 MIN: ICD-10-PCS | Mod: S$GLB,,, | Performed by: INTERNAL MEDICINE

## 2022-10-17 RX ORDER — PANTOPRAZOLE SODIUM 40 MG/1
40 TABLET, DELAYED RELEASE ORAL DAILY
Qty: 90 TABLET | Refills: 3 | Status: SHIPPED | OUTPATIENT
Start: 2022-10-17 | End: 2023-03-13 | Stop reason: SDUPTHER

## 2022-10-17 RX ORDER — DICYCLOMINE HYDROCHLORIDE 20 MG/1
20 TABLET ORAL 3 TIMES DAILY
COMMUNITY
Start: 2022-09-02 | End: 2022-10-17 | Stop reason: SDUPTHER

## 2022-10-17 RX ORDER — DICYCLOMINE HYDROCHLORIDE 20 MG/1
20 TABLET ORAL 3 TIMES DAILY
Qty: 270 TABLET | Refills: 3 | Status: SHIPPED | OUTPATIENT
Start: 2022-10-17 | End: 2023-03-22 | Stop reason: SDUPTHER

## 2022-10-17 NOTE — PROGRESS NOTES
HPI:  Pt comes for f/u of labs for Graves disease. He was started on tapazole 6 weeks ago. Feeling much better, gaining weight.   He does c/o intermittent mid epigastric pain. No N/V/D      Current meds have been verified and updated per the EMR  Exam:/68   Pulse (!) 54   Temp 97 °F (36.1 °C) (Temporal)   Ht 6' (1.829 m)   Wt 80.9 kg (178 lb 5.6 oz)   SpO2 98%   BMI 24.19 kg/m²   Abd soft, B9 s/o tenderness or rebound. BS nl    Lab Results   Component Value Date    WBC 6.07 10/14/2022    HGB 12.0 (L) 10/14/2022    HCT 38.3 (L) 10/14/2022     10/14/2022    CHOL 185 07/28/2021    TRIG 118 07/28/2021    HDL 66 07/28/2021    ALT 23 08/05/2022    AST 16 08/05/2022     08/05/2022    K 4.4 08/05/2022     08/05/2022    CREATININE 1.1 08/05/2022    BUN 23 08/05/2022    CO2 29 08/05/2022    TSH <0.010 (L) 10/14/2022    PSA 3.8 08/05/2022   T4 .93 down from 1.65    Impression:  Graves disease, much improved  Abd pain, c/w GERD  Patient Active Problem List   Diagnosis    Benign prostatic hyperplasia without lower urinary tract symptoms    History of tobacco abuse    Alcohol dependence    Spondylosis of lumbar region without myelopathy or radiculopathy    Osteoarthritis of multiple joints    DDD (degenerative disc disease), lumbar    History of colonic polyps    Aortic atherosclerosis    Hypertension    COPD (chronic obstructive pulmonary disease)    Hyperthyroidism       Plan:  Orders Placed This Encounter    TSH    CBC Auto Differential    pantoprazole (PROTONIX) 40 MG tablet    dicyclomine (BENTYL) 20 mg tablet     Will start on protonix, will repeat labs and see in 2 mths.    This note is generated with speech recognition software and is subject to transcription error and sound alike phrases that may be missed by proofreading.

## 2023-02-07 DIAGNOSIS — Z00.00 ENCOUNTER FOR MEDICARE ANNUAL WELLNESS EXAM: ICD-10-CM

## 2023-02-09 DIAGNOSIS — Z00.00 ENCOUNTER FOR MEDICARE ANNUAL WELLNESS EXAM: ICD-10-CM

## 2023-02-13 ENCOUNTER — OFFICE VISIT (OUTPATIENT)
Dept: INTERNAL MEDICINE | Facility: CLINIC | Age: 73
End: 2023-02-13
Payer: MEDICARE

## 2023-02-13 VITALS
SYSTOLIC BLOOD PRESSURE: 142 MMHG | WEIGHT: 179 LBS | DIASTOLIC BLOOD PRESSURE: 68 MMHG | OXYGEN SATURATION: 98 % | HEIGHT: 72 IN | BODY MASS INDEX: 24.24 KG/M2 | HEART RATE: 48 BPM

## 2023-02-13 DIAGNOSIS — M75.101 ROTATOR CUFF SYNDROME, RIGHT: Primary | ICD-10-CM

## 2023-02-13 PROCEDURE — 1159F MED LIST DOCD IN RCRD: CPT | Mod: HCNC,CPTII,S$GLB, | Performed by: INTERNAL MEDICINE

## 2023-02-13 PROCEDURE — 99213 PR OFFICE/OUTPT VISIT, EST, LEVL III, 20-29 MIN: ICD-10-PCS | Mod: HCNC,S$GLB,, | Performed by: INTERNAL MEDICINE

## 2023-02-13 PROCEDURE — 1101F PT FALLS ASSESS-DOCD LE1/YR: CPT | Mod: HCNC,CPTII,S$GLB, | Performed by: INTERNAL MEDICINE

## 2023-02-13 PROCEDURE — 3288F PR FALLS RISK ASSESSMENT DOCUMENTED: ICD-10-PCS | Mod: HCNC,CPTII,S$GLB, | Performed by: INTERNAL MEDICINE

## 2023-02-13 PROCEDURE — 4010F PR ACE/ARB THEARPY RXD/TAKEN: ICD-10-PCS | Mod: HCNC,CPTII,S$GLB, | Performed by: INTERNAL MEDICINE

## 2023-02-13 PROCEDURE — 1159F PR MEDICATION LIST DOCUMENTED IN MEDICAL RECORD: ICD-10-PCS | Mod: HCNC,CPTII,S$GLB, | Performed by: INTERNAL MEDICINE

## 2023-02-13 PROCEDURE — 99213 OFFICE O/P EST LOW 20 MIN: CPT | Mod: HCNC,S$GLB,, | Performed by: INTERNAL MEDICINE

## 2023-02-13 PROCEDURE — 3288F FALL RISK ASSESSMENT DOCD: CPT | Mod: HCNC,CPTII,S$GLB, | Performed by: INTERNAL MEDICINE

## 2023-02-13 PROCEDURE — 3078F DIAST BP <80 MM HG: CPT | Mod: HCNC,CPTII,S$GLB, | Performed by: INTERNAL MEDICINE

## 2023-02-13 PROCEDURE — 3008F BODY MASS INDEX DOCD: CPT | Mod: HCNC,CPTII,S$GLB, | Performed by: INTERNAL MEDICINE

## 2023-02-13 PROCEDURE — 1126F PR PAIN SEVERITY QUANTIFIED, NO PAIN PRESENT: ICD-10-PCS | Mod: HCNC,CPTII,S$GLB, | Performed by: INTERNAL MEDICINE

## 2023-02-13 PROCEDURE — 3077F SYST BP >= 140 MM HG: CPT | Mod: HCNC,CPTII,S$GLB, | Performed by: INTERNAL MEDICINE

## 2023-02-13 PROCEDURE — 1101F PR PT FALLS ASSESS DOC 0-1 FALLS W/OUT INJ PAST YR: ICD-10-PCS | Mod: HCNC,CPTII,S$GLB, | Performed by: INTERNAL MEDICINE

## 2023-02-13 PROCEDURE — 4010F ACE/ARB THERAPY RXD/TAKEN: CPT | Mod: HCNC,CPTII,S$GLB, | Performed by: INTERNAL MEDICINE

## 2023-02-13 PROCEDURE — 3008F PR BODY MASS INDEX (BMI) DOCUMENTED: ICD-10-PCS | Mod: HCNC,CPTII,S$GLB, | Performed by: INTERNAL MEDICINE

## 2023-02-13 PROCEDURE — 3078F PR MOST RECENT DIASTOLIC BLOOD PRESSURE < 80 MM HG: ICD-10-PCS | Mod: HCNC,CPTII,S$GLB, | Performed by: INTERNAL MEDICINE

## 2023-02-13 PROCEDURE — 99999 PR PBB SHADOW E&M-EST. PATIENT-LVL III: ICD-10-PCS | Mod: PBBFAC,HCNC,, | Performed by: INTERNAL MEDICINE

## 2023-02-13 PROCEDURE — 99999 PR PBB SHADOW E&M-EST. PATIENT-LVL III: CPT | Mod: PBBFAC,HCNC,, | Performed by: INTERNAL MEDICINE

## 2023-02-13 PROCEDURE — 1126F AMNT PAIN NOTED NONE PRSNT: CPT | Mod: HCNC,CPTII,S$GLB, | Performed by: INTERNAL MEDICINE

## 2023-02-13 PROCEDURE — 3077F PR MOST RECENT SYSTOLIC BLOOD PRESSURE >= 140 MM HG: ICD-10-PCS | Mod: HCNC,CPTII,S$GLB, | Performed by: INTERNAL MEDICINE

## 2023-02-13 NOTE — PROGRESS NOTES
HPI:  Patient is a 72-year-old man comes in today with complaints of right shoulder pain for the last month.  Denies any trauma or injury to the shoulder.  He states it hurts the most with trying to use the arm up and back.  He is taking the meloxicam on a daily basis for his arthritis.    Current meds have been verified and updated per the EMR  Exam:BP (!) 142/68 (BP Location: Right arm)   Pulse (!) 48   Ht 6' (1.829 m)   Wt 81.2 kg (179 lb 0.2 oz)   SpO2 98%   BMI 24.28 kg/m²   The right shoulder has positive impingement sign.  He does have normal range of motion although with some discomfort with external rotation.  He has some crepitus with range of motion of the joint as well.  There is no swelling or effusion.    Lab Results   Component Value Date    WBC 6.07 10/14/2022    HGB 12.0 (L) 10/14/2022    HCT 38.3 (L) 10/14/2022     10/14/2022    CHOL 185 07/28/2021    TRIG 118 07/28/2021    HDL 66 07/28/2021    ALT 23 08/05/2022    AST 16 08/05/2022     08/05/2022    K 4.4 08/05/2022     08/05/2022    CREATININE 1.1 08/05/2022    BUN 23 08/05/2022    CO2 29 08/05/2022    TSH <0.010 (L) 10/14/2022    PSA 3.8 08/05/2022       Impression:  Right shoulder pain, rotator cuff syndrome  Patient Active Problem List   Diagnosis    Benign prostatic hyperplasia without lower urinary tract symptoms    History of tobacco abuse    Alcohol dependence    Spondylosis of lumbar region without myelopathy or radiculopathy    Osteoarthritis of multiple joints    DDD (degenerative disc disease), lumbar    History of colonic polyps    Aortic atherosclerosis    Hypertension    COPD (chronic obstructive pulmonary disease)    Hyperthyroidism       Plan:  Orders Placed This Encounter    Ambulatory referral/consult to Physical/Occupational Therapy     Patient was referred to see physical therapy.  If not better in 4 6 weeks he will let me know.    This note is generated with speech recognition software and is subject  to transcription error and sound alike phrases that may be missed by proofreading.

## 2023-02-14 ENCOUNTER — CLINICAL SUPPORT (OUTPATIENT)
Dept: REHABILITATION | Facility: HOSPITAL | Age: 73
End: 2023-02-14
Attending: INTERNAL MEDICINE
Payer: MEDICARE

## 2023-02-14 DIAGNOSIS — R29.898 WEAKNESS OF BOTH UPPER EXTREMITIES: ICD-10-CM

## 2023-02-14 DIAGNOSIS — M75.101 ROTATOR CUFF SYNDROME, RIGHT: ICD-10-CM

## 2023-02-14 DIAGNOSIS — M25.511 ACUTE PAIN OF RIGHT SHOULDER: ICD-10-CM

## 2023-02-14 PROCEDURE — 97110 THERAPEUTIC EXERCISES: CPT | Mod: HCNC,PN

## 2023-02-14 PROCEDURE — 97161 PT EVAL LOW COMPLEX 20 MIN: CPT | Mod: HCNC,PN

## 2023-02-14 PROCEDURE — 97140 MANUAL THERAPY 1/> REGIONS: CPT | Mod: HCNC,PN

## 2023-02-14 NOTE — PLAN OF CARE
[facility]   Physical Therapy Initial Evaluation     Date: 2/14/2023   Name: Harpreet Perez  [facility] Number: [id number]    Therapy Diagnosis:   Encounter Diagnoses   Name Primary?    Rotator cuff syndrome, right     Acute pain of right shoulder     Weakness of both upper extremities      Physician: Srinivas Zabala MD    Physician Orders: PT Eval and Treat   Medical Diagnosis from Referral: Rotator cuff syndrome, right  Evaluation Date: 2/14/2023  Authorization Period Expiration: 2/13/24  Plan of Care Expiration: 3/28/23  Progress Note Due: 3/16/23 or every 6th visit  Visit # / Visits authorized: 1/1   FOTO: 1/3    Precautions: Standard     Time In: 10:45 AM  Time Out: 11:30 AM  Total Appointment Time (timed & untimed codes): 45 minutes      SUBJECTIVE     Date of onset: About 2 months ago    History of current condition - Harpreet reports: R shoulder pain that has been waking him up several times at night. Pt reports pain was intermittent but has become more consistent throughout the day. Pt reports deep aching in the right shoulder that rarely but can extend to the elbow.     Falls: 0    Imaging: None    Prior Therapy: None  Social History: Lives with spouse  Occupation: Part time doing [employer]  Prior Level of Function: Independent without difficulty  Current Level of Function: Difficulty lifting to tall shelf    Pain:  Current 6/10, worst 8/10, best 0/10   Location: right shoulder  Description: Aching and Deep  Aggravating Factors: Lifting  Easing Factors: Hot shower, rest    Patients goals: Reduce shoulder pain so he can sleep through the night.     Medical History:   Past Medical History:   Diagnosis Date    Alcohol dependence     Back pain     COPD (chronic obstructive pulmonary disease)     History of colonic polyps     Hypertension     Hypertrophy of prostate without urinary obstruction and other lower urinary tract symptoms (LUTS)     Osteoarthritis of multiple joints     Tobacco  dependence     quit then restarted       Surgical History:   Harpreet Perez  has a past surgical history that includes Lipoma resection (Right, 8/17/12); Colonoscopy (N/A, 5/25/2016); and Colonoscopy (N/A, 11/15/2021).    Medications:   Harpreet has a current medication list which includes the following prescription(s): atenolol, dicyclomine, lisinopril-hydrochlorothiazide, meloxicam, methimazole, pantoprazole, and tamsulosin.    Allergies:   Review of patient's allergies indicates:  No Known Allergies       OBJECTIVE   (NT = not tested due to pain and/or inability to obtain test position)    RANGE OF MOTION:    Shoulder   Range of Motion Right  Active     Passive Left  Active     Passive Goal   Forward Flexion (180º) WNL* WNL* WNL WNL 170º   Abduction (180º) WNL* WNL* WNL WNL 180º   External Rotation at 90º (90º) WNL WNL WNL WNL 80º   Internal Rotation at 90º (90º) WNL WNL WNL WNL 70º   (*) pain and/or dysfunction    STRENGTH:    Upper Extremity  Strength RIGHT   Goal   Shoulder Flexion []1  []2  []3  [x]4  []5                [x]+ []- 5/5 B   Shoulder Abduction []1  []2  []3  [x]4  []5                [x]+ []- 5/5 B   Shoulder Internal Rotation  []1  []2  []3  [x]4  []5                [x]+ []- 5/5 B   Shoulder External Rotation []1  []2  []3  [x]4  []5                []+ [x]- 5/5 B   Elbow Flexion  []1  []2  []3  [x]4  []5                []+ [x]- 5/5 B   Elbow Extension []1  []2  []3  [x]4  []5                [x]+ []- 5/5 B     Upper Extremity  Strength LEFT   Goal   Shoulder Flexion []1  []2  []3  [x]4  []5                [x]+ []- 5/5 B   Shoulder Abduction []1  []2  []3  [x]4  []5                [x]+ []- 5/5 B   Shoulder Internal Rotation  []1  []2  []3  [x]4  []5                [x]+ []- 5/5 B   Shoulder External Rotation []1  []2  []3  [x]4  []5                [x]+ []- 5/5 B   Elbow Flexion  []1  []2  []3  [x]4  []5                [x]+ []- 5/5 B   Elbow Extension []1  []2  []3  [x]4  []5                [x]+ []- 5/5  B     Hand  Dynamometry:   R: 80 lbs   L: 75 lbs    JOINT MOBILITY:     R shoulder joint mobility WNL in all directions without P!.     Palpation: Significant tenderness to R bicep tendon, supraspinatus and infraspinatus. Pt reports mild tenderness to the L shoulder in these areas.    Posture:  Pt presents with postural abnormalities which include: forward head and rounded shoulders    FUNCTION:     Limitation/Restriction for FOTO Shoulder Survey    Therapist reviewed FOTO scores for Harpreet Perez on 2/14/2023.   FOTO documents entered into [employer] - see Media section.    Limitation Score: 39%         TREATMENT     Total Treatment time (time-based codes) separate from Evaluation: 25 minutes      Harpreet received the treatments listed below:      therapeutic exercises to develop strength, endurance, ROM, and flexibility for 10 minutes including:  Bicep curls (3 x 10), yellow  B ER (3 x 10), yellow  UBE (3'/3')    manual therapy techniques: Joint mobilizations, Manual traction, and Soft tissue Mobilization were applied to the: R shoulder for 10 minutes, including:  STM to R bicep, supraspinatus, and infraspinatus    neuromuscular re-education activities to improve: Balance, Coordination, Kinesthetic, Sense, Proprioception, and Posture for - minutes. The following activities were included:  -    therapeutic activities to improve functional performance for 5  minutes, including:  Patient education (see below)      PATIENT EDUCATION AND HOME EXERCISES     Patient Education:  Patient educated on the impairments noted above and the effects of physical therapy intervention to improve overall condition and QOL.   Patient was educated on all the above exercise prior/during/after for proper posture, positioning, and execution for safe performance with home exercise program.   Patient educated on postural awareness and improved ergonomics to decrease symptoms with ADL performance.  Patient educated on progressive POC to return  to PLOF.       Written Home Exercises Provided: yes. Exercises were reviewed and Harpreet was able to demonstrate them prior to the end of the session.  Harpreet demonstrated good  understanding of the education provided. See EMR under Patient Instructions for exercises provided during therapy sessions.    ASSESSMENT     Harpreet is a 72 y.o. male referred to outpatient Physical Therapy with a medical diagnosis of right shoulder pain. Patient presents with decreased UE strength bilaterally (primarily of R elbow flexion), poor quality of AROM into elevation with associated pain, and decreased functional use of the R UE. Pt to benefit from physical therapy to improve strength, R UE function c ADLs, and improve quality of sleep.     Patient prognosis is Good.   Patient will benefit from skilled outpatient Physical Therapy to address the deficits stated above and in the chart below, provide patient /family education, and to maximize patientt's level of independence.     Plan of care discussed with patient: Yes  Patient's spiritual, cultural and educational needs considered and patient is agreeable to the plan of care and goals as stated below:     Anticipated Barriers for therapy: None    Medical Necessity is demonstrated by the following  History  Co-morbidities and personal factors that may impact the plan of care Co-morbidities:   advanced age    Personal Factors:   no deficits     low   Examination  Body Structures and Functions, activity limitations and participation restrictions that may impact the plan of care Body Regions:   upper extremities    Body Systems:    ROM  strength  gross coordinated movement    Participation Restrictions:   Difficulty working and laying [employer]    Activity limitations:   Learning and applying knowledge  no deficits    General Tasks and Commands  no deficits    Communication  no deficits    Mobility  lifting and carrying objects  fine hand use (grasping/picking up)    Self care  washing  oneself (bathing, drying, washing hands)    Domestic Life  no deficits    Interactions/Relationships  no deficits    Life Areas  no deficits    Community and Social Life  no deficits         low   Clinical Presentation stable and uncomplicated low   Decision Making/ Complexity Score: low     GOALS:  SHORT TERM GOALS: 3 weeks, (3/7/23) Progress   Recent signs and systems trend is improving in order to progress towards Long term goals's.    Patient will be independent with Home Exercise Program  in order to further progress and return to maximal function.    Pain rating at Worst: 5/10 in order to progress towards increased independence with activity.    Patient will be able to correct postural deviations in sitting and standing, to decrease pain and promote postural awareness for injury prevention.     Patient will partially meet predicted functional outcome (FOTO) score: 65% to improve towards increasing self-worth & perceived functional ability.      LONG TERM GOALS: 6 weeks, (3/28/23) Progress   Patient will return to normal Activities of daily living, recreational, and work related activities with less pain and limitation.     Patient will improve Range of Motion to stated goals in order to return to maximal functional potential.     Patient will improve Strength to stated goals of appropriate musculature in order to improve functional independence.     Pain Rating at Best: 1/10 to improve Quality of Life.     Patient will meet predicted functional outcome (FOTO) score: 72% to increase self-worth & perceived functional ability.    Patient will have met/partially met personal goal of: Be able to sleep through the night without shoulder pain.         PLAN   Plan of care Certification: 2/14/2023 to 3/28/23.    Outpatient Physical Therapy 2 times weekly for 6 weeks to include the following interventions: Cervical/Lumbar Traction, Electrical Stimulation , Gait Training, Manual Therapy, Moist Heat/ Ice, Neuromuscular  Re-ed, Patient Education, Self Care, Therapeutic Activities, and Therapeutic Exercise.     Fauzia Carter, PT      I CERTIFY THE NEED FOR THESE SERVICES FURNISHED UNDER THIS PLAN OF TREATMENT AND WHILE UNDER MY CARE   Physician's comments:     Physician's Signature: ___________________________________________________

## 2023-02-16 NOTE — PROGRESS NOTES
[facility]   Physical Therapy Treatment Note     Name: Harpreet Perez  [facility] Number: [id number]    Therapy Diagnosis:   Encounter Diagnoses   Name Primary?    Acute pain of right shoulder Yes    Weakness of both upper extremities      Physician: Srinivas Zabala MD    Visit Date: 2/17/2023    Physician Orders: PT Eval and Treat   Medical Diagnosis from Referral: Rotator cuff syndrome, right  Evaluation Date: 2/14/2023  Authorization Period Expiration: 2/13/24  Plan of Care Expiration: 3/28/23  Progress Note Due: 3/16/23 or every 6th visit  Visit # / Visits authorized: 1/1   FOTO: 1/3    PTA Visit #: 0/5     Time In: 7:00 AM  Time Out: 7:55 AM  Total Billable Time: 55 minutes    SUBJECTIVE     Pt reports: Increased soreness last night, disrupting his sleep. Reports minimal activity yesterday but has multiple [employer] jobs this weekend.   He was compliant with home exercise program.  Response to previous treatment: Mild soreness  Functional change: N/a    Pain: 6/10  Location: Right shoulder    OBJECTIVE     Objective Measures updated at progress report unless specified.     Treatment     Harpreet received the treatments listed below:      therapeutic exercises to develop strength, endurance, ROM, and flexibility for 15 minutes including:  UBE (3'/3')  Straight arm pull downs (3 x 10, 20 lbs)  SL ER (3 x 12, 3 lbs)     manual therapy techniques: Joint mobilizations, Manual traction, and Soft tissue Mobilization were applied to the: R shoulder for 15 minutes, including:  STM to R bicep, supraspinatus, and infraspinatus     neuromuscular re-education activities to improve: Balance, Coordination, Kinesthetic, Sense, Proprioception, and Posture for 15 minutes. The following activities were included:  Eccentric Bicep curls (3 x 10), 3lbs  B ER (3 x 10), yellow  Rows (3 x 12), 30 lbs  Serratus punches (3 x 15, 5 lbs)     therapeutic activities to improve functional performance for - minutes,  including:  Patient education (see below)    Hot pack to the R shoulder for 10 mins.       Patient Education and Home Exercises     Home Exercises Provided and Patient Education Provided     Education provided:   - Activity modification    Written Home Exercises Provided: yes. Exercises were reviewed and Harpreet was able to demonstrate them prior to the end of the session.  Harpreet demonstrated good  understanding of the education provided. See EMR under Patient Instructions for exercises provided during therapy sessions    ASSESSMENT     Pt demonstrates independence c HEP c verbal cuing required to maintain desired form with B ER performance. Pt is able to tolerate exercises today c mild reproduction of familiar symptoms. Pt educated on activity modification to avoid lifting heavy boxes and moving carpet while working on k[employer] this weekend.     Harpreet Is progressing well towards his goals.   Pt prognosis is Good.     Pt will continue to benefit from skilled outpatient physical therapy to address the deficits listed in the problem list box on initial evaluation, provide pt/family education and to maximize pt's level of independence in the home and community environment.     Pt's spiritual, cultural and educational needs considered and pt agreeable to plan of care and goals.     Anticipated barriers to physical therapy: None    GOALS:  SHORT TERM GOALS: 3 weeks, (3/7/23) Progress   Recent signs and systems trend is improving in order to progress towards Long term goals's. Progressing   Patient will be independent with Home Exercise Program  in order to further progress and return to maximal function. Progressing   Pain rating at Worst: 5/10 in order to progress towards increased independence with activity. Progressing   Patient will be able to correct postural deviations in sitting and standing, to decrease pain and promote postural awareness for injury prevention.  Progressing   Patient will partially meet  predicted functional outcome (FOTO) score: 65% to improve towards increasing self-worth & perceived functional ability. Progressing      LONG TERM GOALS: 6 weeks, (3/28/23) Progress   Patient will return to normal Activities of daily living, recreational, and work related activities with less pain and limitation.  Progressing   Patient will improve Range of Motion to stated goals in order to return to maximal functional potential.  Progressing   Patient will improve Strength to stated goals of appropriate musculature in order to improve functional independence.  Progressing   Pain Rating at Best: 1/10 to improve Quality of Life.  Progressing   Patient will meet predicted functional outcome (FOTO) score: 72% to increase self-worth & perceived functional ability. Progressing   Patient will have met/partially met personal goal of: Be able to sleep through the night without shoulder pain. Progressing       PLAN     Plan of care Certification: 2/14/2023 to 3/28/23.     Outpatient Physical Therapy 2 times weekly for 6 weeks to include the following interventions: Cervical/Lumbar Traction, Electrical Stimulation , Gait Training, Manual Therapy, Moist Heat/ Ice, Neuromuscular Re-ed, Patient Education, Self Care, Therapeutic Activities, and Therapeutic Exercise.     Fauzia Carter, PT

## 2023-02-17 ENCOUNTER — CLINICAL SUPPORT (OUTPATIENT)
Dept: REHABILITATION | Facility: HOSPITAL | Age: 73
End: 2023-02-17
Payer: MEDICARE

## 2023-02-17 DIAGNOSIS — M25.511 ACUTE PAIN OF RIGHT SHOULDER: Primary | ICD-10-CM

## 2023-02-17 DIAGNOSIS — R29.898 WEAKNESS OF BOTH UPPER EXTREMITIES: ICD-10-CM

## 2023-02-17 PROCEDURE — 97140 MANUAL THERAPY 1/> REGIONS: CPT | Mod: HCNC,PN

## 2023-02-17 PROCEDURE — 97110 THERAPEUTIC EXERCISES: CPT | Mod: HCNC,PN

## 2023-02-17 PROCEDURE — 97112 NEUROMUSCULAR REEDUCATION: CPT | Mod: HCNC,PN

## 2023-02-17 NOTE — PROGRESS NOTES
"[facility]   Physical Therapy Treatment Note     Name: Harpreet Perez  [facility] Number: [id number]    Therapy Diagnosis:   No diagnosis found.    Physician: Srinivas Zabala MD    Visit Date: 2/20/2023    Physician Orders: PT Eval and Treat   Medical Diagnosis from Referral: Rotator cuff syndrome, right  Evaluation Date: 2/14/2023  Authorization Period Expiration: 2/13/24  Plan of Care Expiration: 3/28/23  Progress Note Due: 3/16/23 or every 6th visit  Visit # / Visits authorized: 2/12 + Eval   FOTO: 1/3    PTA Visit #: 0/5     Time In: 7:55 AM  Time Out: 9:00 AM  Total Billable Time: 65 minutes    SUBJECTIVE     Pt reports: Was lifting 5-6 70 lb boxes this weekend. Reports increased soreness and sleep disturbances last night.   He was compliant with home exercise program.  Response to previous treatment: Mild soreness  Functional change: N/a    Pain: 6/10  Location: Right shoulder    OBJECTIVE     Objective Measures updated at progress report unless specified.     Treatment     Harpreet received the treatments listed below:      therapeutic exercises to develop strength, endurance, ROM, and flexibility for 25 minutes including:  UBE (3'/3')  Straight arm pull downs (3 x 12, 20 lbs)  SL ER (3 x 15, 5 lbs)  Single arm Bicep Curls, Matrix (3 x 5, 10 lbs)  Bicep stretch (10 x 10")  Doorway stretch (10 x 10")     manual therapy techniques: Joint mobilizations, Manual traction, and Soft tissue Mobilization were applied to the: R shoulder for 15 minutes, including:  STM to R bicep, supraspinatus, and infraspinatus     neuromuscular re-education activities to improve: Balance, Coordination, Kinesthetic, Sense, Proprioception, and Posture for 15 minutes. The following activities were included:  Eccentric Bicep curls (3 x 10), 3lbs  B ER (3 x 10), yellow  Rows (3 x 15), 30 lbs, Matrix  Serratus punches (3 x 15, 5 lbs)     therapeutic activities to improve functional performance for - minutes, " including:  Patient education (see below)    Hot pack to the R shoulder for 10 mins.       Patient Education and Home Exercises     Home Exercises Provided and Patient Education Provided     Education provided:   - Activity modification    Written Home Exercises Provided: yes. Exercises were reviewed and Harpreet was able to demonstrate them prior to the end of the session.  Harpreet demonstrated good  understanding of the education provided. See EMR under Patient Instructions for exercises provided during therapy sessions    ASSESSMENT     Pt tolerates exercise at increased intensity well but continues to have pain reproduction. PT emphasized patient activity modification during the workday to avoid heavy lifting with the right UE to better manage symptoms.     Harpreet Is progressing well towards his goals.   Pt prognosis is Good.     Pt will continue to benefit from skilled outpatient physical therapy to address the deficits listed in the problem list box on initial evaluation, provide pt/family education and to maximize pt's level of independence in the home and community environment.     Pt's spiritual, cultural and educational needs considered and pt agreeable to plan of care and goals.     Anticipated barriers to physical therapy: None    GOALS:  SHORT TERM GOALS: 3 weeks, (3/7/23) Progress   Recent signs and systems trend is improving in order to progress towards Long term goals's. Progressing   Patient will be independent with Home Exercise Program  in order to further progress and return to maximal function. Progressing   Pain rating at Worst: 5/10 in order to progress towards increased independence with activity. Progressing   Patient will be able to correct postural deviations in sitting and standing, to decrease pain and promote postural awareness for injury prevention.  Progressing   Patient will partially meet predicted functional outcome (FOTO) score: 65% to improve towards increasing self-worth &  perceived functional ability. Progressing      LONG TERM GOALS: 6 weeks, (3/28/23) Progress   Patient will return to normal Activities of daily living, recreational, and work related activities with less pain and limitation.  Progressing   Patient will improve Range of Motion to stated goals in order to return to maximal functional potential.  Progressing   Patient will improve Strength to stated goals of appropriate musculature in order to improve functional independence.  Progressing   Pain Rating at Best: 1/10 to improve Quality of Life.  Progressing   Patient will meet predicted functional outcome (FOTO) score: 72% to increase self-worth & perceived functional ability. Progressing   Patient will have met/partially met personal goal of: Be able to sleep through the night without shoulder pain. Progressing       PLAN     Plan of care Certification: 2/14/2023 to 3/28/23.     Outpatient Physical Therapy 2 times weekly for 6 weeks to include the following interventions: Cervical/Lumbar Traction, Electrical Stimulation , Gait Training, Manual Therapy, Moist Heat/ Ice, Neuromuscular Re-ed, Patient Education, Self Care, Therapeutic Activities, and Therapeutic Exercise.     Fauzia Carter, PT

## 2023-02-20 ENCOUNTER — CLINICAL SUPPORT (OUTPATIENT)
Dept: REHABILITATION | Facility: HOSPITAL | Age: 73
End: 2023-02-20
Payer: MEDICARE

## 2023-02-20 DIAGNOSIS — M25.511 ACUTE PAIN OF RIGHT SHOULDER: Primary | ICD-10-CM

## 2023-02-20 DIAGNOSIS — R29.898 WEAKNESS OF BOTH UPPER EXTREMITIES: ICD-10-CM

## 2023-02-20 PROCEDURE — 97110 THERAPEUTIC EXERCISES: CPT | Mod: HCNC,PN

## 2023-02-20 PROCEDURE — 97112 NEUROMUSCULAR REEDUCATION: CPT | Mod: HCNC,PN

## 2023-02-20 PROCEDURE — 97140 MANUAL THERAPY 1/> REGIONS: CPT | Mod: HCNC,PN

## 2023-02-22 ENCOUNTER — TELEPHONE (OUTPATIENT)
Dept: REHABILITATION | Facility: HOSPITAL | Age: 73
End: 2023-02-22
Payer: MEDICARE

## 2023-02-22 NOTE — TELEPHONE ENCOUNTER
PT LVM about missed appointment this morning. PT supplied patient with clinic phone number to reschedule today's appointment. Pt reminded of next scheduled appointment on 2/27.

## 2023-02-27 ENCOUNTER — TELEPHONE (OUTPATIENT)
Dept: REHABILITATION | Facility: HOSPITAL | Age: 73
End: 2023-02-27
Payer: MEDICARE

## 2023-02-27 NOTE — TELEPHONE ENCOUNTER
Spoke with patient. Said he tried to cancel appointment via the kiera. I discussed with patient that all of his appointments are still scheduled. Pt said he will try to make Friday's appointment. Pt advised to call the clinic prior to appointment if we need to alter is schedule.

## 2023-03-03 ENCOUNTER — TELEPHONE (OUTPATIENT)
Dept: INTERNAL MEDICINE | Facility: CLINIC | Age: 73
End: 2023-03-03
Payer: MEDICARE

## 2023-03-03 ENCOUNTER — OFFICE VISIT (OUTPATIENT)
Dept: URGENT CARE | Facility: CLINIC | Age: 73
End: 2023-03-03
Payer: MEDICARE

## 2023-03-03 VITALS
HEART RATE: 51 BPM | BODY MASS INDEX: 24.24 KG/M2 | WEIGHT: 179 LBS | DIASTOLIC BLOOD PRESSURE: 60 MMHG | TEMPERATURE: 99 F | SYSTOLIC BLOOD PRESSURE: 123 MMHG | OXYGEN SATURATION: 99 % | RESPIRATION RATE: 20 BRPM | HEIGHT: 72 IN

## 2023-03-03 DIAGNOSIS — A08.4 VIRAL GASTROENTERITIS: Primary | ICD-10-CM

## 2023-03-03 DIAGNOSIS — E86.0 DEHYDRATION: ICD-10-CM

## 2023-03-03 DIAGNOSIS — R11.0 NAUSEA: ICD-10-CM

## 2023-03-03 PROCEDURE — 1125F PR PAIN SEVERITY QUANTIFIED, PAIN PRESENT: ICD-10-PCS | Mod: CPTII,S$GLB,,

## 2023-03-03 PROCEDURE — 1160F PR REVIEW ALL MEDS BY PRESCRIBER/CLIN PHARMACIST DOCUMENTED: ICD-10-PCS | Mod: CPTII,S$GLB,,

## 2023-03-03 PROCEDURE — 1160F RVW MEDS BY RX/DR IN RCRD: CPT | Mod: CPTII,S$GLB,,

## 2023-03-03 PROCEDURE — 3008F PR BODY MASS INDEX (BMI) DOCUMENTED: ICD-10-PCS | Mod: CPTII,S$GLB,,

## 2023-03-03 PROCEDURE — 4010F ACE/ARB THERAPY RXD/TAKEN: CPT | Mod: CPTII,S$GLB,,

## 2023-03-03 PROCEDURE — 3078F DIAST BP <80 MM HG: CPT | Mod: CPTII,S$GLB,,

## 2023-03-03 PROCEDURE — 99203 OFFICE O/P NEW LOW 30 MIN: CPT | Mod: S$GLB,,,

## 2023-03-03 PROCEDURE — 3008F BODY MASS INDEX DOCD: CPT | Mod: CPTII,S$GLB,,

## 2023-03-03 PROCEDURE — 3074F PR MOST RECENT SYSTOLIC BLOOD PRESSURE < 130 MM HG: ICD-10-PCS | Mod: CPTII,S$GLB,,

## 2023-03-03 PROCEDURE — 4010F PR ACE/ARB THEARPY RXD/TAKEN: ICD-10-PCS | Mod: CPTII,S$GLB,,

## 2023-03-03 PROCEDURE — 3078F PR MOST RECENT DIASTOLIC BLOOD PRESSURE < 80 MM HG: ICD-10-PCS | Mod: CPTII,S$GLB,,

## 2023-03-03 PROCEDURE — 1159F MED LIST DOCD IN RCRD: CPT | Mod: CPTII,S$GLB,,

## 2023-03-03 PROCEDURE — 99203 PR OFFICE/OUTPT VISIT, NEW, LEVL III, 30-44 MIN: ICD-10-PCS | Mod: S$GLB,,,

## 2023-03-03 PROCEDURE — 1125F AMNT PAIN NOTED PAIN PRSNT: CPT | Mod: CPTII,S$GLB,,

## 2023-03-03 PROCEDURE — 3074F SYST BP LT 130 MM HG: CPT | Mod: CPTII,S$GLB,,

## 2023-03-03 PROCEDURE — 1159F PR MEDICATION LIST DOCUMENTED IN MEDICAL RECORD: ICD-10-PCS | Mod: CPTII,S$GLB,,

## 2023-03-03 RX ORDER — ONDANSETRON 4 MG/1
4 TABLET, ORALLY DISINTEGRATING ORAL EVERY 8 HOURS PRN
Qty: 15 TABLET | Refills: 0 | Status: SHIPPED | OUTPATIENT
Start: 2023-03-03 | End: 2023-03-22 | Stop reason: ALTCHOICE

## 2023-03-03 NOTE — TELEPHONE ENCOUNTER
Spoke with pt, let him know  is out for the office for the week. Pt stated he will go see UC clinic in [place].

## 2023-03-03 NOTE — PATIENT INSTRUCTIONS
Abdominal Pain   If your condition worsens or fails to improve we recommend that you receive another evaluation at the ER immediately or contact your PCP to discuss your concerns or return here. You must understand that you've received an urgent care treatment only and that you may be released before all your medical problems are known or treated. You the patient will arrange for follow-up care as instructed.     Diarrhea  If you have diarrhea you can use Pepto Bismol.  Avoid Imodium unless you have more than 6 episodes of diarrhea in 24 hours.   Avoid any greasy, spicy, fatty or acidic foods at this time.    Increase fluids and rest is important.    Nausea & Vomiting  If your condition worsens or fails to improve we recommend that you receive another evaluation at the ER immediately or contact your PCP to discuss your concerns or return here. You must understand that you've received an urgent care treatment only and that you may be released before all your medical problems are known or treated. You the patient will arrange for follow-up care as instructed.   Use prescribed anti-nausea medicine as needed and prescribed. OTC anti-nausea Imitrol as needed.   Increase fluids and rest is important.  Start off with liquid diet and progress as tolerated. See below:  Water and clear liquids are important so you do not get dehydrated. Drink a small amount at a time.  Do not force yourself to eat, especially if you have cramps, vomiting, or diarrhea. When you finally decide to start eating, do not eat large amounts at a time, even if you are hungry.  If you eat, avoid fatty, greasy, spicy, or fried foods.  Small, bland meals are better tolerated.  Sport drinks, diluted fruit juices, and other flavored soft drinks along with saltine crackers and broths or soups are okay.      Watch for any increase pain, fever, localized pain to right lower abdomen or continued vomiting or diarrhea.

## 2023-03-03 NOTE — TELEPHONE ENCOUNTER
----- Message from Samanta Cha sent at 3/3/2023  7:54 AM CST -----  Contact: harpreet  Type:  Same Day Appointment Request    Caller is requesting a same day appointment.  Caller declined first available appointment listed below.    Name of Caller:Harpreet   When is the first available appointment?3/27  Symptoms: severe stomach pain  Best Call Back Number:[phone]  Additional Information:    Thanks KB

## 2023-03-03 NOTE — PROGRESS NOTES
Subjective:       Patient ID: Harpreet Perez is a 72 y.o. male.    Vitals:  height is 6' (1.829 m) and weight is 81.2 kg (179 lb). His temperature is 98.6 °F (37 °C). His blood pressure is 123/60 and his pulse is 51 (abnormal). His respiration is 20 and oxygen saturation is 99%.     Chief Complaint: Nausea    Harpreet Perez is a 72 y.o. male who presents for generalized abdominal pain which onset 3 days ago. Associated sxs include nausea and diarrhea after laxative. Patient denies any fever, chills, emesis, constipation, hematochezia, or hematemesis. No known sick contact.    Nausea  This is a new problem. The current episode started in the past 7 days (Onset began Tuesday). The problem has been gradually worsening. Associated symptoms include abdominal pain (cramp), a change in bowel habit (haven't been able to go), fatigue and nausea. Pertinent negatives include no chest pain, chills, fever, myalgias, numbness, visual change, vomiting or weakness. Nothing aggravates the symptoms. Treatments tried: Laxative. The treatment provided mild relief.     Constitution: Positive for fatigue. Negative for chills and fever.   Cardiovascular:  Negative for chest pain.   Respiratory:  Negative for shortness of breath.    Gastrointestinal:  Positive for abdominal pain (cramp), nausea and diarrhea. Negative for vomiting, constipation and bright red blood in stool.   Genitourinary:  Negative for dysuria, frequency and urgency.   Musculoskeletal:  Negative for muscle ache.   Neurological:  Negative for dizziness, numbness and tingling.     Objective:      Physical Exam   Constitutional: He is oriented to person, place, and time. He appears well-developed.   HENT:   Head: Normocephalic and atraumatic.   Ears:   Right Ear: External ear normal.   Left Ear: External ear normal.   Nose: Nose normal.   Mouth/Throat: Mucous membranes are normal. Mucous membranes are moist. Oropharynx is clear.   Eyes: Conjunctivae and lids are normal.    Neck: Trachea normal. Neck supple.   Cardiovascular: Normal rate, regular rhythm and normal heart sounds.   Pulmonary/Chest: Effort normal and breath sounds normal. No respiratory distress.   Abdominal: Normal appearance and bowel sounds are normal. He exhibits no distension and no mass. Soft. There is no abdominal tenderness. There is no rebound, no guarding, no left CVA tenderness and no right CVA tenderness.      Comments: No abdominal tenderness.  No rebound, guarding, or distention.  Normal bowel sounds.  No CVA tenderness.   Musculoskeletal: Normal range of motion.         General: Normal range of motion.   Neurological: He is alert and oriented to person, place, and time. He has normal strength.   Skin: Skin is warm, dry, intact, not diaphoretic and not pale.         Comments: Normal skin turgor.   Psychiatric: His speech is normal and behavior is normal. Judgment and thought content normal.   Nursing note and vitals reviewed.      Assessment:       1. Viral gastroenteritis    2. Nausea    3. Dehydration          Plan:         Viral gastroenteritis    Nausea  -     ondansetron (ZOFRAN-ODT) 4 MG TbDL; Take 1 tablet (4 mg total) by mouth every 8 (eight) hours as needed (for nausea).  Dispense: 15 tablet; Refill: 0    Dehydration    VSS. Afebrile. No signs of dehydration. Patient is in NAD.  No acute abdomen on physical exam.   Attempted IV fluids but unsuccessful.  Blood pressure stable.  Recommend oral rehydration.  Meds:  Zofran sent to preferred pharmacy.  Discussed side effects of medications.  Recommend supportive measures.  Drink plenty of fluids.  Patient encouraged to eat as tolerated.  Recommend Imodium as needed for diarrhea.  Return to clinic if symptoms do not resolve in the next couple of days. Discussed signs of dehydration with patient.  ER precautions given including: fever, abdominal distention, bloody diarrhea, nonintractable vomiting/diarrhea.   Patient verbalized understanding and agrees  with treatment plan.

## 2023-03-04 ENCOUNTER — HOSPITAL ENCOUNTER (EMERGENCY)
Facility: HOSPITAL | Age: 73
Discharge: HOME | End: 2023-03-04
Attending: EMERGENCY MEDICINE
Payer: MEDICARE

## 2023-03-04 VITALS
RESPIRATION RATE: 18 BRPM | OXYGEN SATURATION: 99 % | BODY MASS INDEX: 23.49 KG/M2 | HEART RATE: 52 BPM | DIASTOLIC BLOOD PRESSURE: 60 MMHG | WEIGHT: 173.19 LBS | SYSTOLIC BLOOD PRESSURE: 121 MMHG | TEMPERATURE: 99 F

## 2023-03-04 DIAGNOSIS — R10.13 EPIGASTRIC PAIN: Primary | ICD-10-CM

## 2023-03-04 LAB
ALBUMIN SERPL BCP-MCNC: 3.9 G/DL (ref 3.5–5.2)
ALP SERPL-CCNC: 86 U/L (ref 55–135)
ALT SERPL W/O P-5'-P-CCNC: 21 U/L (ref 10–44)
ANION GAP SERPL CALC-SCNC: 9 MMOL/L (ref 8–16)
AST SERPL-CCNC: 20 U/L (ref 10–40)
BACTERIA #/AREA URNS HPF: NORMAL /HPF
BASOPHILS # BLD AUTO: 0.06 K/UL (ref 0–0.2)
BASOPHILS NFR BLD: 1 % (ref 0–1.9)
BILIRUB SERPL-MCNC: 0.5 MG/DL (ref 0.1–1)
BILIRUB UR QL STRIP: NEGATIVE
BUN SERPL-MCNC: 13 MG/DL (ref 8–23)
CALCIUM SERPL-MCNC: 9.3 MG/DL (ref 8.7–10.5)
CHLORIDE SERPL-SCNC: 100 MMOL/L (ref 95–110)
CLARITY UR: CLEAR
CO2 SERPL-SCNC: 27 MMOL/L (ref 23–29)
COLOR UR: YELLOW
CREAT SERPL-MCNC: 1.1 MG/DL (ref 0.5–1.4)
DIFFERENTIAL METHOD: ABNORMAL
EOSINOPHIL # BLD AUTO: 0.1 K/UL (ref 0–0.5)
EOSINOPHIL NFR BLD: 2 % (ref 0–8)
ERYTHROCYTE [DISTWIDTH] IN BLOOD BY AUTOMATED COUNT: 13.4 % (ref 11.5–14.5)
EST. GFR  (NO RACE VARIABLE): >60 ML/MIN/1.73 M^2
GLUCOSE SERPL-MCNC: 97 MG/DL (ref 70–110)
GLUCOSE UR QL STRIP: NEGATIVE
HCT VFR BLD AUTO: 42.2 % (ref 40–54)
HCV AB SERPL QL IA: NEGATIVE
HEP C VIRUS HOLD SPECIMEN: NORMAL
HGB BLD-MCNC: 14.5 G/DL (ref 14–18)
HGB UR QL STRIP: NEGATIVE
HIV 1+2 AB+HIV1 P24 AG SERPL QL IA: NEGATIVE
HYALINE CASTS #/AREA URNS LPF: 0 /LPF
IMM GRANULOCYTES # BLD AUTO: 0.01 K/UL (ref 0–0.04)
IMM GRANULOCYTES NFR BLD AUTO: 0.2 % (ref 0–0.5)
KETONES UR QL STRIP: NEGATIVE
LEUKOCYTE ESTERASE UR QL STRIP: NEGATIVE
LIPASE SERPL-CCNC: 7 U/L (ref 4–60)
LYMPHOCYTES # BLD AUTO: 1.5 K/UL (ref 1–4.8)
LYMPHOCYTES NFR BLD: 24.8 % (ref 18–48)
MCH RBC QN AUTO: 30.5 PG (ref 27–31)
MCHC RBC AUTO-ENTMCNC: 34.4 G/DL (ref 32–36)
MCV RBC AUTO: 89 FL (ref 82–98)
MICROSCOPIC COMMENT: NORMAL
MONOCYTES # BLD AUTO: 0.6 K/UL (ref 0.3–1)
MONOCYTES NFR BLD: 9.7 % (ref 4–15)
NEUTROPHILS # BLD AUTO: 3.8 K/UL (ref 1.8–7.7)
NEUTROPHILS NFR BLD: 62.3 % (ref 38–73)
NITRITE UR QL STRIP: NEGATIVE
NRBC BLD-RTO: 0 /100 WBC
PH UR STRIP: 6 [PH] (ref 5–8)
PLATELET # BLD AUTO: 223 K/UL (ref 150–450)
PMV BLD AUTO: 8.9 FL (ref 9.2–12.9)
POTASSIUM SERPL-SCNC: 3.8 MMOL/L (ref 3.5–5.1)
PROT SERPL-MCNC: 7.2 G/DL (ref 6–8.4)
PROT UR QL STRIP: ABNORMAL
RBC # BLD AUTO: 4.76 M/UL (ref 4.6–6.2)
RBC #/AREA URNS HPF: 0 /HPF (ref 0–4)
SODIUM SERPL-SCNC: 136 MMOL/L (ref 136–145)
SP GR UR STRIP: >1.03 (ref 1–1.03)
URN SPEC COLLECT METH UR: ABNORMAL
UROBILINOGEN UR STRIP-ACNC: ABNORMAL EU/DL
WBC # BLD AUTO: 6.1 K/UL (ref 3.9–12.7)
WBC #/AREA URNS HPF: 3 /HPF (ref 0–5)

## 2023-03-04 PROCEDURE — 87389 HIV-1 AG W/HIV-1&-2 AB AG IA: CPT | Mod: HCNC | Performed by: EMERGENCY MEDICINE

## 2023-03-04 PROCEDURE — 86803 HEPATITIS C AB TEST: CPT | Mod: HCNC | Performed by: EMERGENCY MEDICINE

## 2023-03-04 PROCEDURE — 81000 URINALYSIS NONAUTO W/SCOPE: CPT | Mod: HCNC | Performed by: NURSE PRACTITIONER

## 2023-03-04 PROCEDURE — 63600175 PHARM REV CODE 636 W HCPCS: Mod: HCNC | Performed by: NURSE PRACTITIONER

## 2023-03-04 PROCEDURE — 80053 COMPREHEN METABOLIC PANEL: CPT | Mod: HCNC | Performed by: NURSE PRACTITIONER

## 2023-03-04 PROCEDURE — 99284 EMERGENCY DEPT VISIT MOD MDM: CPT | Mod: 25,HCNC

## 2023-03-04 PROCEDURE — 85025 COMPLETE CBC W/AUTO DIFF WBC: CPT | Mod: HCNC | Performed by: NURSE PRACTITIONER

## 2023-03-04 PROCEDURE — 96361 HYDRATE IV INFUSION ADD-ON: CPT | Mod: HCNC

## 2023-03-04 PROCEDURE — 25000003 PHARM REV CODE 250: Mod: HCNC | Performed by: NURSE PRACTITIONER

## 2023-03-04 PROCEDURE — 96374 THER/PROPH/DIAG INJ IV PUSH: CPT | Mod: HCNC

## 2023-03-04 PROCEDURE — 83690 ASSAY OF LIPASE: CPT | Mod: HCNC | Performed by: NURSE PRACTITIONER

## 2023-03-04 RX ORDER — SODIUM CHLORIDE 9 MG/ML
1000 INJECTION, SOLUTION INTRAVENOUS
Status: COMPLETED | OUTPATIENT
Start: 2023-03-04 | End: 2023-03-04

## 2023-03-04 RX ORDER — SUCRALFATE 1 G/1
1 TABLET ORAL
Qty: 28 TABLET | Refills: 0 | Status: SHIPPED | OUTPATIENT
Start: 2023-03-04 | End: 2023-03-11

## 2023-03-04 RX ORDER — ONDANSETRON 2 MG/ML
8 INJECTION INTRAMUSCULAR; INTRAVENOUS
Status: COMPLETED | OUTPATIENT
Start: 2023-03-04 | End: 2023-03-04

## 2023-03-04 RX ORDER — MAG HYDROX/ALUMINUM HYD/SIMETH 200-200-20
30 SUSPENSION, ORAL (FINAL DOSE FORM) ORAL
Status: DISCONTINUED | OUTPATIENT
Start: 2023-03-04 | End: 2023-03-04 | Stop reason: HOSPADM

## 2023-03-04 RX ADMIN — ONDANSETRON 8 MG: 2 INJECTION INTRAMUSCULAR; INTRAVENOUS at 10:03

## 2023-03-04 RX ADMIN — SODIUM CHLORIDE 1000 ML: 9 INJECTION, SOLUTION INTRAVENOUS at 10:03

## 2023-03-04 RX ADMIN — ALUMINUM HYDROXIDE, MAGNESIUM HYDROXIDE, AND DIMETHICONE 30 ML: 200; 20; 200 SUSPENSION ORAL at 11:03

## 2023-03-04 NOTE — ED PROVIDER NOTES
Encounter Date: 3/4/2023       History     Chief Complaint   Patient presents with    Abdominal Pain     Abdominal pain, points to gastric area, went to urgent care yesterday and dx with viral gastroenteritis. + nausea, no vomiting. States he hasn't eaten in three days.     72-year-old male with complaint of epigastric pain for the past 4 days.  Also reports decreased appetite with nausea.  Denies vomiting.  Denies constipation.  Denies fever or chills.  Patient reports passing gas.      Review of patient's allergies indicates:  No Known Allergies  Past Medical History:   Diagnosis Date    Alcohol dependence     Back pain     COPD (chronic obstructive pulmonary disease)     History of colonic polyps     Hypertension     Hypertrophy of prostate without urinary obstruction and other lower urinary tract symptoms (LUTS)     Osteoarthritis of multiple joints     Tobacco dependence     quit then restarted     Past Surgical History:   Procedure Laterality Date    COLONOSCOPY N/A 5/25/2016    Procedure: COLONOSCOPY;  Surgeon: Sergey Jensen MD;  Location: [facility] ENDO;  Service: Endoscopy;  Laterality: N/A;    COLONOSCOPY N/A 11/15/2021    Procedure: COLONOSCOPY;  Surgeon: Francia Lucio MD;  Location: [facility] ENDO;  Service: Endoscopy;  Laterality: N/A;    LIPOMA RESECTION Right 8/17/12    posterior shoulder     Family History   Problem Relation Age of Onset    Stroke Father     Heart disease Brother 35        MI    Heart disease Other 33        MI    Hyperlipidemia Brother     Hypertension Brother     Hypertension Brother     Heart disease Brother         cardiac stents    Kidney disease Other         dialysis    Cataracts Mother     Hypertension Mother     Cataracts Sister     Alcohol abuse Neg Hx     Asthma Neg Hx     Birth defects Neg Hx     Cancer Neg Hx     COPD Neg Hx     Depression Neg Hx     Diabetes Neg Hx     Drug abuse Neg Hx     Mental retardation Neg Hx     Mental illness Neg Hx     Learning disabilities Neg Hx      Miscarriages / Stillbirths Neg Hx     Vision loss Neg Hx      Social History     Tobacco Use    Smoking status: Former     Packs/day: 0.50     Years: 35.00     Pack years: 17.50     Types: Cigarettes     Start date: 1979     Quit date: 2014     Years since quittin.2    Smokeless tobacco: Never    Tobacco comments:     resarted 2 weeks ago after being quit x 6 months. wife also smkes    Substance Use Topics    Alcohol use: Yes     Alcohol/week: 3.0 standard drinks     Types: 3 Cans of beer per week     Comment: case per week or up to 30 per week    Drug use: No     Review of Systems   Constitutional:  Negative for fever.   HENT:  Negative for sore throat.    Respiratory:  Negative for shortness of breath.    Cardiovascular:  Negative for chest pain.   Gastrointestinal:  Positive for abdominal pain. Negative for nausea.   Genitourinary:  Negative for dysuria.   Musculoskeletal:  Negative for back pain.   Skin:  Negative for rash.   Neurological:  Negative for weakness.   Hematological:  Does not bruise/bleed easily.     Physical Exam     Initial Vitals [23 0929]   BP Pulse Resp Temp SpO2   125/64 (!) 50 18 98.8 °F (37.1 °C) 99 %      MAP       --         Physical Exam    Nursing note and vitals reviewed.  Constitutional: He appears well-developed and well-nourished.   HENT:   Head: Normocephalic and atraumatic.   Eyes: Conjunctivae are normal. Pupils are equal, round, and reactive to light.   Neck: Neck supple.   Normal range of motion.  Cardiovascular:  Normal rate, regular rhythm, normal heart sounds and intact distal pulses.           Pulmonary/Chest: Breath sounds normal.   Abdominal: Abdomen is soft. There is no abdominal tenderness. There is no rebound and no guarding.   Musculoskeletal:         General: Normal range of motion.      Cervical back: Normal range of motion and neck supple.     Neurological: He is alert.   Skin: Skin is warm and dry. Capillary refill takes less than 2 seconds.    Psychiatric: He has a normal mood and affect. His behavior is normal. Thought content normal.       ED Course   Procedures  Labs Reviewed   CBC W/ AUTO DIFFERENTIAL - Abnormal; Notable for the following components:       Result Value    MPV 8.9 (*)     All other components within normal limits    Narrative:     Release to patient->Immediate   URINALYSIS, REFLEX TO URINE CULTURE - Abnormal; Notable for the following components:    Specific Gravity, UA >1.030 (*)     Protein, UA 2+ (*)     Urobilinogen, UA 2.0-3.0 (*)     All other components within normal limits    Narrative:     Specimen Source->Urine   HEP C VIRUS HOLD SPECIMEN    Narrative:     Release to patient->Immediate   COMPREHENSIVE METABOLIC PANEL    Narrative:     Release to patient->Immediate   LIPASE    Narrative:     Release to patient->Immediate   URINALYSIS MICROSCOPIC    Narrative:     Specimen Source->Urine   HIV 1 / 2 ANTIBODY   HEPATITIS C ANTIBODY        Labs Reviewed   CBC W/ AUTO DIFFERENTIAL - Abnormal; Notable for the following components:       Result Value    MPV 8.9 (*)     All other components within normal limits    Narrative:     Release to patient->Immediate   URINALYSIS, REFLEX TO URINE CULTURE - Abnormal; Notable for the following components:    Specific Gravity, UA >1.030 (*)     Protein, UA 2+ (*)     Urobilinogen, UA 2.0-3.0 (*)     All other components within normal limits    Narrative:     Specimen Source->Urine   HEP C VIRUS HOLD SPECIMEN    Narrative:     Release to patient->Immediate   COMPREHENSIVE METABOLIC PANEL    Narrative:     Release to patient->Immediate   LIPASE    Narrative:     Release to patient->Immediate   URINALYSIS MICROSCOPIC    Narrative:     Specimen Source->Urine   HIV 1 / 2 ANTIBODY   HEPATITIS C ANTIBODY         Imaging Results    None          Medications   aluminum-magnesium hydroxide-simethicone 200-200-20 mg/5 mL suspension 30 mL (30 mLs Oral Given 3/4/23 1106)   0.9%  NaCl infusion (1,000 mLs  Intravenous New Bag 3/4/23 1004)   ondansetron injection 8 mg (8 mg Intravenous Given 3/4/23 1018)     Medical Decision Makin:07 AM  Patient feeling better.  No abdominal pain.  No abdominal tenderness.  All labs normal.  Will treat patient with Carafate for a week as suspect gastritis.  Patient will return for any worsening.                        Clinical Impression:   Final diagnoses:  [R10.13] Epigastric pain (Primary)        ED Disposition Condition    Discharge Stable          ED Prescriptions       Medication Sig Dispense Start Date End Date Auth. Provider    sucralfate (CARAFATE) 1 gram tablet Take 1 tablet (1 g total) by mouth 4 (four) times daily before meals and nightly. for 7 days 28 tablet 3/4/2023 3/11/2023 Parish Jenkins NP          Follow-up Information       Follow up With Specialties Details Why Contact Info    Srinivas Zabala MD Internal Medicine Schedule an appointment as soon as possible for a visit in 2 days  [address]  [address]  [place] [address]  [phone]               Parish Jenkins NP  [phone]

## 2023-03-06 ENCOUNTER — TELEPHONE (OUTPATIENT)
Dept: URGENT CARE | Facility: CLINIC | Age: 73
End: 2023-03-06
Payer: MEDICARE

## 2023-03-06 NOTE — TELEPHONE ENCOUNTER
Courtesy Call, spoke with pt he states that he is still no eating but is feeling better. He states that he noticed a knot and rash on his arm where an IV was attempted.

## 2023-03-13 RX ORDER — ATENOLOL 50 MG/1
50 TABLET ORAL DAILY
Qty: 30 TABLET | Refills: 11 | Status: SHIPPED | OUTPATIENT
Start: 2023-03-13 | End: 2023-03-22 | Stop reason: SDUPTHER

## 2023-03-13 RX ORDER — METHIMAZOLE 10 MG/1
10 TABLET ORAL 2 TIMES DAILY
Qty: 60 TABLET | Refills: 11 | Status: SHIPPED | OUTPATIENT
Start: 2023-03-13 | End: 2023-06-20

## 2023-03-13 RX ORDER — PANTOPRAZOLE SODIUM 40 MG/1
40 TABLET, DELAYED RELEASE ORAL DAILY
Qty: 90 TABLET | Refills: 3 | Status: SHIPPED | OUTPATIENT
Start: 2023-03-13 | End: 2023-03-22 | Stop reason: ALTCHOICE

## 2023-03-13 NOTE — TELEPHONE ENCOUNTER
----- Message from Misty Lovelace sent at 3/13/2023  4:45 PM CDT -----  Contact: [facility]  .Type:  RX Refill Request    3 medications         Who Called: [facility]   Refill or New Rx:refill  RX Name and Strength:  1.methIMAzole (TAPAZOLE) 10 MG Tab  2.pantoprazole (PROTONIX) 40 MG tablet  3.atenoloL (TENORMIN) 50 MG tablet  How is the patient currently taking it? (ex. 1XDay):as prescribed   Is this a 30 day or 90 day RX:90  Preferred Pharmacy with phone number:.  [facility] - [place], [place] - [address]  [address] [address]  Phone: [phone] Fax: [phone]  Local or Mail Order:local  Ordering Provider:Dr ayala   Would the patient rather a call back or a response via [facility]? Call back  Best Call Back Number:[phone]  Additional Information: [facility] requesting medication

## 2023-03-13 NOTE — TELEPHONE ENCOUNTER
Requested Prescriptions     Pending Prescriptions Disp Refills    methIMAzole (TAPAZOLE) 10 MG Tab 60 tablet 11     Sig: Take 1 tablet (10 mg total) by mouth 2 (two) times daily.    pantoprazole (PROTONIX) 40 MG tablet 90 tablet 3     Sig: Take 1 tablet (40 mg total) by mouth once daily.    atenoloL (TENORMIN) 50 MG tablet 30 tablet 11     Sig: Take 1 tablet (50 mg total) by mouth once daily.     LV 2/13/2023  NV 4/5/23 with tonie khan.   LF 10/17/2022, 09/02/2022

## 2023-03-17 ENCOUNTER — HOSPITAL ENCOUNTER (EMERGENCY)
Facility: HOSPITAL | Age: 73
Discharge: HOME | End: 2023-03-17
Attending: EMERGENCY MEDICINE
Payer: MEDICARE

## 2023-03-17 ENCOUNTER — OFFICE VISIT (OUTPATIENT)
Dept: INTERNAL MEDICINE | Facility: CLINIC | Age: 73
End: 2023-03-17
Payer: MEDICARE

## 2023-03-17 VITALS
TEMPERATURE: 99 F | BODY MASS INDEX: 23.8 KG/M2 | RESPIRATION RATE: 18 BRPM | HEART RATE: 83 BPM | HEIGHT: 71 IN | WEIGHT: 170 LBS | DIASTOLIC BLOOD PRESSURE: 62 MMHG | SYSTOLIC BLOOD PRESSURE: 131 MMHG | OXYGEN SATURATION: 100 %

## 2023-03-17 VITALS
DIASTOLIC BLOOD PRESSURE: 62 MMHG | BODY MASS INDEX: 23.71 KG/M2 | WEIGHT: 175.06 LBS | TEMPERATURE: 98 F | OXYGEN SATURATION: 98 % | HEIGHT: 72 IN | HEART RATE: 62 BPM | SYSTOLIC BLOOD PRESSURE: 110 MMHG

## 2023-03-17 DIAGNOSIS — R10.9 ABDOMINAL PAIN, UNSPECIFIED ABDOMINAL LOCATION: Primary | ICD-10-CM

## 2023-03-17 DIAGNOSIS — K59.09 CHRONIC CONSTIPATION: Primary | ICD-10-CM

## 2023-03-17 DIAGNOSIS — K59.00 CONSTIPATION, UNSPECIFIED CONSTIPATION TYPE: ICD-10-CM

## 2023-03-17 LAB
ALBUMIN SERPL BCP-MCNC: 4.5 G/DL (ref 3.5–5.2)
ALP SERPL-CCNC: 100 U/L (ref 55–135)
ALT SERPL W/O P-5'-P-CCNC: 23 U/L (ref 10–44)
ANION GAP SERPL CALC-SCNC: 15 MMOL/L (ref 8–16)
AST SERPL-CCNC: 29 U/L (ref 10–40)
BASOPHILS # BLD AUTO: 0.06 K/UL (ref 0–0.2)
BASOPHILS NFR BLD: 0.4 % (ref 0–1.9)
BILIRUB SERPL-MCNC: 0.5 MG/DL (ref 0.1–1)
BUN SERPL-MCNC: 16 MG/DL (ref 8–23)
CALCIUM SERPL-MCNC: 9.8 MG/DL (ref 8.7–10.5)
CHLORIDE SERPL-SCNC: 98 MMOL/L (ref 95–110)
CO2 SERPL-SCNC: 23 MMOL/L (ref 23–29)
CREAT SERPL-MCNC: 1.2 MG/DL (ref 0.5–1.4)
DIFFERENTIAL METHOD: ABNORMAL
EOSINOPHIL # BLD AUTO: 0.1 K/UL (ref 0–0.5)
EOSINOPHIL NFR BLD: 0.7 % (ref 0–8)
ERYTHROCYTE [DISTWIDTH] IN BLOOD BY AUTOMATED COUNT: 12.5 % (ref 11.5–14.5)
EST. GFR  (NO RACE VARIABLE): >60 ML/MIN/1.73 M^2
GLUCOSE SERPL-MCNC: 105 MG/DL (ref 70–110)
HCT VFR BLD AUTO: 44.5 % (ref 40–54)
HGB BLD-MCNC: 15.5 G/DL (ref 14–18)
IMM GRANULOCYTES # BLD AUTO: 0.05 K/UL (ref 0–0.04)
IMM GRANULOCYTES NFR BLD AUTO: 0.4 % (ref 0–0.5)
LYMPHOCYTES # BLD AUTO: 1.4 K/UL (ref 1–4.8)
LYMPHOCYTES NFR BLD: 10 % (ref 18–48)
MAGNESIUM SERPL-MCNC: 1.8 MG/DL (ref 1.6–2.6)
MCH RBC QN AUTO: 30.9 PG (ref 27–31)
MCHC RBC AUTO-ENTMCNC: 34.8 G/DL (ref 32–36)
MCV RBC AUTO: 89 FL (ref 82–98)
MONOCYTES # BLD AUTO: 0.7 K/UL (ref 0.3–1)
MONOCYTES NFR BLD: 5.2 % (ref 4–15)
NEUTROPHILS # BLD AUTO: 11.5 K/UL (ref 1.8–7.7)
NEUTROPHILS NFR BLD: 83.3 % (ref 38–73)
NRBC BLD-RTO: 0 /100 WBC
PLATELET # BLD AUTO: 338 K/UL (ref 150–450)
PMV BLD AUTO: 8.6 FL (ref 9.2–12.9)
POTASSIUM SERPL-SCNC: 3.7 MMOL/L (ref 3.5–5.1)
PROT SERPL-MCNC: 8.4 G/DL (ref 6–8.4)
RBC # BLD AUTO: 5.02 M/UL (ref 4.6–6.2)
SODIUM SERPL-SCNC: 136 MMOL/L (ref 136–145)
WBC # BLD AUTO: 13.76 K/UL (ref 3.9–12.7)

## 2023-03-17 PROCEDURE — 80053 COMPREHEN METABOLIC PANEL: CPT | Mod: HCNC | Performed by: EMERGENCY MEDICINE

## 2023-03-17 PROCEDURE — 1126F AMNT PAIN NOTED NONE PRSNT: CPT | Mod: HCNC,CPTII,S$GLB, | Performed by: INTERNAL MEDICINE

## 2023-03-17 PROCEDURE — 1160F PR REVIEW ALL MEDS BY PRESCRIBER/CLIN PHARMACIST DOCUMENTED: ICD-10-PCS | Mod: HCNC,CPTII,S$GLB, | Performed by: INTERNAL MEDICINE

## 2023-03-17 PROCEDURE — 85025 COMPLETE CBC W/AUTO DIFF WBC: CPT | Mod: HCNC | Performed by: EMERGENCY MEDICINE

## 2023-03-17 PROCEDURE — 3008F BODY MASS INDEX DOCD: CPT | Mod: HCNC,CPTII,S$GLB, | Performed by: INTERNAL MEDICINE

## 2023-03-17 PROCEDURE — 3074F PR MOST RECENT SYSTOLIC BLOOD PRESSURE < 130 MM HG: ICD-10-PCS | Mod: HCNC,CPTII,S$GLB, | Performed by: INTERNAL MEDICINE

## 2023-03-17 PROCEDURE — 1160F RVW MEDS BY RX/DR IN RCRD: CPT | Mod: HCNC,CPTII,S$GLB, | Performed by: INTERNAL MEDICINE

## 2023-03-17 PROCEDURE — 99283 EMERGENCY DEPT VISIT LOW MDM: CPT | Mod: HCNC

## 2023-03-17 PROCEDURE — 1126F PR PAIN SEVERITY QUANTIFIED, NO PAIN PRESENT: ICD-10-PCS | Mod: HCNC,CPTII,S$GLB, | Performed by: INTERNAL MEDICINE

## 2023-03-17 PROCEDURE — 3074F SYST BP LT 130 MM HG: CPT | Mod: HCNC,CPTII,S$GLB, | Performed by: INTERNAL MEDICINE

## 2023-03-17 PROCEDURE — 99213 OFFICE O/P EST LOW 20 MIN: CPT | Mod: HCNC,S$GLB,, | Performed by: INTERNAL MEDICINE

## 2023-03-17 PROCEDURE — 83735 ASSAY OF MAGNESIUM: CPT | Mod: HCNC | Performed by: EMERGENCY MEDICINE

## 2023-03-17 PROCEDURE — 3078F DIAST BP <80 MM HG: CPT | Mod: HCNC,CPTII,S$GLB, | Performed by: INTERNAL MEDICINE

## 2023-03-17 PROCEDURE — 1159F PR MEDICATION LIST DOCUMENTED IN MEDICAL RECORD: ICD-10-PCS | Mod: HCNC,CPTII,S$GLB, | Performed by: INTERNAL MEDICINE

## 2023-03-17 PROCEDURE — 4010F ACE/ARB THERAPY RXD/TAKEN: CPT | Mod: HCNC,CPTII,S$GLB, | Performed by: INTERNAL MEDICINE

## 2023-03-17 PROCEDURE — 99213 PR OFFICE/OUTPT VISIT, EST, LEVL III, 20-29 MIN: ICD-10-PCS | Mod: HCNC,S$GLB,, | Performed by: INTERNAL MEDICINE

## 2023-03-17 PROCEDURE — 99999 PR PBB SHADOW E&M-EST. PATIENT-LVL III: ICD-10-PCS | Mod: PBBFAC,HCNC,, | Performed by: INTERNAL MEDICINE

## 2023-03-17 PROCEDURE — 3008F PR BODY MASS INDEX (BMI) DOCUMENTED: ICD-10-PCS | Mod: HCNC,CPTII,S$GLB, | Performed by: INTERNAL MEDICINE

## 2023-03-17 PROCEDURE — 3078F PR MOST RECENT DIASTOLIC BLOOD PRESSURE < 80 MM HG: ICD-10-PCS | Mod: HCNC,CPTII,S$GLB, | Performed by: INTERNAL MEDICINE

## 2023-03-17 PROCEDURE — 99999 PR PBB SHADOW E&M-EST. PATIENT-LVL III: CPT | Mod: PBBFAC,HCNC,, | Performed by: INTERNAL MEDICINE

## 2023-03-17 PROCEDURE — 4010F PR ACE/ARB THEARPY RXD/TAKEN: ICD-10-PCS | Mod: HCNC,CPTII,S$GLB, | Performed by: INTERNAL MEDICINE

## 2023-03-17 PROCEDURE — 1159F MED LIST DOCD IN RCRD: CPT | Mod: HCNC,CPTII,S$GLB, | Performed by: INTERNAL MEDICINE

## 2023-03-17 RX ORDER — BISACODYL 5 MG
10 TABLET, DELAYED RELEASE (ENTERIC COATED) ORAL ONCE
Status: DISCONTINUED | OUTPATIENT
Start: 2023-03-17 | End: 2023-03-17 | Stop reason: HOSPADM

## 2023-03-17 RX ORDER — LISINOPRIL AND HYDROCHLOROTHIAZIDE 10; 12.5 MG/1; MG/1
1 TABLET ORAL DAILY
COMMUNITY
Start: 2023-01-14 | End: 2023-03-22 | Stop reason: SDUPTHER

## 2023-03-17 NOTE — PROGRESS NOTES
HPI:  Patient is a 72-year-old man who comes today with complaints of abdominal discomfort and problems with constipation.  He is taking something over-the-counter but he does not know what it is.  He is not had a bowel movement in about 3 days now.  He had similar problems 2 weeks ago was seen in the emergency room for constipation.  I have reviewed those records and lab work.    Current meds have been verified and updated per the EMR  Exam:/62 (BP Location: Right arm, Patient Position: Sitting, BP Method: Large (Manual))   Pulse 62   Temp 98.3 °F (36.8 °C) (Tympanic)   Ht 6' (1.829 m)   Wt 79.4 kg (175 lb 0.7 oz)   SpO2 98%   BMI 23.74 kg/m²   Abdomen soft and benign.  No rebound or guarding or distention.  Bowel sounds normal.  Nontender.  Rectal exam shows soft stool in the vault  Lab Results   Component Value Date    WBC 6.10 03/04/2023    HGB 14.5 03/04/2023    HCT 42.2 03/04/2023     03/04/2023    CHOL 185 07/28/2021    TRIG 118 07/28/2021    HDL 66 07/28/2021    ALT 21 03/04/2023    AST 20 03/04/2023     03/04/2023    K 3.8 03/04/2023     03/04/2023    CREATININE 1.1 03/04/2023    BUN 13 03/04/2023    CO2 27 03/04/2023    TSH <0.010 (L) 10/14/2022    PSA 3.8 08/05/2022       Impression:  Constipation  Patient Active Problem List   Diagnosis    Benign prostatic hyperplasia without lower urinary tract symptoms    History of tobacco abuse    Alcohol dependence    Spondylosis of lumbar region without myelopathy or radiculopathy    Osteoarthritis of multiple joints    DDD (degenerative disc disease), lumbar    History of colonic polyps    Aortic atherosclerosis    Hypertension    COPD (chronic obstructive pulmonary disease)    Hyperthyroidism    Right shoulder pain    Weakness of both upper extremities       Plan:     Patient was encouraged today to take 2 Fleet's enemas to clear out his colon.  Needs to start taking Metamucil and stool softener every day of his life.  He needs to  drink plenty of fluids per day.  If no bowel movement in 3 days on the 4th day should start taking MiraLax daily until he has a bowel movement.    This note is generated with speech recognition software and is subject to transcription error and sound alike phrases that may be missed by proofreading.

## 2023-03-17 NOTE — ED PROVIDER NOTES
SCRIBE #1 NOTE: I, Iliana Ken, am scribing for, and in the presence of, Wicho Paul MD. I have scribed the entire note.       History     Chief Complaint   Patient presents with    Abdominal Pain     Pt c/o abdominal pain x 1 week, LBM X 4 days, seen at  denies N/V     Review of patient's allergies indicates:  No Known Allergies      History of Present Illness     HPI    3/17/2023, 4:22 PM  History obtained from the patient      History of Present Illness: Harpreet Perez is a 72 y.o. male patient with a PMHx of HTN and COPD who presents to the Emergency Department for evaluation of abdominal pain x 2wks. Pt also reports constipation x 4 days. Pt states he went to  this AM where he was given an enema which allowed him to have a small bowel movement. Symptoms are constant and moderate in severity. No mitigating or exacerbating factors reported. Associated sxs include rectal pain. Patient denies any N/V, dysuria, and all other sxs at this time. No further complaints or concerns at this time.       Arrival mode: Ambulance service    PCP: Srinivas Zabala MD        Past Medical History:  Past Medical History:   Diagnosis Date    Alcohol dependence     Back pain     COPD (chronic obstructive pulmonary disease)     History of colonic polyps     Hypertension     Hypertrophy of prostate without urinary obstruction and other lower urinary tract symptoms (LUTS)     Osteoarthritis of multiple joints     Tobacco dependence     quit then restarted       Past Surgical History:  Past Surgical History:   Procedure Laterality Date    COLONOSCOPY N/A 5/25/2016    Procedure: COLONOSCOPY;  Surgeon: Sergey Jensen MD;  Location: [facility] ENDO;  Service: Endoscopy;  Laterality: N/A;    COLONOSCOPY N/A 11/15/2021    Procedure: COLONOSCOPY;  Surgeon: Francia Lucio MD;  Location: [facility] ENDO;  Service: Endoscopy;  Laterality: N/A;    LIPOMA RESECTION Right 8/17/12    posterior shoulder         Family History:  Family History   Problem  Relation Age of Onset    Stroke Father     Heart disease Brother 35        MI    Heart disease Other 33        MI    Hyperlipidemia Brother     Hypertension Brother     Hypertension Brother     Heart disease Brother         cardiac stents    Kidney disease Other         dialysis    Cataracts Mother     Hypertension Mother     Cataracts Sister     Alcohol abuse Neg Hx     Asthma Neg Hx     Birth defects Neg Hx     Cancer Neg Hx     COPD Neg Hx     Depression Neg Hx     Diabetes Neg Hx     Drug abuse Neg Hx     Mental retardation Neg Hx     Mental illness Neg Hx     Learning disabilities Neg Hx     Miscarriages / Stillbirths Neg Hx     Vision loss Neg Hx        Social History:  Social History     Tobacco Use    Smoking status: Former     Packs/day: 0.50     Years: 35.00     Pack years: 17.50     Types: Cigarettes     Start date: 1979     Quit date: 2014     Years since quittin.3    Smokeless tobacco: Never    Tobacco comments:     resarted 2 weeks ago after being quit x 6 months. wife also smkes    Substance and Sexual Activity    Alcohol use: Yes     Alcohol/week: 3.0 standard drinks     Types: 3 Cans of beer per week     Comment: case per week or up to 30 per week    Drug use: No    Sexual activity: Yes     Partners: Female        Review of Systems     Review of Systems   Gastrointestinal:  Positive for abdominal pain and constipation. Negative for nausea and vomiting.        (+) rectal pain   Genitourinary:  Negative for dysuria.   All other systems reviewed and are negative.   Physical Exam     Initial Vitals [23 1553]   BP Pulse Resp Temp SpO2   134/65 81 18 98.2 °F (36.8 °C) 98 %      MAP       --          Physical Exam  Nursing Notes and Vital Signs Reviewed.  Constitutional: Patient is in no acute distress. Well-developed and well-nourished.  Head: Atraumatic. Normocephalic.  Eyes: PERRL. EOM intact. Conjunctivae are not pale. No scleral icterus.  ENT: Mucous membranes are moist. Oropharynx  "is clear and symmetric.    Neck: Supple. Full ROM. No lymphadenopathy.  Cardiovascular: Regular rate. Regular rhythm. No murmurs, rubs, or gallops. Distal pulses are 2+ and symmetric.  Pulmonary/Chest: No respiratory distress. Clear to auscultation bilaterally. No wheezing or rales.  Abdominal: Soft and non-distended.  Mild diffuse abdominal tenderness.  No rebound, guarding, or rigidity.  Rectal: Fe/male chaperone present for the duration of the rectal exam. Normal tone. Fecal mass in rectum.  Genitourinary: No CVA tenderness  Musculoskeletal: Moves all extremities. No obvious deformities. No edema. No calf tenderness.  Skin: Warm and dry.  Neurological:  Alert, awake, and appropriate.  Normal speech.  No acute focal neurological deficits are appreciated.  Psychiatric: Normal affect. Good eye contact. Appropriate in content.     ED Course   Procedures  ED Vital Signs:  Vitals:    03/17/23 1553   BP: 134/65   Pulse: 81   Resp: 18   Temp: 98.2 °F (36.8 °C)   TempSrc: Oral   SpO2: 98%   Weight: 77.1 kg (170 lb)   Height: 5' 11" (1.803 m)       Abnormal Lab Results:  Labs Reviewed   CBC W/ AUTO DIFFERENTIAL - Abnormal; Notable for the following components:       Result Value    WBC 13.76 (*)     MPV 8.6 (*)     Gran # (ANC) 11.5 (*)     Immature Grans (Abs) 0.05 (*)     Gran % 83.3 (*)     Lymph % 10.0 (*)     All other components within normal limits   COMPREHENSIVE METABOLIC PANEL   MAGNESIUM        All Lab Results:      Imaging Results:  Imaging Results    None          The EKG was ordered, reviewed, and independently interpreted by the ED provider.  Interpretation time:   Rate:  BPM  Rhythm:   Interpretation: . No STEMI.  When compared to EKG performed , there are no significant changes.           The Emergency Provider reviewed the vital signs and test results, which are outlined above.     ED Discussion           ED Course as of 03/17/23 1827   Fri Mar 17, 2023   1818 PT said he is feeling better after having a " BM here. Abdominal pain is gone [WB]      ED Course User Index  [WB] Wicho Paul MD     Medical Decision Making:   Clinical Tests:   Lab Tests: Reviewed and Ordered  ED Management:  Pt with abdominal pain and constipation. On exam hard stool in rectum. Pt able to have BM here after KY placed in rectum. CBC, CMP, Mg normal except for WBC 14. Abdomen non tender prior to discharge         ED Medication(s):  Medications   bisacodyL EC tablet 10 mg (has no administration in time range)       New Prescriptions    No medications on file        Follow-up Information       see your doctor within 3 days.                                 Scribe Attestation:   Scribe #1: I performed the above scribed service and the documentation accurately describes the services I performed. I attest to the accuracy of the note.     Attending:   Physician Attestation Statement for Scribe #1: I, Wicho Paul MD, personally performed the services described in this documentation, as scribed by Iliana Rogers, in my presence, and it is both accurate and complete.           Clinical Impression       ICD-10-CM ICD-9-CM   1. Abdominal pain, unspecified abdominal location  R10.9 789.00   2. Constipation, unspecified constipation type  K59.00 564.00               Wicho Paul MD  03/17/23 7719

## 2023-03-22 ENCOUNTER — OFFICE VISIT (OUTPATIENT)
Dept: INTERNAL MEDICINE | Facility: CLINIC | Age: 73
End: 2023-03-22
Payer: MEDICARE

## 2023-03-22 VITALS
HEART RATE: 51 BPM | BODY MASS INDEX: 23.48 KG/M2 | SYSTOLIC BLOOD PRESSURE: 110 MMHG | HEIGHT: 71 IN | OXYGEN SATURATION: 95 % | DIASTOLIC BLOOD PRESSURE: 70 MMHG | WEIGHT: 167.75 LBS | TEMPERATURE: 97 F

## 2023-03-22 DIAGNOSIS — I10 ESSENTIAL HYPERTENSION: ICD-10-CM

## 2023-03-22 DIAGNOSIS — J43.2 CENTRILOBULAR EMPHYSEMA: ICD-10-CM

## 2023-03-22 DIAGNOSIS — E05.90 HYPERTHYROIDISM: Primary | ICD-10-CM

## 2023-03-22 DIAGNOSIS — F17.200 TOBACCO DEPENDENCE: ICD-10-CM

## 2023-03-22 DIAGNOSIS — Z87.898 HISTORY OF ALCOHOL USE: ICD-10-CM

## 2023-03-22 DIAGNOSIS — I70.0 AORTIC ATHEROSCLEROSIS: ICD-10-CM

## 2023-03-22 DIAGNOSIS — N40.0 BENIGN PROSTATIC HYPERPLASIA WITHOUT LOWER URINARY TRACT SYMPTOMS: ICD-10-CM

## 2023-03-22 DIAGNOSIS — F17.210 SMOKING GREATER THAN 20 PACK YEARS: ICD-10-CM

## 2023-03-22 DIAGNOSIS — R21 RASH: ICD-10-CM

## 2023-03-22 DIAGNOSIS — K58.1 IRRITABLE BOWEL SYNDROME WITH CONSTIPATION: ICD-10-CM

## 2023-03-22 DIAGNOSIS — F10.20 UNCOMPLICATED ALCOHOL DEPENDENCE: Chronic | ICD-10-CM

## 2023-03-22 DIAGNOSIS — F17.200 SMOKER: ICD-10-CM

## 2023-03-22 DIAGNOSIS — Z12.5 ENCOUNTER FOR SCREENING FOR MALIGNANT NEOPLASM OF PROSTATE: ICD-10-CM

## 2023-03-22 DIAGNOSIS — I10 PRIMARY HYPERTENSION: ICD-10-CM

## 2023-03-22 PROCEDURE — 3008F BODY MASS INDEX DOCD: CPT | Mod: HCNC,CPTII,S$GLB, | Performed by: INTERNAL MEDICINE

## 2023-03-22 PROCEDURE — 3288F PR FALLS RISK ASSESSMENT DOCUMENTED: ICD-10-PCS | Mod: HCNC,CPTII,S$GLB, | Performed by: INTERNAL MEDICINE

## 2023-03-22 PROCEDURE — 3074F SYST BP LT 130 MM HG: CPT | Mod: HCNC,CPTII,S$GLB, | Performed by: INTERNAL MEDICINE

## 2023-03-22 PROCEDURE — 99215 PR OFFICE/OUTPT VISIT, EST, LEVL V, 40-54 MIN: ICD-10-PCS | Mod: HCNC,S$GLB,, | Performed by: INTERNAL MEDICINE

## 2023-03-22 PROCEDURE — 1159F MED LIST DOCD IN RCRD: CPT | Mod: HCNC,CPTII,S$GLB, | Performed by: INTERNAL MEDICINE

## 2023-03-22 PROCEDURE — 1126F PR PAIN SEVERITY QUANTIFIED, NO PAIN PRESENT: ICD-10-PCS | Mod: HCNC,CPTII,S$GLB, | Performed by: INTERNAL MEDICINE

## 2023-03-22 PROCEDURE — 99999 PR PBB SHADOW E&M-EST. PATIENT-LVL IV: ICD-10-PCS | Mod: PBBFAC,HCNC,, | Performed by: INTERNAL MEDICINE

## 2023-03-22 PROCEDURE — 99999 PR PBB SHADOW E&M-EST. PATIENT-LVL IV: CPT | Mod: PBBFAC,HCNC,, | Performed by: INTERNAL MEDICINE

## 2023-03-22 PROCEDURE — 99215 OFFICE O/P EST HI 40 MIN: CPT | Mod: HCNC,S$GLB,, | Performed by: INTERNAL MEDICINE

## 2023-03-22 PROCEDURE — 3078F DIAST BP <80 MM HG: CPT | Mod: HCNC,CPTII,S$GLB, | Performed by: INTERNAL MEDICINE

## 2023-03-22 PROCEDURE — 1101F PR PT FALLS ASSESS DOC 0-1 FALLS W/OUT INJ PAST YR: ICD-10-PCS | Mod: HCNC,CPTII,S$GLB, | Performed by: INTERNAL MEDICINE

## 2023-03-22 PROCEDURE — 4010F ACE/ARB THERAPY RXD/TAKEN: CPT | Mod: HCNC,CPTII,S$GLB, | Performed by: INTERNAL MEDICINE

## 2023-03-22 PROCEDURE — 3288F FALL RISK ASSESSMENT DOCD: CPT | Mod: HCNC,CPTII,S$GLB, | Performed by: INTERNAL MEDICINE

## 2023-03-22 PROCEDURE — 1101F PT FALLS ASSESS-DOCD LE1/YR: CPT | Mod: HCNC,CPTII,S$GLB, | Performed by: INTERNAL MEDICINE

## 2023-03-22 PROCEDURE — 1126F AMNT PAIN NOTED NONE PRSNT: CPT | Mod: HCNC,CPTII,S$GLB, | Performed by: INTERNAL MEDICINE

## 2023-03-22 PROCEDURE — 3008F PR BODY MASS INDEX (BMI) DOCUMENTED: ICD-10-PCS | Mod: HCNC,CPTII,S$GLB, | Performed by: INTERNAL MEDICINE

## 2023-03-22 PROCEDURE — 3074F PR MOST RECENT SYSTOLIC BLOOD PRESSURE < 130 MM HG: ICD-10-PCS | Mod: HCNC,CPTII,S$GLB, | Performed by: INTERNAL MEDICINE

## 2023-03-22 PROCEDURE — 4010F PR ACE/ARB THEARPY RXD/TAKEN: ICD-10-PCS | Mod: HCNC,CPTII,S$GLB, | Performed by: INTERNAL MEDICINE

## 2023-03-22 PROCEDURE — 1159F PR MEDICATION LIST DOCUMENTED IN MEDICAL RECORD: ICD-10-PCS | Mod: HCNC,CPTII,S$GLB, | Performed by: INTERNAL MEDICINE

## 2023-03-22 PROCEDURE — 3078F PR MOST RECENT DIASTOLIC BLOOD PRESSURE < 80 MM HG: ICD-10-PCS | Mod: HCNC,CPTII,S$GLB, | Performed by: INTERNAL MEDICINE

## 2023-03-22 RX ORDER — TRIAMCINOLONE ACETONIDE 1 MG/G
CREAM TOPICAL 2 TIMES DAILY
Qty: 45 EACH | Refills: 0 | Status: SHIPPED | OUTPATIENT
Start: 2023-03-22 | End: 2023-06-20 | Stop reason: ALTCHOICE

## 2023-03-22 RX ORDER — LISINOPRIL AND HYDROCHLOROTHIAZIDE 10; 12.5 MG/1; MG/1
1 TABLET ORAL DAILY
Qty: 90 TABLET | Refills: 1 | Status: SHIPPED | OUTPATIENT
Start: 2023-03-22 | End: 2023-06-20 | Stop reason: ALTCHOICE

## 2023-03-22 RX ORDER — TRIAMCINOLONE ACETONIDE 1 MG/G
CREAM TOPICAL 2 TIMES DAILY
Qty: 45 EACH | Refills: 0 | Status: SHIPPED | OUTPATIENT
Start: 2023-03-22 | End: 2023-03-22 | Stop reason: SDUPTHER

## 2023-03-22 RX ORDER — DICYCLOMINE HYDROCHLORIDE 20 MG/1
20 TABLET ORAL 3 TIMES DAILY PRN
Qty: 270 TABLET | Refills: 3 | Status: SHIPPED | OUTPATIENT
Start: 2023-03-22 | End: 2023-06-20

## 2023-03-22 RX ORDER — ATENOLOL 50 MG/1
50 TABLET ORAL DAILY
Qty: 90 TABLET | Refills: 3 | Status: SHIPPED | OUTPATIENT
Start: 2023-03-22 | End: 2023-06-20

## 2023-03-22 RX ORDER — TAMSULOSIN HYDROCHLORIDE 0.4 MG/1
0.4 CAPSULE ORAL DAILY
Qty: 90 CAPSULE | Refills: 3 | Status: SHIPPED | OUTPATIENT
Start: 2023-03-22

## 2023-03-22 NOTE — PATIENT INSTRUCTIONS
Check with your pharmacy regarding new shingles vaccine.  Check with your pharmacy regarding new shingles vaccine.

## 2023-03-22 NOTE — PROGRESS NOTES
"Subjective:      Patient ID: Harpreet Perez is a 72 y.o. male.    Chief Complaint: Establish Care    HPI    71 yo with   Patient Active Problem List   Diagnosis    Benign prostatic hyperplasia without lower urinary tract symptoms    Smoker    Alcohol dependence    Spondylosis of lumbar region without myelopathy or radiculopathy    Osteoarthritis of multiple joints    DDD (degenerative disc disease), lumbar    History of colonic polyps    Aortic atherosclerosis    Hypertension    COPD (chronic obstructive pulmonary disease)    Hyperthyroidism    Right shoulder pain    Weakness of both upper extremities     Past Medical History:   Diagnosis Date    Alcohol dependence     Back pain     COPD (chronic obstructive pulmonary disease)     History of colonic polyps     Hypertension     Hypertrophy of prostate without urinary obstruction and other lower urinary tract symptoms (LUTS)     Osteoarthritis of multiple joints     Tobacco dependence     quit then restarted     Here today for management of mult med problems as outlined below.  Compliant with meds without significant side effects. Energy and appetite good.     Pt reports average of a little over 20 pack year history of cigarette smoking. No cigs in one month.     Pt also c/o rash to right arm starting 2 to 3 days ago. Admits to trimming hedges and other yard work days prior. No pain. No respiratory symptoms.     Review of Systems   Constitutional:  Negative for chills and fever.   HENT:  Negative for ear pain and sore throat.    Respiratory:  Negative for cough.    Cardiovascular:  Negative for chest pain.   Gastrointestinal:  Negative for abdominal pain and blood in stool.   Genitourinary:  Negative for dysuria and hematuria.   Neurological:  Negative for seizures and syncope.   Objective:   /70 (BP Location: Left arm, Patient Position: Sitting, BP Method: Large (Manual))   Pulse (!) 51   Temp 97 °F (36.1 °C) (Tympanic)   Ht 5' 11" (1.803 m)   Wt 76.1 kg " (167 lb 12.3 oz)   SpO2 95%   BMI 23.40 kg/m²     Physical Exam  Constitutional:       General: He is not in acute distress.     Appearance: He is well-developed.   HENT:      Head: Normocephalic and atraumatic.   Eyes:      Extraocular Movements: Extraocular movements intact.   Neck:      Thyroid: No thyromegaly.   Cardiovascular:      Rate and Rhythm: Normal rate and regular rhythm.   Pulmonary:      Breath sounds: Normal breath sounds. No wheezing or rales.   Abdominal:      General: Bowel sounds are normal.      Palpations: Abdomen is soft.      Tenderness: There is no abdominal tenderness.   Musculoskeletal:         General: No swelling.      Cervical back: Neck supple. No rigidity.   Lymphadenopathy:      Cervical: No cervical adenopathy.   Skin:     General: Skin is warm and dry.   Neurological:      Mental Status: He is alert and oriented to person, place, and time.   Psychiatric:         Behavior: Behavior normal.     Papular rash right antecubital fossa and radial aspect of distal forearm. No ttp. No evidence of infection.     Lab Results   Component Value Date    WBC 13.76 (H) 03/17/2023    HGB 15.5 03/17/2023    HGB 14.5 03/04/2023    HGB 12.0 (L) 10/14/2022    HCT 44.5 03/17/2023    MCV 89 03/17/2023    MCV 89 03/04/2023    MCV 92 10/14/2022     03/17/2023    CHOL 185 07/28/2021    TRIG 118 07/28/2021    HDL 66 07/28/2021    LDLCALC 95.4 07/28/2021    LDLCALC 103.4 01/26/2021    LDLCALC 117.2 07/24/2020    ALT 23 03/17/2023    AST 29 03/17/2023     03/17/2023    K 3.7 03/17/2023    CL 98 03/17/2023    CO2 23 03/17/2023    BUN 16 03/17/2023    CREATININE 1.2 03/17/2023    CREATININE 1.1 03/04/2023    CREATININE 1.1 08/05/2022    EGFRNORACEVR >60 03/17/2023    EGFRNORACEVR >60 03/04/2023    EGFRNORACEVR >60.0 08/05/2022    TSH <0.010 (L) 10/14/2022    TSH <0.010 (L) 08/05/2022    TSH 0.880 07/28/2021    PSA 3.8 08/05/2022    PSA 2.1 07/28/2021    PSA 1.3 07/24/2020     03/17/2023           The 10-year ASCVD risk score (Elisabeth GLASGOW, et al., 2019) is: 14.5%    Values used to calculate the score:      Age: 72 years      Sex: Male      Is Non- : Yes      Diabetic: No      Tobacco smoker: No      Systolic Blood Pressure: 110 mmHg      Is BP treated: Yes      HDL Cholesterol: 66 mg/dL      Total Cholesterol: 185 mg/dL     Assessment:     1. Hyperthyroidism    2. Primary hypertension    3. Centrilobular emphysema    4. Encounter for screening for malignant neoplasm of prostate    5. History of alcohol use    6. Irritable bowel syndrome with constipation    7. Tobacco dependence    8. Essential hypertension    9. Rash    10. Benign prostatic hyperplasia without lower urinary tract symptoms    11. Smoking greater than 20 pack years    12. Uncomplicated alcohol dependence    13. Aortic atherosclerosis    14. Smoker      Plan:   1. Hyperthyroidism  Assessment & Plan:  methemazole initiated by dr. Zabala 9/2022    Orders:  -     TSH; Future; Expected date: 03/22/2023  -     T4, Free; Future; Expected date: 03/22/2023    2. Primary hypertension  Overview:  Controlled.  Continue current medications    Orders:  -     CBC Auto Differential; Future; Expected date: 08/23/2023  -     Comprehensive Metabolic Panel; Future; Expected date: 08/23/2023  -     lisinopriL-hydrochlorothiazide (PRINZIDE,ZESTORETIC) 10-12.5 mg per tablet; Take 1 tablet by mouth once daily.  Dispense: 90 tablet; Refill: 1  -     atenoloL (TENORMIN) 50 MG tablet; Take 1 tablet (50 mg total) by mouth once daily.  Dispense: 90 tablet; Refill: 3    3. Centrilobular emphysema  Overview:  No recent flares.      4. Encounter for screening for malignant neoplasm of prostate  -     PSA, Screening; Future; Expected date: 08/23/2023    5. History of alcohol use    6. Irritable bowel syndrome with constipation  -     dicyclomine (BENTYL) 20 mg tablet; Take 1 tablet (20 mg total) by mouth 3 (three) times daily as needed (stomach  cramping).  Dispense: 270 tablet; Refill: 3    7. Tobacco dependence    8. Essential hypertension    9. Rash  -     Discontinue: triamcinolone acetonide 0.1% (KENALOG) 0.1 % cream; Apply topically 2 (two) times daily.  Dispense: 45 each; Refill: 0  -     triamcinolone acetonide 0.1% (KENALOG) 0.1 % cream; Apply topically 2 (two) times daily.  Dispense: 45 each; Refill: 0    10. Benign prostatic hyperplasia without lower urinary tract symptoms  Overview:  Reports symptoms much improved with current medications.    Orders:  -     tamsulosin (FLOMAX) 0.4 mg Cap; Take 1 capsule (0.4 mg total) by mouth once daily.  Dispense: 90 capsule; Refill: 3    11. Smoking greater than 20 pack years  -     CT Chest Lung Screening Low Dose; Future; Expected date: 03/22/2023    12. Uncomplicated alcohol dependence  Overview:  Reports no alcohol in 1 month.      13. Aortic atherosclerosis  Overview:  CXR 8/13/12    Asymptomatic      14. Smoker  Overview:  No cigarettes in 1 month.    Assessment & Plan:  Advised cessation.       40+ minutes of total time spent on the encounter, which includes face to face time and non-face to face time preparing to see the patient (eg, review of tests), Obtaining and/or reviewing separately obtained history, documenting clinical information in the electronic or other health record, independently interpreting results (not separately reported) and communicating results to the patient/family/caregiver, or Care coordination (not separately reported).       Patient Instructions   Check with your pharmacy regarding new shingles vaccine.  Check with your pharmacy regarding new shingles vaccine.      Future Appointments   Date Time Provider Department Center   4/4/2023  3:10 PM [facility] CT1 LIMIT 500 LBS [facility] CT SCAN [facility]   4/5/2023  7:00 AM Navin Chirinos NP [facility]   6/15/2023  9:40 AM LABORATORY, [facility] LAB [facility]   6/20/2023 11:40 AM Thompson Christensen MD [facility]   8/22/2023  9:35  AM LABORATORY, [facility]       Lab Frequency Next Occurrence   Ambulatory referral/consult to Smoking Cessation Program Once 06/07/2022   TSH Once 12/17/2022   CBC Auto Differential Once 12/17/2022       Follow up in about 5 months (around 8/29/2023), or if symptoms worsen or fail to improve.

## 2023-04-04 ENCOUNTER — HOSPITAL ENCOUNTER (OUTPATIENT)
Dept: RADIOLOGY | Facility: HOSPITAL | Age: 73
Discharge: HOME | End: 2023-04-04
Attending: INTERNAL MEDICINE
Payer: MEDICARE

## 2023-04-04 DIAGNOSIS — F17.210 SMOKING GREATER THAN 20 PACK YEARS: ICD-10-CM

## 2023-04-04 PROCEDURE — 71271 CT THORAX LUNG CANCER SCR C-: CPT | Mod: TC,HCNC

## 2023-04-04 PROCEDURE — 71271 CT CHEST LUNG SCREENING LOW DOSE: ICD-10-PCS | Mod: 26,HCNC,, | Performed by: RADIOLOGY

## 2023-04-04 PROCEDURE — 71271 CT THORAX LUNG CANCER SCR C-: CPT | Mod: 26,HCNC,, | Performed by: RADIOLOGY

## 2023-05-22 ENCOUNTER — PES CALL (OUTPATIENT)
Dept: ADMINISTRATIVE | Facility: CLINIC | Age: 73
End: 2023-05-22
Payer: MEDICARE

## 2023-06-15 ENCOUNTER — PATIENT MESSAGE (OUTPATIENT)
Dept: INTERNAL MEDICINE | Facility: CLINIC | Age: 73
End: 2023-06-15
Payer: MEDICARE

## 2023-06-15 ENCOUNTER — LAB VISIT (OUTPATIENT)
Dept: LAB | Facility: HOSPITAL | Age: 73
End: 2023-06-15
Attending: INTERNAL MEDICINE
Payer: MEDICARE

## 2023-06-15 DIAGNOSIS — E05.90 HYPERTHYROIDISM: ICD-10-CM

## 2023-06-15 LAB
T4 FREE SERPL-MCNC: 0.59 NG/DL (ref 0.71–1.51)
TSH SERPL DL<=0.005 MIU/L-ACNC: 12.33 UIU/ML (ref 0.4–4)

## 2023-06-15 PROCEDURE — 84439 ASSAY OF FREE THYROXINE: CPT | Performed by: INTERNAL MEDICINE

## 2023-06-15 PROCEDURE — 84443 ASSAY THYROID STIM HORMONE: CPT | Performed by: INTERNAL MEDICINE

## 2023-06-15 PROCEDURE — 36415 COLL VENOUS BLD VENIPUNCTURE: CPT | Performed by: INTERNAL MEDICINE

## 2023-06-16 NOTE — TELEPHONE ENCOUNTER
Thompson,   Mr hCris had some lab done and the results came to me and I see has established care with you. You might want to look at his lab work to address his thyroid medication.     Thanks,  Srinivas

## 2023-06-20 ENCOUNTER — OFFICE VISIT (OUTPATIENT)
Dept: INTERNAL MEDICINE | Facility: CLINIC | Age: 73
End: 2023-06-20
Payer: MEDICARE

## 2023-06-20 VITALS
HEIGHT: 71 IN | BODY MASS INDEX: 23.83 KG/M2 | OXYGEN SATURATION: 99 % | WEIGHT: 170.19 LBS | TEMPERATURE: 98 F | HEART RATE: 50 BPM | SYSTOLIC BLOOD PRESSURE: 96 MMHG | DIASTOLIC BLOOD PRESSURE: 50 MMHG

## 2023-06-20 DIAGNOSIS — E05.90 HYPERTHYROIDISM: Primary | ICD-10-CM

## 2023-06-20 DIAGNOSIS — I10 PRIMARY HYPERTENSION: ICD-10-CM

## 2023-06-20 DIAGNOSIS — F17.200 SMOKER: ICD-10-CM

## 2023-06-20 PROCEDURE — 1126F PR PAIN SEVERITY QUANTIFIED, NO PAIN PRESENT: ICD-10-PCS | Mod: CPTII,S$GLB,, | Performed by: INTERNAL MEDICINE

## 2023-06-20 PROCEDURE — 3078F DIAST BP <80 MM HG: CPT | Mod: CPTII,S$GLB,, | Performed by: INTERNAL MEDICINE

## 2023-06-20 PROCEDURE — 1159F MED LIST DOCD IN RCRD: CPT | Mod: CPTII,S$GLB,, | Performed by: INTERNAL MEDICINE

## 2023-06-20 PROCEDURE — 99999 PR PBB SHADOW E&M-EST. PATIENT-LVL IV: ICD-10-PCS | Mod: PBBFAC,,, | Performed by: INTERNAL MEDICINE

## 2023-06-20 PROCEDURE — 4010F ACE/ARB THERAPY RXD/TAKEN: CPT | Mod: CPTII,S$GLB,, | Performed by: INTERNAL MEDICINE

## 2023-06-20 PROCEDURE — 1159F PR MEDICATION LIST DOCUMENTED IN MEDICAL RECORD: ICD-10-PCS | Mod: CPTII,S$GLB,, | Performed by: INTERNAL MEDICINE

## 2023-06-20 PROCEDURE — 1101F PT FALLS ASSESS-DOCD LE1/YR: CPT | Mod: CPTII,S$GLB,, | Performed by: INTERNAL MEDICINE

## 2023-06-20 PROCEDURE — 1126F AMNT PAIN NOTED NONE PRSNT: CPT | Mod: CPTII,S$GLB,, | Performed by: INTERNAL MEDICINE

## 2023-06-20 PROCEDURE — 3074F PR MOST RECENT SYSTOLIC BLOOD PRESSURE < 130 MM HG: ICD-10-PCS | Mod: CPTII,S$GLB,, | Performed by: INTERNAL MEDICINE

## 2023-06-20 PROCEDURE — 3008F PR BODY MASS INDEX (BMI) DOCUMENTED: ICD-10-PCS | Mod: CPTII,S$GLB,, | Performed by: INTERNAL MEDICINE

## 2023-06-20 PROCEDURE — 3008F BODY MASS INDEX DOCD: CPT | Mod: CPTII,S$GLB,, | Performed by: INTERNAL MEDICINE

## 2023-06-20 PROCEDURE — 99999 PR PBB SHADOW E&M-EST. PATIENT-LVL IV: CPT | Mod: PBBFAC,,, | Performed by: INTERNAL MEDICINE

## 2023-06-20 PROCEDURE — 3288F FALL RISK ASSESSMENT DOCD: CPT | Mod: CPTII,S$GLB,, | Performed by: INTERNAL MEDICINE

## 2023-06-20 PROCEDURE — 3288F PR FALLS RISK ASSESSMENT DOCUMENTED: ICD-10-PCS | Mod: CPTII,S$GLB,, | Performed by: INTERNAL MEDICINE

## 2023-06-20 PROCEDURE — 99214 OFFICE O/P EST MOD 30 MIN: CPT | Mod: S$GLB,,, | Performed by: INTERNAL MEDICINE

## 2023-06-20 PROCEDURE — 99214 PR OFFICE/OUTPT VISIT, EST, LEVL IV, 30-39 MIN: ICD-10-PCS | Mod: S$GLB,,, | Performed by: INTERNAL MEDICINE

## 2023-06-20 PROCEDURE — 3078F PR MOST RECENT DIASTOLIC BLOOD PRESSURE < 80 MM HG: ICD-10-PCS | Mod: CPTII,S$GLB,, | Performed by: INTERNAL MEDICINE

## 2023-06-20 PROCEDURE — 3074F SYST BP LT 130 MM HG: CPT | Mod: CPTII,S$GLB,, | Performed by: INTERNAL MEDICINE

## 2023-06-20 PROCEDURE — 1101F PR PT FALLS ASSESS DOC 0-1 FALLS W/OUT INJ PAST YR: ICD-10-PCS | Mod: CPTII,S$GLB,, | Performed by: INTERNAL MEDICINE

## 2023-06-20 PROCEDURE — 4010F PR ACE/ARB THEARPY RXD/TAKEN: ICD-10-PCS | Mod: CPTII,S$GLB,, | Performed by: INTERNAL MEDICINE

## 2023-06-20 RX ORDER — LISINOPRIL 10 MG/1
10 TABLET ORAL DAILY
Qty: 90 TABLET | Refills: 0 | Status: SHIPPED | OUTPATIENT
Start: 2023-06-20 | End: 2023-06-20

## 2023-06-20 RX ORDER — LISINOPRIL AND HYDROCHLOROTHIAZIDE 10; 12.5 MG/1; MG/1
1 TABLET ORAL DAILY
Qty: 90 TABLET | Refills: 1 | Status: SHIPPED | OUTPATIENT
Start: 2023-06-20 | End: 2023-08-31 | Stop reason: SDUPTHER

## 2023-06-20 RX ORDER — PANTOPRAZOLE SODIUM 40 MG/1
TABLET, DELAYED RELEASE ORAL
COMMUNITY
Start: 2023-05-26 | End: 2023-12-27

## 2023-06-20 NOTE — PROGRESS NOTES
"R Subjective:      Patient ID: Harpreet Perez is a 72 y.o. male.    Chief Complaint: Follow-up    HPI    73 yo with   Patient Active Problem List   Diagnosis    Benign prostatic hyperplasia without lower urinary tract symptoms    Smoker    Alcohol dependence    Spondylosis of lumbar region without myelopathy or radiculopathy    Osteoarthritis of multiple joints    DDD (degenerative disc disease), lumbar    History of colonic polyps    Aortic atherosclerosis    Hypertension    COPD (chronic obstructive pulmonary disease)    Hyperthyroidism    Right shoulder pain    Weakness of both upper extremities     Past Medical History:   Diagnosis Date    Alcohol dependence     Back pain     COPD (chronic obstructive pulmonary disease)     History of colonic polyps     Hypertension     Hypertrophy of prostate without urinary obstruction and other lower urinary tract symptoms (LUTS)     Osteoarthritis of multiple joints     Tobacco dependence     quit then restarted     Here today for management of mult med problems as outlined below.  Compliant with meds without significant side effects. Energy and appetite good.     Reports home bp 120 to 130 systolic  Review of Systems   Constitutional:  Negative for chills and fever.   HENT:  Negative for ear pain and sore throat.    Respiratory:  Negative for cough.    Cardiovascular:  Negative for chest pain.   Gastrointestinal:  Negative for abdominal pain and blood in stool.   Genitourinary:  Negative for dysuria and hematuria.   Neurological:  Negative for seizures and syncope.   Objective:   BP (!) 96/50   Pulse (!) 50   Temp 98.2 °F (36.8 °C) (Tympanic)   Ht 5' 11" (1.803 m)   Wt 77.2 kg (170 lb 3.1 oz)   SpO2 99%   BMI 23.74 kg/m²     Physical Exam  Constitutional:       General: He is awake. He is not in acute distress.     Appearance: Normal appearance. He is well-developed.   HENT:      Head: Normocephalic and atraumatic.   Eyes:      Extraocular Movements: Extraocular " movements intact.      Conjunctiva/sclera: Conjunctivae normal.   Neck:      Thyroid: No thyromegaly.   Cardiovascular:      Rate and Rhythm: Normal rate and regular rhythm.   Pulmonary:      Effort: Pulmonary effort is normal.      Breath sounds: Normal breath sounds. No wheezing or rales.   Abdominal:      General: Bowel sounds are normal.      Palpations: Abdomen is soft.      Tenderness: There is no abdominal tenderness.   Musculoskeletal:         General: No swelling.      Cervical back: Normal range of motion and neck supple. No rigidity.   Lymphadenopathy:      Cervical: No cervical adenopathy.   Skin:     General: Skin is warm and dry.   Neurological:      Mental Status: He is alert and oriented to person, place, and time. Mental status is at baseline.   Psychiatric:         Mood and Affect: Mood normal.         Behavior: Behavior normal. Behavior is cooperative.         Thought Content: Thought content normal.         Judgment: Judgment normal.       Lab Results   Component Value Date    WBC 13.76 (H) 03/17/2023    HGB 15.5 03/17/2023    HGB 14.5 03/04/2023    HGB 12.0 (L) 10/14/2022    HCT 44.5 03/17/2023    MCV 89 03/17/2023    MCV 89 03/04/2023    MCV 92 10/14/2022     03/17/2023    CHOL 185 07/28/2021    TRIG 118 07/28/2021    HDL 66 07/28/2021    LDLCALC 95.4 07/28/2021    LDLCALC 103.4 01/26/2021    LDLCALC 117.2 07/24/2020    ALT 23 03/17/2023    AST 29 03/17/2023     03/17/2023    K 3.7 03/17/2023    CALCIUM 9.8 03/17/2023    CL 98 03/17/2023    CO2 23 03/17/2023    BUN 16 03/17/2023    CREATININE 1.2 03/17/2023    CREATININE 1.1 03/04/2023    CREATININE 1.1 08/05/2022    EGFRNORACEVR >60 03/17/2023    EGFRNORACEVR >60 03/04/2023    EGFRNORACEVR >60.0 08/05/2022    TSH 12.334 (H) 06/15/2023    TSH <0.010 (L) 10/14/2022    TSH <0.010 (L) 08/05/2022    PSA 3.8 08/05/2022    PSA 2.1 07/28/2021    PSA 1.3 07/24/2020     03/17/2023          The 10-year ASCVD risk score (Elisabeth GLASGOW, et  al., 2019) is: 11.4%    Values used to calculate the score:      Age: 72 years      Sex: Male      Is Non- : Yes      Diabetic: No      Tobacco smoker: No      Systolic Blood Pressure: 96 mmHg      Is BP treated: Yes      HDL Cholesterol: 66 mg/dL      Total Cholesterol: 185 mg/dL     Assessment:     1. Hyperthyroidism    2. Primary hypertension    3. Smoker      Plan:   1. Hyperthyroidism  Tsh  elevated. Stop methimazole.   -     TSH; Future; Expected date: 07/20/2023  -     T4, Free; Future; Expected date: 07/20/2023    2. Primary hypertension  Overview:  Controlled.  Continue current medications    Orders:  -     lisinopriL-hydrochlorothiazide (PRINZIDE,ZESTORETIC) 10-12.5 mg per tablet; Take 1 tablet by mouth once daily.  Dispense: 90 tablet; Refill: 1    3. Smoker  Overview:  No cigarettes in 1 month.    Orders:  -     Ambulatory referral/consult to Smoking Cessation Program; Future; Expected date: 06/27/2023    Other orders  -     Discontinue: lisinopriL 10 MG tablet; Take 1 tablet (10 mg total) by mouth once daily.  Dispense: 90 tablet; Refill: 0        There are no Patient Instructions on file for this visit.    Future Appointments   Date Time Provider Department Center   7/20/2023 10:45 AM LABORATORY, [facility]   7/26/2023  8:00 AM Deonna Allen [address]   8/22/2023  9:35 AM LABORATORY, [facility]       Lab Frequency Next Occurrence   Ambulatory referral/consult to Smoking Cessation Program Once 06/07/2022   CBC Auto Differential Once 08/23/2023   Comprehensive Metabolic Panel Once 08/23/2023   PSA, Screening Once 08/23/2023   TSH Once 07/20/2023   T4, Free Once 07/20/2023       Follow up in about 2 months (around 8/20/2023), or if symptoms worsen or fail to improve.

## 2023-07-20 ENCOUNTER — LAB VISIT (OUTPATIENT)
Dept: LAB | Facility: HOSPITAL | Age: 73
End: 2023-07-20
Attending: INTERNAL MEDICINE
Payer: MEDICARE

## 2023-07-20 DIAGNOSIS — E05.90 HYPERTHYROIDISM: ICD-10-CM

## 2023-07-20 LAB
T4 FREE SERPL-MCNC: 0.96 NG/DL (ref 0.71–1.51)
TSH SERPL DL<=0.005 MIU/L-ACNC: 0.28 UIU/ML (ref 0.4–4)

## 2023-07-20 PROCEDURE — 84443 ASSAY THYROID STIM HORMONE: CPT | Mod: HCNC | Performed by: INTERNAL MEDICINE

## 2023-07-20 PROCEDURE — 84439 ASSAY OF FREE THYROXINE: CPT | Mod: HCNC | Performed by: INTERNAL MEDICINE

## 2023-07-20 PROCEDURE — 36415 COLL VENOUS BLD VENIPUNCTURE: CPT | Mod: HCNC | Performed by: INTERNAL MEDICINE

## 2023-07-21 ENCOUNTER — TELEPHONE (OUTPATIENT)
Dept: INTERNAL MEDICINE | Facility: CLINIC | Age: 73
End: 2023-07-21
Payer: MEDICARE

## 2023-07-21 DIAGNOSIS — E05.90 HYPERTHYROIDISM: Primary | ICD-10-CM

## 2023-07-21 DIAGNOSIS — R79.89 ABNORMAL TSH: ICD-10-CM

## 2023-07-21 RX ORDER — METHIMAZOLE 5 MG/1
5 TABLET ORAL 3 TIMES DAILY
Qty: 90 TABLET | Refills: 0 | Status: SHIPPED | OUTPATIENT
Start: 2023-07-21 | End: 2023-08-29

## 2023-07-21 NOTE — TELEPHONE ENCOUNTER
Tsh decreased rec resume methimazole at 5mg daily. Script sent to pharmacy. Recheck tsh and free t4 and t3 in one month. F/u with me in one month. Ok to use private slot if needed.

## 2023-07-26 ENCOUNTER — OFFICE VISIT (OUTPATIENT)
Dept: HOME HEALTH SERVICES | Facility: CLINIC | Age: 73
End: 2023-07-26
Payer: MEDICARE

## 2023-07-26 VITALS
HEART RATE: 61 BPM | SYSTOLIC BLOOD PRESSURE: 114 MMHG | BODY MASS INDEX: 23.8 KG/M2 | HEIGHT: 71 IN | WEIGHT: 170 LBS | TEMPERATURE: 98 F | OXYGEN SATURATION: 98 % | DIASTOLIC BLOOD PRESSURE: 62 MMHG

## 2023-07-26 DIAGNOSIS — Z00.00 ENCOUNTER FOR PREVENTIVE HEALTH EXAMINATION: Primary | ICD-10-CM

## 2023-07-26 DIAGNOSIS — F17.200 SMOKER: ICD-10-CM

## 2023-07-26 DIAGNOSIS — Z00.00 ENCOUNTER FOR MEDICARE ANNUAL WELLNESS EXAM: ICD-10-CM

## 2023-07-26 DIAGNOSIS — Z86.010 HISTORY OF COLONIC POLYPS: ICD-10-CM

## 2023-07-26 DIAGNOSIS — M15.9 PRIMARY OSTEOARTHRITIS INVOLVING MULTIPLE JOINTS: ICD-10-CM

## 2023-07-26 DIAGNOSIS — B35.1 ONYCHOMYCOSIS: ICD-10-CM

## 2023-07-26 DIAGNOSIS — J43.2 CENTRILOBULAR EMPHYSEMA: ICD-10-CM

## 2023-07-26 DIAGNOSIS — I10 PRIMARY HYPERTENSION: ICD-10-CM

## 2023-07-26 DIAGNOSIS — I70.0 AORTIC ATHEROSCLEROSIS: ICD-10-CM

## 2023-07-26 DIAGNOSIS — E05.90 HYPERTHYROIDISM: ICD-10-CM

## 2023-07-26 DIAGNOSIS — M47.816 SPONDYLOSIS OF LUMBAR REGION WITHOUT MYELOPATHY OR RADICULOPATHY: ICD-10-CM

## 2023-07-26 DIAGNOSIS — M51.36 DDD (DEGENERATIVE DISC DISEASE), LUMBAR: ICD-10-CM

## 2023-07-26 DIAGNOSIS — N40.0 BENIGN PROSTATIC HYPERPLASIA WITHOUT LOWER URINARY TRACT SYMPTOMS: Chronic | ICD-10-CM

## 2023-07-26 DIAGNOSIS — F10.20 UNCOMPLICATED ALCOHOL DEPENDENCE: Chronic | ICD-10-CM

## 2023-07-26 PROBLEM — R29.898 WEAKNESS OF BOTH UPPER EXTREMITIES: Status: RESOLVED | Noted: 2023-02-14 | Resolved: 2023-07-26

## 2023-07-26 PROBLEM — M25.511 RIGHT SHOULDER PAIN: Status: RESOLVED | Noted: 2023-02-14 | Resolved: 2023-07-26

## 2023-07-26 PROCEDURE — 4010F ACE/ARB THERAPY RXD/TAKEN: CPT | Mod: CPTII,S$GLB,, | Performed by: NURSE PRACTITIONER

## 2023-07-26 PROCEDURE — 1126F AMNT PAIN NOTED NONE PRSNT: CPT | Mod: CPTII,S$GLB,, | Performed by: NURSE PRACTITIONER

## 2023-07-26 PROCEDURE — 3288F FALL RISK ASSESSMENT DOCD: CPT | Mod: CPTII,S$GLB,, | Performed by: NURSE PRACTITIONER

## 2023-07-26 PROCEDURE — 3078F DIAST BP <80 MM HG: CPT | Mod: CPTII,S$GLB,, | Performed by: NURSE PRACTITIONER

## 2023-07-26 PROCEDURE — 1101F PR PT FALLS ASSESS DOC 0-1 FALLS W/OUT INJ PAST YR: ICD-10-PCS | Mod: CPTII,S$GLB,, | Performed by: NURSE PRACTITIONER

## 2023-07-26 PROCEDURE — 4010F PR ACE/ARB THEARPY RXD/TAKEN: ICD-10-PCS | Mod: CPTII,S$GLB,, | Performed by: NURSE PRACTITIONER

## 2023-07-26 PROCEDURE — 3288F PR FALLS RISK ASSESSMENT DOCUMENTED: ICD-10-PCS | Mod: CPTII,S$GLB,, | Performed by: NURSE PRACTITIONER

## 2023-07-26 PROCEDURE — 3078F PR MOST RECENT DIASTOLIC BLOOD PRESSURE < 80 MM HG: ICD-10-PCS | Mod: CPTII,S$GLB,, | Performed by: NURSE PRACTITIONER

## 2023-07-26 PROCEDURE — 1170F FXNL STATUS ASSESSED: CPT | Mod: CPTII,S$GLB,, | Performed by: NURSE PRACTITIONER

## 2023-07-26 PROCEDURE — 1126F PR PAIN SEVERITY QUANTIFIED, NO PAIN PRESENT: ICD-10-PCS | Mod: CPTII,S$GLB,, | Performed by: NURSE PRACTITIONER

## 2023-07-26 PROCEDURE — 1159F MED LIST DOCD IN RCRD: CPT | Mod: CPTII,S$GLB,, | Performed by: NURSE PRACTITIONER

## 2023-07-26 PROCEDURE — 1160F PR REVIEW ALL MEDS BY PRESCRIBER/CLIN PHARMACIST DOCUMENTED: ICD-10-PCS | Mod: CPTII,S$GLB,, | Performed by: NURSE PRACTITIONER

## 2023-07-26 PROCEDURE — 3008F PR BODY MASS INDEX (BMI) DOCUMENTED: ICD-10-PCS | Mod: CPTII,S$GLB,, | Performed by: NURSE PRACTITIONER

## 2023-07-26 PROCEDURE — 3074F PR MOST RECENT SYSTOLIC BLOOD PRESSURE < 130 MM HG: ICD-10-PCS | Mod: CPTII,S$GLB,, | Performed by: NURSE PRACTITIONER

## 2023-07-26 PROCEDURE — 3074F SYST BP LT 130 MM HG: CPT | Mod: CPTII,S$GLB,, | Performed by: NURSE PRACTITIONER

## 2023-07-26 PROCEDURE — 1170F PR FUNCTIONAL STATUS ASSESSED: ICD-10-PCS | Mod: CPTII,S$GLB,, | Performed by: NURSE PRACTITIONER

## 2023-07-26 PROCEDURE — G0439 PR MEDICARE ANNUAL WELLNESS SUBSEQUENT VISIT: ICD-10-PCS | Mod: S$GLB,,, | Performed by: NURSE PRACTITIONER

## 2023-07-26 PROCEDURE — 1160F RVW MEDS BY RX/DR IN RCRD: CPT | Mod: CPTII,S$GLB,, | Performed by: NURSE PRACTITIONER

## 2023-07-26 PROCEDURE — 3008F BODY MASS INDEX DOCD: CPT | Mod: CPTII,S$GLB,, | Performed by: NURSE PRACTITIONER

## 2023-07-26 PROCEDURE — G0439 PPPS, SUBSEQ VISIT: HCPCS | Mod: S$GLB,,, | Performed by: NURSE PRACTITIONER

## 2023-07-26 PROCEDURE — 1101F PT FALLS ASSESS-DOCD LE1/YR: CPT | Mod: CPTII,S$GLB,, | Performed by: NURSE PRACTITIONER

## 2023-07-26 PROCEDURE — 1159F PR MEDICATION LIST DOCUMENTED IN MEDICAL RECORD: ICD-10-PCS | Mod: CPTII,S$GLB,, | Performed by: NURSE PRACTITIONER

## 2023-07-26 NOTE — PROGRESS NOTES
"Harpreet Perez presented for Medicare AW today. The following components were reviewed and updated:    Medical history  Family History  Social history  Allergies and Current Medications  Health Risk Assessment  Health Maintenance  Care Team    **See Completed Assessments for Annual Wellness visit with in the encounter summary    The following assessments were completed:  Depression Screening  Cognitive function Screening      Timed Get Up Test  Whisper Test    Vitals:    07/26/23 1105   BP: 114/62   Pulse: 61   Temp: 97.9 °F (36.6 °C)   TempSrc: Temporal   SpO2: 98%   Weight: 77.1 kg (170 lb)   Height: 5' 11" (1.803 m)     Body mass index is 23.71 kg/m².   ]    Physical Exam  Vitals reviewed.   Constitutional:       Appearance: Normal appearance.   HENT:      Head: Normocephalic and atraumatic.   Cardiovascular:      Rate and Rhythm: Normal rate and regular rhythm.      Pulses: Normal pulses.      Heart sounds: Normal heart sounds.   Pulmonary:      Effort: Pulmonary effort is normal.      Breath sounds: Normal breath sounds.   Musculoskeletal:         General: Normal range of motion.      Cervical back: Normal range of motion and neck supple.   Skin:     General: Skin is warm and dry.   Neurological:      Mental Status: He is alert and oriented to person, place, and time.      Gait: Gait abnormal (genu varum).   Psychiatric:         Mood and Affect: Mood normal.         Behavior: Behavior normal.        Outpatient Medications Marked as Taking for the 7/26/23 encounter (Office Visit) with CHUCK Rodriguez   Medication Sig Dispense Refill    lisinopriL-hydrochlorothiazide (PRINZIDE,ZESTORETIC) 10-12.5 mg per tablet Take 1 tablet by mouth once daily. 90 tablet 1    meloxicam (MOBIC) 15 MG tablet TAKE 1 TABLET EVERY DAY AS NEEDED 90 tablet 0    pantoprazole (PROTONIX) 40 MG tablet       psyllium (METAMUCIL) powder Take 1 packet by mouth once daily.      tamsulosin (FLOMAX) 0.4 mg Cap Take 1 capsule (0.4 mg total) " by mouth once daily. 90 capsule 3        Diagnoses and health risks identified today and associated recommendations/orders:  1. Encounter for preventive health examination  2. Encounter for Medicare annual wellness exam  Medicare awv complete. Health maintenance:  high dose statin recommended-likely due to aortic atherosclerosis-Recommend to f/u with pcp. Lipid panel ordered. Shingles vaccine due-encouraged patient to obtain at a pharmacy.     Patient thought the thyroid medication was dicyclomine. Informed him that dicyclomine is for abdominal cramps and is not for the thyroid.  Informed him that the medication for hyperthyroidism is methimazole.    - Ambulatory Referral/Consult to Enhanced Annual Wellness Visit (eAWV)    3. Uncomplicated alcohol dependence  Uncontrolled.  Patient drinks 3-4 beers per day and up to 6 beers in a day once a week.  Cage score of 2.  Informed patient to quit drinking.  He states he is trying.  He states he quit for a period of 6 months in the past.  He states he needs to be around better influences in his life.  Encourage this informed him this is a lifestyle change.  He states he does not need an inpatient rehab.  Denies any depression or anxiety and declined counseling. Recommend acamprosate if needed for etoh cessation assistance.  Follow up with PCP.    4. Aortic atherosclerosis  Chronic and stable. Lipid panel ordered.  F/u with pcp.    - Lipid Panel; Future    5. Centrilobular emphysema  Stable.  No respiratory symptomatology.  Not on any medications.  Follow-up with PCP.    6. Primary hypertension  Chronic and stable on BP medication.  Continue current management.  See med list above. Recommend low sodium diet. Follow up with PCP.       7. Hyperthyroidism  Lab Results   Component Value Date    TSH 0.281 (L) 07/20/2023    Chronic and stable.  Resume methimazole per PCP.  See 1. Follow up with PCP as directed.    8. Benign prostatic hyperplasia without lower urinary tract  symptoms  Stable.  No acute issues.  Follow-up with PCP.    9. DDD (degenerative disc disease), lumbar  Chronic and stable. Continue current management. See med list above. Follow up with PCP.     10. Spondylosis of lumbar region without myelopathy or radiculopathy  Chronic and stable. Continue current management. See med list above. Follow up with PCP.     11. Primary osteoarthritis involving multiple joints  Chronic and stable. Continue current management. See med list above. Follow up with PCP.     12. History of colonic polyps  Next colonoscopy due November 2024.    13. Smoker  Chronic and uncontrolled.  Patient states he is ready to quit.  Smoking cessation program has already been ordered.  Phone number for smoking cessation program given to patient today and he states he will call today.  Encouraged patient to quit smoking.  Risks of smoking discussed counseling done.  Follow-up with smoking cessation.    14. Onychomycosis  - Ambulatory referral/consult to Podiatry; Future          Provided Harpreet with a 5-10 year written screening schedule and personal prevention plan. Recommendations were developed using the USPSTF age appropriate recommendations. Education, counseling, and referrals were provided as needed.  After Visit Summary printed and given to patient which includes a list of additional screenings\tests needed.    Follow up in about 1 year (around 7/26/2024) for annual wellness visit.      CHUCK Rodriguez    I offered to discuss advanced care planning, including how to pick a person who would make decisions for you if you were unable to make them for yourself, called a health care power of , and what kind of decisions you might make such as use of life sustaining treatments such as ventilators and tube feeding when faced with a life limiting illness recorded on a living will that they will need to know. (How you want to be cared for as you near the end of your natural life)     X Patient  is interested in learning more about how to make advanced directives.  I provided them paperwork and offered to discuss this with them.

## 2023-07-26 NOTE — Clinical Note
Medicare awv complete. Health maintenance:  high dose statin recommended-likely due to aortic atherosclerosis-Recommend to f/u with pcp. Lipid panel ordered. Shingles vaccine due-encouraged patient to obtain at a pharmacy.   Patient thought the thyroid medication was dicyclomine. Informed him that dicyclomine is for abdominal cramps and is not for the thyroid.  Informed him that the medication for hyperthyroidism is methimazole. Message sent to pcp staff.   Recommend acamprosate if needed for etoh cessation assistance.  Follow up with PCP for this. Pt declined counseling.   Onychomycosis - Ambulatory referral/consult to Podiatry; Future

## 2023-07-26 NOTE — PATIENT INSTRUCTIONS
Counseling and Referral of Other Preventative  (Italic type indicates deductible and co-insurance are waived)    Patient Name: Harpreet Perez  Today's Date: 7/26/2023    Health Maintenance       Date Due Completion Date    High Dose Statin Never done ---    Shingles Vaccine (2 of 3) 10/17/2011 8/22/2011    COVID-19 Vaccine (6 - Pfizer series) 07/26/2024 (Originally 1/25/2023) 9/25/2022    Influenza Vaccine (1) 09/01/2023 9/25/2022    Colorectal Cancer Screening 11/15/2024 11/15/2021    Lipid Panel 07/28/2026 7/28/2021    TETANUS VACCINE 05/31/2028 5/31/2018        Orders Placed This Encounter   Procedures    Lipid Panel    Ambulatory referral/consult to Podiatry       The following information is provided to all patients.  This information is to help you find resources for any of the problems found today that may be affecting your health:                Living healthy guide: www.[facility].[place].gov      Understanding Diabetes: www.diabetes.org      Eating healthy: www.cdc.gov/healthyweight      CDC home safety checklist: www.cdc.gov/steadi/patient.html      Agency on Aging: www.goea.[place].[facility]      Alcoholics anonymous (AA): www.aa.org      Physical Activity: www.reanna.nih.gov/hc1xxpv      Tobacco use: www.quitwithusla.org

## 2023-07-27 ENCOUNTER — PATIENT MESSAGE (OUTPATIENT)
Dept: INTERNAL MEDICINE | Facility: CLINIC | Age: 73
End: 2023-07-27
Payer: MEDICARE

## 2023-08-07 ENCOUNTER — HOSPITAL ENCOUNTER (OUTPATIENT)
Dept: RADIOLOGY | Facility: HOSPITAL | Age: 73
Discharge: HOME | End: 2023-08-07
Attending: INTERNAL MEDICINE
Payer: MEDICARE

## 2023-08-07 ENCOUNTER — TELEPHONE (OUTPATIENT)
Dept: INTERNAL MEDICINE | Facility: CLINIC | Age: 73
End: 2023-08-07
Payer: MEDICARE

## 2023-08-07 ENCOUNTER — OFFICE VISIT (OUTPATIENT)
Dept: INTERNAL MEDICINE | Facility: CLINIC | Age: 73
End: 2023-08-07
Payer: MEDICARE

## 2023-08-07 VITALS
SYSTOLIC BLOOD PRESSURE: 110 MMHG | HEART RATE: 59 BPM | HEIGHT: 71 IN | OXYGEN SATURATION: 95 % | BODY MASS INDEX: 22.5 KG/M2 | TEMPERATURE: 99 F | WEIGHT: 160.69 LBS | DIASTOLIC BLOOD PRESSURE: 64 MMHG

## 2023-08-07 DIAGNOSIS — R06.09 DOE (DYSPNEA ON EXERTION): ICD-10-CM

## 2023-08-07 DIAGNOSIS — R06.09 DOE (DYSPNEA ON EXERTION): Primary | ICD-10-CM

## 2023-08-07 PROCEDURE — 3078F PR MOST RECENT DIASTOLIC BLOOD PRESSURE < 80 MM HG: ICD-10-PCS | Mod: HCNC,CPTII,S$GLB, | Performed by: INTERNAL MEDICINE

## 2023-08-07 PROCEDURE — 4010F ACE/ARB THERAPY RXD/TAKEN: CPT | Mod: HCNC,CPTII,S$GLB, | Performed by: INTERNAL MEDICINE

## 2023-08-07 PROCEDURE — 1126F PR PAIN SEVERITY QUANTIFIED, NO PAIN PRESENT: ICD-10-PCS | Mod: HCNC,CPTII,S$GLB, | Performed by: INTERNAL MEDICINE

## 2023-08-07 PROCEDURE — 99214 OFFICE O/P EST MOD 30 MIN: CPT | Mod: HCNC,S$GLB,, | Performed by: INTERNAL MEDICINE

## 2023-08-07 PROCEDURE — 71046 X-RAY EXAM CHEST 2 VIEWS: CPT | Mod: 26,HCNC,, | Performed by: RADIOLOGY

## 2023-08-07 PROCEDURE — 3074F PR MOST RECENT SYSTOLIC BLOOD PRESSURE < 130 MM HG: ICD-10-PCS | Mod: HCNC,CPTII,S$GLB, | Performed by: INTERNAL MEDICINE

## 2023-08-07 PROCEDURE — 99999 PR PBB SHADOW E&M-EST. PATIENT-LVL IV: ICD-10-PCS | Mod: PBBFAC,HCNC,, | Performed by: INTERNAL MEDICINE

## 2023-08-07 PROCEDURE — 3078F DIAST BP <80 MM HG: CPT | Mod: HCNC,CPTII,S$GLB, | Performed by: INTERNAL MEDICINE

## 2023-08-07 PROCEDURE — 3288F FALL RISK ASSESSMENT DOCD: CPT | Mod: HCNC,CPTII,S$GLB, | Performed by: INTERNAL MEDICINE

## 2023-08-07 PROCEDURE — 1159F MED LIST DOCD IN RCRD: CPT | Mod: HCNC,CPTII,S$GLB, | Performed by: INTERNAL MEDICINE

## 2023-08-07 PROCEDURE — 4010F PR ACE/ARB THEARPY RXD/TAKEN: ICD-10-PCS | Mod: HCNC,CPTII,S$GLB, | Performed by: INTERNAL MEDICINE

## 2023-08-07 PROCEDURE — 3288F PR FALLS RISK ASSESSMENT DOCUMENTED: ICD-10-PCS | Mod: HCNC,CPTII,S$GLB, | Performed by: INTERNAL MEDICINE

## 2023-08-07 PROCEDURE — 1101F PT FALLS ASSESS-DOCD LE1/YR: CPT | Mod: HCNC,CPTII,S$GLB, | Performed by: INTERNAL MEDICINE

## 2023-08-07 PROCEDURE — 71046 X-RAY EXAM CHEST 2 VIEWS: CPT | Mod: TC,HCNC

## 2023-08-07 PROCEDURE — 99214 PR OFFICE/OUTPT VISIT, EST, LEVL IV, 30-39 MIN: ICD-10-PCS | Mod: HCNC,S$GLB,, | Performed by: INTERNAL MEDICINE

## 2023-08-07 PROCEDURE — 71046 XR CHEST PA AND LATERAL: ICD-10-PCS | Mod: 26,HCNC,, | Performed by: RADIOLOGY

## 2023-08-07 PROCEDURE — 1126F AMNT PAIN NOTED NONE PRSNT: CPT | Mod: HCNC,CPTII,S$GLB, | Performed by: INTERNAL MEDICINE

## 2023-08-07 PROCEDURE — 99999 PR PBB SHADOW E&M-EST. PATIENT-LVL IV: CPT | Mod: PBBFAC,HCNC,, | Performed by: INTERNAL MEDICINE

## 2023-08-07 PROCEDURE — 1101F PR PT FALLS ASSESS DOC 0-1 FALLS W/OUT INJ PAST YR: ICD-10-PCS | Mod: HCNC,CPTII,S$GLB, | Performed by: INTERNAL MEDICINE

## 2023-08-07 PROCEDURE — 3008F PR BODY MASS INDEX (BMI) DOCUMENTED: ICD-10-PCS | Mod: HCNC,CPTII,S$GLB, | Performed by: INTERNAL MEDICINE

## 2023-08-07 PROCEDURE — 1159F PR MEDICATION LIST DOCUMENTED IN MEDICAL RECORD: ICD-10-PCS | Mod: HCNC,CPTII,S$GLB, | Performed by: INTERNAL MEDICINE

## 2023-08-07 PROCEDURE — 3008F BODY MASS INDEX DOCD: CPT | Mod: HCNC,CPTII,S$GLB, | Performed by: INTERNAL MEDICINE

## 2023-08-07 PROCEDURE — 3074F SYST BP LT 130 MM HG: CPT | Mod: HCNC,CPTII,S$GLB, | Performed by: INTERNAL MEDICINE

## 2023-08-07 NOTE — PROGRESS NOTES
"Subjective:      Patient ID: Harpreet Perez is a 72 y.o. male.    Chief Complaint: Shortness of Breath    HPI    73 yo with   Patient Active Problem List   Diagnosis    Benign prostatic hyperplasia without lower urinary tract symptoms    Smoker    Alcohol dependence    Spondylosis of lumbar region without myelopathy or radiculopathy    Osteoarthritis of multiple joints    DDD (degenerative disc disease), lumbar    History of colonic polyps    Aortic atherosclerosis    Hypertension    COPD (chronic obstructive pulmonary disease)    Hyperthyroidism     Past Medical History:   Diagnosis Date    Alcohol dependence     Back pain     COPD (chronic obstructive pulmonary disease)     History of colonic polyps     Hypertension     Hypertrophy of prostate without urinary obstruction and other lower urinary tract symptoms (LUTS)     Osteoarthritis of multiple joints     Tobacco dependence     quit then restarted     Here today c/o one week of menendez. No cp. No diaphoresis, nausea vomiting. No trauma.     Review of Systems   Constitutional:  Negative for chills and fever.   HENT:  Negative for ear pain and sore throat.    Respiratory:  Positive for shortness of breath. Negative for cough and wheezing.    Cardiovascular:  Negative for chest pain.   Gastrointestinal:  Negative for abdominal pain and blood in stool.   Genitourinary:  Negative for dysuria and hematuria.   Neurological:  Negative for seizures and syncope.   No pnd. No orthopnea.   Objective:   /64 (BP Location: Right arm, Patient Position: Sitting, BP Method: Medium (Manual))   Pulse (!) 59   Temp 99 °F (37.2 °C) (Oral)   Ht 5' 11" (1.803 m)   Wt 72.9 kg (160 lb 11.5 oz)   SpO2 95%   BMI 22.42 kg/m²     Physical Exam  Constitutional:       General: He is not in acute distress.     Appearance: He is well-developed. He is not ill-appearing, toxic-appearing or diaphoretic.   HENT:      Head: Normocephalic and atraumatic.      Mouth/Throat:      Mouth: Mucous " membranes are moist.      Pharynx: Oropharynx is clear.   Eyes:      Extraocular Movements: Extraocular movements intact.      Conjunctiva/sclera: Conjunctivae normal.   Neck:      Thyroid: No thyromegaly.   Cardiovascular:      Rate and Rhythm: Normal rate and regular rhythm.   Pulmonary:      Breath sounds: Normal breath sounds. No wheezing or rales.   Abdominal:      General: Bowel sounds are normal.      Palpations: Abdomen is soft.      Tenderness: There is no abdominal tenderness.   Musculoskeletal:         General: No swelling or tenderness.      Cervical back: Neck supple. No rigidity.   Lymphadenopathy:      Cervical: No cervical adenopathy.   Skin:     General: Skin is warm and dry.   Neurological:      Mental Status: He is alert and oriented to person, place, and time. Mental status is at baseline.      Motor: No weakness.      Gait: Gait normal.   Psychiatric:         Mood and Affect: Mood normal.         Behavior: Behavior normal.         Lab Results   Component Value Date    WBC 13.76 (H) 03/17/2023    HGB 15.5 03/17/2023    HGB 14.5 03/04/2023    HGB 12.0 (L) 10/14/2022    HCT 44.5 03/17/2023    MCV 89 03/17/2023    MCV 89 03/04/2023    MCV 92 10/14/2022     03/17/2023    CHOL 185 07/28/2021    TRIG 118 07/28/2021    HDL 66 07/28/2021    LDLCALC 95.4 07/28/2021    LDLCALC 103.4 01/26/2021    LDLCALC 117.2 07/24/2020    ALT 23 03/17/2023    AST 29 03/17/2023     03/17/2023    K 3.7 03/17/2023    CALCIUM 9.8 03/17/2023    CL 98 03/17/2023    CO2 23 03/17/2023    BUN 16 03/17/2023    CREATININE 1.2 03/17/2023    CREATININE 1.1 03/04/2023    CREATININE 1.1 08/05/2022    EGFRNORACEVR >60 03/17/2023    EGFRNORACEVR >60 03/04/2023    EGFRNORACEVR >60.0 08/05/2022    TSH 0.281 (L) 07/20/2023    TSH 12.334 (H) 06/15/2023    TSH <0.010 (L) 10/14/2022    PSA 3.8 08/05/2022    PSA 2.1 07/28/2021    PSA 1.3 07/24/2020     03/17/2023          The 10-year ASCVD risk score (Elisabeth GLASGOW, et al.,  2019) is: 23.8%    Values used to calculate the score:      Age: 72 years      Sex: Male      Is Non- : Yes      Diabetic: No      Tobacco smoker: Yes      Systolic Blood Pressure: 110 mmHg      Is BP treated: Yes      HDL Cholesterol: 66 mg/dL      Total Cholesterol: 185 mg/dL     Assessment:     1. CARR (dyspnea on exertion)      Plan:   1. CARR (dyspnea on exertion)  -     CBC Auto Differential; Future; Expected date: 08/07/2023  -     Comprehensive Metabolic Panel; Future; Expected date: 08/07/2023  -     X-Ray Chest PA And Lateral; Future; Expected date: 08/07/2023  -     BNP; Future; Expected date: 08/07/2023  -     TSH; Future; Expected date: 08/07/2023  -     T4, Free; Future; Expected date: 08/07/2023  -     EKG 12-lead; Future  -     TROPONIN I; Future; Expected date: 08/07/2023        There are no Patient Instructions on file for this visit.    Future Appointments   Date Time Provider Department Center   8/8/2023  9:20 AM EKG, [facility]   8/22/2023  9:35 AM LABORATORY, [facility]   8/22/2023 10:20 AM LABORATORY, [facility]   8/31/2023  4:00 PM Thompson Christensen MD [facility]       Lab Frequency Next Occurrence   CBC Auto Differential Once 08/23/2023   Comprehensive Metabolic Panel Once 08/23/2023   PSA, Screening Once 08/23/2023   Ambulatory referral/consult to Smoking Cessation Program Once 06/27/2023   TSH Once 07/21/2023   T4, Free Once 07/21/2023   T3 Once 07/21/2023   Ambulatory referral/consult to Podiatry Once 08/02/2023   Lipid Panel Once 07/26/2023       No follow-ups on file.

## 2023-08-07 NOTE — TELEPHONE ENCOUNTER
----- Message from Lebron Lancaster sent at 8/7/2023  9:04 AM CDT -----  Regarding: request for same day appt  Type:  Same Day Appointment Request    Caller is requesting a same day appointment.  Caller declined first available   appointment listed below.      Name of Caller: Harpreet    When is the first available appointment? 8/10    Symptoms: muscle cramps in left and right side, fingers are number on left side, shortness of breath that comes and goes for about a week, and muscle spasms. Patient stated that he is not struggling to breathe, but notices that he can hardly walk around like he normally does, without feeling super tired and short winded    Would the patient rather a call back or a response via [facility]? Call back     Best Call Back Number: [phone]    Additional Information: the patient is requesting to be seen today if possible. Please return call at earliest convenience.

## 2023-08-08 ENCOUNTER — HOSPITAL ENCOUNTER (OUTPATIENT)
Dept: CARDIOLOGY | Facility: HOSPITAL | Age: 73
Discharge: HOME | End: 2023-08-08
Attending: INTERNAL MEDICINE
Payer: MEDICARE

## 2023-08-08 DIAGNOSIS — R06.09 DOE (DYSPNEA ON EXERTION): ICD-10-CM

## 2023-08-08 PROCEDURE — 93005 ELECTROCARDIOGRAM TRACING: CPT | Mod: HCNC

## 2023-08-08 PROCEDURE — 93010 EKG 12-LEAD: ICD-10-PCS | Mod: HCNC,,, | Performed by: INTERNAL MEDICINE

## 2023-08-08 PROCEDURE — 93010 ELECTROCARDIOGRAM REPORT: CPT | Mod: HCNC,,, | Performed by: INTERNAL MEDICINE

## 2023-08-09 ENCOUNTER — HOSPITAL ENCOUNTER (EMERGENCY)
Facility: HOSPITAL | Age: 73
Discharge: HOME | End: 2023-08-09
Attending: EMERGENCY MEDICINE
Payer: MEDICARE

## 2023-08-09 ENCOUNTER — TELEPHONE (OUTPATIENT)
Dept: INTERNAL MEDICINE | Facility: CLINIC | Age: 73
End: 2023-08-09
Payer: MEDICARE

## 2023-08-09 VITALS
TEMPERATURE: 99 F | OXYGEN SATURATION: 100 % | RESPIRATION RATE: 18 BRPM | DIASTOLIC BLOOD PRESSURE: 72 MMHG | BODY MASS INDEX: 22.78 KG/M2 | SYSTOLIC BLOOD PRESSURE: 145 MMHG | HEART RATE: 55 BPM | WEIGHT: 163.38 LBS

## 2023-08-09 DIAGNOSIS — Z72.0 TOBACCO ABUSE DISORDER: ICD-10-CM

## 2023-08-09 DIAGNOSIS — R94.31 ABNORMAL EKG: ICD-10-CM

## 2023-08-09 DIAGNOSIS — R06.02 SHORTNESS OF BREATH: Primary | ICD-10-CM

## 2023-08-09 LAB
ALBUMIN SERPL BCP-MCNC: 3.8 G/DL (ref 3.5–5.2)
ALP SERPL-CCNC: 86 U/L (ref 55–135)
ALT SERPL W/O P-5'-P-CCNC: 18 U/L (ref 10–44)
ANION GAP SERPL CALC-SCNC: 10 MMOL/L (ref 8–16)
AST SERPL-CCNC: 18 U/L (ref 10–40)
BASOPHILS # BLD AUTO: 0.06 K/UL (ref 0–0.2)
BASOPHILS NFR BLD: 0.9 % (ref 0–1.9)
BILIRUB SERPL-MCNC: 0.1 MG/DL (ref 0.1–1)
BNP SERPL-MCNC: <10 PG/ML (ref 0–99)
BUN SERPL-MCNC: 23 MG/DL (ref 8–23)
CALCIUM SERPL-MCNC: 9.5 MG/DL (ref 8.7–10.5)
CHLORIDE SERPL-SCNC: 101 MMOL/L (ref 95–110)
CO2 SERPL-SCNC: 21 MMOL/L (ref 23–29)
CREAT SERPL-MCNC: 1.3 MG/DL (ref 0.5–1.4)
DIFFERENTIAL METHOD: ABNORMAL
EOSINOPHIL # BLD AUTO: 0.2 K/UL (ref 0–0.5)
EOSINOPHIL NFR BLD: 3.1 % (ref 0–8)
ERYTHROCYTE [DISTWIDTH] IN BLOOD BY AUTOMATED COUNT: 11.3 % (ref 11.5–14.5)
EST. GFR  (NO RACE VARIABLE): 58 ML/MIN/1.73 M^2
GLUCOSE SERPL-MCNC: 92 MG/DL (ref 70–110)
HCT VFR BLD AUTO: 40.7 % (ref 40–54)
HGB BLD-MCNC: 14.2 G/DL (ref 14–18)
IMM GRANULOCYTES # BLD AUTO: 0.02 K/UL (ref 0–0.04)
IMM GRANULOCYTES NFR BLD AUTO: 0.3 % (ref 0–0.5)
LYMPHOCYTES # BLD AUTO: 1.6 K/UL (ref 1–4.8)
LYMPHOCYTES NFR BLD: 23.8 % (ref 18–48)
MCH RBC QN AUTO: 31.6 PG (ref 27–31)
MCHC RBC AUTO-ENTMCNC: 34.9 G/DL (ref 32–36)
MCV RBC AUTO: 90 FL (ref 82–98)
MONOCYTES # BLD AUTO: 0.6 K/UL (ref 0.3–1)
MONOCYTES NFR BLD: 9.4 % (ref 4–15)
NEUTROPHILS # BLD AUTO: 4.1 K/UL (ref 1.8–7.7)
NEUTROPHILS NFR BLD: 62.5 % (ref 38–73)
NRBC BLD-RTO: 0 /100 WBC
PLATELET # BLD AUTO: 237 K/UL (ref 150–450)
PMV BLD AUTO: 8.4 FL (ref 9.2–12.9)
POTASSIUM SERPL-SCNC: 4.7 MMOL/L (ref 3.5–5.1)
PROT SERPL-MCNC: 7.1 G/DL (ref 6–8.4)
RBC # BLD AUTO: 4.5 M/UL (ref 4.6–6.2)
SODIUM SERPL-SCNC: 132 MMOL/L (ref 136–145)
TROPONIN I SERPL DL<=0.01 NG/ML-MCNC: 0.01 NG/ML (ref 0–0.03)
WBC # BLD AUTO: 6.52 K/UL (ref 3.9–12.7)

## 2023-08-09 PROCEDURE — 93010 EKG 12-LEAD: ICD-10-PCS | Mod: HCNC,,, | Performed by: INTERNAL MEDICINE

## 2023-08-09 PROCEDURE — 84484 ASSAY OF TROPONIN QUANT: CPT | Mod: HCNC | Performed by: EMERGENCY MEDICINE

## 2023-08-09 PROCEDURE — 99285 EMERGENCY DEPT VISIT HI MDM: CPT | Mod: 25,HCNC

## 2023-08-09 PROCEDURE — 83880 ASSAY OF NATRIURETIC PEPTIDE: CPT | Mod: HCNC | Performed by: EMERGENCY MEDICINE

## 2023-08-09 PROCEDURE — 80053 COMPREHEN METABOLIC PANEL: CPT | Mod: HCNC | Performed by: EMERGENCY MEDICINE

## 2023-08-09 PROCEDURE — 93005 ELECTROCARDIOGRAM TRACING: CPT | Mod: HCNC

## 2023-08-09 PROCEDURE — 93010 ELECTROCARDIOGRAM REPORT: CPT | Mod: HCNC,,, | Performed by: INTERNAL MEDICINE

## 2023-08-09 PROCEDURE — 85025 COMPLETE CBC W/AUTO DIFF WBC: CPT | Mod: HCNC | Performed by: EMERGENCY MEDICINE

## 2023-08-09 NOTE — TELEPHONE ENCOUNTER
----- Message from Bridgett Stewart sent at 8/9/2023  4:36 PM CDT -----  Type:  Patient Returning Call    Who Called:pt  Who Left Message for Patient:nurse  Does the patient know what this is regarding?:missed call  Would the patient rather a call back or a response via [facility]?  call  Best Call Back Number:[phone]  Additional Information:

## 2023-08-09 NOTE — ED PROVIDER NOTES
SCRIBE #1 NOTE: I, Daphnie Gaffney, am scribing for, and in the presence of, Vanita Brady MD. I have scribed the entire note.      History      Chief Complaint   Patient presents with    Abnormal ECG     Patient sent over from the [place] for abnormal EKG. Only complaint from patient is intermittent SOB.        Review of patient's allergies indicates:  No Known Allergies     HPI   HPI    8/9/2023, 6:53 PM   History obtained from the patient      History of Present Illness: Harpreet Perez is a 72 y.o. male patient with a PMHx of alcohol dependence, COPD, HTN, and tobacco dependence who presents to the Emergency Department for SOB which onset gradually 1 week ago. Pt reports that he was seen by his PCP for this complaint on 8/7/23 and had lab work, CXR, and an EKG. The pt's lab work from 8/7/23 showed that his troponin was elevated at 0.038, so his PCP advised the pt to come to the ER because he suspects that the pt had an MI. Symptoms are intermittent and moderate in severity. No mitigating or exacerbating factors reported. No associated sxs reported. Patient denies any CP, diaphoresis, N/V/D, weakness, numbness, and all other sxs at this time. No prior Tx reported. No further complaints or concerns at this time.         Arrival mode: Personal vehicle     PCP: Thompson Christensen MD       Past Medical History:  Past Medical History:   Diagnosis Date    Alcohol dependence     Back pain     COPD (chronic obstructive pulmonary disease)     History of colonic polyps     Hypertension     Hypertrophy of prostate without urinary obstruction and other lower urinary tract symptoms (LUTS)     Osteoarthritis of multiple joints     Tobacco dependence     quit then restarted       Past Surgical History:  Past Surgical History:   Procedure Laterality Date    COLONOSCOPY N/A 5/25/2016    Procedure: COLONOSCOPY;  Surgeon: Sergey Jensen MD;  Location: [facility];  Service: Endoscopy;  Laterality: N/A;    COLONOSCOPY N/A 11/15/2021     Procedure: COLONOSCOPY;  Surgeon: Francia Lucio MD;  Location: [facility];  Service: Endoscopy;  Laterality: N/A;    LIPOMA RESECTION Right 8/17/12    posterior shoulder         Family History:  Family History   Problem Relation Age of Onset    Stroke Father     Heart disease Brother 35        MI    Heart disease Other 33        MI    Hyperlipidemia Brother     Hypertension Brother     Hypertension Brother     Heart disease Brother         cardiac stents    Kidney disease Other         dialysis    Cataracts Mother     Hypertension Mother     Cataracts Sister     Alcohol abuse Neg Hx     Asthma Neg Hx     Birth defects Neg Hx     Cancer Neg Hx     COPD Neg Hx     Depression Neg Hx     Diabetes Neg Hx     Drug abuse Neg Hx     Mental retardation Neg Hx     Mental illness Neg Hx     Learning disabilities Neg Hx     Miscarriages / Stillbirths Neg Hx     Vision loss Neg Hx        Social History:  Social History     Tobacco Use    Smoking status: Every Day     Current packs/day: 0.50     Average packs/day: 0.5 packs/day for 44.6 years (22.3 ttl pk-yrs)     Types: Cigarettes     Start date: 1/1/1979    Smokeless tobacco: Never    Tobacco comments:     Smokes 6-7 cigarettes per day   Substance and Sexual Activity    Alcohol use: Yes     Alcohol/week: 28.0 standard drinks of alcohol     Types: 28 Cans of beer per week     Comment: case per week or up to 30 per week    Drug use: No    Sexual activity: Yes     Partners: Female       ROS   Review of Systems   Constitutional:  Negative for diaphoresis and fever.   HENT:  Negative for sore throat.    Respiratory:  Positive for shortness of breath.    Cardiovascular:  Negative for chest pain.   Gastrointestinal:  Negative for diarrhea, nausea and vomiting.   Genitourinary:  Negative for dysuria.   Musculoskeletal:  Negative for back pain.   Skin:  Negative for rash.   Neurological:  Negative for weakness and numbness.   Hematological:  Does not bruise/bleed easily.   All other  systems reviewed and are negative.      Physical Exam      Initial Vitals [08/09/23 1800]   BP Pulse Resp Temp SpO2   (!) 142/67 66 18 98.8 °F (37.1 °C) 100 %      MAP       --          Physical Exam  Nursing Notes and Vital Signs Reviewed.  Constitutional: Patient is in no acute distress. Well-developed and well-nourished.  Head: Atraumatic. Normocephalic.  Eyes: PERRL. EOM intact. Conjunctivae are not pale. No scleral icterus.  ENT: Mucous membranes are moist. Oropharynx is clear and symmetric.    Neck: Supple. Full ROM. No lymphadenopathy.  Cardiovascular: Regular rate. Regular rhythm. No murmurs, rubs, or gallops. Distal pulses are 2+ and symmetric.  Pulmonary/Chest: No respiratory distress. Clear to auscultation bilaterally. No wheezing or rales.  Abdominal: Soft and non-distended.  There is no tenderness.  No rebound, guarding, or rigidity.  Musculoskeletal: Moves all extremities. No obvious deformities. No edema.  Skin: Warm and dry.  Neurological:  Alert, awake, and appropriate.  Normal speech.  No acute focal neurological deficits are appreciated.  Psychiatric: Normal affect. Good eye contact. Appropriate in content.    ED Course    Procedures  ED Vital Signs:  Vitals:    08/09/23 1800 08/09/23 1900 08/09/23 1915 08/09/23 1930   BP: (!) 142/67 (!) 144/70     Pulse: 66 (!) 58 (!) 58 (!) 58   Resp: 18 20 20 19   Temp: 98.8 °F (37.1 °C)      TempSrc: Oral      SpO2: 100% 100% 100% 100%   Weight: 74.1 kg (163 lb 5.8 oz)       08/09/23 1945   BP:    Pulse: (!) 57   Resp: 19   Temp:    TempSrc:    SpO2: 98%   Weight:        Abnormal Lab Results:  Labs Reviewed   CBC W/ AUTO DIFFERENTIAL - Abnormal; Notable for the following components:       Result Value    RBC 4.50 (*)     MCH 31.6 (*)     RDW 11.3 (*)     MPV 8.4 (*)     All other components within normal limits   COMPREHENSIVE METABOLIC PANEL - Abnormal; Notable for the following components:    Sodium 132 (*)     CO2 21 (*)     eGFR 58 (*)     All other  components within normal limits   TROPONIN I   B-TYPE NATRIURETIC PEPTIDE        All Lab Results:  Results for orders placed or performed during the hospital encounter of 08/09/23   CBC auto differential   Result Value Ref Range    WBC 6.52 3.90 - 12.70 K/uL    RBC 4.50 (L) 4.60 - 6.20 M/uL    Hemoglobin 14.2 14.0 - 18.0 g/dL    Hematocrit 40.7 40.0 - 54.0 %    MCV 90 82 - 98 fL    MCH 31.6 (H) 27.0 - 31.0 pg    MCHC 34.9 32.0 - 36.0 g/dL    RDW 11.3 (L) 11.5 - 14.5 %    Platelets 237 150 - 450 K/uL    MPV 8.4 (L) 9.2 - 12.9 fL    Immature Granulocytes 0.3 0.0 - 0.5 %    Gran # (ANC) 4.1 1.8 - 7.7 K/uL    Immature Grans (Abs) 0.02 0.00 - 0.04 K/uL    Lymph # 1.6 1.0 - 4.8 K/uL    Mono # 0.6 0.3 - 1.0 K/uL    Eos # 0.2 0.0 - 0.5 K/uL    Baso # 0.06 0.00 - 0.20 K/uL    nRBC 0 0 /100 WBC    Gran % 62.5 38.0 - 73.0 %    Lymph % 23.8 18.0 - 48.0 %    Mono % 9.4 4.0 - 15.0 %    Eosinophil % 3.1 0.0 - 8.0 %    Basophil % 0.9 0.0 - 1.9 %    Differential Method Automated    Comprehensive metabolic panel   Result Value Ref Range    Sodium 132 (L) 136 - 145 mmol/L    Potassium 4.7 3.5 - 5.1 mmol/L    Chloride 101 95 - 110 mmol/L    CO2 21 (L) 23 - 29 mmol/L    Glucose 92 70 - 110 mg/dL    BUN 23 8 - 23 mg/dL    Creatinine 1.3 0.5 - 1.4 mg/dL    Calcium 9.5 8.7 - 10.5 mg/dL    Total Protein 7.1 6.0 - 8.4 g/dL    Albumin 3.8 3.5 - 5.2 g/dL    Total Bilirubin 0.1 0.1 - 1.0 mg/dL    Alkaline Phosphatase 86 55 - 135 U/L    AST 18 10 - 40 U/L    ALT 18 10 - 44 U/L    eGFR 58 (A) >60 mL/min/1.73 m^2    Anion Gap 10 8 - 16 mmol/L   Troponin I #1   Result Value Ref Range    Troponin I 0.014 0.000 - 0.026 ng/mL   BNP   Result Value Ref Range    BNP <10 0 - 99 pg/mL         Imaging Results:  Imaging Results              X-Ray Chest AP Portable (Final result)  Result time 08/09/23 19:40:23      Final result by Tameka Jessica MD (08/09/23 19:40:23)                   Impression:      No acute abnormality.      Electronically signed  by: Tameka Jessica  Date:    08/09/2023  Time:    19:40               Narrative:    EXAMINATION:  XR CHEST AP PORTABLE    CLINICAL HISTORY:  shortness of breath;.    TECHNIQUE:  Single frontal portable view of the chest was performed.    COMPARISON:  Two days ago    FINDINGS:  Support devices: None    The lungs are clear, with normal appearance of pulmonary vasculature and no pleural effusion or pneumothorax.    The cardiac silhouette is normal in size. The hilar and mediastinal contours are unremarkable.    Bones are intact.                                     The EKG was ordered, reviewed, and independently interpreted by the ED provider.  Interpretation time: 18:57  Rate: 57 BPM  Rhythm: sinus bradycardia  Interpretation: Left axis deviation. No STEMI.           The Emergency Provider reviewed the vital signs and test results, which are outlined above.    ED Discussion     7:52 PM: Reassessed pt at this time. Discussed with pt all pertinent ED information and results. Discussed pt dx and plan of tx. Gave pt all f/u and return to the ED instructions. All questions and concerns were addressed at this time. Pt expresses understanding of information and instructions, and is comfortable with plan to discharge. Pt is stable for discharge.    I discussed with patient and/or family/caretaker that evaluation in the ED does not suggest any emergent or life threatening medical conditions requiring immediate intervention beyond what was provided in the ED, and I believe patient is safe for discharge.  Regardless, an unremarkable evaluation in the ED does not preclude the development or presence of a serious of life threatening condition. As such, patient was instructed to return immediately for any worsening or change in current symptoms.           ED Medication(s):  Medications - No data to display    New Prescriptions    No medications on file          Follow-up Information       [facility].  Schedule an appointment as soon as possible for a visit in 2 days.    Specialty: Cardiology  Why: Return to the Emergency Room, If symptoms worsen  Contact information:  [address] [facility] [id number] [phone]  Additional information:  Please park on the [address] side and use the Clinic entrance. Check in on the 3rd floor, to the right.                             Medical Decision Making    Medical Decision Making:   Differential Diagnosis:   COPD  ACS  CHF  Clinical Tests:   Lab Tests: Ordered and Reviewed  Radiological Study: Ordered and Reviewed  Medical Tests: Ordered and Reviewed  ED Management:  Patient with hx of tobacco abuse, reports intermittent shortness of breath for past several weeks, no orthopnea, no pnd, no chest pain or pressure, no dizziness, had ECG done on 8/7 that showed PVCS, incomplete RBBB, trop .038, today patient was sent to the ER by his pcp.  He has no complaints, exam is normal, no ischemic changes on ECG, troponin today is normal, remainder of labs normal, CXR otherwise normal, I do not see a need for admission, he needs outpatient cardiology follow up and stress test, a referral has been placed, he was advised the pt to f/u outpatient with Cardiology.      Additional MDM:   Smoking Cessation: The patient is a smoker. The patient was counseled on smoking cessation for: 4 minutes. The patient was counseled on tobacco related  health complications.        Scribe Attestation:   Scribe #1: I performed the above scribed service and the documentation accurately describes the services I performed. I attest to the accuracy of the note.    Attending:   Physician Attestation Statement for Scribe #1: I, Vanita Brady MD, personally performed the services described in this documentation, as scribed by Daphnie Gaffney, in my presence, and it is both accurate and complete.          Clinical Impression       ICD-10-CM ICD-9-CM   1. Shortness of breath  R06.02 786.05    2. Abnormal EKG  R94.31 794.31   3. Tobacco abuse disorder  Z72.0 305.1       Disposition:   Disposition: Discharged  Condition: Stable         Vanita Brady MD  08/09/23 2013

## 2023-08-09 NOTE — TELEPHONE ENCOUNTER
Informed pt that Dr. Christensen reviewed his lab results and it showed that he may have had a heart attack and advised him to go to the ER right now. Pt v/u

## 2023-08-09 NOTE — TELEPHONE ENCOUNTER
Please contact patient and let him know that his lab report indicates that he could have had a heart attach and he should see ER now.

## 2023-08-10 ENCOUNTER — TELEPHONE (OUTPATIENT)
Dept: CARDIOLOGY | Facility: CLINIC | Age: 73
End: 2023-08-10
Payer: MEDICARE

## 2023-08-10 NOTE — TELEPHONE ENCOUNTER
Contacted patient and let him know that he has the soonest available appointment which is Monday.      ----- Message from Keiko Wheat sent at 8/10/2023  8:06 AM CDT -----  Regarding: pt update  Name of Who is Calling:CHELSEY PENNINGTON [[id number]]          What is the request in detail:       np appointment scheduled for 8/14 pt was seen in er for sob , pt states         he needed to be seen in 2 days but 8/14 was the only closest day available please advise           Can the clinic reply by [facility]: no           What Number to Call Back if not in [facility]: [phone] (home)

## 2023-08-14 ENCOUNTER — OFFICE VISIT (OUTPATIENT)
Dept: CARDIOLOGY | Facility: CLINIC | Age: 73
End: 2023-08-14
Payer: MEDICARE

## 2023-08-14 VITALS
OXYGEN SATURATION: 91 % | SYSTOLIC BLOOD PRESSURE: 140 MMHG | BODY MASS INDEX: 22.56 KG/M2 | DIASTOLIC BLOOD PRESSURE: 72 MMHG | WEIGHT: 161.19 LBS | HEIGHT: 71 IN | HEART RATE: 69 BPM

## 2023-08-14 DIAGNOSIS — R94.31 ABNORMAL EKG: ICD-10-CM

## 2023-08-14 DIAGNOSIS — I10 PRIMARY HYPERTENSION: ICD-10-CM

## 2023-08-14 DIAGNOSIS — I25.10 CORONARY ARTERY CALCIFICATION SEEN ON CAT SCAN: Primary | ICD-10-CM

## 2023-08-14 DIAGNOSIS — Z87.891 EX-SMOKER: ICD-10-CM

## 2023-08-14 DIAGNOSIS — E05.90 HYPERTHYROIDISM: ICD-10-CM

## 2023-08-14 DIAGNOSIS — R06.02 SHORTNESS OF BREATH: ICD-10-CM

## 2023-08-14 DIAGNOSIS — F10.20 UNCOMPLICATED ALCOHOL DEPENDENCE: Chronic | ICD-10-CM

## 2023-08-14 DIAGNOSIS — I70.0 AORTIC ATHEROSCLEROSIS: ICD-10-CM

## 2023-08-14 DIAGNOSIS — J43.2 CENTRILOBULAR EMPHYSEMA: ICD-10-CM

## 2023-08-14 DIAGNOSIS — F17.200 SMOKER: ICD-10-CM

## 2023-08-14 PROCEDURE — 3077F SYST BP >= 140 MM HG: CPT | Mod: HCNC,CPTII,S$GLB, | Performed by: INTERNAL MEDICINE

## 2023-08-14 PROCEDURE — 99204 PR OFFICE/OUTPT VISIT, NEW, LEVL IV, 45-59 MIN: ICD-10-PCS | Mod: HCNC,S$GLB,, | Performed by: INTERNAL MEDICINE

## 2023-08-14 PROCEDURE — 99999 PR PBB SHADOW E&M-EST. PATIENT-LVL III: CPT | Mod: PBBFAC,HCNC,, | Performed by: INTERNAL MEDICINE

## 2023-08-14 PROCEDURE — 1101F PR PT FALLS ASSESS DOC 0-1 FALLS W/OUT INJ PAST YR: ICD-10-PCS | Mod: HCNC,CPTII,S$GLB, | Performed by: INTERNAL MEDICINE

## 2023-08-14 PROCEDURE — 1159F PR MEDICATION LIST DOCUMENTED IN MEDICAL RECORD: ICD-10-PCS | Mod: HCNC,CPTII,S$GLB, | Performed by: INTERNAL MEDICINE

## 2023-08-14 PROCEDURE — 3008F PR BODY MASS INDEX (BMI) DOCUMENTED: ICD-10-PCS | Mod: HCNC,CPTII,S$GLB, | Performed by: INTERNAL MEDICINE

## 2023-08-14 PROCEDURE — 3008F BODY MASS INDEX DOCD: CPT | Mod: HCNC,CPTII,S$GLB, | Performed by: INTERNAL MEDICINE

## 2023-08-14 PROCEDURE — 4010F ACE/ARB THERAPY RXD/TAKEN: CPT | Mod: HCNC,CPTII,S$GLB, | Performed by: INTERNAL MEDICINE

## 2023-08-14 PROCEDURE — 1126F AMNT PAIN NOTED NONE PRSNT: CPT | Mod: HCNC,CPTII,S$GLB, | Performed by: INTERNAL MEDICINE

## 2023-08-14 PROCEDURE — 3078F PR MOST RECENT DIASTOLIC BLOOD PRESSURE < 80 MM HG: ICD-10-PCS | Mod: HCNC,CPTII,S$GLB, | Performed by: INTERNAL MEDICINE

## 2023-08-14 PROCEDURE — 1126F PR PAIN SEVERITY QUANTIFIED, NO PAIN PRESENT: ICD-10-PCS | Mod: HCNC,CPTII,S$GLB, | Performed by: INTERNAL MEDICINE

## 2023-08-14 PROCEDURE — 1101F PT FALLS ASSESS-DOCD LE1/YR: CPT | Mod: HCNC,CPTII,S$GLB, | Performed by: INTERNAL MEDICINE

## 2023-08-14 PROCEDURE — 99204 OFFICE O/P NEW MOD 45 MIN: CPT | Mod: HCNC,S$GLB,, | Performed by: INTERNAL MEDICINE

## 2023-08-14 PROCEDURE — 99999 PR PBB SHADOW E&M-EST. PATIENT-LVL III: ICD-10-PCS | Mod: PBBFAC,HCNC,, | Performed by: INTERNAL MEDICINE

## 2023-08-14 PROCEDURE — 1159F MED LIST DOCD IN RCRD: CPT | Mod: HCNC,CPTII,S$GLB, | Performed by: INTERNAL MEDICINE

## 2023-08-14 PROCEDURE — 3288F PR FALLS RISK ASSESSMENT DOCUMENTED: ICD-10-PCS | Mod: HCNC,CPTII,S$GLB, | Performed by: INTERNAL MEDICINE

## 2023-08-14 PROCEDURE — 3077F PR MOST RECENT SYSTOLIC BLOOD PRESSURE >= 140 MM HG: ICD-10-PCS | Mod: HCNC,CPTII,S$GLB, | Performed by: INTERNAL MEDICINE

## 2023-08-14 PROCEDURE — 3288F FALL RISK ASSESSMENT DOCD: CPT | Mod: HCNC,CPTII,S$GLB, | Performed by: INTERNAL MEDICINE

## 2023-08-14 PROCEDURE — 4010F PR ACE/ARB THEARPY RXD/TAKEN: ICD-10-PCS | Mod: HCNC,CPTII,S$GLB, | Performed by: INTERNAL MEDICINE

## 2023-08-14 PROCEDURE — 3078F DIAST BP <80 MM HG: CPT | Mod: HCNC,CPTII,S$GLB, | Performed by: INTERNAL MEDICINE

## 2023-08-14 RX ORDER — ATORVASTATIN CALCIUM 20 MG/1
20 TABLET, FILM COATED ORAL NIGHTLY
Qty: 90 TABLET | Refills: 3 | Status: SHIPPED | OUTPATIENT
Start: 2023-08-14 | End: 2023-08-14

## 2023-08-14 RX ORDER — ASPIRIN 81 MG/1
81 TABLET ORAL DAILY
Qty: 360 TABLET | Refills: 0 | Status: SHIPPED | OUTPATIENT
Start: 2023-08-14 | End: 2023-08-14

## 2023-08-14 RX ORDER — ATORVASTATIN CALCIUM 20 MG/1
20 TABLET, FILM COATED ORAL NIGHTLY
Qty: 90 TABLET | Refills: 3 | Status: SHIPPED | OUTPATIENT
Start: 2023-08-14 | End: 2024-08-13

## 2023-08-14 RX ORDER — ASPIRIN 81 MG/1
81 TABLET ORAL DAILY
Qty: 360 TABLET | Refills: 0 | Status: SHIPPED | OUTPATIENT
Start: 2023-08-14 | End: 2024-02-15

## 2023-08-14 RX ORDER — BISOPROLOL FUMARATE 5 MG/1
5 TABLET, FILM COATED ORAL DAILY
Qty: 90 TABLET | Refills: 3 | Status: SHIPPED | OUTPATIENT
Start: 2023-08-14 | End: 2023-08-14

## 2023-08-14 RX ORDER — BISOPROLOL FUMARATE 5 MG/1
5 TABLET, FILM COATED ORAL DAILY
Qty: 90 TABLET | Refills: 3 | Status: SHIPPED | OUTPATIENT
Start: 2023-08-14 | End: 2024-08-13

## 2023-08-14 NOTE — TELEPHONE ENCOUNTER
No care due was identified.  [facility] Embedded Care Due Messages. Reference number: [id number].   8/14/2023 1:54:36 AM CDT

## 2023-08-14 NOTE — PROGRESS NOTES
Subjective:   Patient ID:  Harpreet Perez is a 72 y.o. male who presents for evaluation of Abnormal ECG      HPI  Harpreet Perez is a 72 y.o. male patient with a PMHx of alcohol dependence, COPD, HTN, and tobacco dependence who  presented to the emergency room by PCP recommendation last week for minimally elevated troponin after he had an encounter with his PCP about dyspnea on exertion.    Patient is a smoker and uses alcohol heavily but he quit 2 weeks ago.    He denies any chest pain.    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    No lower extremity swelling.  No orthopnea.    He denies any chest pain.        Past Medical History:   Diagnosis Date    Alcohol dependence     Back pain     COPD (chronic obstructive pulmonary disease)     History of colonic polyps     Hypertension     Hypertrophy of prostate without urinary obstruction and other lower urinary tract symptoms (LUTS)     Osteoarthritis of multiple joints     Tobacco dependence     quit then restarted       Past Surgical History:   Procedure Laterality Date    COLONOSCOPY N/A 5/25/2016    Procedure: COLONOSCOPY;  Surgeon: Sergey Jensen MD;  Location: [facility];  Service: Endoscopy;  Laterality: N/A;    COLONOSCOPY N/A 11/15/2021    Procedure: COLONOSCOPY;  Surgeon: Francia Lucio MD;  Location: [facility];  Service: Endoscopy;  Laterality: N/A;    LIPOMA RESECTION Right 8/17/12    posterior shoulder       Social History     Tobacco Use    Smoking status: Every Day     Current packs/day: 0.50     Average packs/day: 0.5 packs/day for 44.6 years (22.3 ttl pk-yrs)     Types: Cigarettes     Start date: 1/1/1979    Smokeless tobacco: Never    Tobacco comments:     Smokes 6-7 cigarettes per day   Substance Use Topics    Alcohol use: Yes     Alcohol/week: 28.0 standard drinks of alcohol     Types: 28 Cans of beer per week     Comment: case per week or up to 30 per week    Drug use: No       Family  History   Problem Relation Age of Onset    Stroke Father     Heart disease Brother 35        MI    Heart disease Other 33        MI    Hyperlipidemia Brother     Hypertension Brother     Hypertension Brother     Heart disease Brother         cardiac stents    Kidney disease Other         dialysis    Cataracts Mother     Hypertension Mother     Cataracts Sister     Alcohol abuse Neg Hx     Asthma Neg Hx     Birth defects Neg Hx     Cancer Neg Hx     COPD Neg Hx     Depression Neg Hx     Diabetes Neg Hx     Drug abuse Neg Hx     Mental retardation Neg Hx     Mental illness Neg Hx     Learning disabilities Neg Hx     Miscarriages / Stillbirths Neg Hx     Vision loss Neg Hx        Review of Systems   Cardiovascular:  Positive for dyspnea on exertion. Negative for chest pain, palpitations and syncope.   Genitourinary: Negative.    Neurological: Negative.        Current Outpatient Medications on File Prior to Visit   Medication Sig    lisinopriL-hydrochlorothiazide (PRINZIDE,ZESTORETIC) 10-12.5 mg per tablet Take 1 tablet by mouth once daily.    methIMAzole (TAPAZOLE) 5 MG Tab Take 1 tablet (5 mg total) by mouth 3 (three) times daily.    pantoprazole (PROTONIX) 40 MG tablet     psyllium (METAMUCIL) powder Take 1 packet by mouth once daily.    tamsulosin (FLOMAX) 0.4 mg Cap Take 1 capsule (0.4 mg total) by mouth once daily.    meloxicam (MOBIC) 15 MG tablet TAKE 1 TABLET EVERY DAY AS NEEDED (Patient not taking: Reported on 8/14/2023)     No current facility-administered medications on file prior to visit.       Objective:   Objective:  Wt Readings from Last 3 Encounters:   08/14/23 73.1 kg (161 lb 2.5 oz)   08/09/23 74.1 kg (163 lb 5.8 oz)   08/07/23 72.9 kg (160 lb 11.5 oz)     Temp Readings from Last 3 Encounters:   08/09/23 98.8 °F (37.1 °C) (Oral)   08/07/23 99 °F (37.2 °C) (Oral)   07/26/23 97.9 °F (36.6 °C) (Temporal)     BP Readings from Last 3 Encounters:   08/14/23 (!) 140/72   08/09/23 (!) 145/72   08/07/23  "110/64     Pulse Readings from Last 3 Encounters:   08/14/23 69   08/09/23 (!) 55   08/07/23 (!) 59       Physical Exam  Vitals reviewed.   Constitutional:       Appearance: He is well-developed.   Neck:      Vascular: No carotid bruit.   Cardiovascular:      Rate and Rhythm: Normal rate and regular rhythm.      Pulses: Intact distal pulses.      Heart sounds: Normal heart sounds. No murmur heard.  Pulmonary:      Breath sounds: Normal breath sounds.   Neurological:      Mental Status: He is oriented to person, place, and time.         Lab Results   Component Value Date    CHOL 185 07/28/2021    CHOL 185 01/26/2021    CHOL 196 07/24/2020     Lab Results   Component Value Date    HDL 66 07/28/2021    HDL 63 01/26/2021    HDL 67 07/24/2020     Lab Results   Component Value Date    LDLCALC 95.4 07/28/2021    LDLCALC 103.4 01/26/2021    LDLCALC 117.2 07/24/2020     Lab Results   Component Value Date    TRIG 118 07/28/2021    TRIG 93 01/26/2021    TRIG 59 07/24/2020     Lab Results   Component Value Date    CHOLHDL 35.7 07/28/2021    CHOLHDL 34.1 01/26/2021    CHOLHDL 34.2 07/24/2020       Chemistry        Component Value Date/Time     (L) 08/09/2023 1911    K 4.7 08/09/2023 1911     08/09/2023 1911    CO2 21 (L) 08/09/2023 1911    BUN 23 08/09/2023 1911    CREATININE 1.3 08/09/2023 1911    GLU 92 08/09/2023 1911        Component Value Date/Time    CALCIUM 9.5 08/09/2023 1911    ALKPHOS 86 08/09/2023 1911    AST 18 08/09/2023 1911    ALT 18 08/09/2023 1911    BILITOT 0.1 08/09/2023 1911    ESTGFRAFRICA >60.0 07/28/2021 0800    EGFRNONAA >60.0 07/28/2021 0800          Lab Results   Component Value Date    TSH 0.746 08/07/2023     No results found for: "INR", "PROTIME"  Lab Results   Component Value Date    WBC 6.52 08/09/2023    HGB 14.2 08/09/2023    HCT 40.7 08/09/2023    MCV 90 08/09/2023     08/09/2023     BNP  @LABRCNTIP(BNP,BNPTRIAGEBLO)@  Estimated Creatinine Clearance: 52.3 mL/min (based on SCr " of 1.3 mg/dL).     Imaging:  ======    No results found for this or any previous visit.    No results found for this or any previous visit.    Results for orders placed during the hospital encounter of 08/07/23    X-Ray Chest PA And Lateral    Narrative  EXAMINATION:  XR CHEST PA AND LATERAL    CLINICAL HISTORY:  Other forms of dyspnea    TECHNIQUE:  PA and lateral views of the chest were performed.    COMPARISON:  08/13/2012    FINDINGS:  The cardiac and mediastinal silhouettes appear within normal limits.   The lungs are clear bilaterally.  No acute osseous findings demonstrated.    Impression  No acute findings.      Electronically signed by: Jose Luis Roman MD  Date:    08/07/2023  Time:    16:42    No results found for this or any previous visit.    No valid procedures specified.    No results found for this or any previous visit.      No results found for this or any previous visit.      No results found for this or any previous visit.      Diagnostic Results:  ECG: Reviewed    The 10-year ASCVD risk score (Elisabeth GLASGOW, et al., 2019) is: 35%    Values used to calculate the score:      Age: 72 years      Sex: Male      Is Non- : Yes      Diabetic: No      Tobacco smoker: Yes      Systolic Blood Pressure: 140 mmHg      Is BP treated: Yes      HDL Cholesterol: 66 mg/dL      Total Cholesterol: 185 mg/dL        Assessment and Plan:   Coronary artery calcification seen on CAT scan  -     Discontinue: bisoprolol (ZEBETA) 5 MG tablet; Take 1 tablet (5 mg total) by mouth once daily.  Dispense: 90 tablet; Refill: 3  -     Discontinue: aspirin (ECOTRIN) 81 MG EC tablet; Take 1 tablet (81 mg total) by mouth once daily.  Dispense: 360 tablet; Refill: 0  -     Discontinue: atorvastatin (LIPITOR) 20 MG tablet; Take 1 tablet (20 mg total) by mouth every evening.  Dispense: 90 tablet; Refill: 3  -     Echo; Future  -     Nuclear Stress - Cardiology Interpreted; Future  -     aspirin (ECOTRIN) 81 MG EC  tablet; Take 1 tablet (81 mg total) by mouth once daily.  Dispense: 360 tablet; Refill: 0  -     atorvastatin (LIPITOR) 20 MG tablet; Take 1 tablet (20 mg total) by mouth every evening.  Dispense: 90 tablet; Refill: 3  -     bisoprolol (ZEBETA) 5 MG tablet; Take 1 tablet (5 mg total) by mouth once daily.  Dispense: 90 tablet; Refill: 3    Abnormal EKG  -     Ambulatory referral/consult to Cardiology    Shortness of breath  -     Ambulatory referral/consult to Cardiology  -     Discontinue: bisoprolol (ZEBETA) 5 MG tablet; Take 1 tablet (5 mg total) by mouth once daily.  Dispense: 90 tablet; Refill: 3  -     Discontinue: aspirin (ECOTRIN) 81 MG EC tablet; Take 1 tablet (81 mg total) by mouth once daily.  Dispense: 360 tablet; Refill: 0  -     Discontinue: atorvastatin (LIPITOR) 20 MG tablet; Take 1 tablet (20 mg total) by mouth every evening.  Dispense: 90 tablet; Refill: 3  -     Echo; Future  -     Nuclear Stress - Cardiology Interpreted; Future  -     aspirin (ECOTRIN) 81 MG EC tablet; Take 1 tablet (81 mg total) by mouth once daily.  Dispense: 360 tablet; Refill: 0  -     atorvastatin (LIPITOR) 20 MG tablet; Take 1 tablet (20 mg total) by mouth every evening.  Dispense: 90 tablet; Refill: 3  -     bisoprolol (ZEBETA) 5 MG tablet; Take 1 tablet (5 mg total) by mouth once daily.  Dispense: 90 tablet; Refill: 3    Aortic atherosclerosis  -     Discontinue: bisoprolol (ZEBETA) 5 MG tablet; Take 1 tablet (5 mg total) by mouth once daily.  Dispense: 90 tablet; Refill: 3  -     Discontinue: aspirin (ECOTRIN) 81 MG EC tablet; Take 1 tablet (81 mg total) by mouth once daily.  Dispense: 360 tablet; Refill: 0  -     Discontinue: atorvastatin (LIPITOR) 20 MG tablet; Take 1 tablet (20 mg total) by mouth every evening.  Dispense: 90 tablet; Refill: 3  -     Echo; Future  -     Nuclear Stress - Cardiology Interpreted; Future  -     aspirin (ECOTRIN) 81 MG EC tablet; Take 1 tablet (81 mg total) by mouth once daily.  Dispense:  360 tablet; Refill: 0  -     atorvastatin (LIPITOR) 20 MG tablet; Take 1 tablet (20 mg total) by mouth every evening.  Dispense: 90 tablet; Refill: 3  -     bisoprolol (ZEBETA) 5 MG tablet; Take 1 tablet (5 mg total) by mouth once daily.  Dispense: 90 tablet; Refill: 3    Centrilobular emphysema    Primary hypertension    Smoker    Uncomplicated alcohol dependence    Ex-smoker        Reviewed all tests and above medical conditions with patient in detail and formulated treatment plan.  Risk factor modification discussed.   Cardiac low salt diet discussed.  Maintaining healthy weight and weight loss goals were discussed in clinic.  Discussed possible heart catheterization in case of abnormal stress test/echo.    Encouraged to maintain abstinence from smoking and alcohol.  Follow up in  3 months

## 2023-08-22 ENCOUNTER — LAB VISIT (OUTPATIENT)
Dept: LAB | Facility: HOSPITAL | Age: 73
End: 2023-08-22
Attending: INTERNAL MEDICINE
Payer: MEDICARE

## 2023-08-22 DIAGNOSIS — Z12.5 ENCOUNTER FOR SCREENING FOR MALIGNANT NEOPLASM OF PROSTATE: ICD-10-CM

## 2023-08-22 DIAGNOSIS — I10 PRIMARY HYPERTENSION: ICD-10-CM

## 2023-08-22 DIAGNOSIS — I70.0 AORTIC ATHEROSCLEROSIS: ICD-10-CM

## 2023-08-22 LAB
ALBUMIN SERPL BCP-MCNC: 3.7 G/DL (ref 3.5–5.2)
ALP SERPL-CCNC: 80 U/L (ref 55–135)
ALT SERPL W/O P-5'-P-CCNC: 23 U/L (ref 10–44)
ANION GAP SERPL CALC-SCNC: 6 MMOL/L (ref 8–16)
AST SERPL-CCNC: 17 U/L (ref 10–40)
BASOPHILS # BLD AUTO: 0.06 K/UL (ref 0–0.2)
BASOPHILS NFR BLD: 1 % (ref 0–1.9)
BILIRUB SERPL-MCNC: 0.3 MG/DL (ref 0.1–1)
BUN SERPL-MCNC: 21 MG/DL (ref 8–23)
CALCIUM SERPL-MCNC: 9.5 MG/DL (ref 8.7–10.5)
CHLORIDE SERPL-SCNC: 101 MMOL/L (ref 95–110)
CHOLEST SERPL-MCNC: 150 MG/DL (ref 120–199)
CHOLEST/HDLC SERPL: 2.8 {RATIO} (ref 2–5)
CO2 SERPL-SCNC: 29 MMOL/L (ref 23–29)
COMPLEXED PSA SERPL-MCNC: 2.3 NG/ML (ref 0–4)
CREAT SERPL-MCNC: 1.4 MG/DL (ref 0.5–1.4)
DIFFERENTIAL METHOD: ABNORMAL
EOSINOPHIL # BLD AUTO: 0.3 K/UL (ref 0–0.5)
EOSINOPHIL NFR BLD: 4.2 % (ref 0–8)
ERYTHROCYTE [DISTWIDTH] IN BLOOD BY AUTOMATED COUNT: 11.9 % (ref 11.5–14.5)
EST. GFR  (NO RACE VARIABLE): 53.1 ML/MIN/1.73 M^2
GLUCOSE SERPL-MCNC: 70 MG/DL (ref 70–110)
HCT VFR BLD AUTO: 39.9 % (ref 40–54)
HDLC SERPL-MCNC: 54 MG/DL (ref 40–75)
HDLC SERPL: 36 % (ref 20–50)
HGB BLD-MCNC: 13.1 G/DL (ref 14–18)
IMM GRANULOCYTES # BLD AUTO: 0.02 K/UL (ref 0–0.04)
IMM GRANULOCYTES NFR BLD AUTO: 0.3 % (ref 0–0.5)
LDLC SERPL CALC-MCNC: 77 MG/DL (ref 63–159)
LYMPHOCYTES # BLD AUTO: 1.6 K/UL (ref 1–4.8)
LYMPHOCYTES NFR BLD: 27.2 % (ref 18–48)
MCH RBC QN AUTO: 30 PG (ref 27–31)
MCHC RBC AUTO-ENTMCNC: 32.8 G/DL (ref 32–36)
MCV RBC AUTO: 92 FL (ref 82–98)
MONOCYTES # BLD AUTO: 0.5 K/UL (ref 0.3–1)
MONOCYTES NFR BLD: 8.8 % (ref 4–15)
NEUTROPHILS # BLD AUTO: 3.5 K/UL (ref 1.8–7.7)
NEUTROPHILS NFR BLD: 58.5 % (ref 38–73)
NONHDLC SERPL-MCNC: 96 MG/DL
NRBC BLD-RTO: 0 /100 WBC
PLATELET # BLD AUTO: 281 K/UL (ref 150–450)
PMV BLD AUTO: 9.3 FL (ref 9.2–12.9)
POTASSIUM SERPL-SCNC: 4.3 MMOL/L (ref 3.5–5.1)
PROT SERPL-MCNC: 6.9 G/DL (ref 6–8.4)
RBC # BLD AUTO: 4.36 M/UL (ref 4.6–6.2)
SODIUM SERPL-SCNC: 136 MMOL/L (ref 136–145)
TRIGL SERPL-MCNC: 95 MG/DL (ref 30–150)
WBC # BLD AUTO: 5.93 K/UL (ref 3.9–12.7)

## 2023-08-22 PROCEDURE — 80061 LIPID PANEL: CPT | Mod: HCNC | Performed by: NURSE PRACTITIONER

## 2023-08-22 PROCEDURE — 85025 COMPLETE CBC W/AUTO DIFF WBC: CPT | Mod: HCNC | Performed by: INTERNAL MEDICINE

## 2023-08-22 PROCEDURE — 80053 COMPREHEN METABOLIC PANEL: CPT | Mod: HCNC | Performed by: INTERNAL MEDICINE

## 2023-08-22 PROCEDURE — 84153 ASSAY OF PSA TOTAL: CPT | Mod: HCNC | Performed by: INTERNAL MEDICINE

## 2023-08-25 ENCOUNTER — HOSPITAL ENCOUNTER (OUTPATIENT)
Dept: CARDIOLOGY | Facility: HOSPITAL | Age: 73
Discharge: HOME | End: 2023-08-25
Attending: INTERNAL MEDICINE
Payer: MEDICARE

## 2023-08-25 ENCOUNTER — HOSPITAL ENCOUNTER (OUTPATIENT)
Dept: RADIOLOGY | Facility: HOSPITAL | Age: 73
Discharge: HOME | End: 2023-08-25
Attending: INTERNAL MEDICINE
Payer: MEDICARE

## 2023-08-25 VITALS
DIASTOLIC BLOOD PRESSURE: 72 MMHG | SYSTOLIC BLOOD PRESSURE: 140 MMHG | BODY MASS INDEX: 22.54 KG/M2 | WEIGHT: 161 LBS | HEART RATE: 40 BPM | HEIGHT: 71 IN

## 2023-08-25 DIAGNOSIS — R06.02 SHORTNESS OF BREATH: ICD-10-CM

## 2023-08-25 DIAGNOSIS — I70.0 AORTIC ATHEROSCLEROSIS: ICD-10-CM

## 2023-08-25 DIAGNOSIS — I25.10 CORONARY ARTERY CALCIFICATION SEEN ON CAT SCAN: ICD-10-CM

## 2023-08-25 LAB
AORTIC ROOT ANNULUS: 3.46 CM
ASCENDING AORTA: 3.32 CM
AV INDEX (PROSTH): 0.71
AV MEAN GRADIENT: 4 MMHG
AV PEAK GRADIENT: 8 MMHG
AV VALVE AREA BY VELOCITY RATIO: 2.71 CM²
AV VALVE AREA: 2.39 CM²
AV VELOCITY RATIO: 0.8
BSA FOR ECHO PROCEDURE: 1.91 M2
CV ECHO LV RWT: 0.48 CM
DOP CALC AO PEAK VEL: 1.43 M/S
DOP CALC AO VTI: 36.6 CM
DOP CALC LVOT AREA: 3.4 CM2
DOP CALC LVOT DIAMETER: 2.07 CM
DOP CALC LVOT PEAK VEL: 1.15 M/S
DOP CALC LVOT STROKE VOLUME: 87.45 CM3
DOP CALC RVOT PEAK VEL: 0.58 M/S
DOP CALC RVOT VTI: 19.5 CM
DOP CALCLVOT PEAK VEL VTI: 26 CM
E WAVE DECELERATION TIME: 256.83 MSEC
E/A RATIO: 0.92
E/E' RATIO: 9.75 M/S
ECHO LV POSTERIOR WALL: 1.12 CM (ref 0.6–1.1)
EJECTION FRACTION: 60 %
FRACTIONAL SHORTENING: 34 % (ref 28–44)
INTERVENTRICULAR SEPTUM: 1.26 CM (ref 0.6–1.1)
IVC DIAMETER: 1.41 CM
IVRT: 111.32 MSEC
LA MAJOR: 4.74 CM
LA MINOR: 5.12 CM
LA WIDTH: 3.9 CM
LEFT ATRIUM SIZE: 3.46 CM
LEFT ATRIUM VOLUME INDEX MOD: 32.8 ML/M2
LEFT ATRIUM VOLUME INDEX: 29.4 ML/M2
LEFT ATRIUM VOLUME MOD: 63 CM3
LEFT ATRIUM VOLUME: 56.46 CM3
LEFT INTERNAL DIMENSION IN SYSTOLE: 3.09 CM (ref 2.1–4)
LEFT VENTRICLE DIASTOLIC VOLUME INDEX: 52.19 ML/M2
LEFT VENTRICLE DIASTOLIC VOLUME: 100.21 ML
LEFT VENTRICLE MASS INDEX: 108 G/M2
LEFT VENTRICLE SYSTOLIC VOLUME INDEX: 19.5 ML/M2
LEFT VENTRICLE SYSTOLIC VOLUME: 37.49 ML
LEFT VENTRICULAR INTERNAL DIMENSION IN DIASTOLE: 4.66 CM (ref 3.5–6)
LEFT VENTRICULAR MASS: 206.7 G
LV LATERAL E/E' RATIO: 9.75 M/S
LV SEPTAL E/E' RATIO: 9.75 M/S
LVOT MG: 2.41 MMHG
LVOT MV: 0.71 CM/S
MV PEAK A VEL: 0.85 M/S
MV PEAK E VEL: 0.78 M/S
MV STENOSIS PRESSURE HALF TIME: 74.48 MS
MV VALVE AREA P 1/2 METHOD: 2.95 CM2
PISA TR MAX VEL: 2.58 M/S
PULM VEIN S/D RATIO: 1.12
PV MEAN GRADIENT: 1 MMHG
PV MV: 0.53 M/S
PV PEAK D VEL: 0.52 M/S
PV PEAK GRADIENT: 2 MMHG
PV PEAK S VEL: 0.58 M/S
PV PEAK VELOCITY: 0.75 M/S
RA MAJOR: 5.11 CM
RA PRESSURE ESTIMATED: 3 MMHG
RA WIDTH: 3.59 CM
RIGHT VENTRICULAR END-DIASTOLIC DIMENSION: 3.51 CM
RV TB RVSP: 6 MMHG
SINUS: 3.33 CM
STJ: 3.07 CM
TDI LATERAL: 0.08 M/S
TDI SEPTAL: 0.08 M/S
TDI: 0.08 M/S
TR MAX PG: 27 MMHG
TRICUSPID ANNULAR PLANE SYSTOLIC EXCURSION: 2.08 CM
TV REST PULMONARY ARTERY PRESSURE: 30 MMHG
Z-SCORE OF LEFT VENTRICULAR DIMENSION IN END DIASTOLE: -1.51
Z-SCORE OF LEFT VENTRICULAR DIMENSION IN END SYSTOLE: -0.61

## 2023-08-25 PROCEDURE — 63600175 PHARM REV CODE 636 W HCPCS: Mod: HCNC | Performed by: INTERNAL MEDICINE

## 2023-08-25 PROCEDURE — 93016 NUCLEAR STRESS - CARDIOLOGY INTERPRETED (CUPID ONLY): ICD-10-PCS | Mod: HCNC,,, | Performed by: INTERNAL MEDICINE

## 2023-08-25 PROCEDURE — 78452 NUCLEAR STRESS - CARDIOLOGY INTERPRETED (CUPID ONLY): ICD-10-PCS | Mod: 26,HCNC,, | Performed by: INTERNAL MEDICINE

## 2023-08-25 PROCEDURE — 78452 HT MUSCLE IMAGE SPECT MULT: CPT | Mod: 26,HCNC,, | Performed by: INTERNAL MEDICINE

## 2023-08-25 PROCEDURE — 93306 TTE W/DOPPLER COMPLETE: CPT | Mod: HCNC

## 2023-08-25 PROCEDURE — 93018 CV STRESS TEST I&R ONLY: CPT | Mod: HCNC,,, | Performed by: INTERNAL MEDICINE

## 2023-08-25 PROCEDURE — A9502 TC99M TETROFOSMIN: HCPCS | Mod: HCNC

## 2023-08-25 PROCEDURE — 93017 CV STRESS TEST TRACING ONLY: CPT | Mod: HCNC

## 2023-08-25 PROCEDURE — 93016 CV STRESS TEST SUPVJ ONLY: CPT | Mod: HCNC,,, | Performed by: INTERNAL MEDICINE

## 2023-08-25 PROCEDURE — 93306 ECHO (CUPID ONLY): ICD-10-PCS | Mod: 26,HCNC,, | Performed by: INTERNAL MEDICINE

## 2023-08-25 PROCEDURE — 93306 TTE W/DOPPLER COMPLETE: CPT | Mod: 26,HCNC,, | Performed by: INTERNAL MEDICINE

## 2023-08-25 PROCEDURE — 93018 NUCLEAR STRESS - CARDIOLOGY INTERPRETED (CUPID ONLY): ICD-10-PCS | Mod: HCNC,,, | Performed by: INTERNAL MEDICINE

## 2023-08-25 PROCEDURE — 78452 HT MUSCLE IMAGE SPECT MULT: CPT | Mod: HCNC

## 2023-08-25 RX ORDER — REGADENOSON 0.08 MG/ML
0.4 INJECTION, SOLUTION INTRAVENOUS ONCE
Status: COMPLETED | OUTPATIENT
Start: 2023-08-25 | End: 2023-08-25

## 2023-08-25 RX ADMIN — REGADENOSON 0.4 MG: 0.08 INJECTION, SOLUTION INTRAVENOUS at 09:08

## 2023-08-26 LAB
CV STRESS BASE HR: 43 BPM
DIASTOLIC BLOOD PRESSURE: 67 MMHG
NUC REST EJECTION FRACTION: 74
NUC STRESS EJECTION FRACTION: 71 %
OHS CV CPX 85 PERCENT MAX PREDICTED HEART RATE MALE: 125
OHS CV CPX MAX PREDICTED HEART RATE: 147
OHS CV CPX PATIENT IS FEMALE: 0
OHS CV CPX PATIENT IS MALE: 1
OHS CV CPX PEAK DIASTOLIC BLOOD PRESSURE: 60 MMHG
OHS CV CPX PEAK HEAR RATE: 67 BPM
OHS CV CPX PEAK RATE PRESSURE PRODUCT: 7705
OHS CV CPX PEAK SYSTOLIC BLOOD PRESSURE: 115 MMHG
OHS CV CPX PERCENT MAX PREDICTED HEART RATE ACHIEVED: 46
OHS CV CPX RATE PRESSURE PRODUCT PRESENTING: 4945
SYSTOLIC BLOOD PRESSURE: 115 MMHG

## 2023-08-29 RX ORDER — METHIMAZOLE 5 MG/1
5 TABLET ORAL 3 TIMES DAILY
Qty: 90 TABLET | Refills: 0 | Status: SHIPPED | OUTPATIENT
Start: 2023-08-29 | End: 2023-10-25

## 2023-08-31 ENCOUNTER — LAB VISIT (OUTPATIENT)
Dept: LAB | Facility: HOSPITAL | Age: 73
End: 2023-08-31
Attending: INTERNAL MEDICINE
Payer: MEDICARE

## 2023-08-31 ENCOUNTER — OFFICE VISIT (OUTPATIENT)
Dept: INTERNAL MEDICINE | Facility: CLINIC | Age: 73
End: 2023-08-31
Payer: MEDICARE

## 2023-08-31 VITALS
HEART RATE: 37 BPM | SYSTOLIC BLOOD PRESSURE: 130 MMHG | TEMPERATURE: 97 F | HEIGHT: 71 IN | BODY MASS INDEX: 23.46 KG/M2 | WEIGHT: 167.56 LBS | DIASTOLIC BLOOD PRESSURE: 60 MMHG | OXYGEN SATURATION: 99 %

## 2023-08-31 DIAGNOSIS — F10.20 UNCOMPLICATED ALCOHOL DEPENDENCE: Chronic | ICD-10-CM

## 2023-08-31 DIAGNOSIS — N40.0 BENIGN PROSTATIC HYPERPLASIA WITHOUT LOWER URINARY TRACT SYMPTOMS: Chronic | ICD-10-CM

## 2023-08-31 DIAGNOSIS — I70.0 AORTIC ATHEROSCLEROSIS: ICD-10-CM

## 2023-08-31 DIAGNOSIS — I10 PRIMARY HYPERTENSION: ICD-10-CM

## 2023-08-31 DIAGNOSIS — Z00.00 ROUTINE GENERAL MEDICAL EXAMINATION AT A HEALTH CARE FACILITY: Primary | ICD-10-CM

## 2023-08-31 DIAGNOSIS — E05.90 HYPERTHYROIDISM: ICD-10-CM

## 2023-08-31 DIAGNOSIS — R00.1 BRADYCARDIA: ICD-10-CM

## 2023-08-31 DIAGNOSIS — J43.2 CENTRILOBULAR EMPHYSEMA: ICD-10-CM

## 2023-08-31 DIAGNOSIS — F17.200 SMOKER: ICD-10-CM

## 2023-08-31 PROCEDURE — 3078F PR MOST RECENT DIASTOLIC BLOOD PRESSURE < 80 MM HG: ICD-10-PCS | Mod: HCNC,CPTII,S$GLB, | Performed by: INTERNAL MEDICINE

## 2023-08-31 PROCEDURE — 1159F PR MEDICATION LIST DOCUMENTED IN MEDICAL RECORD: ICD-10-PCS | Mod: HCNC,CPTII,S$GLB, | Performed by: INTERNAL MEDICINE

## 2023-08-31 PROCEDURE — 99999 PR PBB SHADOW E&M-EST. PATIENT-LVL IV: CPT | Mod: PBBFAC,HCNC,, | Performed by: INTERNAL MEDICINE

## 2023-08-31 PROCEDURE — 99397 PER PM REEVAL EST PAT 65+ YR: CPT | Mod: HCNC,S$GLB,, | Performed by: INTERNAL MEDICINE

## 2023-08-31 PROCEDURE — 99397 PR PREVENTIVE VISIT,EST,65 & OVER: ICD-10-PCS | Mod: HCNC,S$GLB,, | Performed by: INTERNAL MEDICINE

## 2023-08-31 PROCEDURE — 3008F BODY MASS INDEX DOCD: CPT | Mod: HCNC,CPTII,S$GLB, | Performed by: INTERNAL MEDICINE

## 2023-08-31 PROCEDURE — 3008F PR BODY MASS INDEX (BMI) DOCUMENTED: ICD-10-PCS | Mod: HCNC,CPTII,S$GLB, | Performed by: INTERNAL MEDICINE

## 2023-08-31 PROCEDURE — 3288F PR FALLS RISK ASSESSMENT DOCUMENTED: ICD-10-PCS | Mod: HCNC,CPTII,S$GLB, | Performed by: INTERNAL MEDICINE

## 2023-08-31 PROCEDURE — 1159F MED LIST DOCD IN RCRD: CPT | Mod: HCNC,CPTII,S$GLB, | Performed by: INTERNAL MEDICINE

## 2023-08-31 PROCEDURE — 84439 ASSAY OF FREE THYROXINE: CPT | Mod: HCNC | Performed by: INTERNAL MEDICINE

## 2023-08-31 PROCEDURE — 1101F PR PT FALLS ASSESS DOC 0-1 FALLS W/OUT INJ PAST YR: ICD-10-PCS | Mod: HCNC,CPTII,S$GLB, | Performed by: INTERNAL MEDICINE

## 2023-08-31 PROCEDURE — 3075F SYST BP GE 130 - 139MM HG: CPT | Mod: HCNC,CPTII,S$GLB, | Performed by: INTERNAL MEDICINE

## 2023-08-31 PROCEDURE — 1101F PT FALLS ASSESS-DOCD LE1/YR: CPT | Mod: HCNC,CPTII,S$GLB, | Performed by: INTERNAL MEDICINE

## 2023-08-31 PROCEDURE — 4010F PR ACE/ARB THEARPY RXD/TAKEN: ICD-10-PCS | Mod: HCNC,CPTII,S$GLB, | Performed by: INTERNAL MEDICINE

## 2023-08-31 PROCEDURE — 1126F PR PAIN SEVERITY QUANTIFIED, NO PAIN PRESENT: ICD-10-PCS | Mod: HCNC,CPTII,S$GLB, | Performed by: INTERNAL MEDICINE

## 2023-08-31 PROCEDURE — 84443 ASSAY THYROID STIM HORMONE: CPT | Mod: HCNC | Performed by: INTERNAL MEDICINE

## 2023-08-31 PROCEDURE — 3288F FALL RISK ASSESSMENT DOCD: CPT | Mod: HCNC,CPTII,S$GLB, | Performed by: INTERNAL MEDICINE

## 2023-08-31 PROCEDURE — 3078F DIAST BP <80 MM HG: CPT | Mod: HCNC,CPTII,S$GLB, | Performed by: INTERNAL MEDICINE

## 2023-08-31 PROCEDURE — 84480 ASSAY TRIIODOTHYRONINE (T3): CPT | Mod: HCNC | Performed by: INTERNAL MEDICINE

## 2023-08-31 PROCEDURE — 1126F AMNT PAIN NOTED NONE PRSNT: CPT | Mod: HCNC,CPTII,S$GLB, | Performed by: INTERNAL MEDICINE

## 2023-08-31 PROCEDURE — 36415 COLL VENOUS BLD VENIPUNCTURE: CPT | Mod: HCNC | Performed by: INTERNAL MEDICINE

## 2023-08-31 PROCEDURE — 99999 PR PBB SHADOW E&M-EST. PATIENT-LVL IV: ICD-10-PCS | Mod: PBBFAC,HCNC,, | Performed by: INTERNAL MEDICINE

## 2023-08-31 PROCEDURE — 3075F PR MOST RECENT SYSTOLIC BLOOD PRESS GE 130-139MM HG: ICD-10-PCS | Mod: HCNC,CPTII,S$GLB, | Performed by: INTERNAL MEDICINE

## 2023-08-31 PROCEDURE — 4010F ACE/ARB THERAPY RXD/TAKEN: CPT | Mod: HCNC,CPTII,S$GLB, | Performed by: INTERNAL MEDICINE

## 2023-08-31 RX ORDER — LISINOPRIL AND HYDROCHLOROTHIAZIDE 10; 12.5 MG/1; MG/1
1 TABLET ORAL DAILY
Qty: 90 TABLET | Refills: 1 | Status: SHIPPED | OUTPATIENT
Start: 2023-08-31 | End: 2023-12-04

## 2023-08-31 NOTE — PROGRESS NOTES
Subjective:      Patient ID: Harpreet Perez is a 72 y.o. male.    Chief Complaint: Follow-up    HPI      72 y.o. with  Patient Active Problem List   Diagnosis    Benign prostatic hyperplasia without lower urinary tract symptoms    Smoker    Alcohol dependence    Spondylosis of lumbar region without myelopathy or radiculopathy    Osteoarthritis of multiple joints    DDD (degenerative disc disease), lumbar    History of colonic polyps    Aortic atherosclerosis    Hypertension    COPD (chronic obstructive pulmonary disease)    Hyperthyroidism    Routine general medical examination at a health care facility     Past Medical History:   Diagnosis Date    Alcohol dependence     Back pain     COPD (chronic obstructive pulmonary disease)     History of colonic polyps     Hypertension     Hypertrophy of prostate without urinary obstruction and other lower urinary tract symptoms (LUTS)     Osteoarthritis of multiple joints     Tobacco dependence     quit then restarted       Here today for annual prev exam.  Compliant with meds without significant side effects. Energy and appetite are good.         Past Surgical History:   Procedure Laterality Date    COLONOSCOPY N/A 5/25/2016    Procedure: COLONOSCOPY;  Surgeon: eSrgey Jensen MD;  Location: [facility];  Service: Endoscopy;  Laterality: N/A;    COLONOSCOPY N/A 11/15/2021    Procedure: COLONOSCOPY;  Surgeon: Francia Lucio MD;  Location: [facility];  Service: Endoscopy;  Laterality: N/A;    LIPOMA RESECTION Right 8/17/12    posterior shoulder     Social History     Socioeconomic History    Marital status:    Tobacco Use    Smoking status: Former     Current packs/day: 0.50     Average packs/day: 0.5 packs/day for 44.7 years (22.3 ttl pk-yrs)     Types: Cigarettes     Start date: 1/1/1979    Smokeless tobacco: Never    Tobacco comments:     Smokes 6-7 cigarettes per day   Substance and Sexual Activity    Alcohol use: Not Currently     Alcohol/week: 28.0 standard drinks of  alcohol     Types: 28 Cans of beer per week     Comment: case per week or up to 30 per week    Drug use: No    Sexual activity: Yes     Partners: Female     Social Determinants of Health     Financial Resource Strain: Low Risk  (7/26/2023)    Overall Financial Resource Strain (CARDIA)     Difficulty of Paying Living Expenses: Not hard at all   Food Insecurity: No Food Insecurity (7/26/2023)    Hunger Vital Sign     Worried About Running Out of Food in the Last Year: Never true     Ran Out of Food in the Last Year: Never true   Transportation Needs: No Transportation Needs (7/26/2023)    PRAPARE - Transportation     Lack of Transportation (Medical): No     Lack of Transportation (Non-Medical): No   Physical Activity: Insufficiently Active (7/26/2023)    Exercise Vital Sign     Days of Exercise per Week: 7 days     Minutes of Exercise per Session: 20 min   Stress: No Stress Concern Present (7/26/2023)    [employer] [place] [employer] - Occupational Stress Questionnaire     Feeling of Stress : Only a little   Social Connections: Moderately Integrated (7/26/2023)    Social Connection and Isolation Panel [NHANES]     Frequency of Communication with Friends and Family: More than three times a week     Frequency of Social Gatherings with Friends and Family: More than three times a week     Attends [employer] Services: More than 4 times per year     Active Member of Clubs or Organizations: No     Attends Club or Organization Meetings: Never     Marital Status:    Housing Stability: Low Risk  (7/26/2023)    Housing Stability Vital Sign     Unable to Pay for Housing in the Last Year: No     Number of Places Lived in the Last Year: 1     Unstable Housing in the Last Year: No     family history includes Cataracts in his mother and sister; Heart disease in his brother; Heart disease (age of onset: 33) in an other family member; Heart disease (age of onset: 35) in his brother; Hyperlipidemia in his brother;  "Hypertension in his brother, brother, and mother; Kidney disease in an other family member; Stroke in his father.    Review of Systems   Constitutional:  Negative for chills and fever.   HENT:  Negative for ear pain and sore throat.    Respiratory:  Negative for cough.    Cardiovascular:  Negative for chest pain.   Gastrointestinal:  Negative for abdominal pain and blood in stool.   Genitourinary:  Negative for dysuria and hematuria.   Neurological:  Negative for seizures and syncope.     Objective:   /60 (BP Location: Left arm, Patient Position: Sitting, BP Method: Large (Manual))   Pulse (!) 37   Temp 97.1 °F (36.2 °C) (Tympanic)   Ht 5' 10.98" (1.803 m)   Wt 76 kg (167 lb 8.8 oz)   SpO2 99%   BMI 23.38 kg/m²     Physical Exam  Constitutional:       General: He is not in acute distress.     Appearance: He is well-developed.   HENT:      Head: Normocephalic and atraumatic.   Eyes:      Extraocular Movements: Extraocular movements intact.   Neck:      Thyroid: No thyromegaly.   Cardiovascular:      Rate and Rhythm: Normal rate and regular rhythm.   Pulmonary:      Breath sounds: Normal breath sounds. No wheezing or rales.   Abdominal:      General: Bowel sounds are normal.      Palpations: Abdomen is soft.      Tenderness: There is no abdominal tenderness.   Musculoskeletal:         General: No swelling.      Cervical back: Neck supple. No rigidity.   Lymphadenopathy:      Cervical: No cervical adenopathy.   Skin:     General: Skin is warm and dry.   Neurological:      Mental Status: He is alert and oriented to person, place, and time.   Psychiatric:         Behavior: Behavior normal.         Lab Results   Component Value Date    WBC 5.93 08/22/2023    HGB 13.1 (L) 08/22/2023    HGB 14.2 08/09/2023    HGB 15.2 08/07/2023    HCT 39.9 (L) 08/22/2023    MCV 92 08/22/2023    MCV 90 08/09/2023    MCV 93 08/07/2023     08/22/2023    CHOL 150 08/22/2023    TRIG 95 08/22/2023    HDL 54 08/22/2023    " LDLCALC 77.0 08/22/2023    LDLCALC 95.4 07/28/2021    LDLCALC 103.4 01/26/2021    ALT 23 08/22/2023    AST 17 08/22/2023     08/22/2023    K 4.3 08/22/2023    CALCIUM 9.5 08/22/2023     08/22/2023    CO2 29 08/22/2023    BUN 21 08/22/2023    CREATININE 1.4 08/22/2023    CREATININE 1.3 08/09/2023    CREATININE 1.3 08/07/2023    EGFRNORACEVR 53.1 (A) 08/22/2023    EGFRNORACEVR 58 (A) 08/09/2023    EGFRNORACEVR 58.0 (A) 08/07/2023    TSH 1.629 08/31/2023    TSH 1.395 08/22/2023    TSH 0.746 08/07/2023    PSA 2.3 08/22/2023    PSA 3.8 08/05/2022    PSA 2.1 07/28/2021    GLU 70 08/22/2023    BNP <10 08/09/2023          The 10-year ASCVD risk score (Elisabeth GLASGOW, et al., 2019) is: 19.4%    Values used to calculate the score:      Age: 72 years      Sex: Male      Is Non- : Yes      Diabetic: No      Tobacco smoker: No      Systolic Blood Pressure: 130 mmHg      Is BP treated: Yes      HDL Cholesterol: 54 mg/dL      Total Cholesterol: 150 mg/dL     Assessment:     1. Routine general medical examination at a health care facility    2. Hyperthyroidism    3. Centrilobular emphysema    4. Uncomplicated alcohol dependence    5. Smoker    6. Primary hypertension    7. Aortic atherosclerosis    8. Benign prostatic hyperplasia without lower urinary tract symptoms    9. Bradycardia      Plan:   1. Routine general medical examination at a health care facility  Overview:  Heart healthy diet, regular exercise, and regular use of sunscreen.   HM reviewed      2. Hyperthyroidism  On methemazole  -     TSH; Future; Expected date: 08/31/2023  -     T4, Free; Future; Expected date: 08/31/2023  -     T3; Future; Expected date: 08/31/2023    3. Centrilobular emphysema  Overview:  No recent flares.      4. Uncomplicated alcohol dependence  Overview:  Reports no alcohol since 6/2022      5. Smoker  Overview:  Quit 6/2023      6. Primary hypertension  Overview:  Controlled.  Continue current  medications    Orders:  -     lisinopriL-hydrochlorothiazide (PRINZIDE,ZESTORETIC) 10-12.5 mg per tablet; Take 1 tablet by mouth once daily.  Dispense: 90 tablet; Refill: 1    7. Aortic atherosclerosis  Overview:  CXR 8/13/12    Asymptomatic      8. Benign prostatic hyperplasia without lower urinary tract symptoms  Overview:  Reports symptoms much improved with current medications.      9. Bradycardia  -     EKG 12-lead; Future        Patient Instructions   Cut your bisoprolol in half.     Future Appointments   Date Time Provider Department Center   9/28/2023  4:00 PM Thompson Christensen MD [facility] [place]   12/4/2023  1:40 PM Jordan Jackson MD [facility]       Lab Frequency Next Occurrence   Ambulatory referral/consult to Smoking Cessation Program Once 06/27/2023   Ambulatory referral/consult to Podiatry Once 08/02/2023       Follow up in about 4 weeks (around 9/28/2023), or if symptoms worsen or fail to improve.

## 2023-09-01 ENCOUNTER — HOSPITAL ENCOUNTER (OUTPATIENT)
Dept: CARDIOLOGY | Facility: HOSPITAL | Age: 73
Discharge: HOME | End: 2023-09-01
Attending: INTERNAL MEDICINE
Payer: MEDICARE

## 2023-09-01 DIAGNOSIS — R00.1 BRADYCARDIA: ICD-10-CM

## 2023-09-01 LAB
T3 SERPL-MCNC: 91 NG/DL (ref 60–180)
T4 FREE SERPL-MCNC: 0.77 NG/DL (ref 0.71–1.51)
TSH SERPL DL<=0.005 MIU/L-ACNC: 1.63 UIU/ML (ref 0.4–4)

## 2023-09-01 PROCEDURE — 93010 EKG 12-LEAD: ICD-10-PCS | Mod: HCNC,,, | Performed by: INTERNAL MEDICINE

## 2023-09-01 PROCEDURE — 93010 ELECTROCARDIOGRAM REPORT: CPT | Mod: HCNC,,, | Performed by: INTERNAL MEDICINE

## 2023-09-01 PROCEDURE — 93005 ELECTROCARDIOGRAM TRACING: CPT | Mod: HCNC

## 2023-09-02 PROBLEM — Z00.00 ROUTINE GENERAL MEDICAL EXAMINATION AT A HEALTH CARE FACILITY: Status: ACTIVE | Noted: 2023-09-02

## 2023-09-28 ENCOUNTER — OFFICE VISIT (OUTPATIENT)
Dept: INTERNAL MEDICINE | Facility: CLINIC | Age: 73
End: 2023-09-28
Payer: MEDICARE

## 2023-09-28 ENCOUNTER — LAB VISIT (OUTPATIENT)
Dept: LAB | Facility: HOSPITAL | Age: 73
End: 2023-09-28
Attending: INTERNAL MEDICINE
Payer: MEDICARE

## 2023-09-28 VITALS
HEIGHT: 71 IN | TEMPERATURE: 97 F | SYSTOLIC BLOOD PRESSURE: 112 MMHG | BODY MASS INDEX: 23.95 KG/M2 | DIASTOLIC BLOOD PRESSURE: 60 MMHG | HEART RATE: 48 BPM | OXYGEN SATURATION: 97 % | WEIGHT: 171.06 LBS

## 2023-09-28 DIAGNOSIS — E05.00 GRAVES DISEASE: ICD-10-CM

## 2023-09-28 LAB
T4 FREE SERPL-MCNC: 0.65 NG/DL (ref 0.71–1.51)
T4 FREE SERPL-MCNC: 0.65 NG/DL (ref 0.71–1.51)
TSH SERPL DL<=0.005 MIU/L-ACNC: 3.5 UIU/ML (ref 0.4–4)
TSH SERPL DL<=0.005 MIU/L-ACNC: 3.5 UIU/ML (ref 0.4–4)

## 2023-09-28 PROCEDURE — 1101F PR PT FALLS ASSESS DOC 0-1 FALLS W/OUT INJ PAST YR: ICD-10-PCS | Mod: HCNC,CPTII,S$GLB, | Performed by: INTERNAL MEDICINE

## 2023-09-28 PROCEDURE — 1101F PT FALLS ASSESS-DOCD LE1/YR: CPT | Mod: HCNC,CPTII,S$GLB, | Performed by: INTERNAL MEDICINE

## 2023-09-28 PROCEDURE — 1159F PR MEDICATION LIST DOCUMENTED IN MEDICAL RECORD: ICD-10-PCS | Mod: HCNC,CPTII,S$GLB, | Performed by: INTERNAL MEDICINE

## 2023-09-28 PROCEDURE — 84443 ASSAY THYROID STIM HORMONE: CPT | Mod: HCNC | Performed by: INTERNAL MEDICINE

## 2023-09-28 PROCEDURE — 3078F DIAST BP <80 MM HG: CPT | Mod: HCNC,CPTII,S$GLB, | Performed by: INTERNAL MEDICINE

## 2023-09-28 PROCEDURE — 99213 OFFICE O/P EST LOW 20 MIN: CPT | Mod: HCNC,S$GLB,, | Performed by: INTERNAL MEDICINE

## 2023-09-28 PROCEDURE — 1126F PR PAIN SEVERITY QUANTIFIED, NO PAIN PRESENT: ICD-10-PCS | Mod: HCNC,CPTII,S$GLB, | Performed by: INTERNAL MEDICINE

## 2023-09-28 PROCEDURE — 3288F FALL RISK ASSESSMENT DOCD: CPT | Mod: HCNC,CPTII,S$GLB, | Performed by: INTERNAL MEDICINE

## 2023-09-28 PROCEDURE — 1159F MED LIST DOCD IN RCRD: CPT | Mod: HCNC,CPTII,S$GLB, | Performed by: INTERNAL MEDICINE

## 2023-09-28 PROCEDURE — 1126F AMNT PAIN NOTED NONE PRSNT: CPT | Mod: HCNC,CPTII,S$GLB, | Performed by: INTERNAL MEDICINE

## 2023-09-28 PROCEDURE — 99213 PR OFFICE/OUTPT VISIT, EST, LEVL III, 20-29 MIN: ICD-10-PCS | Mod: HCNC,S$GLB,, | Performed by: INTERNAL MEDICINE

## 2023-09-28 PROCEDURE — 36415 COLL VENOUS BLD VENIPUNCTURE: CPT | Mod: HCNC | Performed by: INTERNAL MEDICINE

## 2023-09-28 PROCEDURE — 99999 PR PBB SHADOW E&M-EST. PATIENT-LVL IV: ICD-10-PCS | Mod: PBBFAC,HCNC,, | Performed by: INTERNAL MEDICINE

## 2023-09-28 PROCEDURE — 3008F BODY MASS INDEX DOCD: CPT | Mod: HCNC,CPTII,S$GLB, | Performed by: INTERNAL MEDICINE

## 2023-09-28 PROCEDURE — 84439 ASSAY OF FREE THYROXINE: CPT | Mod: HCNC | Performed by: INTERNAL MEDICINE

## 2023-09-28 PROCEDURE — 3008F PR BODY MASS INDEX (BMI) DOCUMENTED: ICD-10-PCS | Mod: HCNC,CPTII,S$GLB, | Performed by: INTERNAL MEDICINE

## 2023-09-28 PROCEDURE — 4010F PR ACE/ARB THEARPY RXD/TAKEN: ICD-10-PCS | Mod: HCNC,CPTII,S$GLB, | Performed by: INTERNAL MEDICINE

## 2023-09-28 PROCEDURE — 3288F PR FALLS RISK ASSESSMENT DOCUMENTED: ICD-10-PCS | Mod: HCNC,CPTII,S$GLB, | Performed by: INTERNAL MEDICINE

## 2023-09-28 PROCEDURE — 99999 PR PBB SHADOW E&M-EST. PATIENT-LVL IV: CPT | Mod: PBBFAC,HCNC,, | Performed by: INTERNAL MEDICINE

## 2023-09-28 PROCEDURE — 3074F PR MOST RECENT SYSTOLIC BLOOD PRESSURE < 130 MM HG: ICD-10-PCS | Mod: HCNC,CPTII,S$GLB, | Performed by: INTERNAL MEDICINE

## 2023-09-28 PROCEDURE — 3078F PR MOST RECENT DIASTOLIC BLOOD PRESSURE < 80 MM HG: ICD-10-PCS | Mod: HCNC,CPTII,S$GLB, | Performed by: INTERNAL MEDICINE

## 2023-09-28 PROCEDURE — 3074F SYST BP LT 130 MM HG: CPT | Mod: HCNC,CPTII,S$GLB, | Performed by: INTERNAL MEDICINE

## 2023-09-28 PROCEDURE — 4010F ACE/ARB THERAPY RXD/TAKEN: CPT | Mod: HCNC,CPTII,S$GLB, | Performed by: INTERNAL MEDICINE

## 2023-09-28 NOTE — PROGRESS NOTES
"Subjective:      Patient ID: Harpreet Perez is a 72 y.o. male.    Chief Complaint: Follow-up (4wk )    HPI    71 yo with   Patient Active Problem List   Diagnosis    Benign prostatic hyperplasia without lower urinary tract symptoms    Smoker    Alcohol dependence    Spondylosis of lumbar region without myelopathy or radiculopathy    Osteoarthritis of multiple joints    DDD (degenerative disc disease), lumbar    History of colonic polyps    Aortic atherosclerosis    Hypertension    COPD (chronic obstructive pulmonary disease)    Graves disease    Routine general medical examination at a health care facility     Past Medical History:   Diagnosis Date    Alcohol dependence     Back pain     COPD (chronic obstructive pulmonary disease)     History of colonic polyps     Hypertension     Hypertrophy of prostate without urinary obstruction and other lower urinary tract symptoms (LUTS)     Osteoarthritis of multiple joints     Tobacco dependence     quit then restarted     Here today for f/u on graves disease.  Compliant with meds without significant side effects.    Review of Systems   Constitutional:  Negative for chills, fatigue and fever.   HENT:  Negative for ear pain and sore throat.    Respiratory:  Negative for cough.    Cardiovascular:  Negative for chest pain.   Gastrointestinal:  Negative for abdominal pain and blood in stool.   Genitourinary:  Negative for dysuria and hematuria.   Neurological:  Negative for seizures and syncope.     Objective:   /60 (BP Location: Left arm, Patient Position: Sitting, BP Method: Large (Manual))   Pulse (!) 48   Temp 96.9 °F (36.1 °C) (Tympanic)   Ht 5' 10.98" (1.803 m)   Wt 77.6 kg (171 lb 1.2 oz)   SpO2 97%   BMI 23.87 kg/m²     Physical Exam  Constitutional:       General: He is awake.      Appearance: Normal appearance.   HENT:      Head: Normocephalic and atraumatic.   Eyes:      Conjunctiva/sclera: Conjunctivae normal.   Pulmonary:      Effort: Pulmonary effort is " normal.   Musculoskeletal:      Cervical back: Normal range of motion.   Neurological:      Mental Status: He is alert. Mental status is at baseline.   Psychiatric:         Mood and Affect: Mood normal.         Behavior: Behavior normal. Behavior is cooperative.         Thought Content: Thought content normal.         Judgment: Judgment normal.         Lab Results   Component Value Date    WBC 5.93 08/22/2023    HGB 13.1 (L) 08/22/2023    HGB 14.2 08/09/2023    HGB 15.2 08/07/2023    HCT 39.9 (L) 08/22/2023    MCV 92 08/22/2023    MCV 90 08/09/2023    MCV 93 08/07/2023     08/22/2023    CHOL 150 08/22/2023    TRIG 95 08/22/2023    HDL 54 08/22/2023    LDLCALC 77.0 08/22/2023    LDLCALC 95.4 07/28/2021    LDLCALC 103.4 01/26/2021    ALT 23 08/22/2023    AST 17 08/22/2023     08/22/2023    K 4.3 08/22/2023    CALCIUM 9.5 08/22/2023     08/22/2023    CO2 29 08/22/2023    BUN 21 08/22/2023    CREATININE 1.4 08/22/2023    CREATININE 1.3 08/09/2023    CREATININE 1.3 08/07/2023    EGFRNORACEVR 53.1 (A) 08/22/2023    EGFRNORACEVR 58 (A) 08/09/2023    EGFRNORACEVR 58.0 (A) 08/07/2023    TSH 1.629 08/31/2023    TSH 1.395 08/22/2023    TSH 0.746 08/07/2023    PSA 2.3 08/22/2023    PSA 3.8 08/05/2022    PSA 2.1 07/28/2021    GLU 70 08/22/2023    BNP <10 08/09/2023          The 10-year ASCVD risk score (Elisabeth GLASGOW, et al., 2019) is: 15%    Values used to calculate the score:      Age: 72 years      Sex: Male      Is Non- : Yes      Diabetic: No      Tobacco smoker: No      Systolic Blood Pressure: 112 mmHg      Is BP treated: Yes      HDL Cholesterol: 54 mg/dL      Total Cholesterol: 150 mg/dL     Assessment:     1. Graves disease      Plan:   1. Graves disease  Overview:    ft4 1.62 at time of dx. (8/2022)    Srinivas Zabala MD at 9/2/2022 11:20 AM    Status: Signed      HPI:  Patient is a 71-year-old man who comes today for follow-up of his lab work and studies for hyperthyroidism.   Patient found to have Graves disease based on positive antibody assay,    Thyrotropin Receptor Ab 0.00 - 1.75 IU/L 5.03 High     as well as a thyroid nuclear scan showing a significant increased uptake.  Patient has lost about 20 lb of weight.  He does feel jittery and nervous at times.     Current meds have been verified and updated per the EMR  Exam:/60 (BP Location: Right arm)   Pulse 74   Ht 6' (1.829 m)   Wt 76.1 kg (167 lb 12.3 oz)   SpO2 98%   BMI 22.75 kg/m²   Thyroid gland is moderately enlarged, diffusely, no nodule           Lab Results   Component Value Date     WBC 5.85 08/05/2022     HGB 12.4 (L) 08/05/2022     HCT 36.6 (L) 08/05/2022      08/05/2022     CHOL 185 07/28/2021     TRIG 118 07/28/2021     HDL 66 07/28/2021     ALT 23 08/05/2022     AST 16 08/05/2022      08/05/2022     K 4.4 08/05/2022      08/05/2022     CREATININE 1.1 08/05/2022     BUN 23 08/05/2022     CO2 29 08/05/2022     TSH <0.010 (L) 08/05/2022     PSA 3.8 08/05/2022         Impression:  Graves disease, we had a long discussion about the 3 different approaches for treatment.  We discussed surgical options, radioactive iodine and medical therapy with Tapazole.  Patient actually has 2 other family members at take Tapazole for Graves disease.  He is very familiar with that and he prefers that route of treatment.  He understands potential side effects.      Patient Active Problem List   Diagnosis    Benign prostatic hyperplasia without lower urinary tract symptoms    Tobacco abuse    Alcohol dependence    Spondylosis of lumbar region without myelopathy or radiculopathy    Osteoarthritis of multiple joints    DDD (degenerative disc disease), lumbar    History of colonic polyps    Tortuous aorta    Hypertension    COPD (chronic obstructive pulmonary disease)               Orders:  -     TSH; Future; Expected date: 09/28/2023  -     T4, Free; Future; Expected date: 09/28/2023  -     T4, Free; Future;  Expected date: 10/28/2023  -     TSH; Future; Expected date: 09/28/2023      Has gained some weight recently but his weight is now the same as his July weight.  Check TSH and free T4 today.  Adjust methimazole if necessary.  Continue to monitor.    Patient Instructions   Check with your pharmacy regarding new shingles vaccine.      Covid vaccine phone number is [phone].     Future Appointments   Date Time Provider Department Center   9/28/2023  4:20 PM LABORATORY, [facility] LAB [facility]   10/16/2023  8:45 AM Alejandro Goins [facility]   12/4/2023  1:40 PM Jordan Jackson MD [facility] CARDIO [facility]       Lab Frequency Next Occurrence   Ambulatory referral/consult to Smoking Cessation Program Once 06/27/2023   Ambulatory referral/consult to Podiatry Once 08/02/2023       Follow up in about 4 weeks (around 10/26/2023).  4pm appt ok in one month.

## 2023-09-28 NOTE — PATIENT INSTRUCTIONS
Check with your pharmacy regarding new shingles vaccine.      Covid vaccine phone number is [phone].

## 2023-10-10 DIAGNOSIS — E05.90 HYPERTHYROIDISM: Primary | ICD-10-CM

## 2023-10-16 ENCOUNTER — OFFICE VISIT (OUTPATIENT)
Dept: OPHTHALMOLOGY | Facility: CLINIC | Age: 73
End: 2023-10-16
Payer: MEDICARE

## 2023-10-16 ENCOUNTER — PATIENT MESSAGE (OUTPATIENT)
Dept: OPHTHALMOLOGY | Facility: CLINIC | Age: 73
End: 2023-10-16
Payer: MEDICARE

## 2023-10-16 DIAGNOSIS — H25.13 CATARACT, NUCLEAR SCLEROTIC SENILE, BILATERAL: Primary | ICD-10-CM

## 2023-10-16 DIAGNOSIS — H04.123 DRY EYES, BILATERAL: ICD-10-CM

## 2023-10-16 DIAGNOSIS — H52.4 BILATERAL PRESBYOPIA: ICD-10-CM

## 2023-10-16 PROCEDURE — 92015 PR REFRACTION: ICD-10-PCS | Mod: HCNC,S$GLB,, | Performed by: OPTOMETRIST

## 2023-10-16 PROCEDURE — 99999 PR PBB SHADOW E&M-EST. PATIENT-LVL II: ICD-10-PCS | Mod: PBBFAC,HCNC,, | Performed by: OPTOMETRIST

## 2023-10-16 PROCEDURE — 1159F PR MEDICATION LIST DOCUMENTED IN MEDICAL RECORD: ICD-10-PCS | Mod: HCNC,CPTII,S$GLB, | Performed by: OPTOMETRIST

## 2023-10-16 PROCEDURE — 99999 PR PBB SHADOW E&M-EST. PATIENT-LVL II: CPT | Mod: PBBFAC,HCNC,, | Performed by: OPTOMETRIST

## 2023-10-16 PROCEDURE — 92014 PR EYE EXAM, EST PATIENT,COMPREHESV: ICD-10-PCS | Mod: HCNC,S$GLB,, | Performed by: OPTOMETRIST

## 2023-10-16 PROCEDURE — 4010F ACE/ARB THERAPY RXD/TAKEN: CPT | Mod: HCNC,CPTII,S$GLB, | Performed by: OPTOMETRIST

## 2023-10-16 PROCEDURE — 92014 COMPRE OPH EXAM EST PT 1/>: CPT | Mod: HCNC,S$GLB,, | Performed by: OPTOMETRIST

## 2023-10-16 PROCEDURE — 1159F MED LIST DOCD IN RCRD: CPT | Mod: HCNC,CPTII,S$GLB, | Performed by: OPTOMETRIST

## 2023-10-16 PROCEDURE — 4010F PR ACE/ARB THEARPY RXD/TAKEN: ICD-10-PCS | Mod: HCNC,CPTII,S$GLB, | Performed by: OPTOMETRIST

## 2023-10-16 PROCEDURE — 92015 DETERMINE REFRACTIVE STATE: CPT | Mod: HCNC,S$GLB,, | Performed by: OPTOMETRIST

## 2023-10-16 NOTE — PROGRESS NOTES
HPI    Eyes burns sometimes.  Last eye exam 05/31/2022 TRF.  Update glasses RX.  Last edited by Lea Fischer MA on 10/16/2023  8:55 AM.            Assessment /Plan     For exam results, see Encounter Report.    Cataract, nuclear sclerotic senile, bilateral    Dry eyes, bilateral    Bilateral presbyopia      Moderate cataracts OU, not surgical  Follow annually.    AT prn OU    Dispense Final Rx for glasses.  RTC 1 year  Discussed above and answered questions.

## 2023-10-21 DIAGNOSIS — E05.90 HYPERTHYROIDISM: ICD-10-CM

## 2023-10-21 NOTE — TELEPHONE ENCOUNTER
No care due was identified.  [facility] Embedded Care Due Messages. Reference number: [id number].   10/21/2023 3:48:14 AM CDT

## 2023-10-23 ENCOUNTER — TELEPHONE (OUTPATIENT)
Dept: INTERNAL MEDICINE | Facility: CLINIC | Age: 73
End: 2023-10-23
Payer: MEDICARE

## 2023-10-23 NOTE — TELEPHONE ENCOUNTER
----- Message from Jose Wills sent at 10/23/2023  9:59 AM CDT -----  Contact: Harpreet  .Type:  Patient Returning Call    Who Called:Harpreet  Who Left Message for Patient:nurse  Does the patient know what this is regarding?:yes  Would the patient rather a call back or a response via [employer]? Call back  Best Call Back Number:[phone]  Additional Information: na    Thanks  SW

## 2023-10-23 NOTE — TELEPHONE ENCOUNTER
----- Message from Belle Gaffney sent at 10/23/2023  9:32 AM CDT -----  Contact: Harpreet  Type:  Patient Returning Call    Who Called:Harpreet  Who Left Message for Patient:Nurse  Does the patient know what this is regarding?:missed call  Would the patient rather a call back or a response via [employer]? Call back  Best Call Back Number:[phone]  Additional Information: Harpreet called in and stated he had a missed call and would like a call back    Thanks  BABATUNDE

## 2023-10-24 NOTE — TELEPHONE ENCOUNTER
Medication refilled.  However please make sure he has been taking the medication only twice daily instead of 3 times daily as recommended on September 28th.

## 2023-10-25 RX ORDER — METHIMAZOLE 5 MG/1
5 TABLET ORAL 2 TIMES DAILY
Qty: 60 TABLET | Refills: 0 | Status: SHIPPED | OUTPATIENT
Start: 2023-10-25 | End: 2023-12-18

## 2023-12-04 ENCOUNTER — LAB VISIT (OUTPATIENT)
Dept: LAB | Facility: HOSPITAL | Age: 73
End: 2023-12-04
Attending: INTERNAL MEDICINE
Payer: MEDICARE

## 2023-12-04 ENCOUNTER — OFFICE VISIT (OUTPATIENT)
Dept: CARDIOLOGY | Facility: CLINIC | Age: 73
End: 2023-12-04
Payer: MEDICARE

## 2023-12-04 VITALS
HEART RATE: 50 BPM | BODY MASS INDEX: 24.64 KG/M2 | OXYGEN SATURATION: 97 % | SYSTOLIC BLOOD PRESSURE: 140 MMHG | WEIGHT: 176.56 LBS | DIASTOLIC BLOOD PRESSURE: 70 MMHG

## 2023-12-04 DIAGNOSIS — I10 PRIMARY HYPERTENSION: Primary | ICD-10-CM

## 2023-12-04 DIAGNOSIS — R00.1 BRADYCARDIA: ICD-10-CM

## 2023-12-04 DIAGNOSIS — J43.2 CENTRILOBULAR EMPHYSEMA: ICD-10-CM

## 2023-12-04 DIAGNOSIS — I70.0 AORTIC ATHEROSCLEROSIS: ICD-10-CM

## 2023-12-04 DIAGNOSIS — Z87.891 EX-SMOKER: ICD-10-CM

## 2023-12-04 DIAGNOSIS — F10.20 UNCOMPLICATED ALCOHOL DEPENDENCE: ICD-10-CM

## 2023-12-04 DIAGNOSIS — E05.90 HYPERTHYROIDISM: ICD-10-CM

## 2023-12-04 DIAGNOSIS — I25.10 CORONARY ARTERY CALCIFICATION SEEN ON CAT SCAN: ICD-10-CM

## 2023-12-04 PROBLEM — Z00.00 ROUTINE GENERAL MEDICAL EXAMINATION AT A HEALTH CARE FACILITY: Status: RESOLVED | Noted: 2023-09-02 | Resolved: 2023-12-04

## 2023-12-04 LAB
T4 FREE SERPL-MCNC: 0.73 NG/DL (ref 0.71–1.51)
TSH SERPL DL<=0.005 MIU/L-ACNC: 4.21 UIU/ML (ref 0.4–4)

## 2023-12-04 PROCEDURE — 1159F PR MEDICATION LIST DOCUMENTED IN MEDICAL RECORD: ICD-10-PCS | Mod: HCNC,CPTII,S$GLB, | Performed by: INTERNAL MEDICINE

## 2023-12-04 PROCEDURE — 3077F SYST BP >= 140 MM HG: CPT | Mod: HCNC,CPTII,S$GLB, | Performed by: INTERNAL MEDICINE

## 2023-12-04 PROCEDURE — 84439 ASSAY OF FREE THYROXINE: CPT | Mod: HCNC | Performed by: INTERNAL MEDICINE

## 2023-12-04 PROCEDURE — 1101F PR PT FALLS ASSESS DOC 0-1 FALLS W/OUT INJ PAST YR: ICD-10-PCS | Mod: HCNC,CPTII,S$GLB, | Performed by: INTERNAL MEDICINE

## 2023-12-04 PROCEDURE — 99214 OFFICE O/P EST MOD 30 MIN: CPT | Mod: HCNC,S$GLB,, | Performed by: INTERNAL MEDICINE

## 2023-12-04 PROCEDURE — 3008F BODY MASS INDEX DOCD: CPT | Mod: HCNC,CPTII,S$GLB, | Performed by: INTERNAL MEDICINE

## 2023-12-04 PROCEDURE — 84443 ASSAY THYROID STIM HORMONE: CPT | Mod: HCNC | Performed by: INTERNAL MEDICINE

## 2023-12-04 PROCEDURE — 4010F PR ACE/ARB THEARPY RXD/TAKEN: ICD-10-PCS | Mod: HCNC,CPTII,S$GLB, | Performed by: INTERNAL MEDICINE

## 2023-12-04 PROCEDURE — 4010F ACE/ARB THERAPY RXD/TAKEN: CPT | Mod: HCNC,CPTII,S$GLB, | Performed by: INTERNAL MEDICINE

## 2023-12-04 PROCEDURE — 99999 PR PBB SHADOW E&M-EST. PATIENT-LVL III: CPT | Mod: PBBFAC,HCNC,, | Performed by: INTERNAL MEDICINE

## 2023-12-04 PROCEDURE — 3078F PR MOST RECENT DIASTOLIC BLOOD PRESSURE < 80 MM HG: ICD-10-PCS | Mod: HCNC,CPTII,S$GLB, | Performed by: INTERNAL MEDICINE

## 2023-12-04 PROCEDURE — 3008F PR BODY MASS INDEX (BMI) DOCUMENTED: ICD-10-PCS | Mod: HCNC,CPTII,S$GLB, | Performed by: INTERNAL MEDICINE

## 2023-12-04 PROCEDURE — 99999 PR PBB SHADOW E&M-EST. PATIENT-LVL III: ICD-10-PCS | Mod: PBBFAC,HCNC,, | Performed by: INTERNAL MEDICINE

## 2023-12-04 PROCEDURE — 3288F FALL RISK ASSESSMENT DOCD: CPT | Mod: HCNC,CPTII,S$GLB, | Performed by: INTERNAL MEDICINE

## 2023-12-04 PROCEDURE — 1101F PT FALLS ASSESS-DOCD LE1/YR: CPT | Mod: HCNC,CPTII,S$GLB, | Performed by: INTERNAL MEDICINE

## 2023-12-04 PROCEDURE — 1159F MED LIST DOCD IN RCRD: CPT | Mod: HCNC,CPTII,S$GLB, | Performed by: INTERNAL MEDICINE

## 2023-12-04 PROCEDURE — 3078F DIAST BP <80 MM HG: CPT | Mod: HCNC,CPTII,S$GLB, | Performed by: INTERNAL MEDICINE

## 2023-12-04 PROCEDURE — 99214 PR OFFICE/OUTPT VISIT, EST, LEVL IV, 30-39 MIN: ICD-10-PCS | Mod: HCNC,S$GLB,, | Performed by: INTERNAL MEDICINE

## 2023-12-04 PROCEDURE — 3288F PR FALLS RISK ASSESSMENT DOCUMENTED: ICD-10-PCS | Mod: HCNC,CPTII,S$GLB, | Performed by: INTERNAL MEDICINE

## 2023-12-04 PROCEDURE — 36415 COLL VENOUS BLD VENIPUNCTURE: CPT | Mod: HCNC | Performed by: INTERNAL MEDICINE

## 2023-12-04 PROCEDURE — 3077F PR MOST RECENT SYSTOLIC BLOOD PRESSURE >= 140 MM HG: ICD-10-PCS | Mod: HCNC,CPTII,S$GLB, | Performed by: INTERNAL MEDICINE

## 2023-12-04 RX ORDER — LISINOPRIL AND HYDROCHLOROTHIAZIDE 12.5; 2 MG/1; MG/1
1 TABLET ORAL DAILY
Qty: 30 TABLET | Refills: 11 | Status: SHIPPED | OUTPATIENT
Start: 2023-12-04 | End: 2024-12-03

## 2023-12-04 NOTE — PROGRESS NOTES
Subjective:   Patient ID:  Harpreet Perez is a 72 y.o. male who presents for evaluation of No chief complaint on file.      HPI  12.4.23  Had echo and stress  Occasional beers   No cigarettes   He states that his blood pressure can also be a little higher at home  Denies any chest pain anymore.  Dyspnea on exertion.    No Lower extremity swelling.  Palpitations syncope or presyncope    8.2023  Harpreet Perez is a 72 y.o. male patient with a PMHx of alcohol dependence, COPD, HTN, and tobacco dependence who  presented to the emergency room by PCP recommendation last week for minimally elevated troponin after he had an encounter with his PCP about dyspnea on exertion.    Patient is a smoker and uses alcohol heavily but he quit 2 weeks ago.    He denies any chest pain.    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    No lower extremity swelling.  No orthopnea.    He denies any chest pain.        Past Medical History:   Diagnosis Date    Alcohol dependence     Back pain     COPD (chronic obstructive pulmonary disease)     History of colonic polyps     Hypertension     Hypertrophy of prostate without urinary obstruction and other lower urinary tract symptoms (LUTS)     Osteoarthritis of multiple joints     Tobacco dependence     quit then restarted       Past Surgical History:   Procedure Laterality Date    COLONOSCOPY N/A 5/25/2016    Procedure: COLONOSCOPY;  Surgeon: Sergey Jensen MD;  Location: [facility];  Service: Endoscopy;  Laterality: N/A;    COLONOSCOPY N/A 11/15/2021    Procedure: COLONOSCOPY;  Surgeon: Francia Lucio MD;  Location: [facility];  Service: Endoscopy;  Laterality: N/A;    LIPOMA RESECTION Right 8/17/12    posterior shoulder       Social History     Tobacco Use    Smoking status: Former     Current packs/day: 0.50     Average packs/day: 0.5 packs/day for 44.9 years (22.5 ttl pk-yrs)     Types: Cigarettes     Start date: 1/1/1979     Smokeless tobacco: Never    Tobacco comments:     Smokes 6-7 cigarettes per day   Substance Use Topics    Alcohol use: Yes     Alcohol/week: 28.0 standard drinks of alcohol     Types: 28 Cans of beer per week     Comment: case per week or up to 30 per week    Drug use: No       Family History   Problem Relation Age of Onset    Cataracts Mother     Hypertension Mother     Stroke Father     Cataracts Sister     Cataracts Brother     Heart disease Brother 35        MI    Hyperlipidemia Brother     Hypertension Brother     Hypertension Brother     Heart disease Brother         cardiac stents    Heart disease Other 33        MI    Kidney disease Other         dialysis    Alcohol abuse Neg Hx     Asthma Neg Hx     Birth defects Neg Hx     Cancer Neg Hx     COPD Neg Hx     Depression Neg Hx     Diabetes Neg Hx     Drug abuse Neg Hx     Intellectual disability Neg Hx     Mental illness Neg Hx     Learning disabilities Neg Hx     Miscarriages / Stillbirths Neg Hx     Vision loss Neg Hx        Review of Systems   Cardiovascular:  Negative for chest pain, dyspnea on exertion, palpitations and syncope.   Genitourinary: Negative.    Neurological: Negative.        Current Outpatient Medications on File Prior to Visit   Medication Sig    aspirin (ECOTRIN) 81 MG EC tablet Take 1 tablet (81 mg total) by mouth once daily.    atorvastatin (LIPITOR) 20 MG tablet Take 1 tablet (20 mg total) by mouth every evening.    bisoprolol (ZEBETA) 5 MG tablet Take 1 tablet (5 mg total) by mouth once daily.    methIMAzole (TAPAZOLE) 5 MG Tab Take 1 tablet (5 mg total) by mouth 2 (two) times daily.    pantoprazole (PROTONIX) 40 MG tablet     psyllium (METAMUCIL) powder Take 1 packet by mouth once daily.    tamsulosin (FLOMAX) 0.4 mg Cap Take 1 capsule (0.4 mg total) by mouth once daily.    [DISCONTINUED] lisinopriL-hydrochlorothiazide (PRINZIDE,ZESTORETIC) 10-12.5 mg per tablet Take 1 tablet by mouth once daily.     No current facility-administered  medications on file prior to visit.       Objective:   Objective:  Wt Readings from Last 3 Encounters:   12/04/23 80.1 kg (176 lb 9.4 oz)   09/28/23 77.6 kg (171 lb 1.2 oz)   08/31/23 76 kg (167 lb 8.8 oz)     Temp Readings from Last 3 Encounters:   09/28/23 96.9 °F (36.1 °C) (Tympanic)   08/31/23 97.1 °F (36.2 °C) (Tympanic)   08/09/23 98.8 °F (37.1 °C) (Oral)     BP Readings from Last 3 Encounters:   12/04/23 (!) 140/70   09/28/23 112/60   08/31/23 130/60     Pulse Readings from Last 3 Encounters:   12/04/23 (!) 50   09/28/23 (!) 48   08/31/23 (!) 37       Physical Exam  Vitals reviewed.   Constitutional:       Appearance: He is well-developed.   Neck:      Vascular: No carotid bruit.   Cardiovascular:      Rate and Rhythm: Normal rate and regular rhythm.      Pulses: Intact distal pulses.      Heart sounds: Normal heart sounds. No murmur heard.  Pulmonary:      Breath sounds: Normal breath sounds.   Neurological:      Mental Status: He is oriented to person, place, and time.         Lab Results   Component Value Date    CHOL 150 08/22/2023    CHOL 185 07/28/2021    CHOL 185 01/26/2021     Lab Results   Component Value Date    HDL 54 08/22/2023    HDL 66 07/28/2021    HDL 63 01/26/2021     Lab Results   Component Value Date    LDLCALC 77.0 08/22/2023    LDLCALC 95.4 07/28/2021    LDLCALC 103.4 01/26/2021     Lab Results   Component Value Date    TRIG 95 08/22/2023    TRIG 118 07/28/2021    TRIG 93 01/26/2021     Lab Results   Component Value Date    CHOLHDL 36.0 08/22/2023    CHOLHDL 35.7 07/28/2021    CHOLHDL 34.1 01/26/2021       Chemistry        Component Value Date/Time     08/22/2023 0000    K 4.3 08/22/2023 0000     08/22/2023 0000    CO2 29 08/22/2023 0000    BUN 21 08/22/2023 0000    CREATININE 1.4 08/22/2023 0000    GLU 70 08/22/2023 0000        Component Value Date/Time    CALCIUM 9.5 08/22/2023 0000    ALKPHOS 80 08/22/2023 0000    AST 17 08/22/2023 0000    ALT 23 08/22/2023 0000    BILITOT  "0.3 08/22/2023 0000    ESTGFRAFRICA >60.0 07/28/2021 0800    EGFRNONAA >60.0 07/28/2021 0800          Lab Results   Component Value Date    TSH 3.501 09/28/2023    TSH 3.501 09/28/2023     No results found for: "INR", "PROTIME"  Lab Results   Component Value Date    WBC 5.93 08/22/2023    HGB 13.1 (L) 08/22/2023    HCT 39.9 (L) 08/22/2023    MCV 92 08/22/2023     08/22/2023     BNP  @LABRCNTIP(BNP,BNPTRIAGEBLO)@  CrCl cannot be calculated (Patient's most recent lab result is older than the maximum 7 days allowed.).     Imaging:  ======    No results found for this or any previous visit.    No results found for this or any previous visit.    Results for orders placed during the hospital encounter of 08/07/23    X-Ray Chest PA And Lateral    Narrative  EXAMINATION:  XR CHEST PA AND LATERAL    CLINICAL HISTORY:  Other forms of dyspnea    TECHNIQUE:  PA and lateral views of the chest were performed.    COMPARISON:  08/13/2012    FINDINGS:  The cardiac and mediastinal silhouettes appear within normal limits.   The lungs are clear bilaterally.  No acute osseous findings demonstrated.    Impression  No acute findings.      Electronically signed by: Jose Luis Roman MD  Date:    08/07/2023  Time:    16:42    No results found for this or any previous visit.    No valid procedures specified.    No results found for this or any previous visit.      No results found for this or any previous visit.      No results found for this or any previous visit.      Diagnostic Results:  ECG: Reviewed    Results for orders placed during the hospital encounter of 08/25/23    Echo    Interpretation Summary    Left Ventricle: The left ventricle is normal in size. Normal wall thickness. There is concentric remodeling. Normal wall motion. There is normal systolic function with a visually estimated ejection fraction of 55 - 70%. Ejection fraction by visual approximation is 60%. There is normal diastolic function.    Right Ventricle: " Normal right ventricular cavity size. Wall thickness is normal. Right ventricle wall motion  is normal. Systolic function is normal.    Mitral Valve: There is mild regurgitation.    IVC/SVC: Normal venous pressure at 3 mmHg.      Results for orders placed during the hospital encounter of 08/25/23    Nuclear Stress - Cardiology Interpreted    Interpretation Summary    There is a mild to moderate intensity, fixed perfusion abnormality consistent with scar in the basal to mid inferior wall(s).    The gated perfusion images showed an ejection fraction of 74% at rest. The gated perfusion images showed an ejection fraction of 71% post stress.    There is normal wall motion at rest and post stress.    LV cavity size is normal at rest and normal at stress.    The ECG portion of the study is negative for ischemia.    The patient reported no chest pain during the stress test.    Asymptomatic pre, during, and post stress test. Had pt move his arms and tap his feet prior to beginning stress to increase HR- responded well.      The 10-year ASCVD risk score (Elisabeth GLASGOW, et al., 2019) is: 22%    Values used to calculate the score:      Age: 72 years      Sex: Male      Is Non- : Yes      Diabetic: No      Tobacco smoker: No      Systolic Blood Pressure: 140 mmHg      Is BP treated: Yes      HDL Cholesterol: 54 mg/dL      Total Cholesterol: 150 mg/dL        Assessment and Plan:   Primary hypertension  -     lisinopriL-hydrochlorothiazide (PRINZIDE,ZESTORETIC) 20-12.5 mg per tablet; Take 1 tablet by mouth once daily.  Dispense: 30 tablet; Refill: 11    Bradycardia    Coronary artery calcification seen on CAT scan    Centrilobular emphysema    Ex-smoker    Aortic atherosclerosis    Uncomplicated alcohol dependence          Reviewed all tests and above medical conditions with patient in detail and formulated treatment plan.  Risk factor modification discussed.   Cardiac low salt diet discussed.  Maintaining  healthy weight and weight loss goals were discussed in clinic.  Reviewed nuclear stress test , fixed defect and asymptomatic and echo  Encouraged to continue to abstain from Smoking.  Decrease alcohol intake.    Increase lisinopril-hctz     Follow up in 6 months

## 2023-12-08 ENCOUNTER — TELEPHONE (OUTPATIENT)
Dept: INTERNAL MEDICINE | Facility: CLINIC | Age: 73
End: 2023-12-08
Payer: MEDICARE

## 2023-12-08 DIAGNOSIS — E05.00 GRAVES DISEASE: Primary | ICD-10-CM

## 2023-12-08 NOTE — PROGRESS NOTES
Subjective:      Patient ID: Harpreet Perez is a 72 y.o. male.    Chief Complaint: No chief complaint on file.    HPI  Review of Systems  Objective:   There were no vitals taken for this visit.    Physical Exam    Lab Results   Component Value Date    WBC 5.93 08/22/2023    HGB 13.1 (L) 08/22/2023    HGB 14.2 08/09/2023    HGB 15.2 08/07/2023    HCT 39.9 (L) 08/22/2023    MCV 92 08/22/2023    MCV 90 08/09/2023    MCV 93 08/07/2023     08/22/2023    CHOL 150 08/22/2023    TRIG 95 08/22/2023    HDL 54 08/22/2023    LDLCALC 77.0 08/22/2023    LDLCALC 95.4 07/28/2021    LDLCALC 103.4 01/26/2021    ALT 23 08/22/2023    AST 17 08/22/2023     08/22/2023    K 4.3 08/22/2023    CALCIUM 9.5 08/22/2023     08/22/2023    CO2 29 08/22/2023    BUN 21 08/22/2023    CREATININE 1.4 08/22/2023    CREATININE 1.3 08/09/2023    CREATININE 1.3 08/07/2023    EGFRNORACEVR 53.1 (A) 08/22/2023    EGFRNORACEVR 58 (A) 08/09/2023    EGFRNORACEVR 58.0 (A) 08/07/2023    TSH 4.215 (H) 12/04/2023    TSH 3.501 09/28/2023    TSH 3.501 09/28/2023    PSA 2.3 08/22/2023    PSA 3.8 08/05/2022    PSA 2.1 07/28/2021    GLU 70 08/22/2023    BNP <10 08/09/2023          The 10-year ASCVD risk score (Elisabeth GLASGOW, et al., 2019) is: 22%    Values used to calculate the score:      Age: 72 years      Sex: Male      Is Non- : Yes      Diabetic: No      Tobacco smoker: No      Systolic Blood Pressure: 140 mmHg      Is BP treated: Yes      HDL Cholesterol: 54 mg/dL      Total Cholesterol: 150 mg/dL     Assessment:     No diagnosis found.      There are no Patient Instructions on file for this visit.    Future Appointments   Date Time Provider Department Center   6/10/2024 11:00 AM Jordan Jackson MD [facility] CARDIO [facility]       Lab Frequency Next Occurrence   Ambulatory referral/consult to Smoking Cessation Program Once 06/27/2023   Ambulatory referral/consult to Podiatry Once 08/02/2023       No follow-ups on file.

## 2023-12-08 NOTE — TELEPHONE ENCOUNTER
----- Message from Nestor Kim sent at 12/8/2023  9:26 AM CST -----  Contact: self  ..Type:  Patient Returning Call    Who Called:.Harpreet Perez  Who Left Message for Patient:   Does the patient know what this is regarding?:results   Would the patient rather a call back or a response via [facility]?  Call back   Best Call Back Number:.[phone]  Additional Information:  pt states he missed a call

## 2023-12-08 NOTE — TELEPHONE ENCOUNTER
Spoke with patient and informed him per Dr. Christensen that his TSH level was a little high and asked him how much of the methamizole he has been taking the past month. Patient stated he has been taking it as prescribed: 5 mg twice daily.

## 2023-12-16 DIAGNOSIS — E05.90 HYPERTHYROIDISM: ICD-10-CM

## 2023-12-16 NOTE — TELEPHONE ENCOUNTER
No care due was identified.  [facility] Embedded Care Due Messages. Reference number: [id number].   12/16/2023 2:26:31 AM CST

## 2023-12-18 RX ORDER — METHIMAZOLE 5 MG/1
5 TABLET ORAL DAILY
Qty: 90 TABLET | Refills: 0 | Status: SHIPPED | OUTPATIENT
Start: 2023-12-18 | End: 2024-03-01

## 2023-12-27 RX ORDER — PANTOPRAZOLE SODIUM 40 MG/1
40 TABLET, DELAYED RELEASE ORAL
Qty: 90 TABLET | Refills: 0 | Status: SHIPPED | OUTPATIENT
Start: 2023-12-27 | End: 2024-03-11

## 2024-01-10 ENCOUNTER — LAB VISIT (OUTPATIENT)
Dept: LAB | Facility: HOSPITAL | Age: 74
End: 2024-01-10
Attending: INTERNAL MEDICINE
Payer: MEDICARE

## 2024-01-10 DIAGNOSIS — E05.00 GRAVES DISEASE: ICD-10-CM

## 2024-01-10 LAB
T4 FREE SERPL-MCNC: 0.83 NG/DL (ref 0.71–1.51)
TSH SERPL DL<=0.005 MIU/L-ACNC: 2.57 UIU/ML (ref 0.4–4)

## 2024-01-10 PROCEDURE — 36415 COLL VENOUS BLD VENIPUNCTURE: CPT | Mod: HCNC | Performed by: INTERNAL MEDICINE

## 2024-01-10 PROCEDURE — 84443 ASSAY THYROID STIM HORMONE: CPT | Mod: HCNC | Performed by: INTERNAL MEDICINE

## 2024-01-10 PROCEDURE — 84439 ASSAY OF FREE THYROXINE: CPT | Mod: HCNC | Performed by: INTERNAL MEDICINE

## 2024-02-12 ENCOUNTER — TELEPHONE (OUTPATIENT)
Dept: INTERNAL MEDICINE | Facility: CLINIC | Age: 74
End: 2024-02-12
Payer: MEDICARE

## 2024-02-15 ENCOUNTER — OFFICE VISIT (OUTPATIENT)
Dept: INTERNAL MEDICINE | Facility: CLINIC | Age: 74
End: 2024-02-15
Payer: MEDICARE

## 2024-02-15 ENCOUNTER — LAB VISIT (OUTPATIENT)
Dept: LAB | Facility: HOSPITAL | Age: 74
End: 2024-02-15
Attending: INTERNAL MEDICINE
Payer: MEDICARE

## 2024-02-15 VITALS
WEIGHT: 168 LBS | SYSTOLIC BLOOD PRESSURE: 130 MMHG | BODY MASS INDEX: 24.05 KG/M2 | HEIGHT: 70 IN | DIASTOLIC BLOOD PRESSURE: 70 MMHG | HEART RATE: 92 BPM | TEMPERATURE: 98 F | OXYGEN SATURATION: 93 %

## 2024-02-15 DIAGNOSIS — E05.00 GRAVES DISEASE: ICD-10-CM

## 2024-02-15 DIAGNOSIS — F17.200 SMOKER: ICD-10-CM

## 2024-02-15 DIAGNOSIS — M79.10 MYALGIA: ICD-10-CM

## 2024-02-15 DIAGNOSIS — J44.9 CHRONIC OBSTRUCTIVE PULMONARY DISEASE, UNSPECIFIED COPD TYPE: ICD-10-CM

## 2024-02-15 DIAGNOSIS — F10.20 UNCOMPLICATED ALCOHOL DEPENDENCE: Chronic | ICD-10-CM

## 2024-02-15 DIAGNOSIS — I10 PRIMARY HYPERTENSION: ICD-10-CM

## 2024-02-15 DIAGNOSIS — M25.50 ARTHRALGIA, UNSPECIFIED JOINT: Primary | ICD-10-CM

## 2024-02-15 DIAGNOSIS — I70.0 AORTIC ATHEROSCLEROSIS: ICD-10-CM

## 2024-02-15 DIAGNOSIS — M25.50 ARTHRALGIA, UNSPECIFIED JOINT: ICD-10-CM

## 2024-02-15 LAB — RHEUMATOID FACT SERPL-ACNC: <13 IU/ML (ref 0–15)

## 2024-02-15 PROCEDURE — 99214 OFFICE O/P EST MOD 30 MIN: CPT | Mod: HCNC,S$GLB,, | Performed by: INTERNAL MEDICINE

## 2024-02-15 PROCEDURE — 1101F PT FALLS ASSESS-DOCD LE1/YR: CPT | Mod: HCNC,CPTII,S$GLB, | Performed by: INTERNAL MEDICINE

## 2024-02-15 PROCEDURE — 86431 RHEUMATOID FACTOR QUANT: CPT | Mod: HCNC | Performed by: INTERNAL MEDICINE

## 2024-02-15 PROCEDURE — 84550 ASSAY OF BLOOD/URIC ACID: CPT | Mod: HCNC | Performed by: INTERNAL MEDICINE

## 2024-02-15 PROCEDURE — 84439 ASSAY OF FREE THYROXINE: CPT | Mod: HCNC | Performed by: INTERNAL MEDICINE

## 2024-02-15 PROCEDURE — 3078F DIAST BP <80 MM HG: CPT | Mod: HCNC,CPTII,S$GLB, | Performed by: INTERNAL MEDICINE

## 2024-02-15 PROCEDURE — 3288F FALL RISK ASSESSMENT DOCD: CPT | Mod: HCNC,CPTII,S$GLB, | Performed by: INTERNAL MEDICINE

## 2024-02-15 PROCEDURE — 86140 C-REACTIVE PROTEIN: CPT | Mod: HCNC | Performed by: INTERNAL MEDICINE

## 2024-02-15 PROCEDURE — 82550 ASSAY OF CK (CPK): CPT | Mod: HCNC | Performed by: INTERNAL MEDICINE

## 2024-02-15 PROCEDURE — 86038 ANTINUCLEAR ANTIBODIES: CPT | Mod: HCNC | Performed by: INTERNAL MEDICINE

## 2024-02-15 PROCEDURE — 82085 ASSAY OF ALDOLASE: CPT | Mod: HCNC | Performed by: INTERNAL MEDICINE

## 2024-02-15 PROCEDURE — 86200 CCP ANTIBODY: CPT | Mod: HCNC | Performed by: INTERNAL MEDICINE

## 2024-02-15 PROCEDURE — 85652 RBC SED RATE AUTOMATED: CPT | Mod: HCNC | Performed by: INTERNAL MEDICINE

## 2024-02-15 PROCEDURE — 84443 ASSAY THYROID STIM HORMONE: CPT | Mod: HCNC | Performed by: INTERNAL MEDICINE

## 2024-02-15 PROCEDURE — 3008F BODY MASS INDEX DOCD: CPT | Mod: HCNC,CPTII,S$GLB, | Performed by: INTERNAL MEDICINE

## 2024-02-15 PROCEDURE — 3075F SYST BP GE 130 - 139MM HG: CPT | Mod: HCNC,CPTII,S$GLB, | Performed by: INTERNAL MEDICINE

## 2024-02-15 PROCEDURE — 1125F AMNT PAIN NOTED PAIN PRSNT: CPT | Mod: HCNC,CPTII,S$GLB, | Performed by: INTERNAL MEDICINE

## 2024-02-15 PROCEDURE — 99999 PR PBB SHADOW E&M-EST. PATIENT-LVL IV: CPT | Mod: PBBFAC,HCNC,, | Performed by: INTERNAL MEDICINE

## 2024-02-15 PROCEDURE — 36415 COLL VENOUS BLD VENIPUNCTURE: CPT | Mod: HCNC | Performed by: INTERNAL MEDICINE

## 2024-02-15 PROCEDURE — 1159F MED LIST DOCD IN RCRD: CPT | Mod: HCNC,CPTII,S$GLB, | Performed by: INTERNAL MEDICINE

## 2024-02-15 RX ORDER — ATENOLOL 50 MG/1
50 TABLET ORAL
COMMUNITY
Start: 2023-10-14 | End: 2024-06-03

## 2024-02-15 NOTE — PATIENT INSTRUCTIONS
You need to avoid all NSAIDs due to your decreased kidney function.  Examples of NSAIDs are ibuprofen, aleve, meloxicam,, naproxen, motrin, naprosyn, advil.      Tylenol 500 to 1000 mg every 8 hours as needed for pain.

## 2024-02-15 NOTE — PROGRESS NOTES
"Subjective:      Patient ID: Harpreet Perez is a 73 y.o. male.    Chief Complaint: Arthritis (/)    Extremity Weakness         74 yo with   Patient Active Problem List   Diagnosis    Benign prostatic hyperplasia without lower urinary tract symptoms    Smoker    Alcohol dependence    Spondylosis of lumbar region without myelopathy or radiculopathy    Osteoarthritis of multiple joints    DDD (degenerative disc disease), lumbar    History of colonic polyps    Aortic atherosclerosis    Hypertension    COPD (chronic obstructive pulmonary disease)    Graves disease     Past Medical History:   Diagnosis Date    Alcohol dependence     Back pain     COPD (chronic obstructive pulmonary disease)     History of colonic polyps     Hypertension     Hypertrophy of prostate without urinary obstruction and other lower urinary tract symptoms (LUTS)     Osteoarthritis of multiple joints     Tobacco dependence     quit then restarted     Patient reports 4-5 days of multiple arthralgias and myalgias.  Arthralgias or worse in his shoulders wrists.  He has also had some myalgias in his calves and thighs.  Ankles and feet and hands seem to be spared.  Although he does reports some generalized stiffness in the mornings .  Can last about 30 minutes but significantly improves as he moves around.  He reports that he has had arthritis typically in his wrists and shoulders but this is a more severe episode.  He ran out of meloxicam 2 weeks ago.  There has been no redness or warmth. .  No trauma.    Graves disease.  He is currently on methimazole 5 mg daily.  No palpitations.  No weight gain or weight loss.  No heat or cold intolerance.    Review of Systems   Musculoskeletal:  Positive for extremity weakness.     Objective:   /70 (BP Location: Right arm, Patient Position: Sitting, BP Method: Large (Manual))   Pulse 92   Temp 97.6 °F (36.4 °C) (Tympanic)   Ht 5' 10" (1.778 m)   Wt 76.2 kg (167 lb 15.9 oz)   SpO2 (!) 93%   BMI 24.10 " kg/m²     Physical Exam  Constitutional:       General: He is awake.      Appearance: Normal appearance.   HENT:      Head: Normocephalic and atraumatic.   Eyes:      Conjunctiva/sclera: Conjunctivae normal.   Pulmonary:      Effort: Pulmonary effort is normal.   Musculoskeletal:      Cervical back: Normal range of motion.   Neurological:      Mental Status: He is alert. Mental status is at baseline.   Psychiatric:         Mood and Affect: Mood normal.         Behavior: Behavior normal. Behavior is cooperative.         Thought Content: Thought content normal.         Judgment: Judgment normal.         Lab Results   Component Value Date    WBC 5.93 08/22/2023    HGB 13.1 (L) 08/22/2023    HGB 14.2 08/09/2023    HGB 15.2 08/07/2023    HCT 39.9 (L) 08/22/2023    MCV 92 08/22/2023    MCV 90 08/09/2023    MCV 93 08/07/2023     08/22/2023    CHOL 150 08/22/2023    TRIG 95 08/22/2023    HDL 54 08/22/2023    LDLCALC 77.0 08/22/2023    LDLCALC 95.4 07/28/2021    LDLCALC 103.4 01/26/2021    ALT 23 08/22/2023    AST 17 08/22/2023     08/22/2023    K 4.3 08/22/2023    CALCIUM 9.5 08/22/2023     08/22/2023    CO2 29 08/22/2023    BUN 21 08/22/2023    CREATININE 1.4 08/22/2023    CREATININE 1.3 08/09/2023    CREATININE 1.3 08/07/2023    EGFRNORACEVR 53.1 (A) 08/22/2023    EGFRNORACEVR 58 (A) 08/09/2023    EGFRNORACEVR 58.0 (A) 08/07/2023    TSH 2.597 02/15/2024    TSH 2.570 01/10/2024    TSH 4.215 (H) 12/04/2023    PSA 2.3 08/22/2023    PSA 3.8 08/05/2022    PSA 2.1 07/28/2021    GLU 70 08/22/2023    BNP <10 08/09/2023      Good range of motion bilateral shoulders, knees, ankles, fingers.    There is decreased range of motion bilateral wrists.  There is some decreased range of motion with internal rotation bilateral hips.    The 10-year ASCVD risk score (Elisabeth GLASGOW, et al., 2019) is: 20%    Values used to calculate the score:      Age: 73 years      Sex: Male      Is Non- : Yes       Diabetic: No      Tobacco smoker: No      Systolic Blood Pressure: 130 mmHg      Is BP treated: Yes      HDL Cholesterol: 54 mg/dL      Total Cholesterol: 150 mg/dL     Assessment:     1. Arthralgia, unspecified joint    2. Myalgia    3. Graves disease    4. Smoker    5. Uncomplicated alcohol dependence    6. Primary hypertension    7. Chronic obstructive pulmonary disease, unspecified COPD type    8. Aortic atherosclerosis      Plan:   1. Arthralgia, unspecified joint  -     Uric Acid; Future; Expected date: 02/15/2024  -     Rheumatoid Factor; Future; Expected date: 02/15/2024  -     Sedimentation rate; Future; Expected date: 02/15/2024  -     Cyclic Citrullinated Peptide Antibody, IgG; Future; Expected date: 02/15/2024  -     C-Reactive Protein; Future; Expected date: 02/15/2024  -     ANDREW Screen w/Reflex; Future; Expected date: 02/15/2024    2. Myalgia  -     Aldolase; Future; Expected date: 02/15/2024  -     CK; Future; Expected date: 02/15/2024    3. Graves disease  Overview:    ft4 1.62 at time of dx. (8/2022)    Srinivas Zabala MD at 9/2/2022 11:20 AM    Status: Signed      HPI:  Patient is a 71-year-old man who comes today for follow-up of his lab work and studies for hyperthyroidism.  Patient found to have Graves disease based on positive antibody assay,    Thyrotropin Receptor Ab 0.00 - 1.75 IU/L 5.03 High     as well as a thyroid nuclear scan showing a significant increased uptake.  Patient has lost about 20 lb of weight.  He does feel jittery and nervous at times.     Current meds have been verified and updated per the EMR  Exam:/60 (BP Location: Right arm)   Pulse 74   Ht 6' (1.829 m)   Wt 76.1 kg (167 lb 12.3 oz)   SpO2 98%   BMI 22.75 kg/m²   Thyroid gland is moderately enlarged, diffusely, no nodule           Lab Results   Component Value Date     WBC 5.85 08/05/2022     HGB 12.4 (L) 08/05/2022     HCT 36.6 (L) 08/05/2022      08/05/2022     CHOL 185 07/28/2021     TRIG 118  07/28/2021     HDL 66 07/28/2021     ALT 23 08/05/2022     AST 16 08/05/2022      08/05/2022     K 4.4 08/05/2022      08/05/2022     CREATININE 1.1 08/05/2022     BUN 23 08/05/2022     CO2 29 08/05/2022     TSH <0.010 (L) 08/05/2022     PSA 3.8 08/05/2022         Impression:  Graves disease, we had a long discussion about the 3 different approaches for treatment.  We discussed surgical options, radioactive iodine and medical therapy with Tapazole.  Patient actually has 2 other family members at take Tapazole for Graves disease.  He is very familiar with that and he prefers that route of treatment.  He understands potential side effects.      Patient Active Problem List   Diagnosis    Benign prostatic hyperplasia without lower urinary tract symptoms    Tobacco abuse    Alcohol dependence    Spondylosis of lumbar region without myelopathy or radiculopathy    Osteoarthritis of multiple joints    DDD (degenerative disc disease), lumbar    History of colonic polyps    Tortuous aorta    Hypertension    COPD (chronic obstructive pulmonary disease)               Orders:  -     TSH; Future; Expected date: 02/15/2024  -     T4, Free; Future; Expected date: 02/15/2024    4. Smoker  Overview:  Quit 6/2023      5. Uncomplicated alcohol dependence  Overview:  Reports no alcohol since 6/2022      6. Primary hypertension  Overview:  Controlled.  Continue current medications      7. Chronic obstructive pulmonary disease, unspecified COPD type  Overview:  No recent flares.      8. Aortic atherosclerosis  Overview:  CXR 8/13/12    Asymptomatic          Patient Instructions   You need to avoid all NSAIDs due to your decreased kidney function.  Examples of NSAIDs are ibuprofen, aleve, meloxicam,, naproxen, motrin, naprosyn, advil.      Tylenol 500 to 1000 mg every 8 hours as needed for pain.      Future Appointments   Date Time Provider Department Center   3/4/2024  8:00 AM Indiana Yanes FNP [facility] [place]    6/3/2024  4:00 PM Jordan Jackson MD [facility] [place]   8/19/2024  1:40 PM Thompson Christensen MD [facility] [place]       Lab Frequency Next Occurrence   Ambulatory referral/consult to Smoking Cessation Program Once 06/27/2023   Ambulatory referral/consult to Podiatry Once 08/02/2023       Follow up in about 6 months (around 8/15/2024), or if symptoms worsen or fail to improve.

## 2024-02-16 LAB
ALDOLASE SERPL-CCNC: 3.4 U/L (ref 1.2–7.6)
ANA SER QL IF: NORMAL
CCP AB SER IA-ACNC: <0.5 U/ML
CK SERPL-CCNC: 173 U/L (ref 20–200)
CRP SERPL-MCNC: 26.4 MG/L (ref 0–8.2)
ERYTHROCYTE [SEDIMENTATION RATE] IN BLOOD BY PHOTOMETRIC METHOD: 16 MM/HR (ref 0–23)
T4 FREE SERPL-MCNC: 0.89 NG/DL (ref 0.71–1.51)
TSH SERPL DL<=0.005 MIU/L-ACNC: 2.6 UIU/ML (ref 0.4–4)
URATE SERPL-MCNC: 7.5 MG/DL (ref 3.4–7)

## 2024-03-01 DIAGNOSIS — E05.90 HYPERTHYROIDISM: ICD-10-CM

## 2024-03-01 RX ORDER — METHIMAZOLE 5 MG/1
5 TABLET ORAL
Qty: 90 TABLET | Refills: 0 | Status: SHIPPED | OUTPATIENT
Start: 2024-03-01 | End: 2024-05-17

## 2024-03-01 NOTE — TELEPHONE ENCOUNTER
No care due was identified.  [facility] Embedded Care Due Messages. Reference number: [id number].   3/01/2024 1:34:05 AM CST

## 2024-03-04 ENCOUNTER — TELEPHONE (OUTPATIENT)
Dept: PULMONOLOGY | Facility: CLINIC | Age: 74
End: 2024-03-04
Payer: MEDICARE

## 2024-03-04 DIAGNOSIS — Z87.891 HISTORY OF TOBACCO ABUSE: Primary | ICD-10-CM

## 2024-03-10 NOTE — TELEPHONE ENCOUNTER
No care due was identified.  [facility] Embedded Care Due Messages. Reference number: [id number].   3/10/2024 4:03:49 AM CDT

## 2024-03-11 RX ORDER — PANTOPRAZOLE SODIUM 40 MG/1
40 TABLET, DELAYED RELEASE ORAL
Qty: 90 TABLET | Refills: 3 | Status: SHIPPED | OUTPATIENT
Start: 2024-03-11

## 2024-03-11 NOTE — TELEPHONE ENCOUNTER
Refill Routing Note   Medication(s) are not appropriate for processing by [facility] for the following reason(s):        New or recently adjusted medication    [facility] action(s):  Defer               Appointments  past 12m or future 3m with PCP    Date Provider   Last Visit   2/15/2024 Thompson Christensen MD   Next Visit   8/19/2024 Thompson Christensen MD   ED visits in past 90 days: 0        Note composed:10:23 AM 03/11/2024

## 2024-04-04 ENCOUNTER — HOSPITAL ENCOUNTER (OUTPATIENT)
Dept: RADIOLOGY | Facility: HOSPITAL | Age: 74
Discharge: HOME | End: 2024-04-04
Attending: INTERNAL MEDICINE
Payer: MEDICARE

## 2024-04-04 DIAGNOSIS — Z87.891 HISTORY OF TOBACCO ABUSE: ICD-10-CM

## 2024-04-04 PROCEDURE — 71271 CT THORAX LUNG CANCER SCR C-: CPT | Mod: TC,HCNC

## 2024-04-04 PROCEDURE — 71271 CT THORAX LUNG CANCER SCR C-: CPT | Mod: 26,HCNC,, | Performed by: RADIOLOGY

## 2024-05-13 DIAGNOSIS — E05.90 HYPERTHYROIDISM: ICD-10-CM

## 2024-05-13 NOTE — TELEPHONE ENCOUNTER
No care due was identified.  [facility] Embedded Care Due Messages. Reference number: [id number].   5/13/2024 2:02:30 AM CDT

## 2024-05-17 RX ORDER — METHIMAZOLE 5 MG/1
5 TABLET ORAL
Qty: 90 TABLET | Refills: 0 | Status: SHIPPED | OUTPATIENT
Start: 2024-05-17

## 2024-05-21 ENCOUNTER — HOSPITAL ENCOUNTER (OUTPATIENT)
Dept: RADIOLOGY | Facility: HOSPITAL | Age: 74
Discharge: HOME | End: 2024-05-21
Attending: NURSE PRACTITIONER
Payer: MEDICARE

## 2024-05-21 ENCOUNTER — OFFICE VISIT (OUTPATIENT)
Dept: INTERNAL MEDICINE | Facility: CLINIC | Age: 74
End: 2024-05-21
Payer: MEDICARE

## 2024-05-21 VITALS
BODY MASS INDEX: 23.17 KG/M2 | HEART RATE: 48 BPM | HEIGHT: 70 IN | WEIGHT: 161.81 LBS | DIASTOLIC BLOOD PRESSURE: 62 MMHG | SYSTOLIC BLOOD PRESSURE: 124 MMHG | TEMPERATURE: 98 F | OXYGEN SATURATION: 98 %

## 2024-05-21 DIAGNOSIS — M25.511 CHRONIC RIGHT SHOULDER PAIN: Primary | ICD-10-CM

## 2024-05-21 DIAGNOSIS — M25.511 CHRONIC RIGHT SHOULDER PAIN: ICD-10-CM

## 2024-05-21 DIAGNOSIS — G89.29 CHRONIC RIGHT SHOULDER PAIN: Primary | ICD-10-CM

## 2024-05-21 DIAGNOSIS — G89.29 CHRONIC RIGHT SHOULDER PAIN: ICD-10-CM

## 2024-05-21 DIAGNOSIS — E79.0 ELEVATED BLOOD URIC ACID LEVEL: ICD-10-CM

## 2024-05-21 PROCEDURE — 73030 X-RAY EXAM OF SHOULDER: CPT | Mod: 26,HCNC,RT, | Performed by: RADIOLOGY

## 2024-05-21 PROCEDURE — 3288F FALL RISK ASSESSMENT DOCD: CPT | Mod: HCNC,CPTII,S$GLB, | Performed by: NURSE PRACTITIONER

## 2024-05-21 PROCEDURE — 99999 PR PBB SHADOW E&M-EST. PATIENT-LVL V: CPT | Mod: PBBFAC,HCNC,, | Performed by: NURSE PRACTITIONER

## 2024-05-21 PROCEDURE — 3074F SYST BP LT 130 MM HG: CPT | Mod: HCNC,CPTII,S$GLB, | Performed by: NURSE PRACTITIONER

## 2024-05-21 PROCEDURE — 99214 OFFICE O/P EST MOD 30 MIN: CPT | Mod: HCNC,S$GLB,, | Performed by: NURSE PRACTITIONER

## 2024-05-21 PROCEDURE — 73030 X-RAY EXAM OF SHOULDER: CPT | Mod: TC,HCNC,RT

## 2024-05-21 PROCEDURE — 1159F MED LIST DOCD IN RCRD: CPT | Mod: HCNC,CPTII,S$GLB, | Performed by: NURSE PRACTITIONER

## 2024-05-21 PROCEDURE — 1101F PT FALLS ASSESS-DOCD LE1/YR: CPT | Mod: HCNC,CPTII,S$GLB, | Performed by: NURSE PRACTITIONER

## 2024-05-21 PROCEDURE — 1125F AMNT PAIN NOTED PAIN PRSNT: CPT | Mod: HCNC,CPTII,S$GLB, | Performed by: NURSE PRACTITIONER

## 2024-05-21 PROCEDURE — 3078F DIAST BP <80 MM HG: CPT | Mod: HCNC,CPTII,S$GLB, | Performed by: NURSE PRACTITIONER

## 2024-05-21 RX ORDER — METHYLPREDNISOLONE 4 MG/1
TABLET ORAL
Qty: 1 EACH | Refills: 0 | Status: SHIPPED | OUTPATIENT
Start: 2024-05-21 | End: 2024-06-11

## 2024-05-21 RX ORDER — METHYLPREDNISOLONE 4 MG/1
TABLET ORAL
Qty: 1 EACH | Refills: 0 | Status: SHIPPED | OUTPATIENT
Start: 2024-05-21 | End: 2024-05-21 | Stop reason: SDUPTHER

## 2024-05-21 NOTE — PROGRESS NOTES
Subjective:       Patient ID: Harpreet Perez is a 73 y.o. male.    Chief Complaint: Shoulder Pain (For past few months despite taking tylenol arthritis)    Shoulder Pain   Pertinent negatives include no fever, headaches or numbness.       Patient here for above.  When he was seen by his PCP earlier this year there was an obvious elevation in uric acid level patient states he was not aware of this and he has not familiar with gout.  We discussed what causes this as well as with gout is I will include a low purine diet on  his paperwork.  Tylenol is not helping.  He reports difficulty lifting his right arm he is right-handed.  He has never had a shoulder x-ray he has never had physical therapy nor has he ever seen orthopedics.  He rates the pain in his right shoulder 10/10    Past Medical History:   Diagnosis Date    Alcohol dependence     Back pain     COPD (chronic obstructive pulmonary disease)     History of colonic polyps     Hypertension     Hypertrophy of prostate without urinary obstruction and other lower urinary tract symptoms (LUTS)     Osteoarthritis of multiple joints     Tobacco dependence     quit then restarted     Past Surgical History:   Procedure Laterality Date    COLONOSCOPY N/A 5/25/2016    Procedure: COLONOSCOPY;  Surgeon: Sergey Jensen MD;  Location: [facility];  Service: Endoscopy;  Laterality: N/A;    COLONOSCOPY N/A 11/15/2021    Procedure: COLONOSCOPY;  Surgeon: Francia Lucio MD;  Location: [facility];  Service: Endoscopy;  Laterality: N/A;    LIPOMA RESECTION Right 8/17/12    posterior shoulder     Social History     Socioeconomic History    Marital status:    Tobacco Use    Smoking status: Former     Current packs/day: 0.50     Average packs/day: 0.5 packs/day for 45.4 years (22.7 ttl pk-yrs)     Types: Cigarettes     Start date: 1/1/1979    Smokeless tobacco: Never    Tobacco comments:     Smokes 6-7 cigarettes per day   Substance and Sexual Activity    Alcohol use: Yes      Alcohol/week: 28.0 standard drinks of alcohol     Types: 28 Cans of beer per week     Comment: case per week or up to 30 per week    Drug use: No    Sexual activity: Yes     Partners: Female     Social Determinants of Health     Financial Resource Strain: Low Risk  (7/26/2023)    Overall Financial Resource Strain (CARDIA)     Difficulty of Paying Living Expenses: Not hard at all   Food Insecurity: No Food Insecurity (7/26/2023)    Hunger Vital Sign     Worried About Running Out of Food in the Last Year: Never true     Ran Out of Food in the Last Year: Never true   Transportation Needs: No Transportation Needs (7/26/2023)    PRAPARE - Transportation     Lack of Transportation (Medical): No     Lack of Transportation (Non-Medical): No   Physical Activity: Insufficiently Active (7/26/2023)    Exercise Vital Sign     Days of Exercise per Week: 7 days     Minutes of Exercise per Session: 20 min   Stress: No Stress Concern Present (7/26/2023)    Citizen of [place] [employer] - Occupational Stress Questionnaire     Feeling of Stress : Only a little   Housing Stability: Low Risk  (7/26/2023)    Housing Stability Vital Sign     Unable to Pay for Housing in the Last Year: No     Number of Places Lived in the Last Year: 1     Unstable Housing in the Last Year: No     Review of patient's allergies indicates:  No Known Allergies  Current Outpatient Medications   Medication Sig    atenoloL (TENORMIN) 50 MG tablet Take 50 mg by mouth.    atorvastatin (LIPITOR) 20 MG tablet Take 1 tablet (20 mg total) by mouth every evening.    bisoprolol (ZEBETA) 5 MG tablet Take 1 tablet (5 mg total) by mouth once daily.    lisinopriL-hydrochlorothiazide (PRINZIDE,ZESTORETIC) 20-12.5 mg per tablet Take 1 tablet by mouth once daily.    methIMAzole (TAPAZOLE) 5 MG Tab TAKE 1 TABLET EVERY DAY    pantoprazole (PROTONIX) 40 MG tablet TAKE 1 TABLET EVERY DAY    psyllium (METAMUCIL) powder Take 1 packet by mouth once daily.    tamsulosin  (FLOMAX) 0.4 mg Cap Take 1 capsule (0.4 mg total) by mouth once daily.    methylPREDNISolone (MEDROL DOSEPACK) 4 mg tablet use as directed     No current facility-administered medications for this visit.           Review of Systems   Constitutional:  Negative for activity change, appetite change, chills, diaphoresis, fatigue, fever and unexpected weight change.   HENT:  Negative for congestion, ear pain, postnasal drip, rhinorrhea, sinus pressure, sinus pain, sneezing, sore throat, tinnitus, trouble swallowing and voice change.    Eyes:  Negative for photophobia, pain and visual disturbance.   Respiratory:  Negative for cough, chest tightness, shortness of breath and wheezing.    Cardiovascular:  Negative for chest pain, palpitations and leg swelling.   Gastrointestinal:  Negative for abdominal distention, abdominal pain, constipation, diarrhea, nausea and vomiting.   Genitourinary:  Negative for decreased urine volume, difficulty urinating, dysuria, flank pain, frequency, hematuria and urgency.   Musculoskeletal:  Positive for arthralgias. Negative for back pain, joint swelling, neck pain and neck stiffness.   Allergic/Immunologic: Negative for immunocompromised state.   Neurological:  Negative for dizziness, tremors, seizures, syncope, facial asymmetry, speech difficulty, weakness, light-headedness, numbness and headaches.   Hematological:  Negative for adenopathy. Does not bruise/bleed easily.   Psychiatric/Behavioral:  Negative for confusion and sleep disturbance.        Objective:      Physical Exam  Vitals reviewed.   Cardiovascular:      Pulses: Normal pulses.   Musculoskeletal:      Right shoulder: Tenderness present. Decreased range of motion. Decreased strength.   Neurological:      Mental Status: He is alert.         Assessment:     Vitals:    05/21/24 1059   BP: 124/62   Pulse: (!) 48   Temp: 97.9 °F (36.6 °C)         1. Chronic right shoulder pain    2. Elevated blood uric acid level        Plan:    Chronic right shoulder pain  -     X-ray Shoulder 2 or More Views Right; Future; Expected date: 05/21/2024  -     Discontinue: methylPREDNISolone (MEDROL DOSEPACK) 4 mg tablet; use as directed  Dispense: 1 each; Refill: 0  -     Ambulatory referral/consult to Orthopedics; Future; Expected date: 05/28/2024  -     Ambulatory referral/consult to Physical/Occupational Therapy; Future; Expected date: 05/28/2024  -     methylPREDNISolone (MEDROL DOSEPACK) 4 mg tablet; use as directed  Dispense: 1 each; Refill: 0    Elevated blood uric acid level        X-ray now  Steroid Dosepak  Orthopedics/physical therapy consult  Educated on elevated uric acid level and low purine diet printed and given to patient

## 2024-05-31 ENCOUNTER — CLINICAL SUPPORT (OUTPATIENT)
Dept: REHABILITATION | Facility: HOSPITAL | Age: 74
End: 2024-05-31
Payer: MEDICARE

## 2024-05-31 DIAGNOSIS — G89.29 CHRONIC PAIN IN RIGHT SHOULDER: Primary | ICD-10-CM

## 2024-05-31 DIAGNOSIS — M25.611 STIFFNESS OF RIGHT SHOULDER JOINT: ICD-10-CM

## 2024-05-31 DIAGNOSIS — M25.511 CHRONIC PAIN IN RIGHT SHOULDER: Primary | ICD-10-CM

## 2024-05-31 DIAGNOSIS — M62.81 MUSCLE WEAKNESS OF RIGHT UPPER EXTREMITY: ICD-10-CM

## 2024-05-31 DIAGNOSIS — M25.511 CHRONIC RIGHT SHOULDER PAIN: ICD-10-CM

## 2024-05-31 DIAGNOSIS — G89.29 CHRONIC RIGHT SHOULDER PAIN: ICD-10-CM

## 2024-05-31 PROCEDURE — 97112 NEUROMUSCULAR REEDUCATION: CPT | Mod: HCNC

## 2024-05-31 PROCEDURE — 97140 MANUAL THERAPY 1/> REGIONS: CPT | Mod: HCNC

## 2024-05-31 PROCEDURE — 97161 PT EVAL LOW COMPLEX 20 MIN: CPT | Mod: HCNC

## 2024-05-31 NOTE — PROGRESS NOTES
"[facility]   Physical Therapy Initial Evaluation      Date: 5/31/2024   Name: Harpreet Perez  [facility] Number: [id number]    Therapy Diagnosis:    Encounter Diagnoses   Name Primary?    Chronic pain in right shoulder Yes    Chronic right shoulder pain     Muscle weakness of right upper extremity     Stiffness of right shoulder joint       Physician: Leah Negrete, NP     Physician Orders: PT Eval and Treat  Medical Diagnosis from Referral: M25.511,G89.29 (ICD-10-CM) - Chronic right shoulder pain   Evaluation Date: 5/31/2024  Authorization Period Expiration: 05/21/2025  Plan of Care Expiration: 07/26/2024  Progress Note Due: 06/30/2024  Visit # / Visits authorized: 1/1   FOTO: 1/3 (last performed on 5/31/2024)    Precautions: Standard, COPD, Hypertension    Time In: 0832  Time Out: 0932  Total Billable Time (timed & untimed codes): 60 minutes    Subjective     Date of onset: March approximately    History of current condition - Harpreet reports he has been having right shoulder pain for the last few months with no clear MENDY. Patient notes he started a steroid pack but it did not help his symptoms much. Shoulder pain has progressed over the past few weeks and patient can no longer lift shoulder overhead or lift. Patient notes it severely limits his daily tasks around the house and give him difficulty sleeping due to the pain. Patient denies cervical pain or radiating symptoms. Patient points to posterior shoulder and lateral arm for location of pain. Patient notes pain is constant and worse when trying to use upper extremity. Patient did Physical therapy last year for same shoulder pain. Reports it reduced his pain and he discontinued due to progress.     Falls: 0    Imaging: [x] Xray [] MRI [] CT: Performed on: 05/21/2024  "FINDINGS: No acute fracture or dislocation.  Moderate AC joint arthropathy noted.  Mild-to-moderate degenerative change seen at the glenohumeral " "joint."    Pain:  Current 8/10, worst 10/10, best 6/10   Location: [x] Right   [] Left:  shoulder  Description: aching  and throbbing  Aggravating Factors: right shoulder movements, sleeping  Easing Factors: activity avoidance, rest, heat, biofreeze    Prior Therapy:   [] N/A    [x] Yes: right shoulder  Social History: Pt lives with their spouse  Occupation: Pt is retired.   Prior Level of Function: Independent and pain free with all ADL, IADL, community mobility and functional activities.   Current Level of Function: Independent with all ADL, IADL, community mobility and functional activities with reports of increased pain and need for increased time and frequent breaks.      Dominant Extremity:    [x] Right    [] Left    Pts goals: Pt reported goals are to decrease overall pain levels in order to return to prior functional level.     Medical History:   Past Medical History:   Diagnosis Date    Alcohol dependence     Back pain     COPD (chronic obstructive pulmonary disease)     History of colonic polyps     Hypertension     Hypertrophy of prostate without urinary obstruction and other lower urinary tract symptoms (LUTS)     Osteoarthritis of multiple joints     Tobacco dependence     quit then restarted       Surgical History:   Harpreet Perez  has a past surgical history that includes Lipoma resection (Right, 8/17/12); Colonoscopy (N/A, 5/25/2016); and Colonoscopy (N/A, 11/15/2021).    Medications:   Harpreet has a current medication list which includes the following prescription(s): atenolol, atorvastatin, bisoprolol, lisinopril-hydrochlorothiazide, methimazole, methylprednisolone, pantoprazole, psyllium, and tamsulosin.    Allergies:   Review of patient's allergies indicates:  No Known Allergies     Objective        RANGE OF MOTION:   Shoulder AROM/PROM Right Left Pain/Dysfunction with Movement Goal   Shoulder Flexion (180º) 45 125     Shoulder Abduction (180º) 50 105     Shoulder Extension (60º) 70 70   "   Shoulder ER  at 90º (90º) Right trap T3     Shoulder IR at 90º (70º) PSIS [place]     Functional ER       Functional IR         STRENGTH:   U/E MMT Right Left Pain/Dysfunction with Movement Goal   Shoulder Flexion 3-/5 4/5  4+/5 B   Shoulder Extension 5/5 5/5  4+/5 B   Shoulder Abduction 3-/5 4/5  4+/5 B   Shoulder IR 4/5 4+/5  4+/5 B   Shoulder ER 4-/5 4/5  4+/5 B   Middle Trapezius N/T N/T  4+/5 B   Lower Trapezius N/T N/T  4+/5 B   Elbow Flexion  4+/5 4+/5  5/5 B   Elbow Extension 4+/5 4+/5  5/5 B     MUSCLE LENGTH:   Muscle Tested  Right Left  Limitation Goal   Upper Trapezius [] Normal  [x] Limited [] Normal  [] Limited  Normal B   Levator Scapular  [] Normal  [] Limited [] Normal  [] Limited  Normal B   Scalenes [] Normal  [] Limited [] Normal  [] Limited  Normal B   Pectoralis Minor [] Normal  [x] Limited [] Normal  [x] Limited  Normal B   Pectoralis Major [] Normal  [x] Limited [] Normal  [x] Limited  Normal B     JOINT MOBILITY:   Joint Motion  Right Mobility  (spine) Left Mobility Goal   Cervical Upglides  [] Hypo     [x] Normal     [] Hyper [] Hypo     [x] Normal     [] Hyper Normal    Cervical Downglides  [] Hypo     [x] Normal     [] Hyper [] Hypo     [x] Normal     [] Hyper Normal    1st Rib [] Hypo     [x] Normal     [] Hyper [] Hypo     [x] Normal     [] Hyper Normal    Thoracic PA  [] Hypo     [x] Normal     [] Hyper --- Normal   Costotransverse  [] Hypo     [x] Normal     [] Hyper [] Hypo     [x] Normal     [] Hyper Normal    Glenohumeral distraction [x] Hypo     [] Normal     [] Hyper [] Hypo     [x] Normal     [] Hyper Normal    Glenohumeral inferior  [x] Hypo     [] Normal     [] Hyper [] Hypo     [x] Normal     [] Hyper Normal    Glenohumeral anterior [] Hypo     [x] Normal     [] Hyper [] Hypo     [x] Normal     [] Hyper Normal    Glenohumeral posterior [x] Hypo     [] Normal     [] Hyper [] Hypo     [x] Normal     [] Hyper Normal      SPECIAL TESTS:    Right  (spine) Left  Goal    Jane Mg  [x] Positive    [] Negative [] Positive    [x] Negative Negative B    Empty Can  [x] Positive    [] Negative [] Positive    [x] Negative Negative B    Drop Arm [x] Positive    [] Negative [] Positive    [x] Negative Negative B    External Rotation Lag [x] Positive    [] Negative [] Positive    [x] Negative Negative B     SENSATION  [x] Intact to Light Touch   [] Impaired:      PALPATION: Muscles: Increased tone and tenderness to palpation of: right upper trapezius, periscapular musculature, rotator cuff muscles. Structures: Increased tenderness to palpation of: right AC joint, glenohumeral joint, bicipital groove.      POSTURE:  Pt presents with postural abnormalities which include:    [x] Forward Head   [] Increased Lumbar Lordosis   [x] Rounded Shoulder   [] Genu Recurvatum   [x] Increased Thoracic Kyphosis [] Genu Valgus   [] Trunk Deviated    [] Pes Planus   [] Scapular Winging    [] Other:     Function:     Intake Outcome Measure for FOTO Shoulder Survey    Therapist reviewed FOTO scores for Harpreet on 5/31/2024.   FOTO report - see Media section or FOTO account for episode details    Intake Score: 42%         Treatment     Total Treatment time (time-based codes) separate from Evaluation: (27) minutes     Harpreet received the treatments listed below:      MANUAL THERAPY TECHNIQUES were applied for (12) minutes, including:    Manual Intervention Performed Today    Soft Tissue Mobilization [x] right supraspinatus , infraspinatus , teres major and minor    Joint Mobilizations [x] Grade I-II shoulder A/P and inferior glides  Scapular mobilizations    []     []    Functional Dry Needling  []      Plan for Next Visit: Continue as needed      NEUROMUSCULAR RE-EDUCATION ACTIVITIES to improve Balance, Coordination, Kinesthetic, Sense, Proprioception, and Posture for (15) minutes.  The following were included:    Intervention Performed Today    Home exercise plan  x Demonstration, education, performance    Shoulder Isometrics     Scapular Retractions     Pendulums                           Plan for Next Visit: Pulley, table slides, shoulder rolls     Patient Education and Home Exercises     Education provided: time included in treatment time.   PURPOSE: Patient educated on the impairments noted above and the effects of physical therapy intervention to improve overall condition and QOL.   EXERCISE: Patient was educated on all the above exercise prior/during/after for proper posture, positioning, and execution for safe performance with home exercise program.   STRENGTH: Patient educated on the importance of improved core and extremity strength in order to improve alignment of the spine and extremities with static positions and dynamic movement.   POSTURE: Patient educated on postural awareness to reduce stress and maintain optimal alignment of the spine with static positions and dynamic movement     Written Home Exercises Provided: yes.  Exercises were reviewed and Harpreet was able to demonstrate them prior to the end of the session.  Harpreet demonstrated good  understanding of the education provided. See EMR under Patient Instructions for exercises provided during therapy sessions.    Assessment     Harpreet is a 73 y.o. male referred to outpatient Physical Therapy with a medical diagnosis of chronic right shoulder pain. Pt presents with impairments in the following categories: IMPAIRMENTS: ROM, strength, joint mobility, muscle length, and posture    Pt prognosis is Good  Pt will benefit from skilled outpatient Physical Therapy to address the deficits stated above and in the chart below, provide pt/family education, and to maximize pt's level of independence.     Plan of care discussed with patient: Yes  Pt's spiritual, cultural and educational needs considered and patient is agreeable to the plan of care and goals as stated below:     Anticipated Barriers for therapy: co-morbidities, chronicity of condition, and  "adherence to treatment plan    Medical Necessity is demonstrated by the following  History  Co-morbidities and personal factors that may impact the plan of care [] LOW: no personal factors / co-morbidities  [] MODERATE: 1-2 personal factors / co-morbidities  [x] HIGH: 3+ personal factors / co-morbidities    Moderate / High Support Documentation: lifestyle, coping style, chronicity  Past Medical History:   Diagnosis Date    Alcohol dependence     Back pain     COPD (chronic obstructive pulmonary disease)     History of colonic polyps     Hypertension     Hypertrophy of prostate without urinary obstruction and other lower urinary tract symptoms (LUTS)     Osteoarthritis of multiple joints     Tobacco dependence     quit then restarted        Examination  Body Structures and Functions, activity limitations and participation restrictions that may impact the plan of care [] LOW: addressing 1-2 elements  [x] MODERATE: 3+ elements  [] HIGH: 4+ elements (please support below)    Moderate / High Support Documentation: See above in "Current Level of Function"      Clinical Presentation [x] LOW: stable  [] MODERATE: Evolving  [] HIGH: Unstable     Decision Making/ Complexity Score: moderate         Short Term Goals:  4 weeks Status  Date Met   PAIN: Pt will report worst pain of 7/10 in order to progress toward max functional ability and improve quality of life. [x] Progressing  [] Met  [] Not Met    FUNCTION: Patient will demonstrate improved function as indicated by a score of greater than or equal to 50% of goal score out of 100 on FOTO. [x] Progressing  [] Met  [] Not Met    MOBILITY: Patient will improve AROM to 50% of stated goals, listed in objective measures above, in order to progress towards independence with functional activities.  [x] Progressing  [] Met  [] Not Met    STRENGTH: Patient will improve strength to 50% of stated goals, listed in objective measures above, in order to progress towards independence with " functional activities. [x] Progressing  [] Met  [] Not Met    POSTURE: Patient will correct postural deviations in sitting and standing, to decrease pain and promote long term stability.  [x] Progressing  [] Met  [] Not Met    HEP: Patient will demonstrate independence with HEP in order to progress toward functional independence. [x] Progressing  [] Met  [] Not Met      Long Term Goals:  8 weeks Status Date Met   PAIN: Pt will report worst pain of 4/10 in order to progress toward max functional ability and improve quality of life [x] Progressing  [] Met  [] Not Met    FUNCTION: Patient will demonstrate improved function as indicated by a score of greater than or equal to goal score out of 100 on FOTO. [x] Progressing  [] Met  [] Not Met    MOBILITY: Patient will improve AROM to stated goals, listed in objective measures above, in order to return to maximal functional potential and improve quality of life.  [x] Progressing  [] Met  [] Not Met    STRENGTH: Patient will improve strength to stated goals, listed in objective measures above, in order to improve functional independence and quality of life.  [x] Progressing  [] Met  [] Not Met    Patient will return to normal ADL's, IADL's, community involvement, recreational activities, and work-related activities with less than or equal to 4/10 pain and maximal function.  [x] Progressing  [] Met  [] Not Met      Plan     Plan of care Certification: 5/31/2024 to 07/26/2024.    Outpatient Physical Therapy 2 times weekly for 8 weeks to include any combination of the following interventions: virtual visits, dry needling, modalities, electrical stimulation (IFC, Pre-Mod, Attended with Functional Dry Needling), Cervical/Lumbar Traction, Gait Training, Manual Therapy, Neuromuscular Re-ed, Patient Education, Self Care, Therapeutic Exercise, and Therapeutic Activites     Musa Rossi, PT, DPT

## 2024-06-03 ENCOUNTER — OFFICE VISIT (OUTPATIENT)
Dept: CARDIOLOGY | Facility: CLINIC | Age: 74
End: 2024-06-03
Payer: MEDICARE

## 2024-06-03 VITALS
DIASTOLIC BLOOD PRESSURE: 60 MMHG | HEART RATE: 46 BPM | OXYGEN SATURATION: 91 % | SYSTOLIC BLOOD PRESSURE: 128 MMHG | WEIGHT: 163.81 LBS | BODY MASS INDEX: 23.5 KG/M2

## 2024-06-03 DIAGNOSIS — I70.0 AORTIC ATHEROSCLEROSIS: ICD-10-CM

## 2024-06-03 DIAGNOSIS — F17.200 SMOKER: ICD-10-CM

## 2024-06-03 DIAGNOSIS — R00.1 SINUS BRADYCARDIA: Primary | ICD-10-CM

## 2024-06-03 DIAGNOSIS — J43.2 CENTRILOBULAR EMPHYSEMA: ICD-10-CM

## 2024-06-03 DIAGNOSIS — I10 PRIMARY HYPERTENSION: ICD-10-CM

## 2024-06-03 DIAGNOSIS — F10.20 UNCOMPLICATED ALCOHOL DEPENDENCE: ICD-10-CM

## 2024-06-03 DIAGNOSIS — I25.10 CORONARY ARTERY CALCIFICATION SEEN ON CAT SCAN: ICD-10-CM

## 2024-06-03 PROCEDURE — 3074F SYST BP LT 130 MM HG: CPT | Mod: CPTII,S$GLB,, | Performed by: INTERNAL MEDICINE

## 2024-06-03 PROCEDURE — 99999 PR PBB SHADOW E&M-EST. PATIENT-LVL III: CPT | Mod: PBBFAC,HCNC,, | Performed by: INTERNAL MEDICINE

## 2024-06-03 PROCEDURE — 1126F AMNT PAIN NOTED NONE PRSNT: CPT | Mod: CPTII,S$GLB,, | Performed by: INTERNAL MEDICINE

## 2024-06-03 PROCEDURE — 3288F FALL RISK ASSESSMENT DOCD: CPT | Mod: CPTII,S$GLB,, | Performed by: INTERNAL MEDICINE

## 2024-06-03 PROCEDURE — 1159F MED LIST DOCD IN RCRD: CPT | Mod: CPTII,S$GLB,, | Performed by: INTERNAL MEDICINE

## 2024-06-03 PROCEDURE — 99214 OFFICE O/P EST MOD 30 MIN: CPT | Mod: S$GLB,,, | Performed by: INTERNAL MEDICINE

## 2024-06-03 PROCEDURE — 1101F PT FALLS ASSESS-DOCD LE1/YR: CPT | Mod: CPTII,S$GLB,, | Performed by: INTERNAL MEDICINE

## 2024-06-03 PROCEDURE — 3078F DIAST BP <80 MM HG: CPT | Mod: CPTII,S$GLB,, | Performed by: INTERNAL MEDICINE

## 2024-06-03 NOTE — PROGRESS NOTES
Subjective:   Patient ID:  Harpreet Perez is a 73 y.o. male who presents for evaluation of No chief complaint on file.      HPI  6.3.2024  Comes in for a 6 months follow up  States back to smoking a half a pack a day and drinking beer and whiskey  Active and denies any chest pain.  No dyspnea on exertion.  No slurred speech , focal weakness or numbness, amaurosis, dizziness       12.4.23  Had echo and stress  Occasional beers   No cigarettes   He states that his blood pressure can also be a little higher at home  Denies any chest pain anymore.  Dyspnea on exertion.    No Lower extremity swelling.  Palpitations syncope or presyncope    8.2023  Harpreet Perez is a 72 y.o. male patient with a PMHx of alcohol dependence, COPD, HTN, and tobacco dependence who  presented to the emergency room by PCP recommendation last week for minimally elevated troponin after he had an encounter with his PCP about dyspnea on exertion.    Patient is a smoker and uses alcohol heavily but he quit 2 weeks ago.    He denies any chest pain.    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    No lower extremity swelling.  No orthopnea.    He denies any chest pain.        Past Medical History:   Diagnosis Date    Alcohol dependence     Back pain     COPD (chronic obstructive pulmonary disease)     History of colonic polyps     Hypertension     Hypertrophy of prostate without urinary obstruction and other lower urinary tract symptoms (LUTS)     Osteoarthritis of multiple joints     Tobacco dependence     quit then restarted       Past Surgical History:   Procedure Laterality Date    COLONOSCOPY N/A 5/25/2016    Procedure: COLONOSCOPY;  Surgeon: Sergey Jensen MD;  Location: [facility] ENDO;  Service: Endoscopy;  Laterality: N/A;    COLONOSCOPY N/A 11/15/2021    Procedure: COLONOSCOPY;  Surgeon: Francia Lucio MD;  Location: [facility] ENDO;  Service: Endoscopy;  Laterality: N/A;    LIPOMA  RESECTION Right 8/17/12    posterior shoulder       Social History     Tobacco Use    Smoking status: Former     Current packs/day: 0.50     Average packs/day: 0.5 packs/day for 45.4 years (22.7 ttl pk-yrs)     Types: Cigarettes     Start date: 1/1/1979    Smokeless tobacco: Never    Tobacco comments:     Smokes 6-7 cigarettes per day   Substance Use Topics    Alcohol use: Yes     Alcohol/week: 28.0 standard drinks of alcohol     Types: 28 Cans of beer per week     Comment: case per week or up to 30 per week    Drug use: No       Family History   Problem Relation Name Age of Onset    Cataracts Mother      Hypertension Mother      Stroke Father      Cataracts Sister      Cataracts Brother peri     Heart disease Brother peri 35        MI    Hyperlipidemia Brother earnest     Hypertension Brother earnest     Hypertension Brother Gene     Heart disease Brother Gene         cardiac stents    Heart disease Other nephew,sis son 33        MI    Kidney disease Other niece         dialysis    Alcohol abuse Neg Hx      Asthma Neg Hx      Birth defects Neg Hx      Cancer Neg Hx      COPD Neg Hx      Depression Neg Hx      Diabetes Neg Hx      Drug abuse Neg Hx      Intellectual disability Neg Hx      Mental illness Neg Hx      Learning disabilities Neg Hx      Miscarriages / Stillbirths Neg Hx      Vision loss Neg Hx         Review of Systems   Cardiovascular:  Negative for chest pain, dyspnea on exertion, palpitations and syncope.   Genitourinary: Negative.    Neurological: Negative.        Current Outpatient Medications on File Prior to Visit   Medication Sig    atorvastatin (LIPITOR) 20 MG tablet Take 1 tablet (20 mg total) by mouth every evening.    bisoprolol (ZEBETA) 5 MG tablet Take 1 tablet (5 mg total) by mouth once daily.    lisinopriL-hydrochlorothiazide (PRINZIDE,ZESTORETIC) 20-12.5 mg per tablet Take 1 tablet by mouth once daily.    methIMAzole (TAPAZOLE) 5 MG Tab TAKE 1 TABLET EVERY DAY    methylPREDNISolone  (MEDROL DOSEPACK) 4 mg tablet use as directed    pantoprazole (PROTONIX) 40 MG tablet TAKE 1 TABLET EVERY DAY    psyllium (METAMUCIL) powder Take 1 packet by mouth once daily.    tamsulosin (FLOMAX) 0.4 mg Cap Take 1 capsule (0.4 mg total) by mouth once daily.    [DISCONTINUED] atenoloL (TENORMIN) 50 MG tablet Take 50 mg by mouth.     No current facility-administered medications on file prior to visit.       Objective:   Objective:  Wt Readings from Last 3 Encounters:   06/03/24 74.3 kg (163 lb 12.8 oz)   05/21/24 73.4 kg (161 lb 13.1 oz)   02/15/24 76.2 kg (167 lb 15.9 oz)     Temp Readings from Last 3 Encounters:   05/21/24 97.9 °F (36.6 °C) (Tympanic)   02/15/24 97.6 °F (36.4 °C) (Tympanic)   09/28/23 96.9 °F (36.1 °C) (Tympanic)     BP Readings from Last 3 Encounters:   06/03/24 128/60   05/21/24 124/62   02/15/24 130/70     Pulse Readings from Last 3 Encounters:   06/03/24 (!) 46   05/21/24 (!) 48   02/15/24 92       Physical Exam  Vitals reviewed.   Constitutional:       Appearance: He is well-developed.   Neck:      Vascular: No carotid bruit.   Cardiovascular:      Rate and Rhythm: Normal rate and regular rhythm.      Pulses: Intact distal pulses.      Heart sounds: Normal heart sounds. No murmur heard.  Pulmonary:      Breath sounds: Normal breath sounds.   Neurological:      Mental Status: He is oriented to person, place, and time.         Lab Results   Component Value Date    CHOL 150 08/22/2023    CHOL 185 07/28/2021    CHOL 185 01/26/2021     Lab Results   Component Value Date    HDL 54 08/22/2023    HDL 66 07/28/2021    HDL 63 01/26/2021     Lab Results   Component Value Date    LDLCALC 77.0 08/22/2023    LDLCALC 95.4 07/28/2021    LDLCALC 103.4 01/26/2021     Lab Results   Component Value Date    TRIG 95 08/22/2023    TRIG 118 07/28/2021    TRIG 93 01/26/2021     Lab Results   Component Value Date    CHOLHDL 36.0 08/22/2023    CHOLHDL 35.7 07/28/2021    CHOLHDL 34.1 01/26/2021       Chemistry       "  Component Value Date/Time     08/22/2023 0000    K 4.3 08/22/2023 0000     08/22/2023 0000    CO2 29 08/22/2023 0000    BUN 21 08/22/2023 0000    CREATININE 1.4 08/22/2023 0000    GLU 70 08/22/2023 0000        Component Value Date/Time    CALCIUM 9.5 08/22/2023 0000    ALKPHOS 80 08/22/2023 0000    AST 17 08/22/2023 0000    ALT 23 08/22/2023 0000    BILITOT 0.3 08/22/2023 0000    ESTGFRAFRICA >60.0 07/28/2021 0800    EGFRNONAA >60.0 07/28/2021 0800          Lab Results   Component Value Date    TSH 2.597 02/15/2024     No results found for: "INR", "PROTIME"  Lab Results   Component Value Date    WBC 5.93 08/22/2023    HGB 13.1 (L) 08/22/2023    HCT 39.9 (L) 08/22/2023    MCV 92 08/22/2023     08/22/2023     BNP  @LABRCNTIP(BNP,BNPTRIAGEBLO)@  CrCl cannot be calculated (Patient's most recent lab result is older than the maximum 7 days allowed.).     Imaging:  ======    No results found for this or any previous visit.    No results found for this or any previous visit.    Results for orders placed during the hospital encounter of 08/07/23    X-Ray Chest PA And Lateral    Narrative  EXAMINATION:  XR CHEST PA AND LATERAL    CLINICAL HISTORY:  Other forms of dyspnea    TECHNIQUE:  PA and lateral views of the chest were performed.    COMPARISON:  08/13/2012    FINDINGS:  The cardiac and mediastinal silhouettes appear within normal limits.   The lungs are clear bilaterally.  No acute osseous findings demonstrated.    Impression  No acute findings.      Electronically signed by: Jose Luis Roman MD  Date:    08/07/2023  Time:    16:42    No results found for this or any previous visit.    No valid procedures specified.    No results found for this or any previous visit.      No results found for this or any previous visit.      No results found for this or any previous visit.      Diagnostic Results:  ECG: Reviewed    Results for orders placed during the hospital encounter of " 08/25/23    Echo    Interpretation Summary    Left Ventricle: The left ventricle is normal in size. Normal wall thickness. There is concentric remodeling. Normal wall motion. There is normal systolic function with a visually estimated ejection fraction of 55 - 70%. Ejection fraction by visual approximation is 60%. There is normal diastolic function.    Right Ventricle: Normal right ventricular cavity size. Wall thickness is normal. Right ventricle wall motion  is normal. Systolic function is normal.    Mitral Valve: There is mild regurgitation.    IVC/SVC: Normal venous pressure at 3 mmHg.      Results for orders placed during the hospital encounter of 08/25/23    Nuclear Stress - Cardiology Interpreted    Interpretation Summary    There is a mild to moderate intensity, fixed perfusion abnormality consistent with scar in the basal to mid inferior wall(s).    The gated perfusion images showed an ejection fraction of 74% at rest. The gated perfusion images showed an ejection fraction of 71% post stress.    There is normal wall motion at rest and post stress.    LV cavity size is normal at rest and normal at stress.    The ECG portion of the study is negative for ischemia.    The patient reported no chest pain during the stress test.    Asymptomatic pre, during, and post stress test. Had pt move his arms and tap his feet prior to beginning stress to increase HR- responded well.      The 10-year ASCVD risk score (Elisabeth DK, et al., 2019) is: 19.5%    Values used to calculate the score:      Age: 73 years      Sex: Male      Is Non- : Yes      Diabetic: No      Tobacco smoker: No      Systolic Blood Pressure: 128 mmHg      Is BP treated: Yes      HDL Cholesterol: 54 mg/dL      Total Cholesterol: 150 mg/dL        Assessment and Plan:   Sinus bradycardia    Coronary artery calcification seen on CAT scan  -     CV Ultrasound Bilateral Doppler Carotid; Future    Aortic  atherosclerosis    Smoker    Primary hypertension    Centrilobular emphysema    Uncomplicated alcohol dependence          Angina free.  Euvolemic.  Blood pressure controlled.    He is possibly taking atenolol and bisoprolol at the same time I cancelled the atenolol off his prescription list today.  He will check at home.  May substitute for amlodipine if that is the case.  Reviewed all tests and above medical conditions with patient in detail and formulated treatment plan.  Risk factor modification discussed.   Cardiac low salt diet discussed.  Maintaining healthy weight and weight loss goals were discussed in clinic.  Reviewed nuclear stress test , fixed defect and asymptomatic and echo looked normal  Counseled on smoking and alcohol.  Carotid screening given his coronary artery calcification    Follow up in 6 months

## 2024-06-04 ENCOUNTER — CLINICAL SUPPORT (OUTPATIENT)
Dept: REHABILITATION | Facility: HOSPITAL | Age: 74
End: 2024-06-04
Payer: MEDICARE

## 2024-06-04 DIAGNOSIS — M25.511 CHRONIC PAIN IN RIGHT SHOULDER: Primary | ICD-10-CM

## 2024-06-04 DIAGNOSIS — M25.611 STIFFNESS OF RIGHT SHOULDER JOINT: ICD-10-CM

## 2024-06-04 DIAGNOSIS — M62.81 MUSCLE WEAKNESS OF RIGHT UPPER EXTREMITY: ICD-10-CM

## 2024-06-04 DIAGNOSIS — G89.29 CHRONIC PAIN IN RIGHT SHOULDER: Primary | ICD-10-CM

## 2024-06-04 PROCEDURE — 97110 THERAPEUTIC EXERCISES: CPT

## 2024-06-04 PROCEDURE — 97112 NEUROMUSCULAR REEDUCATION: CPT

## 2024-06-04 PROCEDURE — 97140 MANUAL THERAPY 1/> REGIONS: CPT

## 2024-06-04 NOTE — PROGRESS NOTES
[facility]   Physical Therapy Treatment Note        Name: Harpreet Perez  [facility] [place] Number: [id number]    Therapy Diagnosis:   Encounter Diagnoses   Name Primary?    Chronic pain in right shoulder Yes    Muscle weakness of right upper extremity     Stiffness of right shoulder joint      Physician: Leah Negrete NP    Visit Date: 6/4/2024    Physician Orders: PT Eval and Treat  Medical Diagnosis from Referral: M25.511,G89.29 (ICD-10-CM) - Chronic right shoulder pain   Evaluation Date: 5/31/2024  Authorization Period Expiration: 12/31/2024  Plan of Care Expiration: 07/26/2024  Progress Note Due: 06/30/2024  Visit # / Visits authorized: 1/16 (+1 evaluation)   FOTO: 1/3 (last performed on 5/31/2024)     Precautions: Standard, COPD, Hypertension  PTA Visit #: 0/5     Time In: 1001  Time Out: 1058  Total Billable Time: 57 minutes (Billing reflects 1 on 1 treatment time spent with patient)    Subjective     Patient reports: shoulder was pretty sore after initial evaluation. Has been keeping up with home exercise plan well.    He/She was compliant with home exercise program.  Response to previous treatment: initial evaluation  Functional change: performance of home exercise plan    Pain: 7/10     Location: right shoulder    Objective      Objective Measures updated at progress report or POC update only unless otherwise noted.       Treatment     Harpreet received the treatments listed below:     MANUAL THERAPY TECHNIQUES were applied for (10) minutes, including:     Manual Intervention Performed Today     Soft Tissue Mobilization [x]  right supraspinatus , infraspinatus , teres major and minor    Joint Mobilizations [x]  Grade I-II shoulder A/P and inferior glides  Scapular mobilizations     []        []      Functional Dry Needling  []         Plan for Next Visit: Continue as needed      THERAPEUTIC EXERCISES: to develop strength, endurance, ROM, flexibility, posture and core stabilization for  (18)  minutes including: x = performed today    Therapeutic Exercise 6/4/2024    ROM/Chondral: Pulley x 3'/3'   Pendulums x 1' each forward, horizontal, CW, CCW   Table Slides x  x 3' flexion  3' scaption                             BOLD = new this visit  Plan for Next Visit:      NEUROMUSCULAR RE-EDUCATION ACTIVITIES to improve Balance, Coordination, Kinesthetic, Sense, Proprioception, and Posture for (26) minutes.  The following were included:     Intervention Performed Today     Shoulder Isometrics    X3 minutes with 5 second holds each  Sub-max pain free x  x  x  x  x  x Flexion  Extension  Abduction  Adduction  Internal Rotation  External Rotation   Scapular Retractions x 3' with 5 second holds                                             Plan for Next Visit: shoulder rolls     THERAPEUTIC ACTIVITIES: to improve functional performance through dynamic activities, for (0)  minutes including: x = performed today    Therapeutic Activities 6/4/2024                         BOLD = new this visit  Plan for Next Visit:      Examples: Coordination, Kinesthetic Sense, Push/pull, Squat, Stairs, bending, lifting, catching, pushing, pulling, throwing, squatting  Patient Education and Home Exercises       Home Exercises Provided and Patient Education Provided     Education provided: time included in treatment time.   PURPOSE: Patient educated on the impairments noted above and the effects of physical therapy intervention to improve overall condition and QOL.   EXERCISE: Patient was educated on all the above exercise prior/during/after for proper posture, positioning, and execution for safe performance with home exercise program.   STRENGTH: Patient educated on the importance of improved core and extremity strength in order to improve alignment of the spine and extremities with static positions and dynamic movement.   POSTURE: Patient educated on postural awareness to reduce stress and maintain optimal alignment of the spine  with static positions and dynamic movement   SLEEPING POSITIONS: Patient educated on the use of pillows to aid in neutral alignment of spine and extremities when sleeping in supine or side lying.    Written Home Exercises Provided: yes.  Exercises were reviewed and Harpreet was able to demonstrate them prior to the end of the session.  Harpreet demonstrated good  understanding of the education provided. See EMR under Patient Instructions for exercises provided during therapy sessions.    Assessment     Patient tolerated session moderately well secondary to right shoulder pain and stiffness. Patient able to demonstrate home exercise plan well with cueing required for form. Patient progressed with further mobility exercises with pulleys and table slides. Patient notes decrease pain following soft tissue massage of rotator cuff and decreased stiffness following manual mobilizations.     Harpreet is progressing well towards his goals.   Patient prognosis is Good.     Patient will continue to benefit from skilled outpatient physical therapy to address the deficits listed in the problem list box on initial evaluation, provide pt/family education and to maximize patient's level of independence in the home and community environment.     Patient's spiritual, cultural and educational needs considered and pt agreeable to plan of care and goals.    Anticipated Barriers for therapy: co-morbidities, chronicity of condition, and adherence to treatment plan     Short Term Goals:  4 weeks Status  Date Met   PAIN: Pt will report worst pain of 7/10 in order to progress toward max functional ability and improve quality of life. [x] Progressing  [] Met  [] Not Met     FUNCTION: Patient will demonstrate improved function as indicated by a score of greater than or equal to 50% of goal score out of 100 on FOTO. [x] Progressing  [] Met  [] Not Met     MOBILITY: Patient will improve AROM to 50% of stated goals, listed in objective measures above,  in order to progress towards independence with functional activities.  [x] Progressing  [] Met  [] Not Met     STRENGTH: Patient will improve strength to 50% of stated goals, listed in objective measures above, in order to progress towards independence with functional activities. [x] Progressing  [] Met  [] Not Met     POSTURE: Patient will correct postural deviations in sitting and standing, to decrease pain and promote long term stability.  [x] Progressing  [] Met  [] Not Met     HEP: Patient will demonstrate independence with HEP in order to progress toward functional independence. [x] Progressing  [] Met  [] Not Met        Long Term Goals:  8 weeks Status Date Met   PAIN: Pt will report worst pain of 4/10 in order to progress toward max functional ability and improve quality of life [x] Progressing  [] Met  [] Not Met     FUNCTION: Patient will demonstrate improved function as indicated by a score of greater than or equal to goal score out of 100 on FOTO. [x] Progressing  [] Met  [] Not Met     MOBILITY: Patient will improve AROM to stated goals, listed in objective measures above, in order to return to maximal functional potential and improve quality of life.  [x] Progressing  [] Met  [] Not Met     STRENGTH: Patient will improve strength to stated goals, listed in objective measures above, in order to improve functional independence and quality of life.  [x] Progressing  [] Met  [] Not Met     Patient will return to normal ADL's, IADL's, community involvement, recreational activities, and work-related activities with less than or equal to 4/10 pain and maximal function.  [x] Progressing  [] Met  [] Not Met       Plan     Continue Plan of Care (POC) and progress per patient tolerance. See treatment section for details on planned progressions next session.      Musa Rossi, PT

## 2024-06-06 ENCOUNTER — CLINICAL SUPPORT (OUTPATIENT)
Dept: REHABILITATION | Facility: HOSPITAL | Age: 74
End: 2024-06-06
Payer: MEDICARE

## 2024-06-06 DIAGNOSIS — M25.611 STIFFNESS OF RIGHT SHOULDER JOINT: ICD-10-CM

## 2024-06-06 DIAGNOSIS — M62.81 MUSCLE WEAKNESS OF RIGHT UPPER EXTREMITY: ICD-10-CM

## 2024-06-06 DIAGNOSIS — M25.511 CHRONIC PAIN IN RIGHT SHOULDER: Primary | ICD-10-CM

## 2024-06-06 DIAGNOSIS — G89.29 CHRONIC PAIN IN RIGHT SHOULDER: Primary | ICD-10-CM

## 2024-06-06 PROCEDURE — 97140 MANUAL THERAPY 1/> REGIONS: CPT

## 2024-06-06 NOTE — PROGRESS NOTES
[facility]   Physical Therapy Treatment Note      Name: Harpreet Perez  [facility] Number: [id number]    Therapy Diagnosis:   Encounter Diagnoses   Name Primary?    Chronic pain in right shoulder Yes    Muscle weakness of right upper extremity     Stiffness of right shoulder joint      Physician: Leah Negrete NP    Visit Date: 6/6/2024    Physician Orders: PT Eval and Treat  Medical Diagnosis from Referral: M25.511,G89.29 (ICD-10-CM) - Chronic right shoulder pain   Evaluation Date: 5/31/2024  Authorization Period Expiration: 12/31/2024  Plan of Care Expiration: 07/26/2024  Progress Note Due: 06/30/2024  Visit # / Visits authorized: 2/16 (+1 evaluation)   FOTO: 1/3 (last performed on 5/31/2024)     Precautions: Standard, COPD, Hypertension  PTA Visit #: 0/5     Time In: 1001  Time Out: 1101  Total Time: 60  Total Billable Time: 16 minutes (Billing reflects 1 on 1 treatment time spent with patient)    Subjective     Patient reports: shoulder continues to be painful but felt less stiffness following previous session.     He/She was compliant with home exercise program.  Response to previous treatment: decreased shoulder stiffness  Functional change: performance of home exercise plan    Pain: 7/10     Location: right shoulder    Objective      Objective Measures updated at progress report or POC update only unless otherwise noted.       Treatment     Harpreet received the treatments listed below:     MANUAL THERAPY TECHNIQUES were applied for (14) minutes, including:     Manual Intervention Performed Today     Soft Tissue Mobilization [x]  right supraspinatus , infraspinatus , teres major and minor    Joint Mobilizations [x]  Grade I-II shoulder A/P and inferior glides  Scapular mobilizations     []        []      Functional Dry Needling  []         Plan for Next Visit: Continue as needed      THERAPEUTIC EXERCISES: to develop strength, endurance, ROM, flexibility, posture and core  stabilization for (0)  minutes including: x = performed today    Therapeutic Exercise 6/6/2024    ROM/Chondral: Pulley X - NB 3'/3'   Pendulums X - NB 1' each forward, horizontal, CW, CCW   Table Slides X - NB  X - NB 3' flexion  3' scaption                             BOLD = new this visit  Plan for Next Visit:      NEUROMUSCULAR RE-EDUCATION ACTIVITIES to improve Balance, Coordination, Kinesthetic, Sense, Proprioception, and Posture for (0) minutes.  The following were included:     Intervention Performed Today     Shoulder Isometrics    X3 minutes with 5 second holds each  Sub-max pain free X - NB  X - NB  X - NB  X - NB  X - NB  X - NB Flexion  Extension  Abduction  Adduction  Internal Rotation  External Rotation   Scapular Retractions X - NB 3' with 5 second holds                                             Plan for Next Visit: shoulder rolls     THERAPEUTIC ACTIVITIES: to improve functional performance through dynamic activities, for (0)  minutes including: x = performed today    Therapeutic Activities 6/6/2024                         BOLD = new this visit  Plan for Next Visit:      Examples: Coordination, Kinesthetic Sense, Push/pull, Squat, Stairs, bending, lifting, catching, pushing, pulling, throwing, squatting  Patient Education and Home Exercises       Home Exercises Provided and Patient Education Provided     Education provided: time included in treatment time.   PURPOSE: Patient educated on the impairments noted above and the effects of physical therapy intervention to improve overall condition and QOL.   EXERCISE: Patient was educated on all the above exercise prior/during/after for proper posture, positioning, and execution for safe performance with home exercise program.   STRENGTH: Patient educated on the importance of improved core and extremity strength in order to improve alignment of the spine and extremities with static positions and dynamic movement.   POSTURE: Patient educated on postural  awareness to reduce stress and maintain optimal alignment of the spine with static positions and dynamic movement   SLEEPING POSITIONS: Patient educated on the use of pillows to aid in neutral alignment of spine and extremities when sleeping in supine or side lying.    Written Home Exercises Provided: yes.  Exercises were reviewed and Harpreet was able to demonstrate them prior to the end of the session.  Harpreet demonstrated good  understanding of the education provided. See EMR under Patient Instructions for exercises provided during therapy sessions.    Assessment     Patient tolerated session well with improvement in pain during exercise compared to previous session. Patient notes decreased stiffness following passive range of motion exercises and manual scapular mobilizations.      Harpreet is progressing well towards his goals.   Patient prognosis is Good.     Patient will continue to benefit from skilled outpatient physical therapy to address the deficits listed in the problem list box on initial evaluation, provide pt/family education and to maximize patient's level of independence in the home and community environment.     Patient's spiritual, cultural and educational needs considered and pt agreeable to plan of care and goals.    Anticipated Barriers for therapy: co-morbidities, chronicity of condition, and adherence to treatment plan     Short Term Goals:  4 weeks Status  Date Met   PAIN: Pt will report worst pain of 7/10 in order to progress toward max functional ability and improve quality of life. [x] Progressing  [] Met  [] Not Met     FUNCTION: Patient will demonstrate improved function as indicated by a score of greater than or equal to 50% of goal score out of 100 on FOTO. [x] Progressing  [] Met  [] Not Met     MOBILITY: Patient will improve AROM to 50% of stated goals, listed in objective measures above, in order to progress towards independence with functional activities.  [x] Progressing  [] Met  []  Not Met     STRENGTH: Patient will improve strength to 50% of stated goals, listed in objective measures above, in order to progress towards independence with functional activities. [x] Progressing  [] Met  [] Not Met     POSTURE: Patient will correct postural deviations in sitting and standing, to decrease pain and promote long term stability.  [x] Progressing  [] Met  [] Not Met     HEP: Patient will demonstrate independence with HEP in order to progress toward functional independence. [x] Progressing  [] Met  [] Not Met        Long Term Goals:  8 weeks Status Date Met   PAIN: Pt will report worst pain of 4/10 in order to progress toward max functional ability and improve quality of life [x] Progressing  [] Met  [] Not Met     FUNCTION: Patient will demonstrate improved function as indicated by a score of greater than or equal to goal score out of 100 on FOTO. [x] Progressing  [] Met  [] Not Met     MOBILITY: Patient will improve AROM to stated goals, listed in objective measures above, in order to return to maximal functional potential and improve quality of life.  [x] Progressing  [] Met  [] Not Met     STRENGTH: Patient will improve strength to stated goals, listed in objective measures above, in order to improve functional independence and quality of life.  [x] Progressing  [] Met  [] Not Met     Patient will return to normal ADL's, IADL's, community involvement, recreational activities, and work-related activities with less than or equal to 4/10 pain and maximal function.  [x] Progressing  [] Met  [] Not Met       Plan     Continue Plan of Care (POC) and progress per patient tolerance. See treatment section for details on planned progressions next session.      Musa Rossi, PT

## 2024-06-11 ENCOUNTER — CLINICAL SUPPORT (OUTPATIENT)
Dept: REHABILITATION | Facility: HOSPITAL | Age: 74
End: 2024-06-11
Payer: MEDICARE

## 2024-06-11 DIAGNOSIS — G89.29 CHRONIC PAIN IN RIGHT SHOULDER: Primary | ICD-10-CM

## 2024-06-11 DIAGNOSIS — M25.511 CHRONIC PAIN IN RIGHT SHOULDER: Primary | ICD-10-CM

## 2024-06-11 DIAGNOSIS — M25.611 STIFFNESS OF RIGHT SHOULDER JOINT: ICD-10-CM

## 2024-06-11 DIAGNOSIS — M62.81 MUSCLE WEAKNESS OF RIGHT UPPER EXTREMITY: ICD-10-CM

## 2024-06-11 PROCEDURE — 97140 MANUAL THERAPY 1/> REGIONS: CPT

## 2024-06-11 NOTE — PROGRESS NOTES
[facility]   Physical Therapy Treatment Note      Name: Harpreet Perez  [facility] Number: [id number]    Therapy Diagnosis:   Encounter Diagnoses   Name Primary?    Chronic pain in right shoulder Yes    Muscle weakness of right upper extremity     Stiffness of right shoulder joint      Physician: Leah Negrete NP    Visit Date: 6/11/2024    Physician Orders: PT Eval and Treat  Medical Diagnosis from Referral: M25.511,G89.29 (ICD-10-CM) - Chronic right shoulder pain   Evaluation Date: 5/31/2024  Authorization Period Expiration: 12/31/2024  Plan of Care Expiration: 07/26/2024  Progress Note Due: 06/30/2024  Visit # / Visits authorized: 3/16 (+1 evaluation)   FOTO: 1/3 (last performed on 5/31/2024)     Precautions: Standard, COPD, Hypertension  PTA Visit #: 0/5     Time In: 1101  Time Out: 1202  Total Time: 61  Total Billable Time: 15 minutes (Billing reflects 1 on 1 treatment time spent with patient)    Subjective     Patient reports: he feels shoulder is doing slightly better but also sore today after doing yard work yesterday.    He/She was compliant with home exercise program.  Response to previous treatment: decreased shoulder stiffness  Functional change: working in yard    Pain: 7/10     Location: right shoulder    Objective      Objective Measures updated at progress report or POC update only unless otherwise noted.     Treatment     Harpreet received the treatments listed below:     MANUAL THERAPY TECHNIQUES were applied for (9) minutes, including:     Manual Intervention Performed Today     Soft Tissue Mobilization [x]  right supraspinatus , infraspinatus , teres major and minor    Joint Mobilizations [x]  Grade I-II shoulder A/P and inferior glides  Scapular mobilizations     []        []      Functional Dry Needling  []         Plan for Next Visit: Continue as needed      THERAPEUTIC EXERCISES: to develop strength, endurance, ROM, flexibility, posture and core stabilization for  (3)  minutes including: x = performed today, x - NB = performed but not billed    Therapeutic Exercise 6/11/2024    ROM/Chondral: Pulley X - NB 3'/3'   Pendulums X - NB 1' each forward, horizontal, CW, CCW   Table Slides X - NB  X - NB 3' flexion  3' scaption   Side Lying External Rotation x 3 x 10 with right upper extremity                        BOLD = new this visit  Plan for Next Visit:      NEUROMUSCULAR RE-EDUCATION ACTIVITIES to improve Balance, Coordination, Kinesthetic, Sense, Proprioception, and Posture for (3) minutes.  The following were included:     Intervention Performed Today     Shoulder Isometrics    X3 minutes with 5 second holds each  Sub-max pain free X - NB  X - NB  X - NB  X - NB  X - NB  X - NB Flexion  Extension  Abduction  Adduction  Internal Rotation  External Rotation   Scapular Retractions x 3' with 5 second holds                                             Plan for Next Visit: shoulder rolls     THERAPEUTIC ACTIVITIES: to improve functional performance through dynamic activities, for (0)  minutes including: x = performed today    Therapeutic Activities 6/11/2024                         BOLD = new this visit  Plan for Next Visit:      Examples: Coordination, Kinesthetic Sense, Push/pull, Squat, Stairs, bending, lifting, catching, pushing, pulling, throwing, squatting  Patient Education and Home Exercises       Home Exercises Provided and Patient Education Provided     Education provided: time included in treatment time.   PURPOSE: Patient educated on the impairments noted above and the effects of physical therapy intervention to improve overall condition and QOL.   EXERCISE: Patient was educated on all the above exercise prior/during/after for proper posture, positioning, and execution for safe performance with home exercise program.   STRENGTH: Patient educated on the importance of improved core and extremity strength in order to improve alignment of the spine and extremities with  static positions and dynamic movement.   POSTURE: Patient educated on postural awareness to reduce stress and maintain optimal alignment of the spine with static positions and dynamic movement   SLEEPING POSITIONS: Patient educated on the use of pillows to aid in neutral alignment of spine and extremities when sleeping in supine or side lying.    Written Home Exercises Provided: yes.  Exercises were reviewed and Harpreet was able to demonstrate them prior to the end of the session.  Harpreet demonstrated good  understanding of the education provided. See EMR under Patient Instructions for exercises provided during therapy sessions.    Assessment     Patient tolerated session well. Patient continues to decrease stiffness with pulleys and manual shoulder mobilizations. Patient notes stiffness returns later the next day but does help for the rest of today. Patient progressed with side lying external rotation for rotator cuff strengthening.     Harpreet is progressing well towards his goals.   Patient prognosis is Good.     Patient will continue to benefit from skilled outpatient physical therapy to address the deficits listed in the problem list box on initial evaluation, provide pt/family education and to maximize patient's level of independence in the home and community environment.     Patient's spiritual, cultural and educational needs considered and pt agreeable to plan of care and goals.    Anticipated Barriers for therapy: co-morbidities, chronicity of condition, and adherence to treatment plan     Short Term Goals:  4 weeks Status  Date Met   PAIN: Pt will report worst pain of 7/10 in order to progress toward max functional ability and improve quality of life. [x] Progressing  [] Met  [] Not Met     FUNCTION: Patient will demonstrate improved function as indicated by a score of greater than or equal to 50% of goal score out of 100 on FOTO. [x] Progressing  [] Met  [] Not Met     MOBILITY: Patient will improve AROM to  50% of stated goals, listed in objective measures above, in order to progress towards independence with functional activities.  [x] Progressing  [] Met  [] Not Met     STRENGTH: Patient will improve strength to 50% of stated goals, listed in objective measures above, in order to progress towards independence with functional activities. [x] Progressing  [] Met  [] Not Met     POSTURE: Patient will correct postural deviations in sitting and standing, to decrease pain and promote long term stability.  [x] Progressing  [] Met  [] Not Met     HEP: Patient will demonstrate independence with HEP in order to progress toward functional independence. [x] Progressing  [] Met  [] Not Met        Long Term Goals:  8 weeks Status Date Met   PAIN: Pt will report worst pain of 4/10 in order to progress toward max functional ability and improve quality of life [x] Progressing  [] Met  [] Not Met     FUNCTION: Patient will demonstrate improved function as indicated by a score of greater than or equal to goal score out of 100 on FOTO. [x] Progressing  [] Met  [] Not Met     MOBILITY: Patient will improve AROM to stated goals, listed in objective measures above, in order to return to maximal functional potential and improve quality of life.  [x] Progressing  [] Met  [] Not Met     STRENGTH: Patient will improve strength to stated goals, listed in objective measures above, in order to improve functional independence and quality of life.  [x] Progressing  [] Met  [] Not Met     Patient will return to normal ADL's, IADL's, community involvement, recreational activities, and work-related activities with less than or equal to 4/10 pain and maximal function.  [x] Progressing  [] Met  [] Not Met       Plan     Continue Plan of Care (POC) and progress per patient tolerance. See treatment section for details on planned progressions next session.      Musa Rossi, PT

## 2024-06-13 ENCOUNTER — CLINICAL SUPPORT (OUTPATIENT)
Dept: REHABILITATION | Facility: HOSPITAL | Age: 74
End: 2024-06-13
Payer: MEDICARE

## 2024-06-13 DIAGNOSIS — M25.511 CHRONIC PAIN IN RIGHT SHOULDER: Primary | ICD-10-CM

## 2024-06-13 DIAGNOSIS — M62.81 MUSCLE WEAKNESS OF RIGHT UPPER EXTREMITY: ICD-10-CM

## 2024-06-13 DIAGNOSIS — M25.611 STIFFNESS OF RIGHT SHOULDER JOINT: ICD-10-CM

## 2024-06-13 DIAGNOSIS — G89.29 CHRONIC PAIN IN RIGHT SHOULDER: Primary | ICD-10-CM

## 2024-06-13 PROCEDURE — 97140 MANUAL THERAPY 1/> REGIONS: CPT

## 2024-06-13 PROCEDURE — 97110 THERAPEUTIC EXERCISES: CPT

## 2024-06-13 NOTE — PROGRESS NOTES
[facility]   Physical Therapy Treatment Note      Name: Harpreet Perez  [facility] Number: [id number]    Therapy Diagnosis:   Encounter Diagnoses   Name Primary?    Chronic pain in right shoulder Yes    Muscle weakness of right upper extremity     Stiffness of right shoulder joint      Physician: Leah Negrete NP    Visit Date: 6/13/2024    Physician Orders: PT Eval and Treat  Medical Diagnosis from Referral: M25.511,G89.29 (ICD-10-CM) - Chronic right shoulder pain   Evaluation Date: 5/31/2024  Authorization Period Expiration: 12/31/2024  Plan of Care Expiration: 07/26/2024  Progress Note Due: 06/30/2024  Visit # / Visits authorized: 4/16 (+1 evaluation)   FOTO: 1/3 (last performed on 5/31/2024)     Precautions: Standard, COPD, Hypertension  PTA Visit #: 0/5     Time In: 1000  Time Out: 1100  Total Time: 60  Total Billable Time: 25 minutes (Billing reflects 1 on 1 treatment time spent with patient)    Subjective     Patient reports: he got more sleep last night than typical as pain was not as bad.     He/She was compliant with home exercise program.  Response to previous treatment: decreased shoulder stiffness  Functional change: better sleep last night    Pain: 7/10     Location: right shoulder    Objective      Objective Measures updated at progress report or POC update only unless otherwise noted.     Treatment     Harpreet received the treatments listed below:     MANUAL THERAPY TECHNIQUES were applied for (9) minutes, including:     Manual Intervention Performed Today     Soft Tissue Mobilization [x]  right supraspinatus , infraspinatus , teres major and minor    Joint Mobilizations [x]  Grade I-II shoulder A/P and inferior glides  Scapular mobilizations     []        []      Functional Dry Needling  []         Plan for Next Visit: Continue as needed      THERAPEUTIC EXERCISES: to develop strength, endurance, ROM, flexibility, posture and core stabilization for (14)  minutes  including: x = performed today, x - NB = performed but not billed    Therapeutic Exercise 6/13/2024    ROM/Chondral: Pulley X 3'/3'   Pendulums x 1' each forward, horizontal, CW, CCW   Table Slides X - NB  X - NB 3' flexion  3' scaption   Side Lying External Rotation x 3 x 10 with right upper extremity                        BOLD = new this visit  Plan for Next Visit:      NEUROMUSCULAR RE-EDUCATION ACTIVITIES to improve Balance, Coordination, Kinesthetic, Sense, Proprioception, and Posture for (3) minutes.  The following were included:     Intervention Performed Today     Shoulder Isometrics    X3 minutes with 5 second holds each  Sub-max pain free X - NB  X - NB  X - NB  X - NB  X - NB  X - NB Flexion  Extension  Abduction  Adduction  Internal Rotation  External Rotation   Scapular Retractions X - NB 3' with 5 second holds                                             Plan for Next Visit: shoulder rolls     THERAPEUTIC ACTIVITIES: to improve functional performance through dynamic activities, for (0)  minutes including: x = performed today    Therapeutic Activities 6/13/2024                         BOLD = new this visit  Plan for Next Visit:      Examples: Coordination, Kinesthetic Sense, Push/pull, Squat, Stairs, bending, lifting, catching, pushing, pulling, throwing, squatting  Patient Education and Home Exercises       Home Exercises Provided and Patient Education Provided     Education provided: time included in treatment time.   PURPOSE: Patient educated on the impairments noted above and the effects of physical therapy intervention to improve overall condition and QOL.   EXERCISE: Patient was educated on all the above exercise prior/during/after for proper posture, positioning, and execution for safe performance with home exercise program.   STRENGTH: Patient educated on the importance of improved core and extremity strength in order to improve alignment of the spine and extremities with static positions and  dynamic movement.   POSTURE: Patient educated on postural awareness to reduce stress and maintain optimal alignment of the spine with static positions and dynamic movement   SLEEPING POSITIONS: Patient educated on the use of pillows to aid in neutral alignment of spine and extremities when sleeping in supine or side lying.    Written Home Exercises Provided: yes.  Exercises were reviewed and Harpreet was able to demonstrate them prior to the end of the session.  Harpreet demonstrated good  understanding of the education provided. See EMR under Patient Instructions for exercises provided during therapy sessions.    Assessment     Patient tolerated session moderately well with continued shoulder pain at end ranges and with muscle activation. Patient demonstrates improvement with scapular retraction with decreased scapular elevation from engagement of upper traps bilaterally.     Harpreet is progressing well towards his goals.   Patient prognosis is Good.     Patient will continue to benefit from skilled outpatient physical therapy to address the deficits listed in the problem list box on initial evaluation, provide pt/family education and to maximize patient's level of independence in the home and community environment.     Patient's spiritual, cultural and educational needs considered and pt agreeable to plan of care and goals.    Anticipated Barriers for therapy: co-morbidities, chronicity of condition, and adherence to treatment plan     Short Term Goals:  4 weeks Status  Date Met   PAIN: Pt will report worst pain of 7/10 in order to progress toward max functional ability and improve quality of life. [x] Progressing  [] Met  [] Not Met     FUNCTION: Patient will demonstrate improved function as indicated by a score of greater than or equal to 50% of goal score out of 100 on FOTO. [x] Progressing  [] Met  [] Not Met     MOBILITY: Patient will improve AROM to 50% of stated goals, listed in objective measures above, in  order to progress towards independence with functional activities.  [x] Progressing  [] Met  [] Not Met     STRENGTH: Patient will improve strength to 50% of stated goals, listed in objective measures above, in order to progress towards independence with functional activities. [x] Progressing  [] Met  [] Not Met     POSTURE: Patient will correct postural deviations in sitting and standing, to decrease pain and promote long term stability.  [x] Progressing  [] Met  [] Not Met     HEP: Patient will demonstrate independence with HEP in order to progress toward functional independence. [x] Progressing  [] Met  [] Not Met        Long Term Goals:  8 weeks Status Date Met   PAIN: Pt will report worst pain of 4/10 in order to progress toward max functional ability and improve quality of life [x] Progressing  [] Met  [] Not Met     FUNCTION: Patient will demonstrate improved function as indicated by a score of greater than or equal to goal score out of 100 on FOTO. [x] Progressing  [] Met  [] Not Met     MOBILITY: Patient will improve AROM to stated goals, listed in objective measures above, in order to return to maximal functional potential and improve quality of life.  [x] Progressing  [] Met  [] Not Met     STRENGTH: Patient will improve strength to stated goals, listed in objective measures above, in order to improve functional independence and quality of life.  [x] Progressing  [] Met  [] Not Met     Patient will return to normal ADL's, IADL's, community involvement, recreational activities, and work-related activities with less than or equal to 4/10 pain and maximal function.  [x] Progressing  [] Met  [] Not Met       Plan     Continue Plan of Care (POC) and progress per patient tolerance. See treatment section for details on planned progressions next session.    Musa Rossi, PT

## 2024-06-19 ENCOUNTER — HOSPITAL ENCOUNTER (OUTPATIENT)
Dept: CARDIOLOGY | Facility: HOSPITAL | Age: 74
Discharge: HOME | End: 2024-06-19
Attending: INTERNAL MEDICINE
Payer: MEDICARE

## 2024-06-19 ENCOUNTER — CLINICAL SUPPORT (OUTPATIENT)
Dept: REHABILITATION | Facility: HOSPITAL | Age: 74
End: 2024-06-19
Payer: MEDICARE

## 2024-06-19 VITALS
WEIGHT: 163 LBS | SYSTOLIC BLOOD PRESSURE: 165 MMHG | DIASTOLIC BLOOD PRESSURE: 81 MMHG | BODY MASS INDEX: 23.34 KG/M2 | HEIGHT: 70 IN

## 2024-06-19 DIAGNOSIS — G89.29 CHRONIC PAIN IN RIGHT SHOULDER: Primary | ICD-10-CM

## 2024-06-19 DIAGNOSIS — M25.511 CHRONIC PAIN IN RIGHT SHOULDER: Primary | ICD-10-CM

## 2024-06-19 DIAGNOSIS — M25.611 STIFFNESS OF RIGHT SHOULDER JOINT: ICD-10-CM

## 2024-06-19 DIAGNOSIS — M62.81 MUSCLE WEAKNESS OF RIGHT UPPER EXTREMITY: ICD-10-CM

## 2024-06-19 DIAGNOSIS — I25.10 CORONARY ARTERY CALCIFICATION SEEN ON CAT SCAN: ICD-10-CM

## 2024-06-19 LAB
LEFT ARM DIASTOLIC BLOOD PRESSURE: 78 MMHG
LEFT ARM SYSTOLIC BLOOD PRESSURE: 163 MMHG
LEFT CBA DIAS: 11 CM/S
LEFT CBA SYS: 55 CM/S
LEFT CCA DIST DIAS: 14 CM/S
LEFT CCA DIST SYS: 84 CM/S
LEFT CCA MID DIAS: 14 CM/S
LEFT CCA MID SYS: 84 CM/S
LEFT CCA PROX DIAS: 19 CM/S
LEFT CCA PROX SYS: 134 CM/S
LEFT ECA DIAS: 3 CM/S
LEFT ECA SYS: 45 CM/S
LEFT ICA DIST DIAS: 16 CM/S
LEFT ICA DIST SYS: 68 CM/S
LEFT ICA MID DIAS: 15 CM/S
LEFT ICA MID SYS: 84 CM/S
LEFT ICA PROX DIAS: 17 CM/S
LEFT ICA PROX SYS: 75 CM/S
LEFT VERTEBRAL DIAS: 9 CM/S
LEFT VERTEBRAL SYS: 48 CM/S
OHS CV CAROTID RIGHT ICA EDV HIGHEST: 25
OHS CV CAROTID ULTRASOUND LEFT ICA/CCA RATIO: 1
OHS CV CAROTID ULTRASOUND RIGHT ICA/CCA RATIO: 1.22
OHS CV PV CAROTID LEFT HIGHEST CCA: 134
OHS CV PV CAROTID LEFT HIGHEST ICA: 84
OHS CV PV CAROTID RIGHT HIGHEST CCA: 114
OHS CV PV CAROTID RIGHT HIGHEST ICA: 109
OHS CV US CAROTID LEFT HIGHEST EDV: 17
RIGHT ARM DIASTOLIC BLOOD PRESSURE: 81 MMHG
RIGHT ARM SYSTOLIC BLOOD PRESSURE: 165 MMHG
RIGHT CBA DIAS: 14 CM/S
RIGHT CBA SYS: 62 CM/S
RIGHT CCA DIST DIAS: 12 CM/S
RIGHT CCA DIST SYS: 89 CM/S
RIGHT CCA MID DIAS: 14 CM/S
RIGHT CCA MID SYS: 95 CM/S
RIGHT CCA PROX DIAS: 13 CM/S
RIGHT CCA PROX SYS: 114 CM/S
RIGHT ECA DIAS: 4 CM/S
RIGHT ECA SYS: 45 CM/S
RIGHT ICA DIST DIAS: 23 CM/S
RIGHT ICA DIST SYS: 109 CM/S
RIGHT ICA MID DIAS: 25 CM/S
RIGHT ICA MID SYS: 100 CM/S
RIGHT ICA PROX DIAS: 10 CM/S
RIGHT ICA PROX SYS: 42 CM/S
RIGHT VERTEBRAL DIAS: 14 CM/S
RIGHT VERTEBRAL SYS: 64 CM/S

## 2024-06-19 PROCEDURE — 97110 THERAPEUTIC EXERCISES: CPT

## 2024-06-19 PROCEDURE — 93880 EXTRACRANIAL BILAT STUDY: CPT | Mod: 26,,, | Performed by: INTERNAL MEDICINE

## 2024-06-19 PROCEDURE — 93880 EXTRACRANIAL BILAT STUDY: CPT

## 2024-06-19 PROCEDURE — 97112 NEUROMUSCULAR REEDUCATION: CPT

## 2024-06-19 NOTE — PROGRESS NOTES
[facility]   Physical Therapy Treatment Note      Name: Harpreet Perez  [facility] Number: [id number]    Therapy Diagnosis:   Encounter Diagnoses   Name Primary?    Chronic pain in right shoulder Yes    Muscle weakness of right upper extremity     Stiffness of right shoulder joint      Physician: Leah Negrete NP    Visit Date: 6/19/2024    Physician Orders: PT Eval and Treat  Medical Diagnosis from Referral: M25.511,G89.29 (ICD-10-CM) - Chronic right shoulder pain   Evaluation Date: 5/31/2024  Authorization Period Expiration: 12/31/2024  Plan of Care Expiration: 07/26/2024  Progress Note Due: 06/30/2024  Visit # / Visits authorized: 5/16 (+1 evaluation)   FOTO: 1/3 (last performed on 5/31/2024)     Precautions: Standard, COPD, Hypertension  PTA Visit #: 0/5     Time In: 0830  Time Out: 0930  Total Time: 60  Total Billable Time: 60 minutes (Billing reflects 1 on 1 treatment time spent with patient)    Subjective     Patient reports: shoulder continues to be a similar levels of pain. Is interested in pursing injections in shoulder.     He/She was compliant with home exercise program.  Response to previous treatment: decreased shoulder stiffness  Functional change: better sleep last night    Pain: 7/10     Location: right shoulder    Objective      Objective Measures updated at progress report or POC update only unless otherwise noted.     Treatment     Harpreet received the treatments listed below:     MANUAL THERAPY TECHNIQUES were applied for (0) minutes, including:     Manual Intervention Performed Today     Soft Tissue Mobilization []  right supraspinatus , infraspinatus , teres major and minor    Joint Mobilizations []  Grade I-II shoulder A/P and inferior glides  Scapular mobilizations     []        []      Functional Dry Needling  []         Plan for Next Visit: Continue as needed      THERAPEUTIC EXERCISES: to develop strength, endurance, ROM, flexibility, posture and core  stabilization for (28)  minutes including: x = performed today, x - NB = performed but not billed    Therapeutic Exercise 6/19/2024    ROM/Chondral: Pulley x 3'/3'   Pendulums x 1' each forward, horizontal, CW, CCW   Table Slides x  x x30 flexion with 5 second hold  x30 scaption with 5 second hold   Side Lying External Rotation x 3 x 10 with right upper extremity   Wall Walks x X12 flexion                   BOLD = new this visit  Plan for Next Visit:      NEUROMUSCULAR RE-EDUCATION ACTIVITIES to improve Balance, Coordination, Kinesthetic, Sense, Proprioception, and Posture for (25) minutes.  The following were included:     Intervention Performed Today     Shoulder Isometrics    X3 minutes with 5 second holds each  Sub-max pain free x  x  x  x  x  x Flexion  Extension  Abduction  Adduction  Internal Rotation  External Rotation   Scapular Retractions x 3' with 5 second holds                                             Plan for Next Visit: shoulder rolls     THERAPEUTIC ACTIVITIES: to improve functional performance through dynamic activities, for (0)  minutes including: x = performed today    Therapeutic Activities 6/19/2024                         BOLD = new this visit  Plan for Next Visit:      Examples: Coordination, Kinesthetic Sense, Push/pull, Squat, Stairs, bending, lifting, catching, pushing, pulling, throwing, squatting  Patient Education and Home Exercises       Home Exercises Provided and Patient Education Provided     Education provided: time included in treatment time.   PURPOSE: Patient educated on the impairments noted above and the effects of physical therapy intervention to improve overall condition and QOL.   EXERCISE: Patient was educated on all the above exercise prior/during/after for proper posture, positioning, and execution for safe performance with home exercise program.   STRENGTH: Patient educated on the importance of improved core and extremity strength in order to improve alignment of the  spine and extremities with static positions and dynamic movement.   POSTURE: Patient educated on postural awareness to reduce stress and maintain optimal alignment of the spine with static positions and dynamic movement   SLEEPING POSITIONS: Patient educated on the use of pillows to aid in neutral alignment of spine and extremities when sleeping in supine or side lying.    Written Home Exercises Provided: yes.  Exercises were reviewed and Harpreet was able to demonstrate them prior to the end of the session.  Harpreet demonstrated good  understanding of the education provided. See EMR under Patient Instructions for exercises provided during therapy sessions.    Assessment     Patient tolerated session moderately well with continued shoulder pain at end ranges and with quick movements. Patient progressed with active assist shoulder flexion with wall walks. Patient limited to about 50% shoulder flexion with this movement due to pain.     Harpreet is progressing well towards his goals.   Patient prognosis is Good.     Patient will continue to benefit from skilled outpatient physical therapy to address the deficits listed in the problem list box on initial evaluation, provide pt/family education and to maximize patient's level of independence in the home and community environment.     Patient's spiritual, cultural and educational needs considered and pt agreeable to plan of care and goals.    Anticipated Barriers for therapy: co-morbidities, chronicity of condition, and adherence to treatment plan     Short Term Goals:  4 weeks Status  Date Met   PAIN: Pt will report worst pain of 7/10 in order to progress toward max functional ability and improve quality of life. [x] Progressing  [] Met  [] Not Met     FUNCTION: Patient will demonstrate improved function as indicated by a score of greater than or equal to 50% of goal score out of 100 on FOTO. [x] Progressing  [] Met  [] Not Met     MOBILITY: Patient will improve AROM to 50%  of stated goals, listed in objective measures above, in order to progress towards independence with functional activities.  [x] Progressing  [] Met  [] Not Met     STRENGTH: Patient will improve strength to 50% of stated goals, listed in objective measures above, in order to progress towards independence with functional activities. [x] Progressing  [] Met  [] Not Met     POSTURE: Patient will correct postural deviations in sitting and standing, to decrease pain and promote long term stability.  [x] Progressing  [] Met  [] Not Met     HEP: Patient will demonstrate independence with HEP in order to progress toward functional independence. [x] Progressing  [] Met  [] Not Met        Long Term Goals:  8 weeks Status Date Met   PAIN: Pt will report worst pain of 4/10 in order to progress toward max functional ability and improve quality of life [x] Progressing  [] Met  [] Not Met     FUNCTION: Patient will demonstrate improved function as indicated by a score of greater than or equal to goal score out of 100 on FOTO. [x] Progressing  [] Met  [] Not Met     MOBILITY: Patient will improve AROM to stated goals, listed in objective measures above, in order to return to maximal functional potential and improve quality of life.  [x] Progressing  [] Met  [] Not Met     STRENGTH: Patient will improve strength to stated goals, listed in objective measures above, in order to improve functional independence and quality of life.  [x] Progressing  [] Met  [] Not Met     Patient will return to normal ADL's, IADL's, community involvement, recreational activities, and work-related activities with less than or equal to 4/10 pain and maximal function.  [x] Progressing  [] Met  [] Not Met       Plan     Continue Plan of Care (POC) and progress per patient tolerance. See treatment section for details on planned progressions next session.    Musa Rossi, PT

## 2024-06-25 ENCOUNTER — TELEPHONE (OUTPATIENT)
Dept: SPORTS MEDICINE | Facility: CLINIC | Age: 74
End: 2024-06-25
Payer: MEDICARE

## 2024-06-25 NOTE — TELEPHONE ENCOUNTER
I called the pt to get him scheduled from Leah Negrete referral and got him on with Traci Sierra at the [facility] . The pt accepted.     ----- Message from Ang Younger LPN sent at 6/25/2024  3:56 PM CDT -----  Leah Negrete has placed a referral for pt and would like to know if someone could please contact him to schedule./CS

## 2024-06-26 ENCOUNTER — TELEPHONE (OUTPATIENT)
Dept: REHABILITATION | Facility: HOSPITAL | Age: 74
End: 2024-06-26

## 2024-07-01 ENCOUNTER — OFFICE VISIT (OUTPATIENT)
Dept: SPORTS MEDICINE | Facility: CLINIC | Age: 74
End: 2024-07-01
Payer: MEDICARE

## 2024-07-01 VITALS — HEIGHT: 70 IN | BODY MASS INDEX: 23.33 KG/M2 | WEIGHT: 162.94 LBS

## 2024-07-01 DIAGNOSIS — M75.101 RIGHT ROTATOR CUFF TEAR ARTHROPATHY: Primary | ICD-10-CM

## 2024-07-01 DIAGNOSIS — M12.811 RIGHT ROTATOR CUFF TEAR ARTHROPATHY: Primary | ICD-10-CM

## 2024-07-01 DIAGNOSIS — M67.911 TENDINOPATHY OF ROTATOR CUFF, RIGHT: ICD-10-CM

## 2024-07-01 PROCEDURE — 99999 PR PBB SHADOW E&M-EST. PATIENT-LVL III: CPT | Mod: PBBFAC,HCNC,, | Performed by: PHYSICIAN ASSISTANT

## 2024-07-01 RX ORDER — TRIAMCINOLONE ACETONIDE 40 MG/ML
40 INJECTION, SUSPENSION INTRA-ARTICULAR; INTRAMUSCULAR
Status: DISCONTINUED | OUTPATIENT
Start: 2024-07-01 | End: 2024-07-01 | Stop reason: HOSPADM

## 2024-07-01 RX ADMIN — TRIAMCINOLONE ACETONIDE 40 MG: 40 INJECTION, SUSPENSION INTRA-ARTICULAR; INTRAMUSCULAR at 03:07

## 2024-07-01 NOTE — PROCEDURES
Large Joint Aspiration/Injection: R subacromial bursa    Date/Time: 7/1/2024 3:00 PM    Performed by: Traci Sierra PA-C  Authorized by: Traci Sierra PA-C    Consent Done?:  Yes (Verbal)  Indications:  Pain  Site marked: the procedure site was marked    Timeout: prior to procedure the correct patient, procedure, and site was verified    Prep: patient was prepped and draped in usual sterile fashion      Local anesthesia used?: Yes    Anesthesia:  Local infiltration  Local anesthetic:  Lidocaine 1% without epinephrine    Details:  Needle Size:  22 G  Ultrasonic Guidance for needle placement?: No    Approach:  Posterior  Location:  Shoulder  Site:  R subacromial bursa  Medications:  40 mg triamcinolone acetonide 40 mg/mL  Patient tolerance:  Patient tolerated the procedure well with no immediate complications

## 2024-07-01 NOTE — PROGRESS NOTES
Orthopaedics Sports Medicine     Shoulder Initial Visit         7/1/2024    Referring MD: Leah Negrete NP    Chief Complaint   Patient presents with    Right Shoulder - Pain         History of Present Illness:   Harpreet Perez is a 73 y.o. right-hand dominant male who presents with RIGHT shoulder pain and dysfunction.    Onset of the symptoms was about 3 months ago, has been worsening over the last 3 months     Inciting event:  No acute injury or trauma     Current symptoms include right shoulder pain, shoulder blade pain, limited range of motion, night pain     Pain is aggravated by any movement of the shoulder, night pain    Evaluation to date: X-Ray     Treatment to date: Rest, activity modification, muscle rubs, muscle cramps, Tylenol Arthritis     Past Medical History:   Past Medical History:   Diagnosis Date    Alcohol dependence     Back pain     COPD (chronic obstructive pulmonary disease)     History of colonic polyps     Hypertension     Hypertrophy of prostate without urinary obstruction and other lower urinary tract symptoms (LUTS)     Osteoarthritis of multiple joints     Tobacco dependence     quit then restarted       Past Surgical History:   Past Surgical History:   Procedure Laterality Date    COLONOSCOPY N/A 5/25/2016    Procedure: COLONOSCOPY;  Surgeon: Sergey Jensen MD;  Location: [facility];  Service: Endoscopy;  Laterality: N/A;    COLONOSCOPY N/A 11/15/2021    Procedure: COLONOSCOPY;  Surgeon: Francia Lucio MD;  Location: [facility] END[facility];  Service: Endoscopy;  Laterality: N/A;    LIPOMA RESECTION Right 8/17/12    posterior shoulder       Medications:  Patient's Medications   New Prescriptions    No medications on file   Previous Medications    ATORVASTATIN (LIPITOR) 20 MG TABLET    Take 1 tablet (20 mg total) by mouth every evening.    BISOPROLOL (ZEBETA) 5 MG TABLET    Take 1 tablet (5 mg total) by mouth once daily.    LISINOPRIL-HYDROCHLOROTHIAZIDE (PRINZIDE,ZESTORETIC) 20-12.5 MG PER  "TABLET    Take 1 tablet by mouth once daily.    METHIMAZOLE (TAPAZOLE) 5 MG TAB    TAKE 1 TABLET EVERY DAY    PANTOPRAZOLE (PROTONIX) 40 MG TABLET    TAKE 1 TABLET EVERY DAY    PSYLLIUM (METAMUCIL) POWDER    Take 1 packet by mouth once daily.    TAMSULOSIN (FLOMAX) 0.4 MG CAP    Take 1 capsule (0.4 mg total) by mouth once daily.   Modified Medications    No medications on file   Discontinued Medications    No medications on file       Allergies: Review of patient's allergies indicates:  No Known Allergies    Social History:   Home town: [place], [place]  Occupation: retired  Alcohol use: He reports current alcohol use of about 28.0 standard drinks of alcohol per week.  Tobacco use: He reports that he has been smoking cigarettes. He started smoking about 45 years ago. He has a 22.7 pack-year smoking history. He has never used smokeless tobacco.    Review of systems:  History of recent illness, fevers, shakes, or chills: no  History of cardiac problems or chest pain: HTN  History of pulmonary problems or asthma: COPD  History of diabetes: no  History of prior dvt or clotting problems: no  History of sleep apnea: no      Physical Examination:  Estimated body mass index is 23.38 kg/m² as calculated from the following:    Height as of this encounter: 5' 10" (1.778 m).    Weight as of this encounter: 73.9 kg (162 lb 14.7 oz).    General  Healthy appearing male in no acute distress  Alert and oriented, normal mood, appropriate affect    Shoulder Examination:  Patient is alert and oriented, no distress. Skin is intact. Neuro is normal with no focal motor or sensory findings.    Cervical exam is unremarkable. Intact cervical ROM. Negative Spurling's test    Physical Exam:  RIGHT    LEFT    Scap Dyskinesis/Winging (-)    (-)    Tenderness:          Greater Tuberosity             (-)    (-)  Bicipital Groove  (-)    (-)  AC joint   (-)    (-)  Other:   Ttp cervical portion of trap    Ttp periscap muscles      ROM:  Forward " Elevation 60/140    160  Abduction  45/80    100  ER (at side)  60    60  IR   L5    T10    Strength:   Supraspinatus  3/5    5/5  Infraspinatus  3/5    5/5  Subscap / IR  3/5    5/5     Special Tests:   Neer:    (-)    (-)   Lara:   +    (-)   SS Stress:   +    (-)   Bear Hug:   +    (-)   Wiggins's:   +    (-)   Resisted Thrower's:   +    (-)   Cross Arm Abduction:  +    (-)    Neurovascular examination  - Motor grossly intact bilaterally to shoulder abduction, elbow flexion and extension, wrist flexion and extension, and intrinsic hand musculature  - Sensation intact to light touch bilaterally in axillary, median, radial, and ulnar distributions  - Symmetrical radial pulses      Imaging:  XR Results:  Results for orders placed during the hospital encounter of 05/21/24    X-ray Shoulder 2 or More Views Right    Narrative  EXAMINATION:  XR SHOULDER COMPLETE 2 OR MORE VIEWS RIGHT    CLINICAL HISTORY:  Pain in right shoulder    TECHNIQUE:  Two or three views of the right shoulder were preformed.    COMPARISON:  None    FINDINGS:  No acute fracture or dislocation.  Moderate AC joint arthropathy noted.  Mild-to-moderate degenerative change seen at the glenohumeral joint.    Impression  1.  As above      Electronically signed by: Josemanuel Ramírez DO  Date:    05/21/2024  Time:    12:30        Physician Read: I agree with the above impression.      Impression:  73 y.o. male with right rotator cuff tear arthropathy      Plan:  Discussed diagnosis and treatment options with patient today.  His history, physical exam, and imaging is consistent with right rotator cuff tear arthropathy  He has tried conservative treatment in the form of rest, activity modification, over-the-counter Tylenol (unable to take oral anti-inflammatories due to pre-existing medical conditions), Tylenol Arthritis, formal physical therapy.  He has been active in therapy over the last month, he has had little to no improvements since beginning therapy.  His range of motion and is still poor.  He reports that he is unable to fix his hair anymore, he struggles to put his pants on and put his socks on.  We discussed operative and nonoperative treatment options today.  Discussed operative treatment in the form of a reverse total shoulder arthroplasty.  Nonoperative treatment to include rest, activity modification over-the-counter Tylenol, intermittent corticosteroid injections, formal physical therapy as tolerated.  Patient reports he would like to avoid surgical intervention.  Instead we move forward with conservative treatment in the form of a subacromial corticosteroid injection  Right shoulder SAS CSI given in clinic, patient tolerated the procedure well with no immediate complications  Encouraged him to work on exercises he was taught in physical therapy  Discussed with him that this shot is only to help with pain, discussed that the shot will not help with the function of his shoulder, patient voiced understanding  Discussed with him that I would like for him to return to clinic in 6 weeks.  If he fails to improve at that time, we will move forward with an MRI of his right shoulder to evaluate the integrity of his rotator, suspect chronic rotator cuff tearing  Follow up with me in 6 weeks to check progress            Traci Sierra PA-C  Sports Medicine Physician Assistant       Disclaimer: This note was prepared using a voice recognition system and is likely to have sound alike errors within the text.

## 2024-07-09 NOTE — TELEPHONE ENCOUNTER
----- Message from Sabina Banuelos sent at 2/12/2024  7:40 AM CST -----  Regarding: Same Day Appt  Contact: Harpreet  Type:  Same Day Appointment Request    Caller is requesting a same day appointment.  Caller declined first available appointment listed below.    Name of Caller: Harpreet  When is the first available appointment? 02/14/2024  Symptoms: entire body pain and having to hold on to things to get up.   Best Call Back Number:  [phone] or [phone] (home)    Additional Information MRN: [id number] Dr. Christensen       Dear Hannah Pacheco    After reviewing your responses, I've been able to diagnose you with a urinary tract infection, which is a common infection of the bladder with bacteria.  This is not a sexually transmitted infection, though urinating immediately after intercourse can help prevent infections.  Drinking lots of fluids is also helpful to clear your current infection and prevent the next one.      I have sent a prescription for antibiotics to your pharmacy to treat this infection.    It is important that you take all of your prescribed medication even if your symptoms are improving after a few doses.  Taking all of your medicine helps prevent the symptoms from returning.     If your symptoms worsen, you develop pain in your back or stomach, develop fevers, or are not improving in 5 days, please contact your primary care provider for an appointment or visit any of our convenient Walk-in or Urgent Care Centers to be seen, which can be found on our website here.    Thanks again for choosing us as your health care partner,    Marcio Hooper MD  Urinary Tract Infection (UTI) in Women: Care Instructions  Overview     A urinary tract infection (UTI) is an infection caused by bacteria. It can happen anywhere in the urinary tract. A UTI can happen in the:  Kidneys.  Ureters, the tubes that connect the kidneys to the bladder.  Bladder.  Urethra, where the urine comes out.  Most UTIs are bladder infections. They often cause pain or burning when you urinate.  Most UTIs can be cured with antibiotics. If you are prescribed antibiotics, be sure to complete your treatment so that the infection does not get worse.  Follow-up care is a key part of your treatment and safety. Be sure to make and go to all appointments, and call your doctor if you are having problems. It's also a good idea to know your test results and keep a list of the medicines you take.  How can you care for yourself at home?  Take your antibiotics as directed.  "Do not stop taking them just because you feel better. You need to take the full course of antibiotics.  Drink extra water and other fluids for the next day or two. This will help make the urine less concentrated and help wash out the bacteria that are causing the infection. (If you have kidney, heart, or liver disease and have to limit fluids, talk with your doctor before you increase the amount of fluids you drink.)  Avoid drinks that are carbonated or have caffeine. They can irritate the bladder.  Urinate often. Try to empty your bladder each time.  To relieve pain, take a hot bath or lay a heating pad set on low over your lower belly or genital area. Never go to sleep with a heating pad in place.  To prevent UTIs  Drink plenty of water each day. This helps you urinate often, which clears bacteria from your system. (If you have kidney, heart, or liver disease and have to limit fluids, talk with your doctor before you increase the amount of fluids you drink.)  Urinate when you need to.  If you are sexually active, urinate right after you have sex.  Change sanitary pads often.  Avoid douches, bubble baths, feminine hygiene sprays, and other feminine hygiene products that have deodorants.  After going to the bathroom, wipe from front to back.  When should you call for help?   Call your doctor now or seek immediate medical care if:    You have new or worse fever, chills, nausea, or vomiting.     You have new pain in your back just below your rib cage. This is called flank pain.     There is new blood or pus in your urine.     You have any problems with your antibiotic medicine.   Watch closely for changes in your health, and be sure to contact your doctor if:    You are not getting better after taking an antibiotic for 2 days.     Your symptoms go away but then come back.   Where can you learn more?  Go to https://www.healthwise.net/patiented  Enter K848 in the search box to learn more about \"Urinary Tract Infection " "(UTI) in Women: Care Instructions.\"  Current as of: November 15, 2023               Content Version: 14.0    9025-2032 Monitor.   Care instructions adapted under license by your healthcare professional. If you have questions about a medical condition or this instruction, always ask your healthcare professional. Monitor disclaims any warranty or liability for your use of this information.      "

## 2024-07-17 DIAGNOSIS — N40.0 BENIGN PROSTATIC HYPERPLASIA WITHOUT LOWER URINARY TRACT SYMPTOMS: ICD-10-CM

## 2024-07-17 RX ORDER — TAMSULOSIN HYDROCHLORIDE 0.4 MG/1
0.4 CAPSULE ORAL DAILY
Qty: 90 CAPSULE | Refills: 3 | OUTPATIENT
Start: 2024-07-17

## 2024-07-17 RX ORDER — TAMSULOSIN HYDROCHLORIDE 0.4 MG/1
1 CAPSULE ORAL
Qty: 90 CAPSULE | Refills: 1 | Status: SHIPPED | OUTPATIENT
Start: 2024-07-17 | End: 2024-07-19 | Stop reason: SDUPTHER

## 2024-07-17 NOTE — TELEPHONE ENCOUNTER
----- Message from Stella Duke sent at 7/17/2024 11:01 AM CDT -----  Name of Who is Calling:  pt         What is the request in detail: requesting refill to be sent to local pharmacy    tamsulosin (FLOMAX) 0.4 mg Cap90 ontbjur6233/22/2023-NoSig - Route: Take 1 capsule (0.4 mg total) by mouth once daily. - OralSent to pharmacy as: tamsulosin (FLOMAX) 0.4 mg CapClass: NormalNotes to Pharmacy: NEW RX REQUEST FOR PATIENT DUE TO USING NEW MAIL ORDER PHARMACY -[facility].Order: [id number]/Time Signed: 3/22/2023 13:06E-Prescribing Status: Receipt confirmed by pharmacy (3/22/2023  1:06 PM CDT)         [id number] [place], [place] - [address] AT [facility] [address]  [address]  [place] [address]  Phone: [phone] Fax: [phone]        Can the clinic reply by [facility]:no           What Number to Call Back [facility] not [facility]: [phone]

## 2024-07-17 NOTE — TELEPHONE ENCOUNTER
Refill Decision Note   Harpreet Chris  is requesting a refill authorization.  Brief Assessment and Rationale for Refill:  Approve     Medication Therapy Plan:        Comments:     Note composed:1:44 PM 07/17/2024

## 2024-07-17 NOTE — TELEPHONE ENCOUNTER
No care due was identified.  [facility] Embedded Care Due Messages. Reference number: [id number].   7/17/2024 12:08:27 PM CDT

## 2024-07-17 NOTE — TELEPHONE ENCOUNTER
No care due was identified.  [facility] Embedded Care Due Messages. Reference number: [id number].   7/17/2024 11:06:02 AM CDT

## 2024-07-18 DIAGNOSIS — N40.0 BENIGN PROSTATIC HYPERPLASIA WITHOUT LOWER URINARY TRACT SYMPTOMS: ICD-10-CM

## 2024-07-18 RX ORDER — TAMSULOSIN HYDROCHLORIDE 0.4 MG/1
1 CAPSULE ORAL
Qty: 90 CAPSULE | Refills: 1 | Status: CANCELLED | OUTPATIENT
Start: 2024-07-18

## 2024-07-18 NOTE — TELEPHONE ENCOUNTER
No care due was identified.  [facility] Embedded Care Due Messages. Reference number: [id number].   7/18/2024 5:07:27 PM CDT

## 2024-07-19 RX ORDER — TAMSULOSIN HYDROCHLORIDE 0.4 MG/1
0.4 CAPSULE ORAL DAILY
Qty: 30 CAPSULE | Refills: 0 | Status: SHIPPED | OUTPATIENT
Start: 2024-07-19

## 2024-07-19 NOTE — TELEPHONE ENCOUNTER
----- Message from Eliz Flores sent at 7/19/2024  9:08 AM CDT -----  Contact: Harpreet  Harpreet is calling to follow up on a refill request for tamsulosin (FLOMAX) 0.4 mg Cap. He [facility] and needs enough to last him until [facility] delivers the full script. Please give him a call at [phone]         [id number] - [place], [place] - [address] AT [facility] [address]  [address]  [place] [address]  Phone: [phone] Fax: [phone]

## 2024-07-29 DIAGNOSIS — E05.90 HYPERTHYROIDISM: ICD-10-CM

## 2024-07-29 NOTE — TELEPHONE ENCOUNTER
No care due was identified.  [facility] Embedded Care Due Messages. Reference number: [id number].   7/29/2024 1:34:45 AM CDT

## 2024-08-01 ENCOUNTER — OFFICE VISIT (OUTPATIENT)
Dept: URGENT CARE | Facility: CLINIC | Age: 74
End: 2024-08-01
Payer: MEDICARE

## 2024-08-01 VITALS
WEIGHT: 175 LBS | TEMPERATURE: 98 F | DIASTOLIC BLOOD PRESSURE: 54 MMHG | HEART RATE: 47 BPM | SYSTOLIC BLOOD PRESSURE: 104 MMHG | RESPIRATION RATE: 18 BRPM | HEIGHT: 70 IN | BODY MASS INDEX: 25.05 KG/M2 | OXYGEN SATURATION: 99 %

## 2024-08-01 DIAGNOSIS — U07.1 COVID-19 VIRUS DETECTED: ICD-10-CM

## 2024-08-01 DIAGNOSIS — F17.200 SMOKER: ICD-10-CM

## 2024-08-01 DIAGNOSIS — R05.9 COUGH, UNSPECIFIED TYPE: Primary | ICD-10-CM

## 2024-08-01 DIAGNOSIS — Z87.09 HISTORY OF COPD: ICD-10-CM

## 2024-08-01 DIAGNOSIS — U07.1 COVID: ICD-10-CM

## 2024-08-01 LAB
CTP QC/QA: YES
SARS-COV-2 AG RESP QL IA.RAPID: POSITIVE

## 2024-08-01 PROCEDURE — 99214 OFFICE O/P EST MOD 30 MIN: CPT | Mod: S$GLB,,, | Performed by: NURSE PRACTITIONER

## 2024-08-01 PROCEDURE — 87811 SARS-COV-2 COVID19 W/OPTIC: CPT | Mod: QW,S$GLB,, | Performed by: NURSE PRACTITIONER

## 2024-08-01 RX ORDER — CHLORPHENIRAMINE MALEATE AND DEXTROMETHORPHAN HYDROBROMIDE 4; 30 MG/1; MG/1
1 TABLET, FILM COATED ORAL
Qty: 24 EACH | Refills: 0 | Status: SHIPPED | OUTPATIENT
Start: 2024-08-01 | End: 2024-08-02

## 2024-08-01 RX ORDER — BENZONATATE 200 MG/1
200 CAPSULE ORAL 3 TIMES DAILY PRN
Qty: 25 CAPSULE | Refills: 0 | Status: SHIPPED | OUTPATIENT
Start: 2024-08-01 | End: 2024-08-02

## 2024-08-01 RX ORDER — NIRMATRELVIR AND RITONAVIR 300-100 MG
KIT ORAL
Qty: 30 TABLET | Refills: 0 | Status: SHIPPED | OUTPATIENT
Start: 2024-08-01 | End: 2024-08-02

## 2024-08-01 NOTE — PROGRESS NOTES
"Subjective:      Patient ID: Harpreet Perez is a 73 y.o. male.    Vitals:  height is 5' 10" (1.778 m) and weight is 79.4 kg (175 lb). His tympanic temperature is 97.8 °F (36.6 °C). His blood pressure is 104/54 (abnormal) and his pulse is 47 (abnormal). His respiration is 18 and oxygen saturation is 99%.     Chief Complaint: Cough    Patient is a 74 yo male who presents for evaluation of a cough. Onset yesterday. Treating with OTC cough syrup. Denies sick exposure and travel. Denies fever, dyspnea, wheezing, vomiting, diarrhea, rash. Patient has history of COPD but has no prescribed inhaled medication. No other concerns were voiced.     Cough  This is a new problem. The current episode started yesterday. The problem has been unchanged. The problem occurs constantly. The cough is Productive of sputum. Associated symptoms include nasal congestion. Pertinent negatives include no chest pain, chills, ear congestion, ear pain, fever, headaches, heartburn, hemoptysis, myalgias, postnasal drip, rash, rhinorrhea, sore throat, shortness of breath, sweats, weight loss or wheezing. Nothing aggravates the symptoms. Treatments tried: Tylenol. The treatment provided mild relief. There is no history of asthma, bronchiectasis, bronchitis, COPD, emphysema, environmental allergies or pneumonia.       Constitution: Negative for chills and fever.   HENT:  Positive for congestion. Negative for ear pain, postnasal drip and sore throat.    Cardiovascular:  Negative for chest pain.   Respiratory:  Positive for cough. Negative for sputum production, bloody sputum, shortness of breath, stridor and wheezing.    Gastrointestinal:  Negative for vomiting, diarrhea and heartburn.   Musculoskeletal:  Negative for muscle ache.   Skin:  Negative for rash.   Allergic/Immunologic: Negative for environmental allergies.   Neurological:  Negative for headaches.      Objective:     Physical Exam   Constitutional: He is oriented to person, place, and time. He " appears well-developed. He is cooperative.  Non-toxic appearance. He does not appear ill. No distress.   HENT:   Head: Normocephalic and atraumatic.   Ears:   Right Ear: Hearing and external ear normal.   Left Ear: Hearing and external ear normal.   Nose: Congestion present. No mucosal edema, rhinorrhea or nasal deformity. No epistaxis. Right sinus exhibits no maxillary sinus tenderness and no frontal sinus tenderness. Left sinus exhibits no maxillary sinus tenderness and no frontal sinus tenderness.   Mouth/Throat: Uvula is midline, oropharynx is clear and moist and mucous membranes are normal. No trismus in the jaw. Normal dentition. No uvula swelling. No oropharyngeal exudate, posterior oropharyngeal edema or posterior oropharyngeal erythema.   Eyes: Conjunctivae and lids are normal. No scleral icterus.   Neck: Trachea normal and phonation normal. Neck supple. No edema present. No erythema present. No neck rigidity present.   Cardiovascular: Regular rhythm, normal heart sounds and normal pulses. Bradycardia present.   Pulmonary/Chest: Effort normal and breath sounds normal. No respiratory distress. He has no decreased breath sounds. He has no wheezes. He has no rhonchi. He has no rales.   Abdominal: Normal appearance.   Musculoskeletal: Normal range of motion.         General: No deformity. Normal range of motion.   Neurological: He is alert and oriented to person, place, and time. He exhibits normal muscle tone. Coordination normal.   Skin: Skin is warm, dry, intact, not diaphoretic and not pale.   Psychiatric: His speech is normal and behavior is normal. Judgment and thought content normal.   Nursing note and vitals reviewed.      Assessment:     1. Cough, unspecified type    2. Smoker    3. COVID    4. History of COPD        Plan:       Cough, unspecified type  -     SARS Coronavirus 2 Antigen, POCT Manual Read  -     Ambulatory referral/consult to Smoking Cessation Program    Smoker  -     Ambulatory  referral/consult to Smoking Cessation Program    COVID  -     Ambulatory referral/consult to Smoking Cessation Program    History of COPD  -     Ambulatory referral/consult to Smoking Cessation Program    Other orders  -     benzonatate (TESSALON) 200 MG capsule; Take 1 capsule (200 mg total) by mouth 3 (three) times daily as needed for Cough.  Dispense: 25 capsule; Refill: 0  -     chlorpheniramine-dextromethorp (CORICIDIN HBP COUGH AND COLD) 4-30 mg Tab; Take 1 tablet by mouth every 6 (six) hours while awake. for 10 days  Dispense: 24 each; Refill: 0  -     nirmatrelvir-ritonavir (PAXLOVID) 300 mg (150 mg x 2)-100 mg copackaged tablets (EUA); Take 3 tablets by mouth 2 (two) times daily. Each dose contains 2 nirmatrelvir (pink tablets) and 1 ritonavir (white tablet). Take all 3 tablets together  Dispense: 30 tablet; Refill: 0          Medical Decision Making:   Initial Assessment:   Nontoxic appearing 72 yo male c/o cough.  After complete evaluation, including thorough history and physical exam, the patient's symptoms are most likely due to viral upper respiratory infection. There are no concerning features on physical exam to suggest bacterial otitis media/externa, sinusitis, strep pharyngitis, or peritonsillar abscess. Vital signs do not suggest sepsis. Lung sounds are clear and not consistent with pneumonia. There is no neck pain or limited ROM to suggest retropharyngeal abscess or meningitis. In clinic testing for covid is positive .The patient will be treated with supportive care. Will provide RX for paxlovid and tessalon upon D/C. Follow up instructions and ED precautions provided.     Clinical Tests:   Lab Tests: Reviewed       <> Summary of Lab: Covid positive      Patient has history of respiratory disease (copd/asthma/DENISHA/CPaP use) noted to have increase risk of pneumonia (bacterial infection) development with acute respiratory illness. Patient was advised close monitoring and is to continue current  medications including inhalers. Patient to follow up with PCP and given strict ER precautions.    Patient is noted to be a current smoker. Evidence shows that smoking causes cancer, heart disease, stroke, lung diseases, diabetes, and chronic obstructive pulmonary disease (COPD), which includes emphysema and chronic bronchitis. Smoking also increases risk for tuberculosis, certain eye diseases, and problems of the immune system, including rheumatoid arthritis. Furthermore smoking can slow the process of wound healing and prolong the symptoms of respiratory illness.  Patient counseled on dangers of smoking and offered smoking cessation.          Results for orders placed or performed in visit on 08/01/24   SARS Coronavirus 2 Antigen, POCT Manual Read   Result Value Ref Range    SARS Coronavirus 2 Antigen Positive (A) Negative     Acceptable Yes        Patient Instructions   POSITIVE COVID TEST    You have tested positive for COVID-19 today.  Please note the following guidelines from the CDC for patients who test positive for COVID-19    If you test positive for COVID-19 you may return to normal activities when, for at least 24 hours, both are true:     Your symptoms are getting better overall, and:  You have not had a fever AND are not using fever reducing medication such as Tylenol (Acetaminophen) or Motrin (Ibuprofen)     The CDC also recommends added precautions in the 5 days after return to normal activity including frequent hand washing, mask wearing, physical distancing.      They also recommend should the patient develop a fever or starts to feel worse after they have returned to normal activities, they should return home and away from others for at least another 24 hours. The link below is a direct link to the CDC with all this information.     https://www.cdc.gov/respiratory-viruses/prevention/precautions-when-sick.html     YOU DO NOT NEED ANOTHER TEST IN ORDER TO END YOUR  QUARANTINE.  Treatment: There is no cure for the COVID there is only symptomatic care.     Try these tips to keep yourself comfortable:                   -Get plenty of rest.                   -Drink plenty of fluids, at least 8 large glasses of fluid a day. Good fluid choices are water, fruit juices high in Vitamin C, tea, gelatin, or broths and soups. These help to keep mucus thin and ease congestion.                  -Use salt water gargle, cough drops or throat sprays to relieve throat pain. Mi ¼ to ½ teaspoon of salt in 1 cup of warm water for a salt water gargle  solution.                  -Use petroleum jelly or lip balm around lips and nose to prevent chapping.                  -Use saline nose drops or spray to help ease congestion.    Over the Counter (OTC) Medicines:  Take over the counter medicines as needed to ease your signs.  Read labels carefully.  Use a product that treats only the signs that you have. Ask your pharmacist  for recommendations. Be sure to ask about possible interactions with other  medicines you are taking.  Common medicines used to treat signs of covid include:     -Flonase daily.  -Claritin or Zyrtec daily.  -Mucinex every 12 hours -- Drink plenty of water while taking this medication.    - Cough suppressant, also called antitussive, such as dextromethorphan.  This medicine decreases your reflex and sensitivity to cough. This  medicine may be kept behind the pharmacy counter for purchase.    Cold and cough medicines often contain more than one type of medicine.  Ask the pharmacist for help to confirm that you are not using more than one  product with the same or similar ingredient. For example, some cold and  cough medicines have acetaminophen or ibuprofen in them to help lower a  fever or ease muscle aches. Do not take extra acetaminophen (Tylenol) or  ibuprofen (Advil, Motrin) if the cold or cough medicine has it as an  ingredient. Too much medicine could be harmful.    Take the  correct dose as listed on the package. Do not take more than  recommended.    Use a Humidifier:  A cool mist humidifier can make breathing easier by thinning mucus. Do not use  a steam humidifier as hot water can cause burns if spilled.  Place the humidifier a few feet from the bed. Drain and clean each day with  soap and water to prevent bacteria and mold from growing.  Indoor humidity should not be above 50%. Stop using the humidifier if you  notice moisture on windows, walls or pictures.  You do not need to add any medicine to the humidifier.  If you cannot get a humidifier, place a pan of water next to heating vents and  refill the water level daily. The water will evaporate and add moisture to the  Room.    How to prevent the spread of COVID  -Wash your hands with soap and water or use alcohol based hand   often. Dry hands wet from washing with soap on a paper towel instead of cloth towel.  -Cough or sneeze into your elbow to avoid spreading germs.  -Wipe down common surfaces, such as door knobs and faucet handles, with a disinfectant spray.  -Do not share cups or utensils.        Please follow up with your Primary care provider within 2-5 days if your signs and symptoms have not resolved or worsen.      If your condition worsens or fails to improve we recommend that you receive another evaluation at the emergency room immediately or contact your primary medical clinic to discuss your concerns.   You must understand that you have received an Urgent Care treatment only and that you may be released before all of your medical problems are known or treated. You, the patient, will arrange for follow up care as instructed.     We do have a drug called Paxlovid that has an Emergency Authorization Use that [facility] is currently advising and using for Covid-19. IThe main thing with it is that it could cause some liver or kidney injuries. If you were on any type of cholesterol medicine like a statin we would want  to stop that as well. There are some other drug interactions as well. Since it is new we do not know everything about it yet but this is the current recommendation to help prevent severe disease in patients with a mild-moderate symptoms within 5 days of symptom-onset who are not in the hospital being treated for covid.      While taking Paxlovid stop your taking your  statin , resume medication 5 days after you complete the Paxlovid prescription.    Tobacco Cessation  Smoking, vaping, and smokeless tobacco cause harm by raising blood pressure and increasing your risk of heart attack and stroke. Chewing tobacco increases your risk of cancer of the face and throat. Smoking not only raises the risk of cancer, but more often, causes emphysema. This crippling lung disease can cause constant shortness of breath and a need to use oxygen. Stopping smoking can make the difference between playing with your grandchildren outside and sitting by with your oxygen tank watching them.     You may already know your nicotine habit is dangerous, but perhaps you feel helpless or dont know where to start.    Here at [facility] we are invested in the improvement of your health. We would be happy to help you with smoking cessation. If you are interested in quitting and answer yes to   the questions below, we can provide you with FREE assistance to get you on your way.     ? Are you a [place] resident?  ? Did you start smoking before September 1, 1988?  ? Are you ready to quit smoking?    Quitting doesnt have to be lonely -   [facility]s Smoking Cessation program offers a supportive environment along with      FREE smoking cessation counseling to help get you through those rough patches   FREE or reduced medications* to help curb the cravings    You do not need to be an [facility] patient to participate in the program.  Ready? Call [phone] or toll free [phone] or visit [email or web address] to sign up for the program.

## 2024-08-01 NOTE — PATIENT INSTRUCTIONS
POSITIVE COVID TEST    You have tested positive for COVID-19 today.  Please note the following guidelines from the CDC for patients who test positive for COVID-19    If you test positive for COVID-19 you may return to normal activities when, for at least 24 hours, both are true:     Your symptoms are getting better overall, and:  You have not had a fever AND are not using fever reducing medication such as Tylenol (Acetaminophen) or Motrin (Ibuprofen)     The CDC also recommends added precautions in the 5 days after return to normal activity including frequent hand washing, mask wearing, physical distancing.      They also recommend should the patient develop a fever or starts to feel worse after they have returned to normal activities, they should return home and away from others for at least another 24 hours. The link below is a direct link to the CDC with all this information.     https://www.cdc.gov/respiratory-viruses/prevention/precautions-when-sick.html     YOU DO NOT NEED ANOTHER TEST IN ORDER TO END YOUR QUARANTINE.  Treatment: There is no cure for the COVID there is only symptomatic care.     Try these tips to keep yourself comfortable:                   -Get plenty of rest.                   -Drink plenty of fluids, at least 8 large glasses of fluid a day. Good fluid choices are water, fruit juices high in Vitamin C, tea, gelatin, or broths and soups. These help to keep mucus thin and ease congestion.                  -Use salt water gargle, cough drops or throat sprays to relieve throat pain. Mi ¼ to ½ teaspoon of salt in 1 cup of warm water for a salt water gargle  solution.                  -Use petroleum jelly or lip balm around lips and nose to prevent chapping.                  -Use saline nose drops or spray to help ease congestion.    Over the Counter (OTC) Medicines:  Take over the counter medicines as needed to ease your signs.  Read labels carefully.  Use a product that treats only the signs  that you have. Ask your pharmacist  for recommendations. Be sure to ask about possible interactions with other  medicines you are taking.  Common medicines used to treat signs of covid include:     -Flonase daily.  -Claritin or Zyrtec daily.  -Mucinex every 12 hours -- Drink plenty of water while taking this medication.    - Cough suppressant, also called antitussive, such as dextromethorphan.  This medicine decreases your reflex and sensitivity to cough. This  medicine may be kept behind the pharmacy counter for purchase.    Cold and cough medicines often contain more than one type of medicine.  Ask the pharmacist for help to confirm that you are not using more than one  product with the same or similar ingredient. For example, some cold and  cough medicines have acetaminophen or ibuprofen in them to help lower a  fever or ease muscle aches. Do not take extra acetaminophen (Tylenol) or  ibuprofen (Advil, Motrin) if the cold or cough medicine has it as an  ingredient. Too much medicine could be harmful.    Take the correct dose as listed on the package. Do not take more than  recommended.    Use a Humidifier:  A cool mist humidifier can make breathing easier by thinning mucus. Do not use  a steam humidifier as hot water can cause burns if spilled.  Place the humidifier a few feet from the bed. Drain and clean each day with  soap and water to prevent bacteria and mold from growing.  Indoor humidity should not be above 50%. Stop using the humidifier if you  notice moisture on windows, walls or pictures.  You do not need to add any medicine to the humidifier.  If you cannot get a humidifier, place a pan of water next to heating vents and  refill the water level daily. The water will evaporate and add moisture to the  Room.    How to prevent the spread of COVID  -Wash your hands with soap and water or use alcohol based hand   often. Dry hands wet from washing with soap on a paper towel instead of cloth  towel.  -Cough or sneeze into your elbow to avoid spreading germs.  -Wipe down common surfaces, such as door knobs and faucet handles, with a disinfectant spray.  -Do not share cups or utensils.        Please follow up with your Primary care provider within 2-5 days if your signs and symptoms have not resolved or worsen.      If your condition worsens or fails to improve we recommend that you receive another evaluation at the emergency room immediately or contact your primary medical clinic to discuss your concerns.   You must understand that you have received an Urgent Care treatment only and that you may be released before all of your medical problems are known or treated. You, the patient, will arrange for follow up care as instructed.     We do have a drug called Paxlovid that has an Emergency Authorization Use that [facility] is currently advising and using for Covid-19. IThe main thing with it is that it could cause some liver or kidney injuries. If you were on any type of cholesterol medicine like a statin we would want to stop that as well. There are some other drug interactions as well. Since it is new we do not know everything about it yet but this is the current recommendation to help prevent severe disease in patients with a mild-moderate symptoms within 5 days of symptom-onset who are not in the hospital being treated for covid.      While taking Paxlovid stop your taking your  statin , resume medication 5 days after you complete the Paxlovid prescription.    Tobacco Cessation  Smoking, vaping, and smokeless tobacco cause harm by raising blood pressure and increasing your risk of heart attack and stroke. Chewing tobacco increases your risk of cancer of the face and throat. Smoking not only raises the risk of cancer, but more often, causes emphysema. This crippling lung disease can cause constant shortness of breath and a need to use oxygen. Stopping smoking can make the difference between playing with your  grandchildren outside and sitting by with your oxygen tank watching them.     You may already know your nicotine habit is dangerous, but perhaps you feel helpless or dont know where to start.    Here at [facility] we are invested in the improvement of your health. We would be happy to help you with smoking cessation. If you are interested in quitting and answer yes to   the questions below, we can provide you with FREE assistance to get you on your way.     ? Are you a [place] resident?  ? Did you start smoking before September 1, 1988?  ? Are you ready to quit smoking?    Quitting doesnt have to be lonely -   [facility]s Smoking Cessation program offers a supportive environment along with      FREE smoking cessation counseling to help get you through those rough patches   FREE or reduced medications* to help curb the cravings    You do not need to be an [facility] patient to participate in the program.  Ready? Call [phone] or toll free [phone] or visit [email or web address] to sign up for the program.

## 2024-08-02 RX ORDER — CHLORPHENIRAMINE MALEATE AND DEXTROMETHORPHAN HYDROBROMIDE 4; 30 MG/1; MG/1
1 TABLET, FILM COATED ORAL
Qty: 24 EACH | Refills: 0 | Status: SHIPPED | OUTPATIENT
Start: 2024-08-02 | End: 2024-08-12

## 2024-08-02 RX ORDER — NIRMATRELVIR AND RITONAVIR 300-100 MG
KIT ORAL
Qty: 30 TABLET | Refills: 0 | Status: SHIPPED | OUTPATIENT
Start: 2024-08-02

## 2024-08-02 RX ORDER — BENZONATATE 200 MG/1
200 CAPSULE ORAL 3 TIMES DAILY PRN
Qty: 25 CAPSULE | Refills: 0 | Status: SHIPPED | OUTPATIENT
Start: 2024-08-02 | End: 2024-08-12

## 2024-08-05 ENCOUNTER — NURSE TRIAGE (OUTPATIENT)
Dept: ADMINISTRATIVE | Facility: CLINIC | Age: 74
End: 2024-08-05
Payer: MEDICARE

## 2024-08-06 ENCOUNTER — NURSE TRIAGE (OUTPATIENT)
Dept: ADMINISTRATIVE | Facility: CLINIC | Age: 74
End: 2024-08-06
Payer: MEDICARE

## 2024-08-19 ENCOUNTER — OFFICE VISIT (OUTPATIENT)
Dept: SPORTS MEDICINE | Facility: CLINIC | Age: 74
End: 2024-08-19
Payer: MEDICARE

## 2024-08-19 ENCOUNTER — OFFICE VISIT (OUTPATIENT)
Dept: INTERNAL MEDICINE | Facility: CLINIC | Age: 74
End: 2024-08-19
Payer: MEDICARE

## 2024-08-19 VITALS — HEIGHT: 70 IN | BODY MASS INDEX: 23.29 KG/M2 | WEIGHT: 162.69 LBS

## 2024-08-19 VITALS
OXYGEN SATURATION: 96 % | DIASTOLIC BLOOD PRESSURE: 50 MMHG | BODY MASS INDEX: 23.29 KG/M2 | TEMPERATURE: 97 F | WEIGHT: 162.69 LBS | HEIGHT: 70 IN | SYSTOLIC BLOOD PRESSURE: 98 MMHG | HEART RATE: 60 BPM

## 2024-08-19 DIAGNOSIS — I25.10 CORONARY ARTERY CALCIFICATION SEEN ON CAT SCAN: ICD-10-CM

## 2024-08-19 DIAGNOSIS — J44.9 CHRONIC OBSTRUCTIVE PULMONARY DISEASE, UNSPECIFIED COPD TYPE: ICD-10-CM

## 2024-08-19 DIAGNOSIS — I10 PRIMARY HYPERTENSION: ICD-10-CM

## 2024-08-19 DIAGNOSIS — Z12.5 ENCOUNTER FOR SCREENING FOR MALIGNANT NEOPLASM OF PROSTATE: ICD-10-CM

## 2024-08-19 DIAGNOSIS — Z00.00 ROUTINE GENERAL MEDICAL EXAMINATION AT A HEALTH CARE FACILITY: Primary | ICD-10-CM

## 2024-08-19 DIAGNOSIS — M75.101 RIGHT ROTATOR CUFF TEAR ARTHROPATHY: ICD-10-CM

## 2024-08-19 DIAGNOSIS — M12.811 RIGHT ROTATOR CUFF TEAR ARTHROPATHY: ICD-10-CM

## 2024-08-19 DIAGNOSIS — N40.0 BENIGN PROSTATIC HYPERPLASIA WITHOUT LOWER URINARY TRACT SYMPTOMS: Chronic | ICD-10-CM

## 2024-08-19 DIAGNOSIS — M67.911 TENDINOPATHY OF ROTATOR CUFF, RIGHT: Primary | ICD-10-CM

## 2024-08-19 DIAGNOSIS — E05.90 HYPERTHYROIDISM: ICD-10-CM

## 2024-08-19 DIAGNOSIS — E78.5 HYPERLIPIDEMIA, UNSPECIFIED HYPERLIPIDEMIA TYPE: ICD-10-CM

## 2024-08-19 DIAGNOSIS — I70.0 AORTIC ATHEROSCLEROSIS: ICD-10-CM

## 2024-08-19 DIAGNOSIS — E05.00 GRAVES DISEASE: ICD-10-CM

## 2024-08-19 PROCEDURE — 99999 PR PBB SHADOW E&M-EST. PATIENT-LVL IV: CPT | Mod: PBBFAC,HCNC,, | Performed by: INTERNAL MEDICINE

## 2024-08-19 PROCEDURE — 99213 OFFICE O/P EST LOW 20 MIN: CPT | Mod: HCNC,S$GLB,, | Performed by: PHYSICIAN ASSISTANT

## 2024-08-19 PROCEDURE — 4010F ACE/ARB THERAPY RXD/TAKEN: CPT | Mod: HCNC,CPTII,S$GLB, | Performed by: INTERNAL MEDICINE

## 2024-08-19 PROCEDURE — 1160F RVW MEDS BY RX/DR IN RCRD: CPT | Mod: HCNC,CPTII,S$GLB, | Performed by: PHYSICIAN ASSISTANT

## 2024-08-19 PROCEDURE — 1101F PT FALLS ASSESS-DOCD LE1/YR: CPT | Mod: HCNC,CPTII,S$GLB, | Performed by: PHYSICIAN ASSISTANT

## 2024-08-19 PROCEDURE — 1159F MED LIST DOCD IN RCRD: CPT | Mod: HCNC,CPTII,S$GLB, | Performed by: PHYSICIAN ASSISTANT

## 2024-08-19 PROCEDURE — 1125F AMNT PAIN NOTED PAIN PRSNT: CPT | Mod: HCNC,CPTII,S$GLB, | Performed by: PHYSICIAN ASSISTANT

## 2024-08-19 PROCEDURE — 3008F BODY MASS INDEX DOCD: CPT | Mod: HCNC,CPTII,S$GLB, | Performed by: INTERNAL MEDICINE

## 2024-08-19 PROCEDURE — 4010F ACE/ARB THERAPY RXD/TAKEN: CPT | Mod: HCNC,CPTII,S$GLB, | Performed by: PHYSICIAN ASSISTANT

## 2024-08-19 PROCEDURE — 3078F DIAST BP <80 MM HG: CPT | Mod: HCNC,CPTII,S$GLB, | Performed by: INTERNAL MEDICINE

## 2024-08-19 PROCEDURE — 3288F FALL RISK ASSESSMENT DOCD: CPT | Mod: HCNC,CPTII,S$GLB, | Performed by: PHYSICIAN ASSISTANT

## 2024-08-19 PROCEDURE — 99397 PER PM REEVAL EST PAT 65+ YR: CPT | Mod: HCNC,S$GLB,, | Performed by: INTERNAL MEDICINE

## 2024-08-19 PROCEDURE — 3074F SYST BP LT 130 MM HG: CPT | Mod: HCNC,CPTII,S$GLB, | Performed by: INTERNAL MEDICINE

## 2024-08-19 PROCEDURE — 99999 PR PBB SHADOW E&M-EST. PATIENT-LVL III: CPT | Mod: PBBFAC,HCNC,, | Performed by: PHYSICIAN ASSISTANT

## 2024-08-19 PROCEDURE — 1126F AMNT PAIN NOTED NONE PRSNT: CPT | Mod: HCNC,CPTII,S$GLB, | Performed by: INTERNAL MEDICINE

## 2024-08-19 PROCEDURE — 3288F FALL RISK ASSESSMENT DOCD: CPT | Mod: HCNC,CPTII,S$GLB, | Performed by: INTERNAL MEDICINE

## 2024-08-19 PROCEDURE — 3008F BODY MASS INDEX DOCD: CPT | Mod: HCNC,CPTII,S$GLB, | Performed by: PHYSICIAN ASSISTANT

## 2024-08-19 PROCEDURE — 1101F PT FALLS ASSESS-DOCD LE1/YR: CPT | Mod: HCNC,CPTII,S$GLB, | Performed by: INTERNAL MEDICINE

## 2024-08-19 RX ORDER — METHIMAZOLE 5 MG/1
5 TABLET ORAL
Qty: 90 TABLET | Refills: 3 | OUTPATIENT
Start: 2024-08-19

## 2024-08-19 RX ORDER — PANTOPRAZOLE SODIUM 40 MG/1
40 TABLET, DELAYED RELEASE ORAL DAILY
Qty: 90 TABLET | Refills: 3 | Status: SHIPPED | OUTPATIENT
Start: 2024-08-19

## 2024-08-19 RX ORDER — ASPIRIN 81 MG/1
81 TABLET ORAL DAILY
COMMUNITY

## 2024-08-19 RX ORDER — ATORVASTATIN CALCIUM 20 MG/1
20 TABLET, FILM COATED ORAL NIGHTLY
Qty: 90 TABLET | Refills: 3 | Status: SHIPPED | OUTPATIENT
Start: 2024-08-19 | End: 2025-08-19

## 2024-08-19 RX ORDER — METHIMAZOLE 5 MG/1
5 TABLET ORAL DAILY
Qty: 90 TABLET | Refills: 0 | Status: SHIPPED | OUTPATIENT
Start: 2024-08-19

## 2024-08-19 RX ORDER — LISINOPRIL 20 MG/1
20 TABLET ORAL DAILY
Qty: 90 TABLET | Refills: 3 | Status: SHIPPED | OUTPATIENT
Start: 2024-08-19 | End: 2025-08-19

## 2024-08-19 NOTE — PATIENT INSTRUCTIONS
Check with your pharmacy regarding new shingles vaccine.      Check with your pharmacy regarding RSV vaccine.

## 2024-08-19 NOTE — PROGRESS NOTES
Orthopaedic Follow-Up Visit    Last Appointment:  07/01/2024  Diagnosis:  Right rotator cuff tendinopathy  Prior Procedure:  Right SAS CSI    Harpreet Perez is a 73 y.o. male who is here for f/u evaluation of right shoulder pain. The patient was last seen here by me on 07/01/2024 at which point we decided to try a right shoulder corticosteroid injection prior to considering further treatment options. The patient returns today reporting that the symptoms have significantly improved since receiving his corticosteroid injection at last visit, he endorses intermittent pain with certain activities but overall he is happy with his progress thus far.      To review his history, Harpreet Perez is a 73 y.o. right-hand dominant male who initially presented to clinic on 07/01/2024 for right shoulder pain that began 3 months prior with no acute injury or trauma. His symptoms include right shoulder pain, shoulder blade pain, limited range of motion, night pain. His pain is made worse by any movement of the shoulder and night pain. His treatment has included rest, activity modification, muscle rubs, Tylenol Arthritis, subacromial corticosteroid injection    Patient's medications, allergies, past medical, surgical, social and family histories were reviewed and updated as appropriate.    Review of Systems   All systems reviewed were negative.  Specifically, the patient denies fever, chills, weight loss, chest pain, shortness of breath, or dyspnea on exertion.      Past Medical History:   Diagnosis Date    Alcohol dependence     Back pain     COPD (chronic obstructive pulmonary disease)     History of colonic polyps     Hypertension     Hypertrophy of prostate without urinary obstruction and other lower urinary tract symptoms (LUTS)     Osteoarthritis of multiple joints     Tobacco dependence     quit then restarted       Past Surgical History:   Procedure Laterality Date    COLONOSCOPY N/A 5/25/2016    Procedure: COLONOSCOPY;   Surgeon: Sergey Jensen MD;  Location: [facility] ENDO;  Service: Endoscopy;  Laterality: N/A;    COLONOSCOPY N/A 11/15/2021    Procedure: COLONOSCOPY;  Surgeon: Francia Lucio MD;  Location: [facility] ENDO;  Service: Endoscopy;  Laterality: N/A;    LIPOMA RESECTION Right 8/17/12    posterior shoulder       Patient's Medications   New Prescriptions    LISINOPRIL (PRINIVIL,ZESTRIL) 20 MG TABLET    Take 1 tablet (20 mg total) by mouth once daily.   Previous Medications    ASPIRIN (ECOTRIN) 81 MG EC TABLET    Take 81 mg by mouth once daily.    BISOPROLOL (ZEBETA) 5 MG TABLET    Take 1 tablet (5 mg total) by mouth once daily.    LISINOPRIL-HYDROCHLOROTHIAZIDE (PRINZIDE,ZESTORETIC) 20-12.5 MG PER TABLET    Take 1 tablet by mouth once daily.    PSYLLIUM (METAMUCIL) POWDER    Take 1 packet by mouth once daily.    TAMSULOSIN (FLOMAX) 0.4 MG CAP    Take 1 capsule (0.4 mg total) by mouth once daily.   Modified Medications    Modified Medication Previous Medication    ATORVASTATIN (LIPITOR) 20 MG TABLET atorvastatin (LIPITOR) 20 MG tablet       Take 1 tablet (20 mg total) by mouth every evening.    Take 1 tablet (20 mg total) by mouth every evening.    METHIMAZOLE (TAPAZOLE) 5 MG TAB methIMAzole (TAPAZOLE) 5 MG Tab       Take 1 tablet (5 mg total) by mouth once daily.    TAKE 1 TABLET EVERY DAY    PANTOPRAZOLE (PROTONIX) 40 MG TABLET pantoprazole (PROTONIX) 40 MG tablet       Take 1 tablet (40 mg total) by mouth once daily.    TAKE 1 TABLET EVERY DAY   Discontinued Medications    No medications on file       Family History   Problem Relation Name Age of Onset    Cataracts Mother      Hypertension Mother      Stroke Father      Cataracts Sister      Cataracts Brother peri     Heart disease Brother peri 35        MI    Hyperlipidemia Brother earnest     Hypertension Brother earnest     Hypertension Brother Gene     Heart disease Brother Gene         cardiac stents    Heart disease Other nephew,sis son 33        MI    Kidney disease  Other niece         dialysis    Alcohol abuse Neg Hx      Asthma Neg Hx      Birth defects Neg Hx      Cancer Neg Hx      COPD Neg Hx      Depression Neg Hx      Diabetes Neg Hx      Drug abuse Neg Hx      Intellectual disability Neg Hx      Mental illness Neg Hx      Learning disabilities Neg Hx      Miscarriages / Stillbirths Neg Hx      Vision loss Neg Hx         Review of patient's allergies indicates:  No Known Allergies      Objective:      Physical Exam  Patient is alert and oriented, no distress. Skin is intact. Neuro is normal with no focal motor or sensory findings.    Cervical exam is unremarkable. Intact cervical ROM. Negative Spurling's test    Physical Exam:  RIGHT    LEFT    Scap Dyskinesis/Winging (-)    (-)    Tenderness:          Greater Tuberosity  (-)    (-)  Bicipital Groove  (-)    (-)  AC joint   (-)    (-)  Other:     ROM:  Forward Elevation 160    160  Abduction  100    100  ER (at side)  60    60  IR   L5    T10    Strength:   Supraspinatus  4/5    5/5  Infraspinatus  4/5    5/5  Subscap / IR  4/5    5/5     Special Tests:   Neer:    (-)    (-)   Lara:   (-)    (-)   SS Stress:   +    (-)   Bear Hug:   (-)    (-)   Breckinridge's:   +    (-)   Resisted Thrower's:   +    (-)   Cross Arm Abduction:  (-)    (-)    Neurovascular examination  - Motor grossly intact bilaterally to shoulder abduction, elbow flexion and extension, wrist flexion and extension, and intrinsic hand musculature  - Sensation intact to light touch bilaterally in axillary, median, radial, and ulnar distributions  - Symmetrical radial pulses    Imaging:    XR Results:  Results for orders placed during the hospital encounter of 05/21/24    X-ray Shoulder 2 or More Views Right    Narrative  EXAMINATION:  XR SHOULDER COMPLETE 2 OR MORE VIEWS RIGHT    CLINICAL HISTORY:  Pain in right shoulder    TECHNIQUE:  Two or three views of the right shoulder were preformed.    COMPARISON:  None    FINDINGS:  No acute fracture or  dislocation.  Moderate AC joint arthropathy noted.  Mild-to-moderate degenerative change seen at the glenohumeral joint.    Impression  1.  As above      Electronically signed by: Josemanuel Ramírez DO  Date:    05/21/2024  Time:    12:30        Physician read: I agree with the above impression.    Assessment/Plan:   Harpreet Perez is a 73 y.o. male with right rotator cuff tendinopathy    Plan:    Discussed diagnosis and treatment options with the patient today.  At last visit he was diagnosed with right shoulder rotator cuff tear arthropathy  At last visit we elected to try a subacromial corticosteroid injection.  He had marginal improvement of his pain since receiving this injection, he reports he is able to do his activities of daily living with manageable pain.  Overall he is happy with his progress thus far  Discussed with him that I am also happy with the progress thus far, discussed that we can move forward with the corticosteroid injections every 3 months as needed for pain, patient voiced understanding and would like to return to clinic in October for a possible repeat injection  Recommended he continue working on exercises he was taught in therapy to maintain strength in his rotator cuff  Follow up with me in October for repeat injection and right shoulder          Traci Sierra PA-C  Sports Medicine Physician Assistant       Disclaimer: This note was prepared using a voice recognition system and is likely to have sound alike errors within the text.

## 2024-08-20 NOTE — PROGRESS NOTES
Subjective:      Patient ID: Harpreet Perez is a 73 y.o. male.    Chief Complaint: Follow-up    Follow-up  Pertinent negatives include no abdominal pain, chest pain, chills, coughing, fever or sore throat.         73 y.o. with  Patient Active Problem List   Diagnosis    Benign prostatic hyperplasia without lower urinary tract symptoms    History of cigarette smoking    Spondylosis of lumbar region without myelopathy or radiculopathy    Osteoarthritis of multiple joints    DDD (degenerative disc disease), lumbar    History of colonic polyps    Aortic atherosclerosis    Hypertension    COPD (chronic obstructive pulmonary disease)    Graves disease    Chronic pain in right shoulder    Routine general medical examination at a health care facility    Muscle weakness of right upper extremity    Stiffness of right shoulder joint    Coronary artery calcification seen on CAT scan    Hyperthyroidism    Hyperlipidemia     Past Medical History:   Diagnosis Date    Alcohol dependence     Back pain     COPD (chronic obstructive pulmonary disease)     Coronary artery calcification seen on CAT scan 8/19/2024    History of colonic polyps     Hyperlipidemia 8/19/2024    Hypertension     Hypertrophy of prostate without urinary obstruction and other lower urinary tract symptoms (LUTS)     Osteoarthritis of multiple joints     Tobacco dependence     quit then restarted       Here today for annual prev exam.  Compliant with meds without significant side effects. Energy and appetite are good.         Past Surgical History:   Procedure Laterality Date    COLONOSCOPY N/A 5/25/2016    Procedure: COLONOSCOPY;  Surgeon: Sergey Jensen MD;  Location: [facility];  Service: Endoscopy;  Laterality: N/A;    COLONOSCOPY N/A 11/15/2021    Procedure: COLONOSCOPY;  Surgeon: Francia Lucio MD;  Location: [facility];  Service: Endoscopy;  Laterality: N/A;    LIPOMA RESECTION Right 8/17/12    posterior shoulder     Social History     Socioeconomic History     Marital status:    Tobacco Use    Smoking status: Every Day     Current packs/day: 0.50     Average packs/day: 0.5 packs/day for 45.6 years (22.8 ttl pk-yrs)     Types: Cigarettes     Start date: 1/1/1979    Smokeless tobacco: Never    Tobacco comments:     Smokes 6-7 cigarettes per day   Substance and Sexual Activity    Alcohol use: Yes     Alcohol/week: 28.0 standard drinks of alcohol     Types: 28 Cans of beer per week     Comment: case per week or up to 30 per week    Drug use: No    Sexual activity: Yes     Partners: Female     Social Determinants of Health     Financial Resource Strain: Low Risk  (7/26/2023)    Overall Financial Resource Strain (CARDIA)     Difficulty of Paying Living Expenses: Not hard at all   Food Insecurity: No Food Insecurity (7/26/2023)    Hunger Vital Sign     Worried About Running Out of Food in the Last Year: Never true     Ran Out of Food in the Last Year: Never true   Transportation Needs: No Transportation Needs (7/26/2023)    PRAPARE - Transportation     Lack of Transportation (Medical): No     Lack of Transportation (Non-Medical): No   Physical Activity: Insufficiently Active (7/26/2023)    Exercise Vital Sign     Days of Exercise per Week: 7 days     Minutes of Exercise per Session: 20 min   Stress: No Stress Concern Present (7/26/2023)    [employer] - Occupational Stress Questionnaire     Feeling of Stress : Only a little   Housing Stability: Low Risk  (7/26/2023)    Housing Stability Vital Sign     Unable to Pay for Housing in the Last Year: No     Number of Places Lived in the Last Year: 1     Unstable Housing in the Last Year: No     family history includes Cataracts in his brother, mother, and sister; Heart disease in his brother; Heart disease (age of onset: 33) in an other family member; Heart disease (age of onset: 35) in his brother; Hyperlipidemia in his brother; Hypertension in his brother, brother, and mother; Kidney disease in  "an other family member; Stroke in his father.      Review of Systems   Constitutional:  Negative for chills and fever.   HENT:  Negative for ear pain and sore throat.    Respiratory:  Negative for cough.    Cardiovascular:  Negative for chest pain.   Gastrointestinal:  Negative for abdominal pain and blood in stool.   Genitourinary:  Negative for dysuria and hematuria.   Neurological:  Negative for seizures and syncope.     Objective:   BP (!) 98/50 (BP Location: Left arm, Patient Position: Sitting, BP Method: Medium (Manual))   Pulse 60   Temp 97.3 °F (36.3 °C) (Tympanic)   Ht 5' 10" (1.778 m)   Wt 73.8 kg (162 lb 11.2 oz)   SpO2 96%   BMI 23.34 kg/m²     Physical Exam  Constitutional:       General: He is not in acute distress.     Appearance: He is well-developed.   HENT:      Head: Normocephalic and atraumatic.   Eyes:      Extraocular Movements: Extraocular movements intact.   Neck:      Thyroid: No thyromegaly.   Cardiovascular:      Rate and Rhythm: Normal rate and regular rhythm.   Pulmonary:      Breath sounds: Normal breath sounds. No wheezing or rales.   Abdominal:      General: Bowel sounds are normal.      Palpations: Abdomen is soft.      Tenderness: There is no abdominal tenderness.   Musculoskeletal:         General: No swelling.      Cervical back: Neck supple. No rigidity.   Lymphadenopathy:      Cervical: No cervical adenopathy.   Skin:     General: Skin is warm and dry.   Neurological:      Mental Status: He is alert and oriented to person, place, and time.   Psychiatric:         Behavior: Behavior normal.         Lab Results   Component Value Date    WBC 5.93 08/22/2023    HGB 13.1 (L) 08/22/2023    HGB 14.2 08/09/2023    HGB 15.2 08/07/2023    HCT 39.9 (L) 08/22/2023    MCV 92 08/22/2023    MCV 90 08/09/2023    MCV 93 08/07/2023     08/22/2023    CHOL 150 08/22/2023    TRIG 95 08/22/2023    HDL 54 08/22/2023    LDLCALC 77.0 08/22/2023    LDLCALC 95.4 07/28/2021    LDLCALC 103.4 " 01/26/2021    ALT 23 08/22/2023    AST 17 08/22/2023     08/22/2023    K 4.3 08/22/2023    CALCIUM 9.5 08/22/2023     08/22/2023    CO2 29 08/22/2023    BUN 21 08/22/2023    CREATININE 1.4 08/22/2023    CREATININE 1.3 08/09/2023    CREATININE 1.3 08/07/2023    EGFRNORACEVR 53.1 (A) 08/22/2023    EGFRNORACEVR 58 (A) 08/09/2023    EGFRNORACEVR 58.0 (A) 08/07/2023    TSH 2.597 02/15/2024    TSH 2.570 01/10/2024    TSH 4.215 (H) 12/04/2023    PSA 2.3 08/22/2023    PSA 3.8 08/05/2022    PSA 2.1 07/28/2021    GLU 70 08/22/2023    BNP <10 08/09/2023          The 10-year ASCVD risk score (Elisabeth GLASGOW, et al., 2019) is: 20.1%    Values used to calculate the score:      Age: 73 years      Sex: Male      Is Non- : Yes      Diabetic: No      Tobacco smoker: Yes      Systolic Blood Pressure: 98 mmHg      Is BP treated: Yes      HDL Cholesterol: 54 mg/dL      Total Cholesterol: 150 mg/dL     Assessment:     1. Routine general medical examination at a health care facility    2. Benign prostatic hyperplasia without lower urinary tract symptoms    3. Aortic atherosclerosis    4. Chronic obstructive pulmonary disease, unspecified COPD type    5. Primary hypertension    6. Graves disease    7. Coronary artery calcification seen on CAT scan    8. Hyperthyroidism    9. Hyperlipidemia, unspecified hyperlipidemia type    10. Encounter for screening for malignant neoplasm of prostate      Plan:   1. Routine general medical examination at a health care facility  Overview:  Heart healthy diet, regular exercise, and regular use of sunscreen.   HM reviewed      2. Benign prostatic hyperplasia without lower urinary tract symptoms  Overview:  Reports symptoms much improved with current medications.      3. Aortic atherosclerosis  Overview:  CXR 8/13/12    Asymptomatic    Orders:  -     atorvastatin (LIPITOR) 20 MG tablet; Take 1 tablet (20 mg total) by mouth every evening.  Dispense: 90 tablet; Refill: 3    4.  Chronic obstructive pulmonary disease, unspecified COPD type  Overview:  No recent flares.      5. Primary hypertension  Overview:  Controlled.  Continue current medications    Orders:  -     Comprehensive Metabolic Panel; Future; Expected date: 08/23/2024  -     CBC Auto Differential; Future; Expected date: 08/23/2024    6. Graves disease  Overview:    ft4 1.62 at time of dx. (8/2022)    Srinivas Zabala MD at 9/2/2022 11:20 AM    Status: Signed      HPI:  Patient is a 71-year-old man who comes today for follow-up of his lab work and studies for hyperthyroidism.  Patient found to have Graves disease based on positive antibody assay,    Thyrotropin Receptor Ab 0.00 - 1.75 IU/L 5.03 High     as well as a thyroid nuclear scan showing a significant increased uptake.  Patient has lost about 20 lb of weight.  He does feel jittery and nervous at times.     Current meds have been verified and updated per the EMR  Exam:/60 (BP Location: Right arm)   Pulse 74   Ht 6' (1.829 m)   Wt 76.1 kg (167 lb 12.3 oz)   SpO2 98%   BMI 22.75 kg/m²   Thyroid gland is moderately enlarged, diffusely, no nodule           Lab Results   Component Value Date     WBC 5.85 08/05/2022     HGB 12.4 (L) 08/05/2022     HCT 36.6 (L) 08/05/2022      08/05/2022     CHOL 185 07/28/2021     TRIG 118 07/28/2021     HDL 66 07/28/2021     ALT 23 08/05/2022     AST 16 08/05/2022      08/05/2022     K 4.4 08/05/2022      08/05/2022     CREATININE 1.1 08/05/2022     BUN 23 08/05/2022     CO2 29 08/05/2022     TSH <0.010 (L) 08/05/2022     PSA 3.8 08/05/2022         Impression:  Graves disease, we had a long discussion about the 3 different approaches for treatment.  We discussed surgical options, radioactive iodine and medical therapy with Tapazole.  Patient actually has 2 other family members at take Tapazole for Graves disease.  He is very familiar with that and he prefers that route of treatment.  He understands potential side  effects.      Patient Active Problem List   Diagnosis    Benign prostatic hyperplasia without lower urinary tract symptoms    Tobacco abuse    Alcohol dependence    Spondylosis of lumbar region without myelopathy or radiculopathy    Osteoarthritis of multiple joints    DDD (degenerative disc disease), lumbar    History of colonic polyps    Tortuous aorta    Hypertension    COPD (chronic obstructive pulmonary disease)                 7. Coronary artery calcification seen on CAT scan  -     atorvastatin (LIPITOR) 20 MG tablet; Take 1 tablet (20 mg total) by mouth every evening.  Dispense: 90 tablet; Refill: 3    8. Hyperthyroidism  -     TSH; Future; Expected date: 11/19/2024  -     T4, Free; Future; Expected date: 11/19/2024  -     methIMAzole (TAPAZOLE) 5 MG Tab; Take 1 tablet (5 mg total) by mouth once daily.  Dispense: 90 tablet; Refill: 0  -     TSH; Future; Expected date: 08/23/2024  -     T4, Free; Future; Expected date: 08/23/2024    9. Hyperlipidemia, unspecified hyperlipidemia type  -     Lipid Panel; Future; Expected date: 08/23/2024    10. Encounter for screening for malignant neoplasm of prostate  -     PSA, Screening; Future; Expected date: 08/23/2024    Other orders  -     pantoprazole (PROTONIX) 40 MG tablet; Take 1 tablet (40 mg total) by mouth once daily.  Dispense: 90 tablet; Refill: 3  -     lisinopriL (PRINIVIL,ZESTRIL) 20 MG tablet; Take 1 tablet (20 mg total) by mouth once daily.  Dispense: 90 tablet; Refill: 3        Patient Instructions   Check with your pharmacy regarding new shingles vaccine.      Check with your pharmacy regarding RSV vaccine.        Future Appointments   Date Time Provider Department Center   8/23/2024  8:15 AM LABORATORY, V [facility]   10/8/2024  8:00 AM Traci Sierra PA-C [facility]   11/19/2024  9:35 AM LABORATORY, [facility]   12/30/2024  2:40 PM Jordan Jackson MD [facility] CARDIO [facility]   2/20/2025  7:40 AM Thompson Christensen  MD [facility]       Lab Frequency Next Occurrence   Ambulatory referral/consult to Podiatry Once 08/02/2023   Ambulatory referral/consult to Orthopedics Once 05/28/2024   TSH Once 11/19/2024   T4, Free Once 11/19/2024   Comprehensive Metabolic Panel Once 08/23/2024   CBC Auto Differential Once 08/23/2024   TSH Once 08/23/2024   Lipid Panel Once 08/23/2024   PSA, Screening Once 08/23/2024   T4, Free Once 08/23/2024       Follow up in about 3 months (around 11/19/2024), or if symptoms worsen or fail to improve, for Virtual Visit ok for f/u.

## 2024-08-23 ENCOUNTER — LAB VISIT (OUTPATIENT)
Dept: LAB | Facility: HOSPITAL | Age: 74
End: 2024-08-23
Attending: INTERNAL MEDICINE
Payer: MEDICARE

## 2024-08-23 DIAGNOSIS — Z12.5 ENCOUNTER FOR SCREENING FOR MALIGNANT NEOPLASM OF PROSTATE: ICD-10-CM

## 2024-08-23 DIAGNOSIS — E05.90 HYPERTHYROIDISM: ICD-10-CM

## 2024-08-23 DIAGNOSIS — E78.5 HYPERLIPIDEMIA, UNSPECIFIED HYPERLIPIDEMIA TYPE: ICD-10-CM

## 2024-08-23 DIAGNOSIS — I10 PRIMARY HYPERTENSION: ICD-10-CM

## 2024-08-23 LAB
ALBUMIN SERPL BCP-MCNC: 3.3 G/DL (ref 3.5–5.2)
ALP SERPL-CCNC: 77 U/L (ref 55–135)
ALT SERPL W/O P-5'-P-CCNC: 18 U/L (ref 10–44)
ANION GAP SERPL CALC-SCNC: 8 MMOL/L (ref 8–16)
AST SERPL-CCNC: 25 U/L (ref 10–40)
BASOPHILS # BLD AUTO: 0.03 K/UL (ref 0–0.2)
BASOPHILS NFR BLD: 0.5 % (ref 0–1.9)
BILIRUB SERPL-MCNC: 0.6 MG/DL (ref 0.1–1)
BUN SERPL-MCNC: 14 MG/DL (ref 8–23)
CALCIUM SERPL-MCNC: 9.1 MG/DL (ref 8.7–10.5)
CHLORIDE SERPL-SCNC: 107 MMOL/L (ref 95–110)
CHOLEST SERPL-MCNC: 146 MG/DL (ref 120–199)
CHOLEST/HDLC SERPL: 2 {RATIO} (ref 2–5)
CO2 SERPL-SCNC: 27 MMOL/L (ref 23–29)
COMPLEXED PSA SERPL-MCNC: 2.2 NG/ML (ref 0–4)
CREAT SERPL-MCNC: 0.9 MG/DL (ref 0.5–1.4)
DIFFERENTIAL METHOD BLD: ABNORMAL
EOSINOPHIL # BLD AUTO: 0.2 K/UL (ref 0–0.5)
EOSINOPHIL NFR BLD: 3.6 % (ref 0–8)
ERYTHROCYTE [DISTWIDTH] IN BLOOD BY AUTOMATED COUNT: 13.7 % (ref 11.5–14.5)
EST. GFR  (NO RACE VARIABLE): >60 ML/MIN/1.73 M^2
GLUCOSE SERPL-MCNC: 78 MG/DL (ref 70–110)
HCT VFR BLD AUTO: 35.7 % (ref 40–54)
HDLC SERPL-MCNC: 73 MG/DL (ref 40–75)
HDLC SERPL: 50 % (ref 20–50)
HGB BLD-MCNC: 12 G/DL (ref 14–18)
IMM GRANULOCYTES # BLD AUTO: 0.02 K/UL (ref 0–0.04)
IMM GRANULOCYTES NFR BLD AUTO: 0.4 % (ref 0–0.5)
LDLC SERPL CALC-MCNC: 65.6 MG/DL (ref 63–159)
LYMPHOCYTES # BLD AUTO: 1.7 K/UL (ref 1–4.8)
LYMPHOCYTES NFR BLD: 30.3 % (ref 18–48)
MCH RBC QN AUTO: 31 PG (ref 27–31)
MCHC RBC AUTO-ENTMCNC: 33.6 G/DL (ref 32–36)
MCV RBC AUTO: 92 FL (ref 82–98)
MONOCYTES # BLD AUTO: 0.4 K/UL (ref 0.3–1)
MONOCYTES NFR BLD: 7.8 % (ref 4–15)
NEUTROPHILS # BLD AUTO: 3.2 K/UL (ref 1.8–7.7)
NEUTROPHILS NFR BLD: 57.4 % (ref 38–73)
NONHDLC SERPL-MCNC: 73 MG/DL
NRBC BLD-RTO: 0 /100 WBC
PLATELET # BLD AUTO: 226 K/UL (ref 150–450)
PMV BLD AUTO: 10 FL (ref 9.2–12.9)
POTASSIUM SERPL-SCNC: 3.7 MMOL/L (ref 3.5–5.1)
PROT SERPL-MCNC: 6.4 G/DL (ref 6–8.4)
RBC # BLD AUTO: 3.87 M/UL (ref 4.6–6.2)
SODIUM SERPL-SCNC: 142 MMOL/L (ref 136–145)
T4 FREE SERPL-MCNC: 0.86 NG/DL (ref 0.71–1.51)
TRIGL SERPL-MCNC: 37 MG/DL (ref 30–150)
TSH SERPL DL<=0.005 MIU/L-ACNC: 0.96 UIU/ML (ref 0.4–4)
WBC # BLD AUTO: 5.52 K/UL (ref 3.9–12.7)

## 2024-08-23 PROCEDURE — 84439 ASSAY OF FREE THYROXINE: CPT | Mod: HCNC | Performed by: INTERNAL MEDICINE

## 2024-08-23 PROCEDURE — 84153 ASSAY OF PSA TOTAL: CPT | Mod: HCNC | Performed by: INTERNAL MEDICINE

## 2024-08-23 PROCEDURE — 36415 COLL VENOUS BLD VENIPUNCTURE: CPT | Mod: HCNC | Performed by: INTERNAL MEDICINE

## 2024-08-23 PROCEDURE — 80053 COMPREHEN METABOLIC PANEL: CPT | Mod: HCNC | Performed by: INTERNAL MEDICINE

## 2024-08-23 PROCEDURE — 85025 COMPLETE CBC W/AUTO DIFF WBC: CPT | Mod: HCNC | Performed by: INTERNAL MEDICINE

## 2024-08-23 PROCEDURE — 84443 ASSAY THYROID STIM HORMONE: CPT | Mod: HCNC | Performed by: INTERNAL MEDICINE

## 2024-08-23 PROCEDURE — 80061 LIPID PANEL: CPT | Mod: HCNC | Performed by: INTERNAL MEDICINE

## 2024-09-27 ENCOUNTER — DOCUMENTATION ONLY (OUTPATIENT)
Dept: REHABILITATION | Facility: HOSPITAL | Age: 74
End: 2024-09-27
Payer: MEDICARE

## 2024-09-27 DIAGNOSIS — I70.0 AORTIC ATHEROSCLEROSIS: ICD-10-CM

## 2024-09-27 DIAGNOSIS — I25.10 CORONARY ARTERY CALCIFICATION SEEN ON CAT SCAN: ICD-10-CM

## 2024-09-27 DIAGNOSIS — R06.02 SHORTNESS OF BREATH: ICD-10-CM

## 2024-09-27 RX ORDER — BISOPROLOL FUMARATE 5 MG/1
5 TABLET, FILM COATED ORAL
Qty: 90 TABLET | Refills: 3 | Status: SHIPPED | OUTPATIENT
Start: 2024-09-27

## 2024-09-27 NOTE — PROGRESS NOTES
[facility]  Physical Therapy Discharge Note    Name: Harpreet [facility] Number: [id number]    Therapy Diagnosis:        Encounter Diagnoses   Name Primary?    Chronic pain in right shoulder Yes    Muscle weakness of right upper extremity      Stiffness of right shoulder joint        Physician: Leah Negrete, NP       Physician Orders: PT Eval and Treat  Medical Diagnosis from Referral: M25.511,G89.29 (ICD-10-CM) - Chronic right shoulder pain   Evaluation Date: 5/31/2024      Date of Last visit: 6/19/2024  Total Visits Received: 6    ASSESSMENT      See daily note    Discharge reason: Patient has not attended therapy since 6/19/2024    Discharge FOTO Score: see daily note    Goals: see daily note    PLAN   This patient is discharged from Physical Therapy      Musa Rossi, PT

## 2024-10-08 ENCOUNTER — OFFICE VISIT (OUTPATIENT)
Dept: SPORTS MEDICINE | Facility: CLINIC | Age: 74
End: 2024-10-08
Payer: MEDICARE

## 2024-10-08 VITALS — WEIGHT: 162.69 LBS | HEIGHT: 70 IN | BODY MASS INDEX: 23.29 KG/M2 | RESPIRATION RATE: 17 BRPM

## 2024-10-08 DIAGNOSIS — M75.101 RIGHT ROTATOR CUFF TEAR ARTHROPATHY: Primary | ICD-10-CM

## 2024-10-08 DIAGNOSIS — M19.042 ARTHRITIS OF LEFT HAND: ICD-10-CM

## 2024-10-08 DIAGNOSIS — M12.811 RIGHT ROTATOR CUFF TEAR ARTHROPATHY: Primary | ICD-10-CM

## 2024-10-08 DIAGNOSIS — M67.911 TENDINOPATHY OF ROTATOR CUFF, RIGHT: ICD-10-CM

## 2024-10-08 PROCEDURE — 99999 PR PBB SHADOW E&M-EST. PATIENT-LVL III: CPT | Mod: PBBFAC,HCNC,, | Performed by: PHYSICIAN ASSISTANT

## 2024-10-08 PROCEDURE — 3008F BODY MASS INDEX DOCD: CPT | Mod: HCNC,CPTII,S$GLB, | Performed by: PHYSICIAN ASSISTANT

## 2024-10-08 PROCEDURE — 3288F FALL RISK ASSESSMENT DOCD: CPT | Mod: HCNC,CPTII,S$GLB, | Performed by: PHYSICIAN ASSISTANT

## 2024-10-08 PROCEDURE — 1125F AMNT PAIN NOTED PAIN PRSNT: CPT | Mod: HCNC,CPTII,S$GLB, | Performed by: PHYSICIAN ASSISTANT

## 2024-10-08 PROCEDURE — 99214 OFFICE O/P EST MOD 30 MIN: CPT | Mod: HCNC,25,S$GLB, | Performed by: PHYSICIAN ASSISTANT

## 2024-10-08 PROCEDURE — 4010F ACE/ARB THERAPY RXD/TAKEN: CPT | Mod: HCNC,CPTII,S$GLB, | Performed by: PHYSICIAN ASSISTANT

## 2024-10-08 PROCEDURE — 1159F MED LIST DOCD IN RCRD: CPT | Mod: HCNC,CPTII,S$GLB, | Performed by: PHYSICIAN ASSISTANT

## 2024-10-08 PROCEDURE — 1160F RVW MEDS BY RX/DR IN RCRD: CPT | Mod: HCNC,CPTII,S$GLB, | Performed by: PHYSICIAN ASSISTANT

## 2024-10-08 PROCEDURE — 20610 DRAIN/INJ JOINT/BURSA W/O US: CPT | Mod: HCNC,RT,S$GLB, | Performed by: PHYSICIAN ASSISTANT

## 2024-10-08 PROCEDURE — 1101F PT FALLS ASSESS-DOCD LE1/YR: CPT | Mod: HCNC,CPTII,S$GLB, | Performed by: PHYSICIAN ASSISTANT

## 2024-10-08 RX ORDER — DICLOFENAC SODIUM 10 MG/G
2 GEL TOPICAL 3 TIMES DAILY
Qty: 100 G | Refills: 1 | Status: SHIPPED | OUTPATIENT
Start: 2024-10-08 | End: 2024-10-08

## 2024-10-08 RX ORDER — DICLOFENAC SODIUM 10 MG/G
2 GEL TOPICAL 3 TIMES DAILY
Qty: 100 G | Refills: 1 | Status: SHIPPED | OUTPATIENT
Start: 2024-10-08

## 2024-10-08 RX ORDER — TRIAMCINOLONE ACETONIDE 40 MG/ML
40 INJECTION, SUSPENSION INTRA-ARTICULAR; INTRAMUSCULAR
Status: DISCONTINUED | OUTPATIENT
Start: 2024-10-08 | End: 2024-10-08 | Stop reason: HOSPADM

## 2024-10-08 RX ORDER — MELOXICAM 15 MG/1
15 TABLET ORAL DAILY
Qty: 90 TABLET | Refills: 0 | Status: SHIPPED | OUTPATIENT
Start: 2024-10-08

## 2024-10-08 RX ADMIN — TRIAMCINOLONE ACETONIDE 40 MG: 40 INJECTION, SUSPENSION INTRA-ARTICULAR; INTRAMUSCULAR at 08:10

## 2024-10-08 NOTE — PATIENT INSTRUCTIONS
REVERSE TOTAL SHOULDER REPLACMENT    This information does not include all information related to shoulder instablity. For more information please visit the [employer] website using the following link: Reverse Total Shoulder Replacement    Every year, thousands of conventional total shoulder replacements are successfully done in the [place] for patients with shoulder arthritis.    However, this type of procedure is not as beneficial for patients with large rotator cuff tears who have developed a complex type of shoulder arthritis called cuff tear arthropathy. For these patients, conventional total shoulder replacement may result in pain and limited motion, and reverse total shoulder replacement is a better option.    Description  A conventional shoulder replacement device mimics the normal anatomy of the shoulder: A plastic cup is fitted into the shoulder socket (glenoid), and a metal ball is attached to the top of the upper arm bone (humerus).    In a reverse total shoulder replacement, the socket and metal ball are switched. The metal ball is fixed to the socket, and the plastic cup is fixed to the upper end of the humerus. A reverse total shoulder replacement works better for people with cuff tear arthropathy because it relies on different muscles to move the arm.    In a healthy shoulder, the rotator cuff muscles help position and power the arm during range of motion. A conventional replacement device also uses the rotator cuff muscles to function properly.In a patient with a large rotator cuff tear and cuff tear arthropathy, these muscles no longer function. The reverse total shoulder replacement relies on the deltoid muscle, instead of the rotator cuff, to power and position the arm. It essentially re-creates the function of the torn rotator cuff.    This surgery was originally designed in the 1980s in [place]. The Food and Drug Administration (FDA) approved its use in the [place] in  2003.        Candidates for Surgery  Reverse total shoulder replacement may be recommended if you have:  A completely torn rotator cuff that cannot be repaired  Cuff tear arthropathy  A previous shoulder replacement that was unsuccessful  Severe shoulder pain and difficulty lifting your arm away from your side or over your head  A complex fracture of the shoulder joint  A chronic shoulder dislocation  A tumor of the shoulder joint    Your doctor may also recommend surgery if nonsurgical treatments, such as rest, medications, cortisone injections, and/or physical therapy, have not relieved your shoulder pain    Preparing for Surgery  Your orthopaedic surgeon will help you plan and prepare for your shoulder surgery.    Medical Evaluation  Most patients must have a complete physical by their primary care doctor or internist before surgery. This is needed to make sure you are healthy enough to have the surgery and complete the recovery.    Many patients with chronic medical conditions, like heart disease, must also be evaluated by a specialist, such a cardiologist, before the surgery.    Medications  Be sure to talk to your orthopaedic surgeon about the medications you take. Some medications may need to be stopped before surgery. For example, the following over-the-counter medicines may cause excessive bleeding and should be stopped 2 weeks before surgery:    Non-steroidal anti-inflammatory medications, such as aspirin, ibuprofen, and naproxen  Some arthritis medications    If you take blood thinners, either your primary care doctor or cardiologist will advise you about stopping these medications before surgery. Additionally, certain types of rheumatoid arthritis medication may need to be stopped for a period of time.     Home Planning  Making simple changes in your home before surgery can make your recovery period easier.For the first several weeks after your surgery, it will be hard to reach high shelves and  cupboards. Before your surgery, be sure to go through your home and place any items you may need afterwards on low shelves. When you come home from the hospital, you will need help for a few weeks with some daily tasks like dressing, bathing, cooking, and laundry. If you will not have any support at home immediately after surgery, you may need a short stay in a rehabilitation facility until you become more independent.    Learn more: Preparing for Joint Replacement Surgery    Your Surgery    Before Your Operation  Wear loose-fitting clothes and a button-front shirt when you go to the hospital for your surgery. After surgery, you will be wearing a sling and will have limited use of your arm.    Nerve Block  Will receive a nerve block for your surgery to help control your pain after surgery. This cab cause your arm (down to your hand) to feel numb for up to 3 days after surgery. However, it may wear off sooner.      Surgical Procedure  The procedure to replace your shoulder joint with an artificial device usually takes about 2-2.5 hours. After surgery, you will be moved to the recovery room, where you will remain  while your recovery from anesthesia is monitored. Barring any complications you will go home the day of your surgery.   A video of what reverse total shoulder replacement looks like can be found at the following link: Reverse Total Shoulder Replacement Video        After Your Surgery    Immobilization  When you leave the hospital, your arm will be in a sling. You will need the sling to support and protect your shoulder for the first 2 to 6 weeks after surgery, depending on the complexity of your surgery and your surgeon's preference.    Dressing and Wound Care  Keep the dressing clean and dry. It is normal for there to be some drainage after surgery since the shoulder was irrigated with large amounts of fluid. Reinforce with additional gauze as necessary.  Remove the dressing the 7th day after surgery and  begin changing daily with clean gauze or Band-Aids®. Keep your incisions covered until you follow up in clinic.  If you have Steri-Strips in place of stitches, allow them to stay in place as long as possible. Steri-Strips are made of a fabric material that can get wet in the shower and pat dry with a towel. They usually fall off on their own within 7 to 10 days. You may trim the edges as they begin to curl.    You may bathe or shower on the 7th day after surgery, but do not scrub or soak the incisions. Dry the area by gently blotting it with a gauze or towel. After it is completely dry, cover the wound with clean gauze or Band-Aids®. Do NOT submerge the incisions (bath/swim) until after the sutures are removed and the wound has completely healed.     Post-Op Medications    Pain Control  After surgery, you will feel pain. However, it is important to stay ahead of pain as it becomes challenging to get under control if you fall behind. Ice and elevation can help and should be used as much as possible in the first few days.   Narcotic pain medications, such as tramadol and oxycodone, should be taken as prescribed. The Tramadol is intended to be taken first as the primary medicine and then oxycodone taken for breakthrough pain. Wean off as soon as possible. Take these with food to decrease the chances of nausea and vomiting. Do not drink alcohol, drive a vehicle, or use heavy machinery while taking narcotic pain medications.   NSAID medications are used for pain control and to decrease inflammation. You may be prescribed an NSAID such as celebrex. Take as instructed. Other NSAID medications such as ibuprofen, Motrin, Advil, naproxen, or Aleve can be used once you have finished the celebrex, or if a prescription for celebrex was not provided.   Acetaminophen (Tylenol) is an effective over-the-counter pain medication that can be used with NSAID medications and non-acetaminophen containing narcotics such as plain  oxycodone.     Blood Clot Prevention  You should take one 81 mg baby aspirin twice daily for two weeks starting the evening of the day you have surgery unless instructed otherwise or taking a different blood thinner such as enoxaparin or warfarin. If you are aware that you are at high risk for a blood clot, notify your physician as soon as possible.   Take aspirin at least 30 minutes before taking ibuprofen or Toradol.    Constipation Prevention  Anesthesia and pain medications, changes in eating and drinking, and less activity can all lead to constipation after surgery. To prevent or reduce constipation, take an over-the-counter stool softener (brands include Colace and Miralax). Follow the directions on the bottle. Drink plenty of water and eat high fiber foods including whole grains, fresh fruits, vegetables, beans, prunes or prune juice.    Physical Therapy  Exercise is a critical component of home care, particularly during the first few weeks after surgery and this will include physical therapy, which will play a vital role in getting you back to your daily activities by helping you regain shoulder strength and motion. Physical therapy will start 3-4 days after your surgery (it can start before your first post-op visit with your doctor). It will progress as follows  Passive exercise: Even though your tear has been repaired, the muscles around your arm remain weak. Once your surgeon decides it is safe for you to move your arm and shoulder, a therapist will help you with passive exercises to improve range of motion in your shoulder. With passive exercise, your therapist supports your arm and moves it in different positions. In most cases, passive exercise is begun within the first 4 to 6 weeks after surgery.  Active exercise: After 6 weeks, you will progress to doing active exercises without the help of your therapist. Moving your muscles on your own will gradually increase your strength and improve your arm  control. At 8 to 12 weeks, your therapist will start you on a strengthening exercise program.    Driving  Your physician will provide recommendations on when it is safe to drive after surgery. At minimum you must NOT be taking any narcotic/opoid medications and feel you are capable of driving. It is NOT recommended that you drive while wearing a sling.     Expectations  Your surgeon will address what appropriate expectations following the surgery will be. You can expect to have functional range of motion which includes the ability to reach the back of your head to be able to wash or scratch it and to be able to pull up your pants. You WILL NOT be able to reach and scratch or wash your back following shoulder replacement surgery.     Reverse total shoulder replacement provides outstanding pain relief and patient satisfaction is typically very high. Early and mid-term studies of the results of this surgery have been very promising.    Complications  Your orthopaedic surgeon will explain the potential risks and complications of shoulder joint replacement, including those related to the surgery itself and those that can occur over time after your surgery.When complications occur, most are successfully treatable. Possible complications include the following.    Infection: Infection is a complication of any surgery. In shoulder joint replacement, infection may occur in the wound or deep around the prosthesis. It may happen while in the hospital or after you go home. It may even occur years later. Minor infections in the wound area are generally treated with antibiotics. Major or deep infections may require more surgery and removal of the prosthesis. Any infection in your body can spread to your joint replacement.  Prosthesis Problems: Although prosthesis designs and materials, as well as surgical techniques, continue to advance, the prosthesis may wear down and the components may loosen. The components of a shoulder  replacement may also dislocate. Excessive wear, loosening, or dislocation may require additional surgery (revision procedure).  Nerve damage: Nerves in the vicinity of the joint replacement may be damaged during surgery, although this type of injury is infrequent. Over time, these nerve injuries often improve and may completely recover.    Do's and Don'ts  The success of your surgery will depend largely on how well you follow your orthopaedic surgeon's instructions at home during the first few weeks after surgery. Here are some common do's and don'ts for when you return home:    DON'T use the arm to push yourself up in bed or from a chair because this requires forceful contraction of muscles.  Do follow the program of exercises prescribed for you by your surgeon and/or physical therapist.. You may need to do the exercises 2 to 3 times a day for a month or more.  DON'T overdo it! If your shoulder pain was severe before the surgery, the experience of pain-free motion may lull you into thinking that you can do more than is prescribed. Early overuse of the shoulder may result in severe limitations in motion.  DON'T lift anything heavier than a glass of water for the first 2 to 4 weeks after surgery.  Do ask for assistance. Your physician may be able to recommend an agency or facility to help if you do not have home support.  DON'T participate in contact sports or do any repetitive heavy lifting after your shoulder replacement.  Do avoid placing your arm in any extreme position, such as straight out to the side or behind your body for the first 6 weeks after surgery.    Many thousands of patients have experienced an improved quality of life after shoulder joint replacement surgery. They experience less pain, improved motion and strength, and better function.    Links      Reverse Total Shoulder Replacement  Preparing for Joint Replacement Surgery  Reverse Total Shoulder Replacement Video  [employer] Clinical Practice Guideline  on the Management of Glenohumeral Joint Arthritis

## 2024-10-08 NOTE — PROCEDURES
Large Joint Aspiration/Injection: R subacromial bursa    Date/Time: 10/8/2024 8:00 AM    Performed by: Traci Sierra PA-C  Authorized by: Traci Sierra PA-C    Consent Done?:  Yes (Verbal)  Indications:  Pain  Site marked: the procedure site was marked    Timeout: prior to procedure the correct patient, procedure, and site was verified    Prep: patient was prepped and draped in usual sterile fashion      Local anesthesia used?: Yes    Anesthesia:  Local infiltration  Local anesthetic:  Lidocaine 1% without epinephrine    Details:  Needle Size:  22 G  Ultrasonic Guidance for needle placement?: No    Approach:  Posterior  Location:  Shoulder  Site:  R subacromial bursa  Medications:  40 mg triamcinolone acetonide 40 mg/mL  Patient tolerance:  Patient tolerated the procedure well with no immediate complications    Procedure Note:  We discussed the risk and benefits of injections, including pain, infection, bleeding, damage to adjacent structures, risk of reaction to injection. We discussed the steroid/cortisone injections will not heal the problem but mat help decrease inflammation and help with symptoms. We discussed the risk of repeated injections. The patient expressed understanding and wanted to proceed with the injection. We performed a timeout to verify the proper patient, proper procedure, and the proper site. The injection site was prepared in a sterile fashion. The patient tolerated it well and there were no complication. We did discuss with the patient that steroid injections can cause some increase in blood sugar and blood pressure for up to a week after the injection.

## 2024-10-08 NOTE — PROGRESS NOTES
Orthopaedic Follow-Up Visit    Last Appointment: 8/19/24  Diagnosis: Tendinopathy of rotator cuff, right Right rotator cuff tear arthropathy  Prior Procedure: right SAS CSI 7/1/2024     Harpreet Perez is a 73 y.o. male who is here for f/u evaluation of Right Shoulder pain. The patient was last seen here by me on 8/19/24 at which point doing well and we elected to follow up in October for a repeat injection if necessary. The patient returns today reporting that the symptoms have returned and is interested in discussing further treatment options.     To review his history, Harpreet Perez is a 73 y.o. right-hand dominant male who initially presented to clinic on 07/01/2024 for right shoulder pain that began 3 months prior with no acute injury or trauma. His symptoms include right shoulder pain, shoulder blade pain, limited range of motion, night pain. His pain is made worse by any movement of the shoulder and night pain. His treatment has included rest, activity modification, muscle rubs, Tylenol Arthritis, subacromial corticosteroid injection     Patient's medications, allergies, past medical, surgical, social and family histories were reviewed and updated as appropriate.    Review of Systems   All systems reviewed were negative.  Specifically, the patient denies fever, chills, weight loss, chest pain, shortness of breath, or dyspnea on exertion.      Past Medical History:   Diagnosis Date    Alcohol dependence     Back pain     COPD (chronic obstructive pulmonary disease)     Coronary artery calcification seen on CAT scan 8/19/2024    History of colonic polyps     Hyperlipidemia 8/19/2024    Hypertension     Hypertrophy of prostate without urinary obstruction and other lower urinary tract symptoms (LUTS)     Osteoarthritis of multiple joints     Tobacco dependence     quit then restarted       Past Surgical History:   Procedure Laterality Date    COLONOSCOPY N/A 5/25/2016    Procedure: COLONOSCOPY;  Surgeon: Sergey  MD Camila;  Location: [facility] END[facility];  Service: Endoscopy;  Laterality: N/A;    COLONOSCOPY N/A 11/15/2021    Procedure: COLONOSCOPY;  Surgeon: Francia Lucio MD;  Location: [facility] ENDO;  Service: Endoscopy;  Laterality: N/A;    LIPOMA RESECTION Right 8/17/12    posterior shoulder       Patient's Medications   New Prescriptions    No medications on file   Previous Medications    ASPIRIN (ECOTRIN) 81 MG EC TABLET    Take 81 mg by mouth once daily.    ATORVASTATIN (LIPITOR) 20 MG TABLET    Take 1 tablet (20 mg total) by mouth every evening.    BISOPROLOL (ZEBETA) 5 MG TABLET    TAKE 1 TABLET ONE TIME DAILY    LISINOPRIL (PRINIVIL,ZESTRIL) 20 MG TABLET    Take 1 tablet (20 mg total) by mouth once daily.    LISINOPRIL-HYDROCHLOROTHIAZIDE (PRINZIDE,ZESTORETIC) 20-12.5 MG PER TABLET    Take 1 tablet by mouth once daily.    METHIMAZOLE (TAPAZOLE) 5 MG TAB    Take 1 tablet (5 mg total) by mouth once daily.    PANTOPRAZOLE (PROTONIX) 40 MG TABLET    Take 1 tablet (40 mg total) by mouth once daily.    PSYLLIUM (METAMUCIL) POWDER    Take 1 packet by mouth once daily.    TAMSULOSIN (FLOMAX) 0.4 MG CAP    Take 1 capsule (0.4 mg total) by mouth once daily.   Modified Medications    No medications on file   Discontinued Medications    No medications on file       Family History   Problem Relation Name Age of Onset    Cataracts Mother      Hypertension Mother      Stroke Father      Cataracts Sister      Cataracts Brother peri     Heart disease Brother peri 35        MI    Hyperlipidemia Brother earnest     Hypertension Brother earnest     Hypertension Brother Gene     Heart disease Brother Gene         cardiac stents    Heart disease Other nephew,sis son 33        MI    Kidney disease Other niece         dialysis    Alcohol abuse Neg Hx      Asthma Neg Hx      Birth defects Neg Hx      Cancer Neg Hx      COPD Neg Hx      Depression Neg Hx      Diabetes Neg Hx      Drug abuse Neg Hx      Intellectual disability Neg Hx      Mental  illness Neg Hx      Learning disabilities Neg Hx      Miscarriages / Stillbirths Neg Hx      Vision loss Neg Hx         Review of patient's allergies indicates:  No Known Allergies      Objective:      Physical Exam  Patient is alert and oriented, no distress. Skin is intact. Neuro is normal with no focal motor or sensory findings.    Cervical exam is unremarkable. Intact cervical ROM. Negative Spurling's test     Physical Exam:                       RIGHT                                     LEFT     Scap Dyskinesis/Winging       (-)                                             (-)     Tenderness:                                                                              Greater Tuberosity                  (-)                                             (-)  Bicipital Groove                       (-)                                             (-)  AC joint                                   (-)                                             (-)  Other:      ROM:  Forward Elevation 160                                          160  Abduction                    100                                          100  ER (at side)                 60                                            60  IR                                 L5                                            T10     Strength:   Supraspinatus             4/5                                           5/5  Infraspinatus               4/5                                           5/5  Subscap / IR               4/5                                           5/5      Special Tests:              Neer:                                       +                                              (-)              Lara:                                 +                                              (-)              SS Stress:                               +                                              (-)              Bear Hug:                                (-)                                             (-)              Calaveras's:                                 +                                              (-)              Resisted Thrower's:                +                                              (-)              Cross Arm Abduction:             +                                              (-)    Neurovascular examination  - Motor grossly intact bilaterally to shoulder abduction, elbow flexion and extension, wrist flexion and extension, and intrinsic hand musculature  - Sensation intact to light touch bilaterally in axillary, median, radial, and ulnar distributions  - Symmetrical radial pulses    Imaging:    X-ray Shoulder 2 or More Views Right     Narrative  EXAMINATION:  XR SHOULDER COMPLETE 2 OR MORE VIEWS RIGHT     CLINICAL HISTORY:  Pain in right shoulder     TECHNIQUE:  Two or three views of the right shoulder were preformed.     COMPARISON:  None     FINDINGS:  No acute fracture or dislocation.  Moderate AC joint arthropathy noted.  Mild-to-moderate degenerative change seen at the glenohumeral joint.     Impression  1.  As above        Electronically signed by:Josemanuel Ramírez DO  Date:                                                05/21/2024  Time:                                               12:30    Physician read: I agree with the above impression.    Assessment/Plan:   Harpreet Perez is a 73 y.o. male with right rotator cuff tear arthropathy    Plan:    Discussed diagnosis and treatment options with the patient today.  At last visit he was diagnosed with right shoulder rotator cuff tear arthropathy.  He has had good relief with corticosteroid injection in the past, his pain has returned about 1 month ago. He had about 2 months of relief with the last corticosteroid injection.  Today we discussed operative and nonoperative treatment options.  Operative treatment to include a reverse total shoulder arthroplasty. Nonoperative treatment to include rest, activity  modification, anti-inflammatories, over-the-counter Tylenol, over-the-counter topicals, home exercises, intermittent injections. He reports that he may be interested in surgical intervention in the future, today he would like to hold off and move forward with a repeat injection  Discussed with him that if any point he is interested in moving forward with surgery, I would like to obtain an MRI of his right shoulder for surgical planning, patient voiced understanding  Handout on reverse total shoulder arthroplasties provided for the patient today  Right shoulder subacromial corticosteroid injection given in clinic, patient tolerated the procedure well with no immediate complications  Prescription for Voltaren gel and meloxicam once a day provided as needed for pain  Follow up with me in 3 months, we will again discuss surgery versus a repeat injection          Traci Sierra PA-C  Sports Medicine Physician Assistant       Disclaimer: This note was prepared using a voice recognition system and is likely to have sound alike errors within the text.

## 2024-11-01 DIAGNOSIS — E05.90 HYPERTHYROIDISM: ICD-10-CM

## 2024-11-01 NOTE — TELEPHONE ENCOUNTER
No care due was identified.  Health [facility] Embedded Care Due Messages. Reference number: [id number].   11/01/2024 2:08:29 AM CDT

## 2024-11-05 RX ORDER — METHIMAZOLE 5 MG/1
5 TABLET ORAL
Qty: 90 TABLET | Refills: 3 | Status: SHIPPED | OUTPATIENT
Start: 2024-11-05

## 2024-11-13 ENCOUNTER — PATIENT MESSAGE (OUTPATIENT)
Dept: ADMINISTRATIVE | Facility: HOSPITAL | Age: 74
End: 2024-11-13
Payer: MEDICARE

## 2024-11-14 ENCOUNTER — PATIENT OUTREACH (OUTPATIENT)
Dept: ADMINISTRATIVE | Facility: HOSPITAL | Age: 74
End: 2024-11-14
Payer: MEDICARE

## 2024-11-14 ENCOUNTER — PATIENT MESSAGE (OUTPATIENT)
Dept: PREADMISSION TESTING | Facility: HOSPITAL | Age: 74
End: 2024-11-14
Payer: MEDICARE

## 2024-11-14 DIAGNOSIS — Z12.11 SCREENING FOR COLON CANCER: Primary | ICD-10-CM

## 2024-11-14 NOTE — PROGRESS NOTES
Replying to Campaign Questionnaire for Overdue HM:  COLORECTAL CANCER SCREENING    Order placed for colonoscopy.  Next steps given to patient via portal as requested     unk

## 2024-11-19 ENCOUNTER — LAB VISIT (OUTPATIENT)
Dept: LAB | Facility: HOSPITAL | Age: 74
End: 2024-11-19
Attending: INTERNAL MEDICINE
Payer: MEDICARE

## 2024-11-19 DIAGNOSIS — E05.90 HYPERTHYROIDISM: ICD-10-CM

## 2024-11-19 LAB
T4 FREE SERPL-MCNC: 0.88 NG/DL (ref 0.71–1.51)
TSH SERPL DL<=0.005 MIU/L-ACNC: 2.39 UIU/ML (ref 0.4–4)

## 2024-11-19 PROCEDURE — 84439 ASSAY OF FREE THYROXINE: CPT | Mod: HCNC | Performed by: INTERNAL MEDICINE

## 2024-11-19 PROCEDURE — 84443 ASSAY THYROID STIM HORMONE: CPT | Mod: HCNC | Performed by: INTERNAL MEDICINE

## 2024-11-19 PROCEDURE — 36415 COLL VENOUS BLD VENIPUNCTURE: CPT | Mod: HCNC | Performed by: INTERNAL MEDICINE

## 2024-11-20 ENCOUNTER — OFFICE VISIT (OUTPATIENT)
Dept: OPHTHALMOLOGY | Facility: CLINIC | Age: 74
End: 2024-11-20
Payer: MEDICARE

## 2024-11-20 ENCOUNTER — TELEPHONE (OUTPATIENT)
Dept: OPHTHALMOLOGY | Facility: CLINIC | Age: 74
End: 2024-11-20
Payer: MEDICARE

## 2024-11-20 DIAGNOSIS — H52.4 BILATERAL PRESBYOPIA: ICD-10-CM

## 2024-11-20 DIAGNOSIS — H25.13 CATARACT, NUCLEAR SCLEROTIC SENILE, BILATERAL: Primary | ICD-10-CM

## 2024-11-20 PROCEDURE — 92014 COMPRE OPH EXAM EST PT 1/>: CPT | Mod: HCNC,S$GLB,, | Performed by: OPTOMETRIST

## 2024-11-20 PROCEDURE — 4010F ACE/ARB THERAPY RXD/TAKEN: CPT | Mod: HCNC,CPTII,S$GLB, | Performed by: OPTOMETRIST

## 2024-11-20 PROCEDURE — 99999 PR PBB SHADOW E&M-EST. PATIENT-LVL II: CPT | Mod: PBBFAC,HCNC,, | Performed by: OPTOMETRIST

## 2024-11-20 PROCEDURE — 92015 DETERMINE REFRACTIVE STATE: CPT | Mod: HCNC,S$GLB,, | Performed by: OPTOMETRIST

## 2024-11-20 PROCEDURE — 1159F MED LIST DOCD IN RCRD: CPT | Mod: HCNC,CPTII,S$GLB, | Performed by: OPTOMETRIST

## 2024-11-20 NOTE — PROGRESS NOTES
ARACELI Perez is 73 y.o. male  Uncorrected distance visual acuity was 20/50 -1 in the right eye and 20/30 -2 in the left eye. Corrected near visual acuity was J4 in the right eye and J3 in the left eye.   Chief Complaint   Patient presents with    Hypertensive Eye Exam    Eye Exam          HPI    No visual complaints.   Eyes burns sometimes.  Last eye exam 10/16/2023 TRF.   Update glasses RX.  HX of Cataract, nuclear sclerotic senile, bilateral  Dry eyes, bilateral  Last edited by Lea Fischer MA on 11/20/2024 10:25 AM.         Assessment /Plan :  1. Cataract, nuclear sclerotic senile, bilateral  Significant cataracts OU, discussed cataract surgery including Specialty IOL's  Refer to Dr Burnett  Consult Ophthalmology for cataract evaluation at next available appointment.    2. Bilateral presbyopia    Discussed above and answered questions.

## 2024-11-20 NOTE — TELEPHONE ENCOUNTER
Called pt to schedule CAT Eval. Made sure to explain the in and out's of the appointment length and testing. Pt understood the time and place of appointment and had no questions for staff.

## 2024-11-21 ENCOUNTER — HOSPITAL ENCOUNTER (OUTPATIENT)
Dept: PREADMISSION TESTING | Facility: HOSPITAL | Age: 74
Discharge: HOME | End: 2024-11-21
Attending: INTERNAL MEDICINE
Payer: MEDICARE

## 2024-11-21 DIAGNOSIS — Z12.11 SCREENING FOR COLON CANCER: Primary | ICD-10-CM

## 2024-11-21 RX ORDER — SODIUM, POTASSIUM,MAG SULFATES 17.5-3.13G
1 SOLUTION, RECONSTITUTED, ORAL ORAL DAILY
Qty: 1 KIT | Refills: 0 | Status: SHIPPED | OUTPATIENT
Start: 2024-11-21 | End: 2024-11-23

## 2024-11-27 DIAGNOSIS — N40.0 BENIGN PROSTATIC HYPERPLASIA WITHOUT LOWER URINARY TRACT SYMPTOMS: ICD-10-CM

## 2024-11-27 RX ORDER — TAMSULOSIN HYDROCHLORIDE 0.4 MG/1
0.4 CAPSULE ORAL DAILY
Qty: 30 CAPSULE | Refills: 0 | Status: SHIPPED | OUTPATIENT
Start: 2024-11-27

## 2024-11-27 NOTE — TELEPHONE ENCOUNTER
----- Message from Margogurmeet sent at 11/27/2024 10:37 AM CST -----  Contact: Harpreet  .Type:  RX Refill Request    Who Called:  Harpreet   Refill or New Rx: Refill   RX Name and Strength: tamsulosin (FLOMAX) 0.4 mg Cap  How is the patient currently taking it? (ex. 1XDay): 1 capsule (0.4 mg total) by mouth once daily  Is this a 30 day or 90 day RX: 30 day   Preferred Pharmacy with phone number: .[facility] [address] [place] [address]  Phone: [phone] Fax: [phone]    Local or Mail Order: local   Ordering Provider: Thompson Christensen   Would the patient rather a call back or a response via [facility]?  Call back   Best Call Back Number: . [phone]  Additional Information: Pt states he is completely out of the medication and would like to get a 1 time fill from local pharmacy and would like to get the next fill from [facility]  mail order for 90 day supply      Thanks

## 2024-11-27 NOTE — TELEPHONE ENCOUNTER
No care due was identified.  Health [facility] Embedded Care Due Messages. Reference number: [id number].   11/27/2024 10:47:32 AM CST

## 2024-12-02 RX ORDER — SODIUM, POTASSIUM,MAG SULFATES 17.5-3.13G
1 SOLUTION, RECONSTITUTED, ORAL ORAL DAILY
Qty: 1 KIT | Refills: 0 | Status: SHIPPED | OUTPATIENT
Start: 2024-12-02 | End: 2024-12-04

## 2024-12-09 ENCOUNTER — HOSPITAL ENCOUNTER (OUTPATIENT)
Facility: HOSPITAL | Age: 74
Discharge: HOME | End: 2024-12-09
Attending: INTERNAL MEDICINE | Admitting: INTERNAL MEDICINE
Payer: MEDICARE

## 2024-12-09 ENCOUNTER — ANESTHESIA EVENT (OUTPATIENT)
Dept: ENDOSCOPY | Facility: HOSPITAL | Age: 74
End: 2024-12-09
Payer: MEDICARE

## 2024-12-09 ENCOUNTER — ANESTHESIA (OUTPATIENT)
Dept: ENDOSCOPY | Facility: HOSPITAL | Age: 74
End: 2024-12-09
Payer: MEDICARE

## 2024-12-09 DIAGNOSIS — Z12.11 COLON CANCER SCREENING: ICD-10-CM

## 2024-12-09 PROCEDURE — 45385 COLONOSCOPY W/LESION REMOVAL: CPT | Mod: PT,HCNC | Performed by: INTERNAL MEDICINE

## 2024-12-09 PROCEDURE — D9220A PRA ANESTHESIA: Mod: PT,,, | Performed by: NURSE ANESTHETIST, CERTIFIED REGISTERED

## 2024-12-09 PROCEDURE — 45385 COLONOSCOPY W/LESION REMOVAL: CPT | Mod: PT,HCNC,, | Performed by: INTERNAL MEDICINE

## 2024-12-09 PROCEDURE — 88305 TISSUE EXAM BY PATHOLOGIST: CPT | Mod: HCNC | Performed by: STUDENT IN AN ORGANIZED HEALTH CARE EDUCATION/TRAINING PROGRAM

## 2024-12-09 PROCEDURE — 37000009 HC ANESTHESIA EA ADD 15 MINS: Performed by: INTERNAL MEDICINE

## 2024-12-09 PROCEDURE — 37000008 HC ANESTHESIA 1ST 15 MINUTES: Performed by: INTERNAL MEDICINE

## 2024-12-09 PROCEDURE — 63600175 PHARM REV CODE 636 W HCPCS: Performed by: NURSE ANESTHETIST, CERTIFIED REGISTERED

## 2024-12-09 PROCEDURE — 27201089 HC SNARE, DISP (ANY): Performed by: INTERNAL MEDICINE

## 2024-12-09 RX ORDER — PROPOFOL 10 MG/ML
INJECTION, EMULSION INTRAVENOUS
Status: DISCONTINUED | OUTPATIENT
Start: 2024-12-09 | End: 2024-12-09

## 2024-12-09 RX ORDER — SODIUM CHLORIDE, SODIUM LACTATE, POTASSIUM CHLORIDE, CALCIUM CHLORIDE 600; 310; 30; 20 MG/100ML; MG/100ML; MG/100ML; MG/100ML
INJECTION, SOLUTION INTRAVENOUS CONTINUOUS
Status: DISCONTINUED | OUTPATIENT
Start: 2024-12-09 | End: 2024-12-09 | Stop reason: HOSPADM

## 2024-12-09 RX ORDER — LIDOCAINE HYDROCHLORIDE 20 MG/ML
INJECTION INTRAVENOUS
Status: DISCONTINUED | OUTPATIENT
Start: 2024-12-09 | End: 2024-12-09

## 2024-12-09 RX ADMIN — LIDOCAINE HYDROCHLORIDE 50 MG: 20 INJECTION INTRAVENOUS at 12:12

## 2024-12-09 RX ADMIN — PROPOFOL 30 MG: 10 INJECTION, EMULSION INTRAVENOUS at 12:12

## 2024-12-09 RX ADMIN — PROPOFOL 50 MG: 10 INJECTION, EMULSION INTRAVENOUS at 12:12

## 2024-12-09 NOTE — DISCHARGE SUMMARY
[facility]  Discharge Note  Short Stay    Procedure(s) (LRB):  COLONOSCOPY, SCREENING, LOW RISK PATIENT (N/A)      OUTCOME: Patient tolerated treatment/procedure well without complication and is now ready for discharge.    DISPOSITION: Home or Self Care    FINAL DIAGNOSIS:  Colon cancer screening    FOLLOWUP: With primary care provider    DISCHARGE INSTRUCTIONS:  No discharge procedures on file.     TIME SPENT ON DISCHARGE: 20 minutes

## 2024-12-09 NOTE — PROVATION PATIENT INSTRUCTIONS
Discharge Summary/Instructions after an Endoscopic Procedure  Patient Name: Harpreet Perez  Patient MRN: [id number]  Patient YOB: 1950 Monday, December 9, 2024  Jose Zuniga MD  Dear patient,  As a result of recent federal legislation (The Federal Cures Act), you may   receive lab or pathology results from your procedure in your [facility]   account before your physician is able to contact you. Your physician or   their representative will relay the results to you with their   recommendations at their soonest availability.  Thank you,  RESTRICTIONS:  During your procedure today, you received medications for sedation.  These   medications may affect your judgment, balance and coordination.  Therefore,   for 24 hours, you have the following restrictions:   - DO NOT drive a car, operate machinery, make legal/financial decisions,   sign important papers or drink alcohol.    ACTIVITY:  Today: no heavy lifting, straining or running due to procedural   sedation/anesthesia.  The following day: return to full activity including work.  DIET:  Eat and drink normally unless instructed otherwise.     TREATMENT FOR COMMON SIDE EFFECTS:  - Mild abdominal pain, nausea, belching, bloating or excessive gas:  rest,   eat lightly and use a heating pad.  - Sore Throat: treat with throat lozenges and/or gargle with warm salt   water.  - Because air was used during the procedure, expelling large amounts of air   from your rectum or belching is normal.  - If a bowel prep was taken, you may not have a bowel movement for 1-3 days.    This is normal.  SYMPTOMS TO WATCH FOR AND REPORT TO YOUR PHYSICIAN:  1. Abdominal pain or bloating, other than gas cramps.  2. Chest pain.  3. Back pain.  4. Signs of infection such as: chills or fever occurring within 24 hours   after the procedure.  5. Rectal bleeding, which would show as bright red, maroon, or black stools.   (A tablespoon of blood from the rectum is not serious,  especially if   hemorrhoids are present.)  6. Vomiting.  7. Weakness or dizziness.  GO DIRECTLY TO THE NEAREST EMERGENCY ROOM IF YOU HAVE ANY OF THE FOLLOWING:      Difficulty breathing              Chills and/or fever over 101 F   Persistent vomiting and/or vomiting blood   Severe abdominal pain   Severe chest pain   Black, tarry stools   Bleeding- more than one tablespoon   Any other symptom or condition that you feel may need urgent attention  Your doctor recommends these additional instructions:  If any biopsies were taken, your doctors clinic will contact you in 1 to 2   weeks with any results.  - Discharge patient to home.   - Resume previous diet.   - Await pathology results.   - Repeat colonoscopy in 5 years for surveillance.   - Return to referring physician as previously scheduled.   - Continue present medications.   - Patient has a contact number available for emergencies.  The signs and   symptoms of potential delayed complications were discussed with the   patient.  Return to normal activities tomorrow.  Written discharge   instructions were provided to the patient.   - Patient has a contact number available for emergencies.  The signs and   symptoms of potential delayed complications were discussed with the   patient.  Return to normal activities tomorrow.  Written discharge   instructions were provided to the patient.  For questions, problems or results please call your physician Jose Zuniga MD at Work:  [phone]  If you have any questions about the above instructions, call the GI   department at [phone] or call the endoscopy unit at [phone]   from 7am until 3 pm.  [facility] [place], EMERGENCY ROOM PHONE NUMBER:   [phone]  IF A COMPLICATION OR EMERGENCY SITUATION ARISES AND YOU ARE UNABLE TO REACH   YOUR PHYSICIAN - GO DIRECTLY TO THE EMERGENCY ROOM.  I have read or have had read to me these discharge instructions for my   procedure and have  received a written copy.  I understand these   instructions and will follow-up with my physician if I have any questions.     __________________________________       _____________________________________  Nurse Signature                                          Patient/Designated   Responsible Party Signature  MD Jose Grajeda MD  12/9/2024 12:51:54 PM  This report has been verified and signed electronically.  Dear patient,  As a result of recent federal legislation (The Federal Cures Act), you may   receive lab or pathology results from your procedure in your [facility]   account before your physician is able to contact you. Your physician or   their representative will relay the results to you with their   recommendations at their soonest availability.  Thank you,  PROVATION

## 2024-12-09 NOTE — ANESTHESIA PREPROCEDURE EVALUATION
12/09/2024  Harpreet Perez is a 73 y.o., male.    Anesthesia Evaluation    I have reviewed the Patient Summary Reports.     I have reviewed the Nursing Notes. I have reviewed the NPO Status.   I have reviewed the Medications.     Review of Systems  Anesthesia Hx:  No problems with previous Anesthesia             Denies Family Hx of Anesthesia complications.    Denies Personal Hx of Anesthesia complications.                    Social:  Non-Smoker       Hematology/Oncology:  Hematology Normal   Oncology Normal                                   EENT/Dental:  EENT/Dental Normal           Cardiovascular:     Hypertension   CAD                                          Pulmonary:   COPD, mild                     Renal/:  Renal/ Normal                 Hepatic/GI:  Hepatic/GI Normal                    Musculoskeletal:  Arthritis               Neurological:  Neurology Normal                                      Endocrine:  Endocrine Normal            Dermatological:  Skin Normal    Psych:  Psychiatric Normal                    Physical Exam  General:  Well nourished       Airway/Jaw/Neck:  Airway Findings: Mouth Opening: Normal     Tongue: Normal      General Airway Assessment: Adult, Average      Mallampati: II   TM Distance: Normal, at least 6 cm               Dental:  Dental Findings: In tact      Chest/Lungs:  Chest/Lungs Findings:  Clear to auscultation, Normal Respiratory Rate       Heart/Vascular:  Heart Findings: Rate: Normal  Rhythm: Regular Rhythm                        Mental Status:  Mental Status Findings:  Cooperative, Alert and Oriented         Anesthesia Plan  Type of Anesthesia, risks & benefits discussed:  Anesthesia Type:  MAC    Patient's Preference:   Plan Factors:          Intra-op Monitoring Plan: standard ASA monitors  Intra-op Monitoring Plan Comments:   Post Op Pain Control Plan:   Post Op  Pain Control Plan Comments:     Induction:   IV  Beta Blocker:  Patient is on a Beta-Blocker and has received one dose within the past 24 hours (No further documentation required).       Informed Consent: Informed consent signed with the Patient and all parties understand the risks and agree with anesthesia plan.  All questions answered.  Anesthesia consent signed with patient.  ASA Score: 3     Day of Surgery Review of History & Physical: I have interviewed and examined the patient. I have reviewed the patient's H&P dated:  There are no significant changes.            Ready For Surgery From Anesthesia Perspective.             Physical Exam  General: Well nourished    Airway:  Mallampati: II   Mouth Opening: Normal  TM Distance: Normal, at least 6 cm  Tongue: Normal    Dental:  In tact    Chest/Lungs:  Clear to auscultation, Normal Respiratory Rate    Heart:  Rate: Normal  Rhythm: Regular Rhythm        Anesthesia Plan  Type of Anesthesia, risks & benefits discussed:    Anesthesia Type: MAC  Intra-op Monitoring Plan: standard ASA monitors  Induction:  IV  Informed Consent: Informed consent signed with the Patient and all parties understand the risks and agree with anesthesia plan.  All questions answered.   ASA Score: 3  Day of Surgery Review of History & Physical: I have interviewed and examined the patient. I have reviewed the patient's H&P dated:     Ready For Surgery From Anesthesia Perspective.     .

## 2024-12-09 NOTE — PLAN OF CARE
Discharge instructions reviewed with pt and spouse, handouts given, verbalized understanding with no further questions at this time. Dr. Zuniga  spoke to pt at bedside, reviewed procedure and answered questions aware they are awaiting biopsy results with MD telephone number provided per AVS sheet. VSS on RA, no pain or nausea noted, tolerating po fluids without difficulty, no other complaints noted. Fall precautions reviewed, consents in chart.

## 2024-12-09 NOTE — ANESTHESIA POSTPROCEDURE EVALUATION
Anesthesia Post Evaluation    Patient: Harpreet Perez    Procedure(s) Performed: Procedure(s) (LRB):  COLONOSCOPY, SCREENING, LOW RISK PATIENT (N/A)    Final Anesthesia Type: MAC      Patient location during evaluation: PACU  Patient participation: Yes- Able to Participate  Level of consciousness: awake and alert and oriented  Post-procedure vital signs: reviewed and stable  Pain management: adequate  Airway patency: patent    PONV status at discharge: No PONV  Anesthetic complications: no      Cardiovascular status: hemodynamically stable  Respiratory status: unassisted  Hydration status: euvolemic  Follow-up not needed.              Vitals Value Taken Time   /84 12/09/24 1314   Temp 98 12/09/24 1533   Pulse 49 12/09/24 1316   Resp 25 12/09/24 1314   SpO2 97 % 12/09/24 1316   Vitals shown include unfiled device data.      Event Time   Out of Recovery 13:23:00         Pain/Henry Score: Henry Score: 10 (12/9/2024  1:13 PM)

## 2024-12-09 NOTE — H&P
Endoscopy History and Physical    PCP - Thompson Christensen MD  Referring Physician - Thompson Christensen MD  [id number] [place] [address]      ASA - per anesthesia  Mallampati - per anesthesia  History of Anesthesia problems - no  Family history Anesthesia problems -  no   Plan of anesthesia - General    HPI  73 y.o. male    Planned Procedure: Colonoscopy  Diagnosis: previous adenomatous polyp  Chief Complaint: Same as above          ROS:  Constitutional: No fevers, chills, No weight loss  CV: No chest pain  Pulm: No cough, No shortness of breath  GI: see HPI    Medical History:  has a past medical history of Alcohol dependence, Back pain, COPD (chronic obstructive pulmonary disease), Coronary artery calcification seen on CAT scan (8/19/2024), History of colonic polyps, Hyperlipidemia (8/19/2024), Hypertension, Hypertrophy of prostate without urinary obstruction and other lower urinary tract symptoms (LUTS), Osteoarthritis of multiple joints, and Tobacco dependence.    Surgical History:  has a past surgical history that includes Lipoma resection (Right, 8/17/12); Colonoscopy (N/A, 5/25/2016); and Colonoscopy (N/A, 11/15/2021).    Family History: family history includes Cataracts in his brother, mother, and sister; Heart disease in his brother; Heart disease (age of onset: 33) in an other family member; Heart disease (age of onset: 35) in his brother; Hyperlipidemia in his brother; Hypertension in his brother, brother, and mother; Kidney disease in an other family member; Stroke in his father..    Social History:  reports that he has been smoking cigarettes. He started smoking about 45 years ago. He has a 23 pack-year smoking history. He has never used smokeless tobacco. He reports current alcohol use of about 28.0 standard drinks of alcohol per week. He reports that he does not use drugs.    Review of patient's allergies indicates:  No Known Allergies    Medications:   Medications Prior to Admission    Medication Sig Dispense Refill Last Dose/Taking    aspirin (ECOTRIN) 81 MG EC tablet Take 81 mg by mouth once daily.   12/7/2024    atorvastatin (LIPITOR) 20 MG tablet Take 1 tablet (20 mg total) by mouth every evening. 90 tablet 3 12/7/2024    bisoprolol (ZEBETA) 5 MG tablet TAKE 1 TABLET ONE TIME DAILY 90 tablet 3 11/30/2024    diclofenac sodium (VOLTAREN) 1 % Gel Apply 2 g topically 3 (three) times daily. 100 g 1     lisinopriL (PRINIVIL,ZESTRIL) 20 MG tablet Take 1 tablet (20 mg total) by mouth once daily. 90 tablet 3 12/7/2024    lisinopriL-hydrochlorothiazide (PRINZIDE,ZESTORETIC) 20-12.5 mg per tablet Take 1 tablet by mouth once daily. 30 tablet 11 Unknown    meloxicam (MOBIC) 15 MG tablet Take 1 tablet (15 mg total) by mouth once daily. 90 tablet 0 12/7/2024    methIMAzole (TAPAZOLE) 5 MG Tab TAKE 1 TABLET ONE TIME DAILY 90 tablet 3 12/7/2024    pantoprazole (PROTONIX) 40 MG tablet Take 1 tablet (40 mg total) by mouth once daily. 90 tablet 3 12/7/2024    psyllium (METAMUCIL) powder Take 1 packet by mouth once daily.   12/7/2024    tamsulosin (FLOMAX) 0.4 mg Cap Take 1 capsule (0.4 mg total) by mouth once daily. 30 capsule 0 12/7/2024       Physical Exam:    Vital Signs:   Vitals:    12/09/24 1044   BP: (!) 175/81   Pulse: (!) 49   Resp: 15   Temp: 97.5 °F (36.4 °C)       General Appearance: Well appearing in no acute distress  Abdomen: Soft, non tender, non distended with normal bowel sounds, no masses    Labs:  Lab Results   Component Value Date    WBC 5.52 08/23/2024    HGB 12.0 (L) 08/23/2024    HCT 35.7 (L) 08/23/2024     08/23/2024    CHOL 146 08/23/2024    TRIG 37 08/23/2024    HDL 73 08/23/2024    ALT 18 08/23/2024    AST 25 08/23/2024     08/23/2024    K 3.7 08/23/2024     08/23/2024    CREATININE 0.9 08/23/2024    BUN 14 08/23/2024    CO2 27 08/23/2024    TSH 2.387 11/19/2024    PSA 2.2 08/23/2024       I have explained the risks and benefits of this endoscopic procedure to the  patient including but not limited to bleeding, inflammation, infection, perforation, and death.    SEDATION PLAN: per anesthesia       History reviewed, vital signs satisfactory, cardiopulmonary status satisfactory, sedation options, risks and plans have been discussed with the patient  All their questions were answered and the patient agrees to the sedation procedures as planned and the patient is deemed an appropriate candidate for the sedation as planned.     The risks, benefits and alternatives of the procedure were discussed with the patient in detail. This discussion was had in the presence of endoscopy staff. The risks include, risks of adverse reaction to sedation requiring the use of reversal agents, bleeding requiring blood transfusion, perforation requiring surgical intervention and technical failure. Other risks include aspiration leading to respiratory distress and respiratory failure resulting in endotracheal intubation and mechanical ventilation including death. If anesthesia is being utilized for this procedure, it is up to the anesthesiologist to determine airway safety including elective endotracheal intubation. Questions were answered, they agree to proceed. There was no language barriers.       Procedure explained to patient, informed consent obtained and placed in chart.       Jose Zuniga MD

## 2024-12-09 NOTE — TRANSFER OF CARE
"Anesthesia Transfer of Care Note    Patient: Harpreet Perez    Procedure(s) Performed: Procedure(s) (LRB):  COLONOSCOPY, SCREENING, LOW RISK PATIENT (N/A)    Patient location: PACU    Anesthesia Type: general    Transport from OR: Transported from OR on room air with adequate spontaneous ventilation    Post pain: adequate analgesia    Post assessment: no apparent anesthetic complications and tolerated procedure well    Post vital signs: stable    Level of consciousness: awake, alert and oriented    Nausea/Vomiting: no nausea/vomiting    Complications: none    Transfer of care protocol was followed      Last vitals: Visit Vitals  BP (!) 175/81   Pulse (!) 49   Temp 36.4 °C (97.5 °F)   Resp 15   Ht 5' 11" (1.803 m)   Wt 72 kg (158 lb 11.7 oz)   SpO2 100%   BMI 22.14 kg/m²     "

## 2024-12-10 VITALS
DIASTOLIC BLOOD PRESSURE: 65 MMHG | HEART RATE: 52 BPM | BODY MASS INDEX: 22.23 KG/M2 | HEIGHT: 71 IN | TEMPERATURE: 98 F | OXYGEN SATURATION: 97 % | WEIGHT: 158.75 LBS | RESPIRATION RATE: 16 BRPM | SYSTOLIC BLOOD PRESSURE: 123 MMHG

## 2024-12-11 LAB
FINAL PATHOLOGIC DIAGNOSIS: NORMAL
GROSS: NORMAL
Lab: NORMAL

## 2024-12-13 ENCOUNTER — PATIENT MESSAGE (OUTPATIENT)
Dept: HEPATOLOGY | Facility: CLINIC | Age: 74
End: 2024-12-13
Payer: MEDICARE

## 2024-12-20 ENCOUNTER — OFFICE VISIT (OUTPATIENT)
Dept: OPHTHALMOLOGY | Facility: CLINIC | Age: 74
End: 2024-12-20
Payer: MEDICARE

## 2024-12-20 ENCOUNTER — DOCUMENTATION ONLY (OUTPATIENT)
Dept: OPHTHALMOLOGY | Facility: CLINIC | Age: 74
End: 2024-12-20

## 2024-12-20 DIAGNOSIS — H25.812 COMBINED FORMS OF AGE-RELATED CATARACT OF LEFT EYE: Primary | ICD-10-CM

## 2024-12-20 DIAGNOSIS — H25.811 COMBINED FORMS OF AGE-RELATED CATARACT OF RIGHT EYE: ICD-10-CM

## 2024-12-20 PROCEDURE — 99999 PR PBB SHADOW E&M-EST. PATIENT-LVL III: CPT | Mod: PBBFAC,HCNC,, | Performed by: OPHTHALMOLOGY

## 2024-12-20 RX ORDER — KETOROLAC TROMETHAMINE 5 MG/ML
1 SOLUTION OPHTHALMIC 4 TIMES DAILY
Qty: 3 ML | Refills: 0 | Status: SHIPPED | OUTPATIENT
Start: 2024-12-20

## 2024-12-20 RX ORDER — PREDNISOLONE ACETATE 10 MG/ML
1 SUSPENSION/ DROPS OPHTHALMIC 4 TIMES DAILY
Qty: 5 ML | Refills: 1 | Status: SHIPPED | OUTPATIENT
Start: 2024-12-20

## 2024-12-20 NOTE — PROGRESS NOTES
Short Stay Record    Diagnosis: Nuclear Sclerotic Cataract left    CC: Blurry Vision     HPI:  Harpreet Perez is a 73 y.o. male who presents for evaluation prior to ophthalmic surgery. No current complaints.     Past Medical History:   Diagnosis Date    Alcohol dependence     Back pain     COPD (chronic obstructive pulmonary disease)     Coronary artery calcification seen on CAT scan 8/19/2024    History of colonic polyps     Hyperlipidemia 8/19/2024    Hypertension     Hypertrophy of prostate without urinary obstruction and other lower urinary tract symptoms (LUTS)     Osteoarthritis of multiple joints     Tobacco dependence     quit then restarted     Past Surgical History:   Procedure Laterality Date    COLONOSCOPY N/A 5/25/2016    Procedure: COLONOSCOPY;  Surgeon: Sergey Jensen MD;  Location: [facility];  Service: Endoscopy;  Laterality: N/A;    COLONOSCOPY N/A 11/15/2021    Procedure: COLONOSCOPY;  Surgeon: Francia Lucio MD;  Location: [facility];  Service: Endoscopy;  Laterality: N/A;    COLONOSCOPY, SCREENING, LOW RISK PATIENT N/A 12/9/2024    Procedure: COLONOSCOPY, SCREENING, LOW RISK PATIENT;  Surgeon: Jose Zuniga MD;  Location: [facility];  Service: Endoscopy;  Laterality: N/A;    LIPOMA RESECTION Right 8/17/12    posterior shoulder     Social History     Tobacco Use    Smoking status: Every Day     Current packs/day: 0.50     Average packs/day: 0.5 packs/day for 46.0 years (23.0 ttl pk-yrs)     Types: Cigarettes     Start date: 1/1/1979    Smokeless tobacco: Never    Tobacco comments:     Smokes 6-7 cigarettes per day   Substance Use Topics    Alcohol use: Yes     Alcohol/week: 28.0 standard drinks of alcohol     Types: 28 Cans of beer per week     Comment: case per week or up to 30 per week     Family History   Problem Relation Name Age of Onset    Cataracts Mother      Hypertension Mother      Stroke Father      Cataracts Sister      Cataracts Brother peri     Heart disease Brother peri  35        MI    Hyperlipidemia Brother earnest     Hypertension Brother earnest     Hypertension Brother Gene     Heart disease Brother Gene         cardiac stents    Heart disease Other nephew,sis son 33        MI    Kidney disease Other niece         dialysis    Alcohol abuse Neg Hx      Asthma Neg Hx      Birth defects Neg Hx      Cancer Neg Hx      COPD Neg Hx      Depression Neg Hx      Diabetes Neg Hx      Drug abuse Neg Hx      Intellectual disability Neg Hx      Mental illness Neg Hx      Learning disabilities Neg Hx      Miscarriages / Stillbirths Neg Hx      Vision loss Neg Hx       Review of patient's allergies indicates:  No Known Allergies      Current Outpatient Medications:     aspirin (ECOTRIN) 81 MG EC tablet, Take 81 mg by mouth once daily., Disp: , Rfl:     atorvastatin (LIPITOR) 20 MG tablet, Take 1 tablet (20 mg total) by mouth every evening., Disp: 90 tablet, Rfl: 3    bisoprolol (ZEBETA) 5 MG tablet, TAKE 1 TABLET ONE TIME DAILY, Disp: 90 tablet, Rfl: 3    diclofenac sodium (VOLTAREN) 1 % Gel, Apply 2 g topically 3 (three) times daily., Disp: 100 g, Rfl: 1    ketorolac 0.5% (ACULAR) 0.5 % Drop, Place 1 drop into the left eye 4 (four) times daily., Disp: 3 mL, Rfl: 0    lisinopriL (PRINIVIL,ZESTRIL) 20 MG tablet, Take 1 tablet (20 mg total) by mouth once daily., Disp: 90 tablet, Rfl: 3    lisinopriL-hydrochlorothiazide (PRINZIDE,ZESTORETIC) 20-12.5 mg per tablet, Take 1 tablet by mouth once daily., Disp: 30 tablet, Rfl: 11    meloxicam (MOBIC) 15 MG tablet, Take 1 tablet (15 mg total) by mouth once daily., Disp: 90 tablet, Rfl: 0    methIMAzole (TAPAZOLE) 5 MG Tab, TAKE 1 TABLET ONE TIME DAILY, Disp: 90 tablet, Rfl: 3    pantoprazole (PROTONIX) 40 MG tablet, Take 1 tablet (40 mg total) by mouth once daily., Disp: 90 tablet, Rfl: 3    prednisoLONE acetate (PRED FORTE) 1 % DrpS, Place 1 drop into the left eye 4 (four) times daily., Disp: 5 mL, Rfl: 1    psyllium (METAMUCIL) powder, Take 1 packet  by mouth once daily., Disp: , Rfl:     tamsulosin (FLOMAX) 0.4 mg Cap, Take 1 capsule (0.4 mg total) by mouth once daily., Disp: 30 capsule, Rfl: 0    Review of Systems:  10 Pt ROS negative except as stated in HPI    Physical Exam:  General Appearance:    A&Ox3, no distress, appears stated age   Head:    Normocephalic, without obvious abnormality, atraumatic   Eyes:    PERRL, EOM's intact   Back:     Symmetric, no curvature   Lungs:     respirations unlabored   Chest Wall:    No tenderness or deformity    Heart:  Abdomen:  Extremities:  Skin:    S1 and S2 present    Soft, non-tender    Extremities normal, atraumatic    Skin color, texture, turgor normal     Patient is stable for ophthalmic surgery under local and MAC.       _

## 2024-12-20 NOTE — PROGRESS NOTES
HPI     Cataract     Additional comments: Pt here for a cataract evaluation; referred by Dr. Pandya. C/o decreased VA; with increased nighttime driving issues w/   glare. Needing an increase of light to read.   No other complaints at this time.           Last edited by Sada Louise on 12/20/2024 12:54 PM.            Assessment /Plan     For exam results, see Encounter Report.    Combined forms of age-related cataract of left eye  Visually Significant Cataract OS > OD  Patient reports decreased vision consistent with the clinical amount of lenticular opacity. Cataract is visually significant and affects activities of daily living including reading and glare. Risks, benefits, and alternatives to cataract surgery were discussed. Discussion of risks included increased dryness, possibility of infection, need for more surgery, as well as permanent vision loss.The patient expressed a desire to proceed with surgery with the potential for some reasonable degree of visual improvement. Recommended regular use of artificial tears and good lid hygiene to optimize surgical outcome.     Discussed intraocular lens options. Monofocal - Distance. Patient understands that glasses will be generally needed at all times for near vision and often for finer distance correction especially for higher degrees of astigmatism.     Lens style/power: Sy60WF +19.5    Final visual acuity may be limited by astigmatism    None    Other considerations: None    Dilation: good  Alpha Blockers: Flomax    Following medications prescribed:  Prednisolone   Ketorolac     Combined forms of age-related cataract of right eye  Will schedule after OS

## 2024-12-27 ENCOUNTER — PATIENT MESSAGE (OUTPATIENT)
Dept: OPTOMETRY | Facility: CLINIC | Age: 74
End: 2024-12-27
Payer: MEDICARE

## 2024-12-30 ENCOUNTER — OFFICE VISIT (OUTPATIENT)
Dept: CARDIOLOGY | Facility: CLINIC | Age: 74
End: 2024-12-30
Payer: MEDICARE

## 2024-12-30 VITALS
SYSTOLIC BLOOD PRESSURE: 130 MMHG | DIASTOLIC BLOOD PRESSURE: 80 MMHG | OXYGEN SATURATION: 97 % | WEIGHT: 166.25 LBS | HEIGHT: 71 IN | BODY MASS INDEX: 23.27 KG/M2 | HEART RATE: 52 BPM

## 2024-12-30 DIAGNOSIS — F10.20 UNCOMPLICATED ALCOHOL DEPENDENCE: ICD-10-CM

## 2024-12-30 DIAGNOSIS — I10 PRIMARY HYPERTENSION: ICD-10-CM

## 2024-12-30 DIAGNOSIS — R06.02 SHORTNESS OF BREATH: Primary | ICD-10-CM

## 2024-12-30 DIAGNOSIS — R00.1 SINUS BRADYCARDIA: ICD-10-CM

## 2024-12-30 DIAGNOSIS — J43.2 CENTRILOBULAR EMPHYSEMA: ICD-10-CM

## 2024-12-30 DIAGNOSIS — F17.200 SMOKER: ICD-10-CM

## 2024-12-30 DIAGNOSIS — I70.0 AORTIC ATHEROSCLEROSIS: ICD-10-CM

## 2024-12-30 PROCEDURE — 3288F FALL RISK ASSESSMENT DOCD: CPT | Mod: HCNC,CPTII,S$GLB, | Performed by: INTERNAL MEDICINE

## 2024-12-30 PROCEDURE — 1101F PT FALLS ASSESS-DOCD LE1/YR: CPT | Mod: HCNC,CPTII,S$GLB, | Performed by: INTERNAL MEDICINE

## 2024-12-30 PROCEDURE — 3079F DIAST BP 80-89 MM HG: CPT | Mod: HCNC,CPTII,S$GLB, | Performed by: INTERNAL MEDICINE

## 2024-12-30 PROCEDURE — 99214 OFFICE O/P EST MOD 30 MIN: CPT | Mod: HCNC,S$GLB,, | Performed by: INTERNAL MEDICINE

## 2024-12-30 PROCEDURE — 3075F SYST BP GE 130 - 139MM HG: CPT | Mod: HCNC,CPTII,S$GLB, | Performed by: INTERNAL MEDICINE

## 2024-12-30 PROCEDURE — 1159F MED LIST DOCD IN RCRD: CPT | Mod: HCNC,CPTII,S$GLB, | Performed by: INTERNAL MEDICINE

## 2024-12-30 PROCEDURE — 4010F ACE/ARB THERAPY RXD/TAKEN: CPT | Mod: HCNC,CPTII,S$GLB, | Performed by: INTERNAL MEDICINE

## 2024-12-30 PROCEDURE — 99999 PR PBB SHADOW E&M-EST. PATIENT-LVL III: CPT | Mod: PBBFAC,HCNC,, | Performed by: INTERNAL MEDICINE

## 2024-12-30 NOTE — PROGRESS NOTES
Subjective:   Patient ID:  Harpreet Perez is a 74 y.o. male who presents for evaluation of No chief complaint on file.      HPI  12.30.2024  Comes in for six-month visit.    Denies any significant CARR, angina, palpitations, lower extremity swelling syncope or presyncope   Compliant with medications.      6.3.2024  Comes in for a 6 months follow up  States back to smoking a half a pack a day and drinking beer and whiskey  Active and denies any chest pain.  No dyspnea on exertion.  No slurred speech , focal weakness or numbness, amaurosis, dizziness       12.4.23  Had echo and stress  Occasional beers   No cigarettes   He states that his blood pressure can also be a little higher at home  Denies any chest pain anymore.  Dyspnea on exertion.    No Lower extremity swelling.  Palpitations syncope or presyncope    8.2023  Harpreet Perez is a 72 y.o. male patient with a PMHx of alcohol dependence, COPD, HTN, and tobacco dependence who  presented to the emergency room by PCP recommendation last week for minimally elevated troponin after he had an encounter with his PCP about dyspnea on exertion.    Patient is a smoker and uses alcohol heavily but he quit 2 weeks ago.    He denies any chest pain.    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    No lower extremity swelling.  No orthopnea.    He denies any chest pain.        Past Medical History:   Diagnosis Date    Alcohol dependence     Back pain     COPD (chronic obstructive pulmonary disease)     Coronary artery calcification seen on CAT scan 8/19/2024    History of colonic polyps     Hyperlipidemia 8/19/2024    Hypertension     Hypertrophy of prostate without urinary obstruction and other lower urinary tract symptoms (LUTS)     Osteoarthritis of multiple joints     Tobacco dependence     quit then restarted       Past Surgical History:   Procedure Laterality Date    COLONOSCOPY N/A 5/25/2016    Procedure:  COLONOSCOPY;  Surgeon: Sergey Jensen MD;  Location: [facility];  Service: Endoscopy;  Laterality: N/A;    COLONOSCOPY N/A 11/15/2021    Procedure: COLONOSCOPY;  Surgeon: Francia Lucio MD;  Location: [facility];  Service: Endoscopy;  Laterality: N/A;    COLONOSCOPY, SCREENING, LOW RISK PATIENT N/A 12/9/2024    Procedure: COLONOSCOPY, SCREENING, LOW RISK PATIENT;  Surgeon: Jose Zuniga MD;  Location: [facility];  Service: Endoscopy;  Laterality: N/A;    LIPOMA RESECTION Right 8/17/12    posterior shoulder       Social History     Tobacco Use    Smoking status: Every Day     Current packs/day: 0.50     Average packs/day: 0.5 packs/day for 46.0 years (23.0 ttl pk-yrs)     Types: Cigarettes     Start date: 1/1/1979    Smokeless tobacco: Never    Tobacco comments:     Smokes 6-7 cigarettes per day   Substance Use Topics    Alcohol use: Yes     Alcohol/week: 28.0 standard drinks of alcohol     Types: 28 Cans of beer per week     Comment: case per week or up to 30 per week    Drug use: No       Family History   Problem Relation Name Age of Onset    Cataracts Mother      Hypertension Mother      Stroke Father      Cataracts Sister      Cataracts Brother peri     Heart disease Brother peri 35        MI    Hyperlipidemia Brother earnest     Hypertension Brother earnest     Hypertension Brother Gene     Heart disease Brother Gene         cardiac stents    Heart disease Other nephew,sis son 33        MI    Kidney disease Other niece         dialysis    Alcohol abuse Neg Hx      Asthma Neg Hx      Birth defects Neg Hx      Cancer Neg Hx      COPD Neg Hx      Depression Neg Hx      Diabetes Neg Hx      Drug abuse Neg Hx      Intellectual disability Neg Hx      Mental illness Neg Hx      Learning disabilities Neg Hx      Miscarriages / Stillbirths Neg Hx      Vision loss Neg Hx         Review of Systems   Cardiovascular:  Negative for chest pain, dyspnea on exertion, palpitations and syncope.   Genitourinary:  Negative.    Neurological: Negative.        Current Outpatient Medications on File Prior to Visit   Medication Sig    aspirin (ECOTRIN) 81 MG EC tablet Take 81 mg by mouth once daily.    atorvastatin (LIPITOR) 20 MG tablet Take 1 tablet (20 mg total) by mouth every evening.    bisoprolol (ZEBETA) 5 MG tablet TAKE 1 TABLET ONE TIME DAILY    diclofenac sodium (VOLTAREN) 1 % Gel Apply 2 g topically 3 (three) times daily.    ketorolac 0.5% (ACULAR) 0.5 % Drop Place 1 drop into the left eye 4 (four) times daily.    lisinopriL (PRINIVIL,ZESTRIL) 20 MG tablet Take 1 tablet (20 mg total) by mouth once daily.    meloxicam (MOBIC) 15 MG tablet Take 1 tablet (15 mg total) by mouth once daily.    methIMAzole (TAPAZOLE) 5 MG Tab TAKE 1 TABLET ONE TIME DAILY    pantoprazole (PROTONIX) 40 MG tablet Take 1 tablet (40 mg total) by mouth once daily.    prednisoLONE acetate (PRED FORTE) 1 % DrpS Place 1 drop into the left eye 4 (four) times daily.    psyllium (METAMUCIL) powder Take 1 packet by mouth once daily.    tamsulosin (FLOMAX) 0.4 mg Cap Take 1 capsule (0.4 mg total) by mouth once daily.    lisinopriL-hydrochlorothiazide (PRINZIDE,ZESTORETIC) 20-12.5 mg per tablet Take 1 tablet by mouth once daily.     No current facility-administered medications on file prior to visit.       Objective:   Objective:  Wt Readings from Last 3 Encounters:   12/30/24 75.4 kg (166 lb 3.6 oz)   12/09/24 72 kg (158 lb 11.7 oz)   10/08/24 73.8 kg (162 lb 11.2 oz)     Temp Readings from Last 3 Encounters:   12/09/24 97.5 °F (36.4 °C)   08/19/24 97.3 °F (36.3 °C) (Tympanic)   08/01/24 97.8 °F (36.6 °C) (Tympanic)     BP Readings from Last 3 Encounters:   12/30/24 130/80   12/09/24 123/65   08/19/24 (!) 98/50     Pulse Readings from Last 3 Encounters:   12/30/24 (!) 52   12/09/24 (!) 52   08/19/24 60       Physical Exam  Vitals reviewed.   Constitutional:       Appearance: He is well-developed.   Neck:      Vascular: No carotid bruit.   Cardiovascular:  "     Rate and Rhythm: Normal rate and regular rhythm.      Pulses: Intact distal pulses.      Heart sounds: Normal heart sounds. No murmur heard.  Pulmonary:      Breath sounds: Normal breath sounds.   Neurological:      Mental Status: He is oriented to person, place, and time.         Lab Results   Component Value Date    CHOL 146 08/23/2024    CHOL 150 08/22/2023    CHOL 185 07/28/2021     Lab Results   Component Value Date    HDL 73 08/23/2024    HDL 54 08/22/2023    HDL 66 07/28/2021     Lab Results   Component Value Date    LDLCALC 65.6 08/23/2024    LDLCALC 77.0 08/22/2023    LDLCALC 95.4 07/28/2021     Lab Results   Component Value Date    TRIG 37 08/23/2024    TRIG 95 08/22/2023    TRIG 118 07/28/2021     Lab Results   Component Value Date    CHOLHDL 50.0 08/23/2024    CHOLHDL 36.0 08/22/2023    CHOLHDL 35.7 07/28/2021       Chemistry        Component Value Date/Time     08/23/2024 0842    K 3.7 08/23/2024 0842     08/23/2024 0842    CO2 27 08/23/2024 0842    BUN 14 08/23/2024 0842    CREATININE 0.9 08/23/2024 0842    GLU 78 08/23/2024 0842        Component Value Date/Time    CALCIUM 9.1 08/23/2024 0842    ALKPHOS 77 08/23/2024 0842    AST 25 08/23/2024 0842    ALT 18 08/23/2024 0842    BILITOT 0.6 08/23/2024 0842    ESTGFRAFRICA >60.0 07/28/2021 0800    EGFRNONAA >60.0 07/28/2021 0800          Lab Results   Component Value Date    TSH 2.387 11/19/2024     No results found for: "INR", "PROTIME"  Lab Results   Component Value Date    WBC 5.52 08/23/2024    HGB 12.0 (L) 08/23/2024    HCT 35.7 (L) 08/23/2024    MCV 92 08/23/2024     08/23/2024     BNP  @LABRCNTIP(BNP,BNPTRIAGEBLO)@  CrCl cannot be calculated (Patient's most recent lab result is older than the maximum 7 days allowed.).     Imaging:  ======    No results found for this or any previous visit.    No results found for this or any previous visit.    Results for orders placed during the hospital encounter of 08/07/23    X-Ray Chest " PA And Lateral    Narrative  EXAMINATION:  XR CHEST PA AND LATERAL    CLINICAL HISTORY:  Other forms of dyspnea    TECHNIQUE:  PA and lateral views of the chest were performed.    COMPARISON:  08/13/2012    FINDINGS:  The cardiac and mediastinal silhouettes appear within normal limits.   The lungs are clear bilaterally.  No acute osseous findings demonstrated.    Impression  No acute findings.      Electronically signed by: Jose Luis Roman MD  Date:    08/07/2023  Time:    16:42    No results found for this or any previous visit.    No valid procedures specified.    No results found for this or any previous visit.      No results found for this or any previous visit.      No results found for this or any previous visit.      Diagnostic Results:  ECG: Reviewed    Results for orders placed during the hospital encounter of 08/25/23    Echo    Interpretation Summary    Left Ventricle: The left ventricle is normal in size. Normal wall thickness. There is concentric remodeling. Normal wall motion. There is normal systolic function with a visually estimated ejection fraction of 55 - 70%. Ejection fraction by visual approximation is 60%. There is normal diastolic function.    Right Ventricle: Normal right ventricular cavity size. Wall thickness is normal. Right ventricle wall motion  is normal. Systolic function is normal.    Mitral Valve: There is mild regurgitation.    IVC/SVC: Normal venous pressure at 3 mmHg.      Results for orders placed during the hospital encounter of 08/25/23    Nuclear Stress - Cardiology Interpreted    Interpretation Summary    There is a mild to moderate intensity, fixed perfusion abnormality consistent with scar in the basal to mid inferior wall(s).    The gated perfusion images showed an ejection fraction of 74% at rest. The gated perfusion images showed an ejection fraction of 71% post stress.    There is normal wall motion at rest and post stress.    LV cavity size is normal at rest and  normal at stress.    The ECG portion of the study is negative for ischemia.    The patient reported no chest pain during the stress test.    Asymptomatic pre, during, and post stress test. Had pt move his arms and tap his feet prior to beginning stress to increase HR- responded well.      The 10-year ASCVD risk score (Elisabeth GLASGOW, et al., 2019) is: 30.3%    Values used to calculate the score:      Age: 74 years      Sex: Male      Is Non- : Yes      Diabetic: No      Tobacco smoker: Yes      Systolic Blood Pressure: 130 mmHg      Is BP treated: Yes      HDL Cholesterol: 73 mg/dL      Total Cholesterol: 146 mg/dL      Interpretation Summary 6.2024  Show Result Comparison     There is 20-39% right Internal Carotid Stenosis.    There is 0-19% left Internal Carotid Stenosis.      Assessment and Plan:   Shortness of breath    Smoker    Uncomplicated alcohol dependence    Centrilobular emphysema    Sinus bradycardia    Primary hypertension    Aortic atherosclerosis            Angina free.  Euvolemic.  Blood pressure controlled.    Reviewed all tests and above medical conditions with patient in detail and formulated treatment plan.  Risk factor modification discussed.   Cardiac low salt diet discussed.  Maintaining healthy weight and weight loss goals were discussed in clinic.  Reviewed nuclear stress test , fixed defect and asymptomatic and echo looked normal  Counseled on smoking and alcohol.  Carotid US reviewed    Follow up in 12 months

## 2024-12-31 DIAGNOSIS — N40.0 BENIGN PROSTATIC HYPERPLASIA WITHOUT LOWER URINARY TRACT SYMPTOMS: ICD-10-CM

## 2024-12-31 RX ORDER — TAMSULOSIN HYDROCHLORIDE 0.4 MG/1
CAPSULE ORAL
Qty: 90 CAPSULE | Refills: 0 | OUTPATIENT
Start: 2024-12-31

## 2024-12-31 NOTE — TELEPHONE ENCOUNTER
No care due was identified.  [facility] Embedded Care Due Messages. Reference number: [id number].   12/31/2024 8:40:49 AM CST

## 2024-12-31 NOTE — TELEPHONE ENCOUNTER
Refill Decision Note   Harpreet Perez  is requesting a refill authorization.  Brief Assessment and Rationale for Refill:  Quick Discontinue     Medication Therapy Plan: Pharmacy is requesting new scripts for the following medications without required information, (sig/ frequency/qty/etc)     Medication Reconciliation Completed: No   Comments:     No Care Gaps recommended.     Note composed:9:47 AM 12/31/2024

## 2025-01-03 DIAGNOSIS — M75.101 RIGHT ROTATOR CUFF TEAR ARTHROPATHY: ICD-10-CM

## 2025-01-03 DIAGNOSIS — M67.911 TENDINOPATHY OF ROTATOR CUFF, RIGHT: ICD-10-CM

## 2025-01-03 DIAGNOSIS — M12.811 RIGHT ROTATOR CUFF TEAR ARTHROPATHY: ICD-10-CM

## 2025-01-03 DIAGNOSIS — N40.0 BENIGN PROSTATIC HYPERPLASIA WITHOUT LOWER URINARY TRACT SYMPTOMS: ICD-10-CM

## 2025-01-03 RX ORDER — TAMSULOSIN HYDROCHLORIDE 0.4 MG/1
0.4 CAPSULE ORAL DAILY
Qty: 30 CAPSULE | Refills: 0 | Status: SHIPPED | OUTPATIENT
Start: 2025-01-03

## 2025-01-03 NOTE — TELEPHONE ENCOUNTER
No care due was identified.  [facility] Embedded Care Due Messages. Reference number: [id number].   1/03/2025 4:22:35 PM CST

## 2025-01-06 RX ORDER — MELOXICAM 15 MG/1
15 TABLET ORAL DAILY
Qty: 90 TABLET | Refills: 0 | Status: SHIPPED | OUTPATIENT
Start: 2025-01-06 | End: 2025-01-08 | Stop reason: SDUPTHER

## 2025-01-08 ENCOUNTER — OFFICE VISIT (OUTPATIENT)
Dept: SPORTS MEDICINE | Facility: CLINIC | Age: 75
End: 2025-01-08
Payer: MEDICARE

## 2025-01-08 VITALS — HEIGHT: 71 IN | BODY MASS INDEX: 23.27 KG/M2 | WEIGHT: 166.25 LBS

## 2025-01-08 DIAGNOSIS — M12.811 RIGHT ROTATOR CUFF TEAR ARTHROPATHY: ICD-10-CM

## 2025-01-08 DIAGNOSIS — M67.911 TENDINOPATHY OF ROTATOR CUFF, RIGHT: Primary | ICD-10-CM

## 2025-01-08 DIAGNOSIS — M75.101 RIGHT ROTATOR CUFF TEAR ARTHROPATHY: ICD-10-CM

## 2025-01-08 PROCEDURE — 1125F AMNT PAIN NOTED PAIN PRSNT: CPT | Mod: HCNC,CPTII,S$GLB, | Performed by: PHYSICIAN ASSISTANT

## 2025-01-08 PROCEDURE — 20610 DRAIN/INJ JOINT/BURSA W/O US: CPT | Mod: HCNC,RT,S$GLB, | Performed by: PHYSICIAN ASSISTANT

## 2025-01-08 PROCEDURE — 1159F MED LIST DOCD IN RCRD: CPT | Mod: HCNC,CPTII,S$GLB, | Performed by: PHYSICIAN ASSISTANT

## 2025-01-08 PROCEDURE — 3288F FALL RISK ASSESSMENT DOCD: CPT | Mod: HCNC,CPTII,S$GLB, | Performed by: PHYSICIAN ASSISTANT

## 2025-01-08 PROCEDURE — 1101F PT FALLS ASSESS-DOCD LE1/YR: CPT | Mod: HCNC,CPTII,S$GLB, | Performed by: PHYSICIAN ASSISTANT

## 2025-01-08 PROCEDURE — 99999 PR PBB SHADOW E&M-EST. PATIENT-LVL III: CPT | Mod: PBBFAC,HCNC,, | Performed by: PHYSICIAN ASSISTANT

## 2025-01-08 PROCEDURE — 99213 OFFICE O/P EST LOW 20 MIN: CPT | Mod: HCNC,25,S$GLB, | Performed by: PHYSICIAN ASSISTANT

## 2025-01-08 PROCEDURE — 1160F RVW MEDS BY RX/DR IN RCRD: CPT | Mod: HCNC,CPTII,S$GLB, | Performed by: PHYSICIAN ASSISTANT

## 2025-01-08 RX ORDER — MELOXICAM 15 MG/1
15 TABLET ORAL DAILY
Qty: 90 TABLET | Refills: 1 | Status: SHIPPED | OUTPATIENT
Start: 2025-01-08

## 2025-01-08 RX ORDER — TRIAMCINOLONE ACETONIDE 40 MG/ML
40 INJECTION, SUSPENSION INTRA-ARTICULAR; INTRAMUSCULAR
Status: DISCONTINUED | OUTPATIENT
Start: 2025-01-08 | End: 2025-01-08 | Stop reason: HOSPADM

## 2025-01-08 RX ADMIN — TRIAMCINOLONE ACETONIDE 40 MG: 40 INJECTION, SUSPENSION INTRA-ARTICULAR; INTRAMUSCULAR at 08:01

## 2025-01-08 NOTE — PROCEDURES
Large Joint Aspiration/Injection: R subacromial bursa    Date/Time: 1/8/2025 8:30 AM    Performed by: Traci Sierra PA-C  Authorized by: Traci Sierra PA-C    Consent Done?:  Yes (Verbal)  Indications:  Pain  Site marked: the procedure site was marked    Timeout: prior to procedure the correct patient, procedure, and site was verified    Prep: patient was prepped and draped in usual sterile fashion      Local anesthesia used?: Yes    Anesthesia:  Local infiltration  Local anesthetic:  Lidocaine 1% without epinephrine    Details:  Needle Size:  22 G  Ultrasonic Guidance for needle placement?: No    Approach:  Posterior  Location:  Shoulder  Site:  R subacromial bursa  Medications:  40 mg triamcinolone acetonide 40 mg/mL  Patient tolerance:  Patient tolerated the procedure well with no immediate complications

## 2025-01-08 NOTE — PROGRESS NOTES
Orthopaedic Follow-Up Visit    Last Appointment:  10/08/2024  Diagnosis:  Right rotator cuff tear arthropathy, right rotator cuff tendinopathy  Prior Procedure:  Right SAS CSI, Voltaren gel, meloxicam    Harpreet Perez is a 74 y.o. male who is here for f/u evaluation of right shoulder pain. The patient was last seen here by me on 10/08/2024 at which point we decided to do a repeat subacromial corticosteroid injection prior to considering further treatment options. The patient returns today reporting that his symptoms improved with the previously administered injection, there has been gradually starting to return and he is interested in a repeat injection    To review his history, Harpreet Perez is a 73 y.o. right-hand dominant male who initially presented to clinic on 07/01/2024 for right shoulder pain that began 3 months prior with no acute injury or trauma. His symptoms include right shoulder pain, shoulder blade pain, limited range of motion, night pain. His pain is made worse by any movement of the shoulder and night pain. His treatment has included rest, activity modification, muscle rubs, Tylenol Arthritis, subacromial corticosteroid injection     Patient's medications, allergies, past medical, surgical, social and family histories were reviewed and updated as appropriate.    Review of Systems   All systems reviewed were negative.  Specifically, the patient denies fever, chills, weight loss, chest pain, shortness of breath, or dyspnea on exertion.      Past Medical History:   Diagnosis Date    Alcohol dependence     Back pain     COPD (chronic obstructive pulmonary disease)     Coronary artery calcification seen on CAT scan 8/19/2024    History of colonic polyps     Hyperlipidemia 8/19/2024    Hypertension     Hypertrophy of prostate without urinary obstruction and other lower urinary tract symptoms (LUTS)     Osteoarthritis of multiple joints     Tobacco dependence     quit then restarted       Past Surgical  History:   Procedure Laterality Date    COLONOSCOPY N/A 5/25/2016    Procedure: COLONOSCOPY;  Surgeon: Sergey Jensen MD;  Location: [facility] ENDO;  Service: Endoscopy;  Laterality: N/A;    COLONOSCOPY N/A 11/15/2021    Procedure: COLONOSCOPY;  Surgeon: Francia Lucio MD;  Location: [facility] ENDO;  Service: Endoscopy;  Laterality: N/A;    COLONOSCOPY, SCREENING, LOW RISK PATIENT N/A 12/9/2024    Procedure: COLONOSCOPY, SCREENING, LOW RISK PATIENT;  Surgeon: Jose Zuniga MD;  Location: [facility] ENDO;  Service: Endoscopy;  Laterality: N/A;    LIPOMA RESECTION Right 8/17/12    posterior shoulder       Patient's Medications   New Prescriptions    No medications on file   Previous Medications    ASPIRIN (ECOTRIN) 81 MG EC TABLET    Take 81 mg by mouth once daily.    ATORVASTATIN (LIPITOR) 20 MG TABLET    Take 1 tablet (20 mg total) by mouth every evening.    BISOPROLOL (ZEBETA) 5 MG TABLET    TAKE 1 TABLET ONE TIME DAILY    DICLOFENAC SODIUM (VOLTAREN) 1 % GEL    Apply 2 g topically 3 (three) times daily.    KETOROLAC 0.5% (ACULAR) 0.5 % DROP    Place 1 drop into the left eye 4 (four) times daily.    LISINOPRIL (PRINIVIL,ZESTRIL) 20 MG TABLET    Take 1 tablet (20 mg total) by mouth once daily.    LISINOPRIL-HYDROCHLOROTHIAZIDE (PRINZIDE,ZESTORETIC) 20-12.5 MG PER TABLET    Take 1 tablet by mouth once daily.    MELOXICAM (MOBIC) 15 MG TABLET    Take 1 tablet (15 mg total) by mouth once daily.    METHIMAZOLE (TAPAZOLE) 5 MG TAB    TAKE 1 TABLET ONE TIME DAILY    PANTOPRAZOLE (PROTONIX) 40 MG TABLET    Take 1 tablet (40 mg total) by mouth once daily.    PREDNISOLONE ACETATE (PRED FORTE) 1 % DRPS    Place 1 drop into the left eye 4 (four) times daily.    PSYLLIUM (METAMUCIL) POWDER    Take 1 packet by mouth once daily.    TAMSULOSIN (FLOMAX) 0.4 MG CAP    Take 1 capsule (0.4 mg total) by mouth once daily.   Modified Medications    No medications on file   Discontinued Medications    No medications on file       Family History    Problem Relation Name Age of Onset    Cataracts Mother      Hypertension Mother      Stroke Father      Cataracts Sister      Cataracts Brother peri     Heart disease Brother peri 35        MI    Hyperlipidemia Brother earnest     Hypertension Brother earnest     Hypertension Brother Gene     Heart disease Brother Gene         cardiac stents    Heart disease Other nephew,sis son 33        MI    Kidney disease Other niece         dialysis    Alcohol abuse Neg Hx      Asthma Neg Hx      Birth defects Neg Hx      Cancer Neg Hx      COPD Neg Hx      Depression Neg Hx      Diabetes Neg Hx      Drug abuse Neg Hx      Intellectual disability Neg Hx      Mental illness Neg Hx      Learning disabilities Neg Hx      Miscarriages / Stillbirths Neg Hx      Vision loss Neg Hx         Review of patient's allergies indicates:  No Known Allergies      Objective:      Physical Exam  Patient is alert and oriented, no distress. Skin is intact. Neuro is normal with no focal motor or sensory findings.    Cervical exam is unremarkable. Intact cervical ROM. Negative Spurling's test     Physical Exam:                       RIGHT                                     LEFT     Scap Dyskinesis/Winging       (-)                                             (-)     Tenderness:                                                                              Greater Tuberosity                  (-)                                             (-)  Bicipital Groove                       (-)                                             (-)  AC joint                                   (-)                                             (-)  Other:      ROM:  Forward Elevation       160                                          160  Abduction                    100                                          100  ER (at side)                 60                                            60  IR                                 L5                                             T10     Strength:   Supraspinatus             4/5                                           5/5  Infraspinatus               4/5                                           5/5  Subscap / IR               4/5                                           5/5      Special Tests:              Neer:                                       +                                              (-)              Lara:                                 +                                              (-)              SS Stress:                               +                                              (-)              Bear Hug:                                (-)                                            (-)              [place]'s:                                 +                                              (-)              Resisted Thrower's:                +                                              (-)              Cross Arm Abduction:             +                                              (-)     Neurovascular examination  - Motor grossly intact bilaterally to shoulder abduction, elbow flexion and extension, wrist flexion and extension, and intrinsic hand musculature  - Sensation intact to light touch bilaterally in axillary, median, radial, and ulnar distributions  - Symmetrical radial pulses    Imaging:    XR Results:  Results for orders placed during the hospital encounter of 05/21/24    X-ray Shoulder 2 or More Views Right    Narrative  EXAMINATION:  XR SHOULDER COMPLETE 2 OR MORE VIEWS RIGHT    CLINICAL HISTORY:  Pain in right shoulder    TECHNIQUE:  Two or three views of the right shoulder were preformed.    COMPARISON:  None    FINDINGS:  No acute fracture or dislocation.  Moderate AC joint arthropathy noted.  Mild-to-moderate degenerative change seen at the glenohumeral joint.    Impression  1.  As above      Electronically signed by: Josemanuel Ramírez DO  Date:    05/21/2024  Time:    12:30      Physician  read: I agree with the above impression.    Assessment/Plan:   Harpreet Perez is a 74 y.o. male with right rotator cuff tear arthropathy     Plan:    Discussed diagnosis and treatment options with the patient today.  He has known right shoulder rotator cuff tear arthropathy  He is had good relief with the previously administered corticosteroid injection, his pain has gradually been returning over the last few weeks.  It has been 3 months since his last injection.  We again discussed operative and nonoperative treatment options.  He reports he would like to continue nonoperative management as he gets good intermittent pain relief with injections  I recommend we move forward with a right shoulder corticosteroid injection, patient is in agreement with the plan  right shoulder SAS CSI given in clinic, patient tolerated the procedure well with no immediate complications  Follow up with me in 3-4 months           Traci Sierra PA-C  Sports Medicine Physician Assistant       Disclaimer: This note was prepared using a voice recognition system and is likely to have sound alike errors within the text.

## 2025-01-27 ENCOUNTER — OUTSIDE PLACE OF SERVICE (OUTPATIENT)
Dept: OPHTHALMOLOGY | Facility: CLINIC | Age: 75
End: 2025-01-27
Payer: MEDICARE

## 2025-01-28 ENCOUNTER — OFFICE VISIT (OUTPATIENT)
Dept: OPHTHALMOLOGY | Facility: CLINIC | Age: 75
End: 2025-01-28
Payer: MEDICARE

## 2025-01-28 DIAGNOSIS — Z98.890 POST-OPERATIVE STATE: Primary | ICD-10-CM

## 2025-01-28 DIAGNOSIS — Z98.42 STATUS POST CATARACT EXTRACTION AND INSERTION OF INTRAOCULAR LENS OF LEFT EYE: ICD-10-CM

## 2025-01-28 DIAGNOSIS — Z96.1 STATUS POST CATARACT EXTRACTION AND INSERTION OF INTRAOCULAR LENS OF LEFT EYE: ICD-10-CM

## 2025-01-28 PROCEDURE — 1159F MED LIST DOCD IN RCRD: CPT | Mod: HCNC,CPTII,S$GLB, | Performed by: OPHTHALMOLOGY

## 2025-01-28 PROCEDURE — 99024 POSTOP FOLLOW-UP VISIT: CPT | Mod: HCNC,S$GLB,, | Performed by: OPHTHALMOLOGY

## 2025-01-28 PROCEDURE — 99999 PR PBB SHADOW E&M-EST. PATIENT-LVL III: CPT | Mod: PBBFAC,HCNC,, | Performed by: OPHTHALMOLOGY

## 2025-01-28 PROCEDURE — 1160F RVW MEDS BY RX/DR IN RCRD: CPT | Mod: HCNC,CPTII,S$GLB, | Performed by: OPHTHALMOLOGY

## 2025-01-28 NOTE — PROGRESS NOTES
HPI     Post-op Evaluation     Additional comments: Pt here for 1 day PCIOL OS PO. No pain or   discomfort.            Comments    1. NS OD  PCIOL OS 1/27/25    Pred QID OS  Ketorolac QID OS             Last edited by Jaxon Alanis MA on 1/28/2025  8:01 AM.            Assessment /Plan     For exam results, see Encounter Report.    Post-operative state  Status post cataract extraction and insertion of intraocular lens of left eye  POD#1 S/P phaco/IOL OS Doing well. Patient denies significant pain. Mild edema    Continue gtts to operative eye:  PF QID  Ketorolac    Reinstructed on importance of absolute compliance with Post-OP instructions including medications, shield at bedtime, protective glasses during the day, and limitation of activities. Follow up appointments in approximately one and four weeks or call immediately for increased pain, redness or vision loss.     RTC 1 week. MOCT if PH worse than 20/25

## 2025-01-29 DIAGNOSIS — N40.0 BENIGN PROSTATIC HYPERPLASIA WITHOUT LOWER URINARY TRACT SYMPTOMS: ICD-10-CM

## 2025-01-29 RX ORDER — TAMSULOSIN HYDROCHLORIDE 0.4 MG/1
1 CAPSULE ORAL
Qty: 90 CAPSULE | Refills: 1 | Status: SHIPPED | OUTPATIENT
Start: 2025-01-29

## 2025-01-29 NOTE — TELEPHONE ENCOUNTER
No care due was identified.  [facility] Embedded Care Due Messages. Reference number: [id number].   1/29/2025 1:08:41 AM CST

## 2025-01-29 NOTE — TELEPHONE ENCOUNTER
Refill Decision Note   Harpreet Chris  is requesting a refill authorization.  Brief Assessment and Rationale for Refill:  Approve     Medication Therapy Plan:         Comments:     Note composed:4:19 AM 01/29/2025

## 2025-02-03 ENCOUNTER — OFFICE VISIT (OUTPATIENT)
Dept: OPHTHALMOLOGY | Facility: CLINIC | Age: 75
End: 2025-02-03
Payer: MEDICARE

## 2025-02-03 DIAGNOSIS — Z98.890 POST-OPERATIVE STATE: Primary | ICD-10-CM

## 2025-02-03 DIAGNOSIS — Z98.42 STATUS POST CATARACT EXTRACTION AND INSERTION OF INTRAOCULAR LENS OF LEFT EYE: ICD-10-CM

## 2025-02-03 DIAGNOSIS — Z96.1 STATUS POST CATARACT EXTRACTION AND INSERTION OF INTRAOCULAR LENS OF LEFT EYE: ICD-10-CM

## 2025-02-03 DIAGNOSIS — H25.811 COMBINED FORMS OF AGE-RELATED CATARACT OF RIGHT EYE: ICD-10-CM

## 2025-02-03 PROCEDURE — 1160F RVW MEDS BY RX/DR IN RCRD: CPT | Mod: HCNC,CPTII,S$GLB, | Performed by: OPHTHALMOLOGY

## 2025-02-03 PROCEDURE — 99024 POSTOP FOLLOW-UP VISIT: CPT | Mod: HCNC,S$GLB,, | Performed by: OPHTHALMOLOGY

## 2025-02-03 PROCEDURE — 99999 PR PBB SHADOW E&M-EST. PATIENT-LVL III: CPT | Mod: PBBFAC,HCNC,, | Performed by: OPHTHALMOLOGY

## 2025-02-03 PROCEDURE — 1159F MED LIST DOCD IN RCRD: CPT | Mod: HCNC,CPTII,S$GLB, | Performed by: OPHTHALMOLOGY

## 2025-02-03 NOTE — PROGRESS NOTES
HPI     Post-op Evaluation     Additional comments: 1-week sp PCIOL OS. No pain or discomfort. VA   stable. 100% compliant with gtts.            Comments    1-week sp PCIOL OS.    PCIOL OS 01/28/25          Last edited by Jaxon Alanis MA on 2/3/2025  8:57 AM.            Assessment /Plan     For exam results, see Encounter Report.    Post-operative state  Status post cataract extraction and insertion of intraocular lens of left eye  POW#1 S/P phaco/IOL OS : Doing well with no evidence of infection.     Continue gtts to operative eye:   Trace Cell. PF Taper 4-3-2-1 then stop    Pt given and instructed in one week postop instructions. Can resume normal activitites and d/c eye shield. OTC reading glasses can be used until evaluated for final MR.       Combined forms of age-related cataract of right eye  Patient defers scheduling for now. Will discuss at 1 month PO.

## 2025-02-20 ENCOUNTER — RESULTS FOLLOW-UP (OUTPATIENT)
Dept: INTERNAL MEDICINE | Facility: CLINIC | Age: 75
End: 2025-02-20

## 2025-02-20 ENCOUNTER — OFFICE VISIT (OUTPATIENT)
Dept: INTERNAL MEDICINE | Facility: CLINIC | Age: 75
End: 2025-02-20
Payer: MEDICARE

## 2025-02-20 ENCOUNTER — LAB VISIT (OUTPATIENT)
Dept: LAB | Facility: HOSPITAL | Age: 75
End: 2025-02-20
Attending: INTERNAL MEDICINE
Payer: MEDICARE

## 2025-02-20 VITALS
SYSTOLIC BLOOD PRESSURE: 136 MMHG | TEMPERATURE: 98 F | HEART RATE: 57 BPM | BODY MASS INDEX: 23.77 KG/M2 | HEIGHT: 71 IN | OXYGEN SATURATION: 97 % | DIASTOLIC BLOOD PRESSURE: 74 MMHG | WEIGHT: 169.75 LBS

## 2025-02-20 DIAGNOSIS — I25.10 CORONARY ARTERY CALCIFICATION SEEN ON CAT SCAN: ICD-10-CM

## 2025-02-20 DIAGNOSIS — J44.9 CHRONIC OBSTRUCTIVE PULMONARY DISEASE, UNSPECIFIED COPD TYPE: ICD-10-CM

## 2025-02-20 DIAGNOSIS — E78.5 HYPERLIPIDEMIA, UNSPECIFIED HYPERLIPIDEMIA TYPE: ICD-10-CM

## 2025-02-20 DIAGNOSIS — N40.0 BENIGN PROSTATIC HYPERPLASIA WITHOUT LOWER URINARY TRACT SYMPTOMS: Chronic | ICD-10-CM

## 2025-02-20 DIAGNOSIS — I70.0 AORTIC ATHEROSCLEROSIS: ICD-10-CM

## 2025-02-20 DIAGNOSIS — I10 PRIMARY HYPERTENSION: Primary | ICD-10-CM

## 2025-02-20 DIAGNOSIS — E05.00 GRAVES DISEASE: ICD-10-CM

## 2025-02-20 DIAGNOSIS — Z12.5 ENCOUNTER FOR SCREENING FOR MALIGNANT NEOPLASM OF PROSTATE: ICD-10-CM

## 2025-02-20 DIAGNOSIS — Z87.891 HISTORY OF CIGARETTE SMOKING: ICD-10-CM

## 2025-02-20 PROBLEM — E05.90 HYPERTHYROIDISM: Status: RESOLVED | Noted: 2024-08-19 | Resolved: 2025-02-20

## 2025-02-20 LAB
T4 FREE SERPL-MCNC: 0.89 NG/DL (ref 0.71–1.51)
TSH SERPL DL<=0.005 MIU/L-ACNC: 2.13 UIU/ML (ref 0.4–4)

## 2025-02-20 PROCEDURE — 36415 COLL VENOUS BLD VENIPUNCTURE: CPT | Mod: HCNC | Performed by: INTERNAL MEDICINE

## 2025-02-20 PROCEDURE — 84439 ASSAY OF FREE THYROXINE: CPT | Mod: HCNC | Performed by: INTERNAL MEDICINE

## 2025-02-20 PROCEDURE — 84443 ASSAY THYROID STIM HORMONE: CPT | Mod: HCNC | Performed by: INTERNAL MEDICINE

## 2025-02-20 RX ORDER — ATORVASTATIN CALCIUM 20 MG/1
20 TABLET, FILM COATED ORAL NIGHTLY
Qty: 90 TABLET | Refills: 3 | Status: SHIPPED | OUTPATIENT
Start: 2025-02-20 | End: 2026-02-20

## 2025-02-20 RX ORDER — TAMSULOSIN HYDROCHLORIDE 0.4 MG/1
1 CAPSULE ORAL DAILY
Qty: 90 CAPSULE | Refills: 1 | Status: SHIPPED | OUTPATIENT
Start: 2025-02-20

## 2025-02-20 RX ORDER — LISINOPRIL AND HYDROCHLOROTHIAZIDE 12.5; 2 MG/1; MG/1
1 TABLET ORAL DAILY
Qty: 90 TABLET | Refills: 2 | Status: SHIPPED | OUTPATIENT
Start: 2025-02-20 | End: 2026-02-20

## 2025-02-20 RX ORDER — LISINOPRIL 20 MG/1
20 TABLET ORAL DAILY
Qty: 90 TABLET | Refills: 3 | Status: SHIPPED | OUTPATIENT
Start: 2025-02-20 | End: 2026-02-20

## 2025-02-20 NOTE — PROGRESS NOTES
"Subjective:      Patient ID: Harpreet Perez is a 74 y.o. male.    Chief Complaint: Follow-up    Follow-up  Pertinent negatives include no abdominal pain, chest pain, chills, coughing, fever or sore throat.     History of Present Illness             73 yo with Problem List[1]  Past Medical History:   Diagnosis Date    Alcohol dependence     Back pain     COPD (chronic obstructive pulmonary disease)     Coronary artery calcification seen on CAT scan 8/19/2024    History of colonic polyps     Hyperlipidemia 8/19/2024    Hypertension     Hypertrophy of prostate without urinary obstruction and other lower urinary tract symptoms (LUTS)     Osteoarthritis of multiple joints     Tobacco dependence     quit then restarted     Here today for management of mult med problems as outlined below.  Compliant with meds without significant side effects. Energy and appetite good.     Review of Systems   Constitutional:  Negative for chills and fever.   HENT:  Negative for ear pain and sore throat.    Respiratory:  Negative for cough.    Cardiovascular:  Negative for chest pain.   Gastrointestinal:  Negative for abdominal pain and blood in stool.   Genitourinary:  Negative for dysuria and hematuria.   Neurological:  Negative for seizures and syncope.     Objective:   /74 (BP Location: Right arm, Patient Position: Sitting)   Pulse (!) 57   Temp 98 °F (36.7 °C) (Tympanic)   Ht 5' 11" (1.803 m)   Wt 77 kg (169 lb 12.1 oz)   SpO2 97%   BMI 23.68 kg/m²     Physical Exam  Constitutional:       General: He is not in acute distress.     Appearance: He is well-developed.   HENT:      Head: Normocephalic and atraumatic.   Eyes:      Extraocular Movements: Extraocular movements intact.   Neck:      Thyroid: No thyromegaly.   Cardiovascular:      Rate and Rhythm: Normal rate and regular rhythm.   Pulmonary:      Breath sounds: Normal breath sounds. No wheezing or rales.   Abdominal:      General: Bowel sounds are normal.      " Palpations: Abdomen is soft.      Tenderness: There is no abdominal tenderness.   Musculoskeletal:         General: No swelling.      Cervical back: Neck supple. No rigidity.   Lymphadenopathy:      Cervical: No cervical adenopathy.   Skin:     General: Skin is warm and dry.   Neurological:      Mental Status: He is alert and oriented to person, place, and time.   Psychiatric:         Behavior: Behavior normal.         Lab Results   Component Value Date    WBC 5.52 08/23/2024    HGB 12.0 (L) 08/23/2024    HGB 13.1 (L) 08/22/2023    HGB 14.2 08/09/2023    HCT 35.7 (L) 08/23/2024    MCV 92 08/23/2024    MCV 92 08/22/2023    MCV 90 08/09/2023     08/23/2024    CHOL 146 08/23/2024    TRIG 37 08/23/2024    HDL 73 08/23/2024    LDLCALC 65.6 08/23/2024    LDLCALC 77.0 08/22/2023    LDLCALC 95.4 07/28/2021    ALT 18 08/23/2024    AST 25 08/23/2024     08/23/2024    K 3.7 08/23/2024    CALCIUM 9.1 08/23/2024     08/23/2024    CO2 27 08/23/2024    BUN 14 08/23/2024    CREATININE 0.9 08/23/2024    CREATININE 1.4 08/22/2023    CREATININE 1.3 08/09/2023    EGFRNORACEVR >60.0 08/23/2024    EGFRNORACEVR 53.1 (A) 08/22/2023    EGFRNORACEVR 58 (A) 08/09/2023    TSH 2.387 11/19/2024    TSH 0.965 08/23/2024    TSH 2.597 02/15/2024    PSA 2.2 08/23/2024    PSA 2.3 08/22/2023    PSA 3.8 08/05/2022    GLU 78 08/23/2024    BNP <10 08/09/2023          The 10-year ASCVD risk score (Elisabeth GLASGOW, et al., 2019) is: 32.5%    Values used to calculate the score:      Age: 74 years      Sex: Male      Is Non- : Yes      Diabetic: No      Tobacco smoker: Yes      Systolic Blood Pressure: 136 mmHg      Is BP treated: Yes      HDL Cholesterol: 73 mg/dL      Total Cholesterol: 146 mg/dL     Assessment:     1. Primary hypertension    2. Benign prostatic hyperplasia without lower urinary tract symptoms    3. Chronic obstructive pulmonary disease, unspecified COPD type    4. Aortic atherosclerosis    5. Coronary  artery calcification seen on CAT scan    6. Hyperlipidemia, unspecified hyperlipidemia type    7. Graves disease    8. History of cigarette smoking    9. Encounter for screening for malignant neoplasm of prostate      Plan:   1. Primary hypertension  Overview:  Controlled.  Continue current medications    Orders:  -     Comprehensive Metabolic Panel; Future; Expected date: 08/25/2025  -     CBC Auto Differential; Future; Expected date: 08/25/2025  -     lisinopriL (PRINIVIL,ZESTRIL) 20 MG tablet; Take 1 tablet (20 mg total) by mouth once daily.  Dispense: 90 tablet; Refill: 3  -     lisinopriL-hydrochlorothiazide (PRINZIDE,ZESTORETIC) 20-12.5 mg per tablet; Take 1 tablet by mouth once daily.  Dispense: 90 tablet; Refill: 2    2. Benign prostatic hyperplasia without lower urinary tract symptoms  Overview:  Reports symptoms much improved with current medications.    Orders:  -     tamsulosin (FLOMAX) 0.4 mg Cap; Take 1 capsule (0.4 mg total) by mouth once daily.  Dispense: 90 capsule; Refill: 1    3. Chronic obstructive pulmonary disease, unspecified COPD type  Overview:  No recent flares.      4. Aortic atherosclerosis  Overview:  CXR 8/13/12    Asymptomatic    Orders:  -     atorvastatin (LIPITOR) 20 MG tablet; Take 1 tablet (20 mg total) by mouth every evening.  Dispense: 90 tablet; Refill: 3    5. Coronary artery calcification seen on CAT scan  -     atorvastatin (LIPITOR) 20 MG tablet; Take 1 tablet (20 mg total) by mouth every evening.  Dispense: 90 tablet; Refill: 3    6. Hyperlipidemia, unspecified hyperlipidemia type  -     Lipid Panel; Future; Expected date: 08/25/2025  -     TSH; Future; Expected date: 08/25/2025    7. Graves disease  Overview:    ft4 1.62 at time of dx. (8/2022)    Srinivas Zabala MD at 9/2/2022 11:20 AM    Status: Signed      HPI:  Patient is a 71-year-old man who comes today for follow-up of his lab work and studies for hyperthyroidism.  Patient found to have Graves disease based on  positive antibody assay,    Thyrotropin Receptor Ab 0.00 - 1.75 IU/L 5.03 High     as well as a thyroid nuclear scan showing a significant increased uptake.  Patient has lost about 20 lb of weight.  He does feel jittery and nervous at times.     Current meds have been verified and updated per the EMR  Exam:/60 (BP Location: Right arm)   Pulse 74   Ht 6' (1.829 m)   Wt 76.1 kg (167 lb 12.3 oz)   SpO2 98%   BMI 22.75 kg/m²   Thyroid gland is moderately enlarged, diffusely, no nodule           Lab Results   Component Value Date     WBC 5.85 08/05/2022     HGB 12.4 (L) 08/05/2022     HCT 36.6 (L) 08/05/2022      08/05/2022     CHOL 185 07/28/2021     TRIG 118 07/28/2021     HDL 66 07/28/2021     ALT 23 08/05/2022     AST 16 08/05/2022      08/05/2022     K 4.4 08/05/2022      08/05/2022     CREATININE 1.1 08/05/2022     BUN 23 08/05/2022     CO2 29 08/05/2022     TSH <0.010 (L) 08/05/2022     PSA 3.8 08/05/2022         Impression:  Graves disease, we had a long discussion about the 3 different approaches for treatment.  We discussed surgical options, radioactive iodine and medical therapy with Tapazole.  Patient actually has 2 other family members at take Tapazole for Graves disease.  He is very familiar with that and he prefers that route of treatment.  He understands potential side effects.      Patient Active Problem List   Diagnosis    Benign prostatic hyperplasia without lower urinary tract symptoms    Tobacco abuse    Alcohol dependence    Spondylosis of lumbar region without myelopathy or radiculopathy    Osteoarthritis of multiple joints    DDD (degenerative disc disease), lumbar    History of colonic polyps    Tortuous aorta    Hypertension    COPD (chronic obstructive pulmonary disease)               Orders:  -     TSH; Future; Expected date: 08/25/2025  -     T4, Free; Future; Expected date: 08/25/2025  -     TSH; Future; Expected date: 02/20/2025  -     T4, Free; Future;  Expected date: 02/20/2025    8. History of cigarette smoking  Overview:  Quit smoking 2025      9. Encounter for screening for malignant neoplasm of prostate        Patient Instructions   Check with your pharmacy regarding shingles vaccine.      Check with your pharmacy regarding RSV vaccine.      Future Appointments   Date Time Provider Department Center   2/20/2025 12:15 PM LABORATORY, [facility] LAB [facility]   2/24/2025  8:00 AM Perla Burnett MD [facility] OPHTHAL [facility]   4/8/2025  8:30 AM Traci Sierra PA-C [facility] SPOMED [facility]   8/20/2025  8:20 AM Thompson Christensen MD [facility] IM [facility]   12/29/2025 10:20 AM Jordan Jackson MD [facility]ARDIO [facility]       Lab Frequency Next Occurrence   Ambulatory referral/consult to Orthopedics Once 05/28/2024       Follow up in about 6 months (around 8/20/2025), or if symptoms worsen or fail to improve.       Visit today included increased complexity  addressed and managing the longitudinal care of the patient due to the serious and/or complex managed problem(s)                 [1]   Patient Active Problem List  Diagnosis    Benign prostatic hyperplasia without lower urinary tract symptoms    History of cigarette smoking    Spondylosis of lumbar region without myelopathy or radiculopathy    Osteoarthritis of multiple joints    DDD (degenerative disc disease), lumbar    History of colonic polyps    Aortic atherosclerosis    Hypertension    COPD (chronic obstructive pulmonary disease)    Graves disease    Chronic pain in right shoulder    Colon cancer screening    Muscle weakness of right upper extremity    Stiffness of right shoulder joint    Coronary artery calcification seen on CAT scan    Hyperlipidemia

## 2025-02-20 NOTE — PATIENT INSTRUCTIONS
Check with your pharmacy regarding shingles vaccine.      Check with your pharmacy regarding RSV vaccine.

## 2025-02-21 ENCOUNTER — TELEPHONE (OUTPATIENT)
Dept: OPHTHALMOLOGY | Facility: CLINIC | Age: 75
End: 2025-02-21
Payer: MEDICARE

## 2025-02-21 NOTE — TELEPHONE ENCOUNTER
Spoke to pt, pt defers appt today - states eye is scratchy only- will use oTC tears and call us if he has any new symptoms or wants to  come in sooner    none

## 2025-02-21 NOTE — TELEPHONE ENCOUNTER
----- Message from Juan sent at 2/21/2025  9:11 AM CST -----  Contact: CHELSEY PENNINGTON [[id number]]  ..Type:  Patient Requesting CallWho Called:CHELSEY PENNINGTON [[id number]]Does the patient know what this is regarding?:pt states he woke this morning and his eyes are irritated and would like to speak with the provider to see what he should do or put in it Would the patient rather a call back or a response via [employer]? callBe Call Back [phone] Information:

## 2025-02-22 ENCOUNTER — NURSE TRIAGE (OUTPATIENT)
Dept: ADMINISTRATIVE | Facility: CLINIC | Age: 75
End: 2025-02-22
Payer: MEDICARE

## 2025-02-22 ENCOUNTER — HOSPITAL ENCOUNTER (EMERGENCY)
Facility: HOSPITAL | Age: 75
Discharge: HOME | End: 2025-02-22
Attending: EMERGENCY MEDICINE
Payer: MEDICARE

## 2025-02-22 VITALS
WEIGHT: 167.38 LBS | RESPIRATION RATE: 18 BRPM | SYSTOLIC BLOOD PRESSURE: 182 MMHG | DIASTOLIC BLOOD PRESSURE: 117 MMHG | OXYGEN SATURATION: 100 % | BODY MASS INDEX: 23.35 KG/M2 | HEART RATE: 43 BPM | TEMPERATURE: 99 F

## 2025-02-22 DIAGNOSIS — R00.1 BRADYCARDIA: ICD-10-CM

## 2025-02-22 DIAGNOSIS — H10.9 CONJUNCTIVITIS OF LEFT EYE, UNSPECIFIED CONJUNCTIVITIS TYPE: Primary | ICD-10-CM

## 2025-02-22 PROCEDURE — 93010 ELECTROCARDIOGRAM REPORT: CPT | Mod: HCNC,,, | Performed by: INTERNAL MEDICINE

## 2025-02-22 PROCEDURE — 99283 EMERGENCY DEPT VISIT LOW MDM: CPT | Mod: 25,HCNC

## 2025-02-22 PROCEDURE — 93005 ELECTROCARDIOGRAM TRACING: CPT | Mod: HCNC

## 2025-02-22 PROCEDURE — 25000003 PHARM REV CODE 250: Mod: HCNC | Performed by: EMERGENCY MEDICINE

## 2025-02-22 RX ORDER — ERYTHROMYCIN 5 MG/G
OINTMENT OPHTHALMIC
Qty: 3.5 G | Refills: 0 | Status: SHIPPED | OUTPATIENT
Start: 2025-02-22

## 2025-02-22 RX ORDER — TETRACAINE HYDROCHLORIDE 5 MG/ML
2 SOLUTION OPHTHALMIC
Status: COMPLETED | OUTPATIENT
Start: 2025-02-22 | End: 2025-02-22

## 2025-02-22 RX ADMIN — TETRACAINE HYDROCHLORIDE 2 DROP: 5 SOLUTION OPHTHALMIC at 10:02

## 2025-02-22 RX ADMIN — FLUORESCEIN SODIUM 1 EACH: 1 STRIP OPHTHALMIC at 10:02

## 2025-02-22 NOTE — TELEPHONE ENCOUNTER
pReason for Disposition   Sounds like a serious complication to the triager    Additional Information   Negative: Sounds like a life-threatening emergency to the triager   Negative: Chest pain   Negative: Difficulty breathing   Negative: [1] Widespread rash AND [2] bright red, sunburn-like   Negative: [1] SEVERE headache AND [2] after spinal (epidural) anesthesia   Negative: [1] Vomiting AND [2] persists > 4 hours   Negative: [1] Vomiting AND [2] abdomen looks much more swollen than usual   Negative: [1] Drinking very little AND [2] dehydration suspected (e.g., no urine > 12 hours, very dry mouth, very lightheaded)   Negative: Patient sounds very sick or weak to the triager    Protocols used: Post-Op Symptoms and [id number]  Pt states he has surgery on his eye February the second. States she has new onset pain and pressure in the eye. He states he called his doctors office yesterday and was told to apply drops. States he feels worse today. Advised per the Triage protocol to have someone bring him to the ED now for evaluation. Pt verbalized understanding.

## 2025-02-22 NOTE — DISCHARGE INSTRUCTIONS
Ilotycin ointment as prescribed.  Use Motrin Tylenol for pain.  Follow up with your eye doctor on Monday as scheduled.  Return as needed

## 2025-02-22 NOTE — ED PROVIDER NOTES
"SCRIBE #1 NOTE: I, Vimal Kerns, am scribing for, and in the presence of, Rafa Crane Jr., MD. I have scribed the entire note.       History     Chief Complaint   Patient presents with    Eye Problem     Pt had cataract surgery on left eye on 2/3/25. Reports pain, watery, scratchy, called Dr. Burnett and was told to use medication but states not getting better.      Review of patient's allergies indicates:  No Known Allergies      History of Present Illness     HPI    2/22/2025, 9:23 AM  History obtained from the patient      History of Present Illness: Harpreet Perez is a 74 y.o. male patient with a PMHx of alcohol dependence, tobacco dependence, COPD, CAD, HTN, and HLD who presents to the Emergency Department for evaluation of post-op left cataract eye surgery from 3 weeks ago. Pt reports sxs are new and were not present after surgery. Symptoms are constant and moderate in severity. No mitigating or exacerbating factors reported. Associated sxs include pain to left eye, "scratchy" and watering of left eye. Denies any recent chemical exposures or working outside. Patient denies any flashing lights, photophobia, dizziness, fever, SOB, CP, and all other sxs at this time. Prior Tx includes medication prescribed by Dr. Burnett who conducted the surgery. No further complaints or concerns at this time.       Arrival mode: Personal vehicle      PCP: Thompson Christensen MD        Past Medical History:  Past Medical History:   Diagnosis Date    Alcohol dependence     Back pain     COPD (chronic obstructive pulmonary disease)     Coronary artery calcification seen on CAT scan 8/19/2024    History of colonic polyps     Hyperlipidemia 8/19/2024    Hypertension     Hypertrophy of prostate without urinary obstruction and other lower urinary tract symptoms (LUTS)     Osteoarthritis of multiple joints     Tobacco dependence     quit then restarted       Past Surgical History:  Past Surgical History:   Procedure Laterality Date    " COLONOSCOPY N/A 5/25/2016    Procedure: COLONOSCOPY;  Surgeon: Sergey Jensen MD;  Location: [facility] ENDO;  Service: Endoscopy;  Laterality: N/A;    COLONOSCOPY N/A 11/15/2021    Procedure: COLONOSCOPY;  Surgeon: Francia Lucio MD;  Location: [facility] ENDO;  Service: Endoscopy;  Laterality: N/A;    COLONOSCOPY, SCREENING, LOW RISK PATIENT N/A 12/9/2024    Procedure: COLONOSCOPY, SCREENING, LOW RISK PATIENT;  Surgeon: Jose Zuniga MD;  Location: [facility] ENDO;  Service: Endoscopy;  Laterality: N/A;    LIPOMA RESECTION Right 8/17/12    posterior shoulder         Family History:  Family History   Problem Relation Name Age of Onset    Cataracts Mother      Hypertension Mother      Stroke Father      Cataracts Sister      Cataracts Brother peri     Heart disease Brother peri 35        MI    Hyperlipidemia Brother earnest     Hypertension Brother earnest     Hypertension Brother Gene     Heart disease Brother Gene         cardiac stents    Heart disease Other nephew,sis son 33        MI    Kidney disease Other niece         dialysis    Alcohol abuse Neg Hx      Asthma Neg Hx      Birth defects Neg Hx      Cancer Neg Hx      COPD Neg Hx      Depression Neg Hx      Diabetes Neg Hx      Drug abuse Neg Hx      Intellectual disability Neg Hx      Mental illness Neg Hx      Learning disabilities Neg Hx      Miscarriages / Stillbirths Neg Hx      Vision loss Neg Hx         Social History:  Social History     Tobacco Use    Smoking status: Every Day     Current packs/day: 0.50     Average packs/day: 0.5 packs/day for 46.1 years (23.1 ttl pk-yrs)     Types: Cigarettes     Start date: 1/1/1979    Smokeless tobacco: Never    Tobacco comments:     Smokes 6-7 cigarettes per day   Substance and Sexual Activity    Alcohol use: Yes     Alcohol/week: 28.0 standard drinks of alcohol     Types: 28 Cans of beer per week     Comment: case per week or up to 30 per week    Drug use: No    Sexual activity: Yes     Partners: Female         Review of Systems     Review of Systems   Constitutional:  Negative for chills and fever.   HENT:  Negative for congestion and sore throat.    Eyes:  Positive for pain (left), discharge (watery, left eye) and itching (left). Negative for photophobia.        (-) flashing lights   Respiratory:  Negative for cough and shortness of breath.    Cardiovascular:  Negative for chest pain.   Gastrointestinal:  Negative for abdominal pain, blood in stool, nausea and vomiting.   Genitourinary:  Negative for dysuria.   Musculoskeletal:  Negative for back pain.   Skin:  Negative for rash.   Neurological:  Negative for dizziness, weakness, light-headedness and headaches.   Hematological:  Does not bruise/bleed easily.   All other systems reviewed and are negative.       Physical Exam     Initial Vitals [02/22/25 0904]   BP Pulse Resp Temp SpO2   (!) 174/87 (!) 45 20 98.6 °F (37 °C) 98 %      MAP       --          Physical Exam  Nursing Notes and Vital Signs Reviewed.  Constitutional: Patient is in no acute distress. Well-developed and well-nourished.  Head: Atraumatic. Normocephalic.  Eyes:  EOM intact.  No scleral icterus.  ENT: Mucous membranes are moist.  Nares clear   Neck:  Full ROM. No JVD.  Cardiovascular: Regular rate. Regular rhythm No murmurs, rubs, or gallops. Distal pulses are 2+ and symmetric  Pulmonary/Chest: No respiratory distress. Clear to auscultation bilaterally. No wheezing or rales.  Equal chest wall rise bilaterally  Abdominal: Soft and non-distended.  There is no tenderness.  No rebound, guarding, or rigidity. Good bowel sounds.  Genitourinary: No CVA tenderness.  No suprapubic tenderness  Musculoskeletal: Moves all extremities. No obvious deformities.  5 x 5 strength in all extremities   Skin: Warm and dry.  Neurological:  Alert, awake, and appropriate.  Normal speech.  No acute focal neurological deficits are appreciated.  Two through 12 intact bilaterally.  Psychiatric: Normal affect. Good eye contact.  Appropriate in content.       ED Course   Procedures  ED Vital Signs:  Vitals:    02/22/25 0904 02/22/25 1000 02/22/25 1023   BP: (!) 174/87 (!) 193/91 (!) 182/117   Pulse: (!) 45 (!) 42 (!) 43   Resp: 20 18 18   Temp: 98.6 °F (37 °C)  98.6 °F (37 °C)   TempSrc: Oral  Oral   SpO2: 98% 99% 100%   Weight: 75.9 kg (167 lb 6.4 oz)         Abnormal Lab Results:  Labs Reviewed   HEPATITIS C ANTIBODY   HEP C VIRUS HOLD SPECIMEN   HIV 1 / 2 ANTIBODY            Imaging Results:  Imaging Results    None          The EKG was ordered, reviewed, and independently interpreted by the ED provider.  Interpretation time: 09:11  Rate: 41 BPM  Rhythm: Marked sinus bradycardia with fusion complexes  Interpretation: RBBB. Left anterior fascicular block. Minimal voltage criteria for LVH, may be normal variant (R in a VL). Possible anterolateral infarct, age undetermined. No STEMI.           The Emergency Provider reviewed the vital signs and test results, which are outlined above.     ED Discussion       10:16 AM: Reassessed pt at this time. Discussed with pt all pertinent ED information and results. Discussed pt dx and plan of tx. Gave pt all f/u and return to the ED instructions. All questions and concerns were addressed at this time. Pt expresses understanding of information and instructions, and is comfortable with plan to discharge. Pt is stable for discharge.    I discussed with patient and/or family/caretaker that evaluation in the ED does not suggest any emergent or life threatening medical conditions requiring immediate intervention beyond what was provided in the ED, and I believe patient is safe for discharge.  Regardless, an unremarkable evaluation in the ED does not preclude the development or presence of a serious of life threatening condition. As such, patient was instructed to return immediately for any worsening or change in current symptoms.         Medical Decision Making  Differential diagnosis:  Conjunctivitis,  corneal abrasion, postop infection, eye pain    Patient is evaluated history physical examination.  Patient is several weeks status post cataract surgery.  He was fine postoperatively and now has developed some irritation this morning for the left eye.  He notes that has mild pain and clear drainage.  Denies any trauma.  No foreign body sensation.  No photophobia.  Physical examination reveals no foreign body.  That has mild watery discharge.  That has no fluorescein uptake.  Intraocular pressures on the left are 16.  Patient is clinically having some conjunctivitis.  Will treat with Ilotycin.  He is due to follow up with his eye doctor on Monday.  He is stable safe for discharge in my opinion I have advised Motrin Tylenol for pain    Amount and/or Complexity of Data Reviewed  External Data Reviewed: notes.     Details: Postoperative notes evaluated by Dr. Burnett    Risk  OTC drugs.  Prescription drug management.                ED Medication(s):  Medications   fluorescein ophthalmic strip 1 each (1 each Left Eye Given by Other 2/22/25 1012)   TETRAcaine HCl (PF) 0.5 % Drop 2 drop (2 drops Both Eyes Given by Other 2/22/25 1012)       Discharge Medication List as of 2/22/2025 10:17 AM        START taking these medications    Details   erythromycin (ROMYCIN) ophthalmic ointment Place a 1/2 inch ribbon of ointment into the lower eyelid twice daily for 5 days., Normal              Follow-up Information       Thompson Christensen MD.    Specialty: Internal Medicine  Contact information:  [id number]  [place] [address]  [phone]                                 Scribe Attestation:   Scribe #1: I performed the above scribed service and the documentation accurately describes the services I performed. I attest to the accuracy of the note.     Attending:   Physician Attestation Statement for Scribe #1: I, Rafa Crane Jr., MD, personally performed the services described in this documentation, as scribed by  Vimal Kerns, in my presence, and it is both accurate and complete.           Clinical Impression       ICD-10-CM ICD-9-CM   1. Conjunctivitis of left eye, unspecified conjunctivitis type  H10.9 372.30   2. Bradycardia  R00.1 427.89       Disposition:   Disposition: Discharged  Condition: Stable        Rafa Crane Jr., MD  02/22/25 113

## 2025-02-23 LAB
OHS QRS DURATION: 126 MS
OHS QTC CALCULATION: 399 MS

## 2025-02-24 ENCOUNTER — OFFICE VISIT (OUTPATIENT)
Dept: OPHTHALMOLOGY | Facility: CLINIC | Age: 75
End: 2025-02-24
Payer: MEDICARE

## 2025-02-24 DIAGNOSIS — Z96.1 STATUS POST CATARACT EXTRACTION AND INSERTION OF INTRAOCULAR LENS OF LEFT EYE: ICD-10-CM

## 2025-02-24 DIAGNOSIS — H25.811 COMBINED FORMS OF AGE-RELATED CATARACT OF RIGHT EYE: ICD-10-CM

## 2025-02-24 DIAGNOSIS — H25.812 COMBINED FORMS OF AGE-RELATED CATARACT OF LEFT EYE: ICD-10-CM

## 2025-02-24 DIAGNOSIS — Z98.890 POST-OPERATIVE STATE: Primary | ICD-10-CM

## 2025-02-24 DIAGNOSIS — Z98.42 STATUS POST CATARACT EXTRACTION AND INSERTION OF INTRAOCULAR LENS OF LEFT EYE: ICD-10-CM

## 2025-02-24 PROCEDURE — 99999 PR PBB SHADOW E&M-EST. PATIENT-LVL III: CPT | Mod: PBBFAC,HCNC,, | Performed by: OPHTHALMOLOGY

## 2025-02-24 PROCEDURE — 1159F MED LIST DOCD IN RCRD: CPT | Mod: HCNC,CPTII,S$GLB, | Performed by: OPHTHALMOLOGY

## 2025-02-24 PROCEDURE — 99024 POSTOP FOLLOW-UP VISIT: CPT | Mod: HCNC,S$GLB,, | Performed by: OPHTHALMOLOGY

## 2025-02-24 PROCEDURE — 1160F RVW MEDS BY RX/DR IN RCRD: CPT | Mod: HCNC,CPTII,S$GLB, | Performed by: OPHTHALMOLOGY

## 2025-02-24 RX ORDER — PREDNISOLONE ACETATE 10 MG/ML
1 SUSPENSION/ DROPS OPHTHALMIC 4 TIMES DAILY
Qty: 5 ML | Refills: 1 | Status: SHIPPED | OUTPATIENT
Start: 2025-02-24

## 2025-02-24 NOTE — PROGRESS NOTES
HPI     Post-op Evaluation     Additional comments: Patient here today for PO visit s/p CEIOL of the   left eye. Patient states vision is doing well and is using drops. Denies   any pain or discomfort.  No other ocular complaints           Last edited by Jacob Fischer on 2/24/2025  8:39 AM.            Assessment /Plan     For exam results, see Encounter Report.    Post-operative state  Status post cataract extraction and insertion of intraocular lens of left eye  Increased inflammation present. Instructed patient to resume Pred QID OS. Will have patient return in 1 week.    Combined forms of age-related cataract of right eye  Will schedule at next appointment      Return to clinic in 1 week for iritis check or sooner PRN

## 2025-03-03 ENCOUNTER — OFFICE VISIT (OUTPATIENT)
Dept: OPHTHALMOLOGY | Facility: CLINIC | Age: 75
End: 2025-03-03
Payer: MEDICARE

## 2025-03-03 DIAGNOSIS — Z96.1 STATUS POST CATARACT EXTRACTION AND INSERTION OF INTRAOCULAR LENS OF LEFT EYE: ICD-10-CM

## 2025-03-03 DIAGNOSIS — H25.811 COMBINED FORMS OF AGE-RELATED CATARACT OF RIGHT EYE: ICD-10-CM

## 2025-03-03 DIAGNOSIS — Z98.890 POST-OPERATIVE STATE: Primary | ICD-10-CM

## 2025-03-03 DIAGNOSIS — Z98.42 STATUS POST CATARACT EXTRACTION AND INSERTION OF INTRAOCULAR LENS OF LEFT EYE: ICD-10-CM

## 2025-03-03 PROCEDURE — 1159F MED LIST DOCD IN RCRD: CPT | Mod: HCNC,CPTII,S$GLB, | Performed by: OPHTHALMOLOGY

## 2025-03-03 PROCEDURE — 1160F RVW MEDS BY RX/DR IN RCRD: CPT | Mod: HCNC,CPTII,S$GLB, | Performed by: OPHTHALMOLOGY

## 2025-03-03 PROCEDURE — 99024 POSTOP FOLLOW-UP VISIT: CPT | Mod: HCNC,S$GLB,, | Performed by: OPHTHALMOLOGY

## 2025-03-03 PROCEDURE — 99999 PR PBB SHADOW E&M-EST. PATIENT-LVL I: CPT | Mod: PBBFAC,HCNC,, | Performed by: OPHTHALMOLOGY

## 2025-03-03 NOTE — PROGRESS NOTES
HPI     Post-op Evaluation     Additional comments: States still has scratchy sensation OS.  No pain, No   light sens.           Comments    1. NS OD  PCIOL OS 1/27/25    Pred QID OS            Last edited by Leah Bailey MA on 3/3/2025  8:51 AM.            Assessment /Plan     For exam results, see Encounter Report.    Post-operative state  Status post cataract extraction and insertion of intraocular lens of left eye  Inflammation much improved. Instructed to begin Pred taper 4-3-2-1    Combined forms of age-related cataract of right eye  Will schedule at next appointment    Return to clinic in 3 weeks for final post-op appointment

## 2025-04-04 ENCOUNTER — OFFICE VISIT (OUTPATIENT)
Dept: OPHTHALMOLOGY | Facility: CLINIC | Age: 75
End: 2025-04-04
Payer: MEDICARE

## 2025-04-04 DIAGNOSIS — Z96.1 STATUS POST CATARACT EXTRACTION AND INSERTION OF INTRAOCULAR LENS OF LEFT EYE: ICD-10-CM

## 2025-04-04 DIAGNOSIS — Z98.42 STATUS POST CATARACT EXTRACTION AND INSERTION OF INTRAOCULAR LENS OF LEFT EYE: ICD-10-CM

## 2025-04-04 DIAGNOSIS — H25.811 COMBINED FORMS OF AGE-RELATED CATARACT OF RIGHT EYE: ICD-10-CM

## 2025-04-04 DIAGNOSIS — Z98.890 POST-OPERATIVE STATE: Primary | ICD-10-CM

## 2025-04-04 PROCEDURE — 99999 PR PBB SHADOW E&M-EST. PATIENT-LVL III: CPT | Mod: PBBFAC,HCNC,, | Performed by: OPHTHALMOLOGY

## 2025-04-04 NOTE — PROGRESS NOTES
HPI     Post-op Evaluation     Additional comments: Patient here today for 1 month post op visit s/p   CEIOL of the left eye. Patient states vision is doing well and is using   drops. Denies any pain or discomfort. No other ocular complaints            Comments    1. NS OD  PCIOL OS 1/27/25    Pred QID OS          Last edited by Jacob Fischer on 4/4/2025  8:50 AM.            Assessment /Plan     For exam results, see Encounter Report.    Post-operative state  Status post cataract extraction and insertion of intraocular lens of left eye  S/P phaco/IOL OS : Doing Well and completing healing process. Final visual correction options discussed and appropriate prescriptions and/or OTC reading glass recommendations offered to patient. Mrx offered. Pt instructed to follow up in 6 months for next eye exam or PRN any pain, redness, vision changes or other concerns.    Combined forms of age-related cataract of right eye  Patient does reports mild visual decline from incipient cataractous changes, but not sufficient to affect activities of daily living. I recommend monitoring visual status and follow up when visual symptoms worsen. Mrx provided.      Return to clinic in 6 months for DFE or sooner PRN

## 2025-04-08 ENCOUNTER — OFFICE VISIT (OUTPATIENT)
Dept: SPORTS MEDICINE | Facility: CLINIC | Age: 75
End: 2025-04-08
Payer: MEDICARE

## 2025-04-08 ENCOUNTER — HOSPITAL ENCOUNTER (OUTPATIENT)
Dept: RADIOLOGY | Facility: HOSPITAL | Age: 75
Discharge: HOME | End: 2025-04-08
Attending: PHYSICIAN ASSISTANT
Payer: MEDICARE

## 2025-04-08 ENCOUNTER — TELEPHONE (OUTPATIENT)
Dept: INTERNAL MEDICINE | Facility: CLINIC | Age: 75
End: 2025-04-08
Payer: MEDICARE

## 2025-04-08 VITALS — HEIGHT: 71 IN | WEIGHT: 167.31 LBS | BODY MASS INDEX: 23.42 KG/M2

## 2025-04-08 DIAGNOSIS — M25.532 BILATERAL WRIST PAIN: ICD-10-CM

## 2025-04-08 DIAGNOSIS — M25.531 BILATERAL WRIST PAIN: ICD-10-CM

## 2025-04-08 DIAGNOSIS — M79.642 BILATERAL HAND PAIN: ICD-10-CM

## 2025-04-08 DIAGNOSIS — Z87.891 STOPPED SMOKING WITH GREATER THAN 20 PACK YEAR HISTORY: Primary | ICD-10-CM

## 2025-04-08 DIAGNOSIS — M79.641 BILATERAL HAND PAIN: ICD-10-CM

## 2025-04-08 DIAGNOSIS — M67.911 TENDINOPATHY OF ROTATOR CUFF, RIGHT: ICD-10-CM

## 2025-04-08 DIAGNOSIS — M75.101 RIGHT ROTATOR CUFF TEAR ARTHROPATHY: Primary | ICD-10-CM

## 2025-04-08 DIAGNOSIS — M12.811 RIGHT ROTATOR CUFF TEAR ARTHROPATHY: Primary | ICD-10-CM

## 2025-04-08 PROCEDURE — 73130 X-RAY EXAM OF HAND: CPT | Mod: 26,50,HCNC, | Performed by: STUDENT IN AN ORGANIZED HEALTH CARE EDUCATION/TRAINING PROGRAM

## 2025-04-08 PROCEDURE — 1101F PT FALLS ASSESS-DOCD LE1/YR: CPT | Mod: HCNC,CPTII,S$GLB, | Performed by: PHYSICIAN ASSISTANT

## 2025-04-08 PROCEDURE — 73110 X-RAY EXAM OF WRIST: CPT | Mod: TC,50,HCNC

## 2025-04-08 PROCEDURE — 73110 X-RAY EXAM OF WRIST: CPT | Mod: 26,50,HCNC, | Performed by: STUDENT IN AN ORGANIZED HEALTH CARE EDUCATION/TRAINING PROGRAM

## 2025-04-08 PROCEDURE — 73130 X-RAY EXAM OF HAND: CPT | Mod: TC,50,HCNC

## 2025-04-08 PROCEDURE — 99213 OFFICE O/P EST LOW 20 MIN: CPT | Mod: HCNC,S$GLB,, | Performed by: PHYSICIAN ASSISTANT

## 2025-04-08 PROCEDURE — 1126F AMNT PAIN NOTED NONE PRSNT: CPT | Mod: HCNC,CPTII,S$GLB, | Performed by: PHYSICIAN ASSISTANT

## 2025-04-08 PROCEDURE — 1159F MED LIST DOCD IN RCRD: CPT | Mod: HCNC,CPTII,S$GLB, | Performed by: PHYSICIAN ASSISTANT

## 2025-04-08 PROCEDURE — 1160F RVW MEDS BY RX/DR IN RCRD: CPT | Mod: HCNC,CPTII,S$GLB, | Performed by: PHYSICIAN ASSISTANT

## 2025-04-08 PROCEDURE — 99999 PR PBB SHADOW E&M-EST. PATIENT-LVL IV: CPT | Mod: PBBFAC,HCNC,, | Performed by: PHYSICIAN ASSISTANT

## 2025-04-08 PROCEDURE — 3288F FALL RISK ASSESSMENT DOCD: CPT | Mod: HCNC,CPTII,S$GLB, | Performed by: PHYSICIAN ASSISTANT

## 2025-04-08 NOTE — PROGRESS NOTES
Orthopaedic Follow-Up Visit    Last Appointment:  01/08/2025  Diagnosis:  Right rotator cuff tear arthropathy  Prior Procedure:  Right SAS CSI 01/08/2025, Voltaren gel, meloxicam      Harpreet Perez is a 74 y.o. male who is here for f/u evaluation of shoulder pain. The patient was last seen here by me on 01/08/2025 at which point we decided to do a right shoulder corticosteroid injection prior to considering further treatment options. The patient returns today reporting that the symptoms have significantly improved compared to previous visits.  Today he is endorsing bilateral hand and wrist pain.  It hurts with certain activities of daily living such as eating.  He takes meloxicam and uses Voltaren gel intermittently which sometimes helps with the wrist and hand pain.      To review his history, Harpreet Perez is a 73 y.o. right-hand dominant male who initially presented to clinic on 07/01/2024 for right shoulder pain that began 3 months prior with no acute injury or trauma. His symptoms include right shoulder pain, shoulder blade pain, limited range of motion, night pain. His pain is made worse by any movement of the shoulder and night pain. His treatment has included rest, activity modification, muscle rubs, Tylenol Arthritis, subacromial corticosteroid injection     Patient's medications, allergies, past medical, surgical, social and family histories were reviewed and updated as appropriate.    Review of Systems   All systems reviewed were negative.  Specifically, the patient denies fever, chills, weight loss, chest pain, shortness of breath, or dyspnea on exertion.      Past Medical History:   Diagnosis Date    Alcohol dependence     Back pain     COPD (chronic obstructive pulmonary disease)     Coronary artery calcification seen on CAT scan 8/19/2024    History of colonic polyps     Hyperlipidemia 8/19/2024    Hypertension     Hypertrophy of prostate without urinary obstruction and other lower urinary tract  symptoms (LUTS)     Osteoarthritis of multiple joints     Tobacco dependence     quit then restarted       Past Surgical History:   Procedure Laterality Date    COLONOSCOPY N/A 5/25/2016    Procedure: COLONOSCOPY;  Surgeon: Sergey Jensen MD;  Location: [facility] ENDO;  Service: Endoscopy;  Laterality: N/A;    COLONOSCOPY N/A 11/15/2021    Procedure: COLONOSCOPY;  Surgeon: Francia Lucio MD;  Location: [facility] ENDO;  Service: Endoscopy;  Laterality: N/A;    COLONOSCOPY, SCREENING, LOW RISK PATIENT N/A 12/9/2024    Procedure: COLONOSCOPY, SCREENING, LOW RISK PATIENT;  Surgeon: Jose Zuniga MD;  Location: [facility] ENDO;  Service: Endoscopy;  Laterality: N/A;    LIPOMA RESECTION Right 8/17/12    posterior shoulder       Patient's Medications   New Prescriptions    No medications on file   Previous Medications    ASPIRIN (ECOTRIN) 81 MG EC TABLET    Take 81 mg by mouth once daily.    ATORVASTATIN (LIPITOR) 20 MG TABLET    Take 1 tablet (20 mg total) by mouth every evening.    BISOPROLOL (ZEBETA) 5 MG TABLET    TAKE 1 TABLET ONE TIME DAILY    DICLOFENAC SODIUM (VOLTAREN) 1 % GEL    Apply 2 g topically 3 (three) times daily.    ERYTHROMYCIN (ROMYCIN) OPHTHALMIC OINTMENT    Place a 1/2 inch ribbon of ointment into the lower eyelid twice daily for 5 days.    LISINOPRIL (PRINIVIL,ZESTRIL) 20 MG TABLET    Take 1 tablet (20 mg total) by mouth once daily.    LISINOPRIL-HYDROCHLOROTHIAZIDE (PRINZIDE,ZESTORETIC) 20-12.5 MG PER TABLET    Take 1 tablet by mouth once daily.    MELOXICAM (MOBIC) 15 MG TABLET    Take 1 tablet (15 mg total) by mouth once daily.    METHIMAZOLE (TAPAZOLE) 5 MG TAB    TAKE 1 TABLET ONE TIME DAILY    PANTOPRAZOLE (PROTONIX) 40 MG TABLET    Take 1 tablet (40 mg total) by mouth once daily.    PREDNISOLONE ACETATE (PRED FORTE) 1 % DRPS    Place 1 drop into the left eye 4 (four) times daily.    PSYLLIUM (METAMUCIL) POWDER    Take 1 packet by mouth once daily.    TAMSULOSIN (FLOMAX) 0.4 MG CAP    Take 1 capsule  (0.4 mg total) by mouth once daily.   Modified Medications    No medications on file   Discontinued Medications    No medications on file       Family History   Problem Relation Name Age of Onset    Cataracts Mother      Hypertension Mother      Stroke Father      Cataracts Sister      Cataracts Brother peri     Heart disease Brother peri 35        MI    Hyperlipidemia Brother earnest     Hypertension Brother earnest     Hypertension Brother Gene     Heart disease Brother Gene         cardiac stents    Heart disease Other nephew,sis son 33        MI    Kidney disease Other niece         dialysis    Alcohol abuse Neg Hx      Asthma Neg Hx      Birth defects Neg Hx      Cancer Neg Hx      COPD Neg Hx      Depression Neg Hx      Diabetes Neg Hx      Drug abuse Neg Hx      Intellectual disability Neg Hx      Mental illness Neg Hx      Learning disabilities Neg Hx      Miscarriages / Stillbirths Neg Hx      Vision loss Neg Hx         Review of patient's allergies indicates:  No Known Allergies      Objective:      Physical Exam  Patient is alert and oriented, no distress. Skin is intact. Neuro is normal with no focal motor or sensory findings.    Cervical exam is unremarkable. Intact cervical ROM. Negative Spurling's test    Physical Exam:  RIGHT    LEFT    Scap Dyskinesis/Winging (-)    (-)    Tenderness:          Greater Tuberosity  (-)    (-)  Bicipital Groove  (-)    (-)  AC joint   (-)    (-)  Other:     ROM:  Forward Elevation 170    170  Abduction  100    100  ER (at side)  60    60  IR   L5    T10    Strength:   Supraspinatus  4/5    5/5  Infraspinatus  4/5    5/5  Subscap / IR  4/5    5/5     Special Tests:   Neer:    (-)    (-)   Lara:   (-)    (-)   SS Stress:   +    (-)   Bear Hug:   (-)    (-)   Venetia's:   (-)    (-)   Resisted Thrower's:   (-)    (-)   Speed's   (-)    (-)   Cross Arm Abduction:  (-)    (-)    Neurovascular examination  - Motor grossly intact bilaterally to shoulder abduction,  elbow flexion and extension, wrist flexion and extension, and intrinsic hand musculature  - Sensation intact to light touch bilaterally in axillary, median, radial, and ulnar distributions  - Symmetrical radial pulses    Imaging:    XR Results:  Results for orders placed during the hospital encounter of 05/21/24    X-ray Shoulder 2 or More Views Right    Narrative  EXAMINATION:  XR SHOULDER COMPLETE 2 OR MORE VIEWS RIGHT    CLINICAL HISTORY:  Pain in right shoulder    TECHNIQUE:  Two or three views of the right shoulder were preformed.    COMPARISON:  None    FINDINGS:  No acute fracture or dislocation.  Moderate AC joint arthropathy noted.  Mild-to-moderate degenerative change seen at the glenohumeral joint.    Impression  1.  As above      Electronically signed by: Josemanuel Ramírez DO  Date:    05/21/2024  Time:    12:30      Physician read: I agree with the above impression.    Assessment/Plan:   Harpreet Perez is a 74 y.o. male with right rotator cuff tear arthropathy    Plan:    Discussed diagnosis and treatment options with the patient today.  He has known right shoulder rotator cuff tear arthropathy  We last completed an injection in his right shoulder about 3 months ago with excellent improvement of symptoms.  He endorses 0/10 pain today.  He is complaining of bilateral wrist and hand pain that has worse with activities of daily living such as eating and doing daily activities.  Discussed with him that I do not typically treat the wrist and hand but I will get him set up with 1 of our specialists, Dr. Guerra.  We will obtain wrist and hand x-rays on the way out of clinic and have him set up to see her tomorrow.  Follow up appointment scheduled with Dr. Guerra 04/09/2025  Continue previously prescribed meloxicam as needed for intermittent shoulder and hand pain  Continue Voltaren gel as needed  OK to add Tylenol in addition to the above medications, not to exceed 3000 mg of Tylenol  Follow up with me as needed  for shoulder pain, we can always consider repeat injection          Traci Sierra PA-C  Sports Medicine Physician Assistant       Disclaimer: This note was prepared using a voice recognition system and is likely to have sound alike errors within the text.

## 2025-04-09 ENCOUNTER — OFFICE VISIT (OUTPATIENT)
Dept: ORTHOPEDICS | Facility: CLINIC | Age: 75
End: 2025-04-09
Payer: MEDICARE

## 2025-04-09 VITALS — BODY MASS INDEX: 23.42 KG/M2 | HEIGHT: 71 IN | WEIGHT: 167.31 LBS

## 2025-04-09 DIAGNOSIS — M19.032 ARTHRITIS OF LEFT WRIST: ICD-10-CM

## 2025-04-09 DIAGNOSIS — M19.031 ARTHRITIS OF RIGHT WRIST: Primary | ICD-10-CM

## 2025-04-09 PROCEDURE — 99999 PR PBB SHADOW E&M-EST. PATIENT-LVL III: CPT | Mod: PBBFAC,HCNC,, | Performed by: STUDENT IN AN ORGANIZED HEALTH CARE EDUCATION/TRAINING PROGRAM

## 2025-04-09 RX ORDER — BETAMETHASONE SODIUM PHOSPHATE AND BETAMETHASONE ACETATE 3; 3 MG/ML; MG/ML
6 INJECTION, SUSPENSION INTRA-ARTICULAR; INTRALESIONAL; INTRAMUSCULAR; SOFT TISSUE
Status: DISCONTINUED | OUTPATIENT
Start: 2025-04-09 | End: 2025-04-09 | Stop reason: HOSPADM

## 2025-04-09 RX ADMIN — BETAMETHASONE SODIUM PHOSPHATE AND BETAMETHASONE ACETATE 6 MG: 3; 3 INJECTION, SUSPENSION INTRA-ARTICULAR; INTRALESIONAL; INTRAMUSCULAR; SOFT TISSUE at 08:04

## 2025-04-09 NOTE — PROGRESS NOTES
Hand Surgery Clinic Note    Chief Complaint  Chief Complaint   Patient presents with    Left Hand - Pain, Swelling, Numbness    Right Hand - Pain, Swelling, Numbness       History of Present Illness  74-year-old right-hand dominant male who is retired presents for evaluation of bilateral wrist pain and stiffness.  He has noted these symptoms for about a year.  The left is worse than the right.  Pain level is a 6/10.  No history of injury.  He notes that he has soreness throughout the wrist.  He has pain with activity.  He describes the pain as throbbing and aching in nature.  He notes associated swelling.  No history of diabetes.  Patient takes aspirin 81 mg daily.    Review of Systems  Review of systems negative for chest pain, shortness of breath, fevers, chills, nausea/vomiting.    Past Medical History  Past Medical History:   Diagnosis Date    Alcohol dependence     Back pain     COPD (chronic obstructive pulmonary disease)     Coronary artery calcification seen on CAT scan 8/19/2024    History of colonic polyps     Hyperlipidemia 8/19/2024    Hypertension     Hypertrophy of prostate without urinary obstruction and other lower urinary tract symptoms (LUTS)     Osteoarthritis of multiple joints     Tobacco dependence     quit then restarted       Past Surgical History  Past Surgical History:   Procedure Laterality Date    COLONOSCOPY N/A 5/25/2016    Procedure: COLONOSCOPY;  Surgeon: Sergey Jensen MD;  Location: [facility];  Service: Endoscopy;  Laterality: N/A;    COLONOSCOPY N/A 11/15/2021    Procedure: COLONOSCOPY;  Surgeon: Francia Lucio MD;  Location: [facility];  Service: Endoscopy;  Laterality: N/A;    COLONOSCOPY, SCREENING, LOW RISK PATIENT N/A 12/9/2024    Procedure: COLONOSCOPY, SCREENING, LOW RISK PATIENT;  Surgeon: Jose Zuniga MD;  Location: [facility];  Service: Endoscopy;  Laterality: N/A;    LIPOMA RESECTION Right 8/17/12    posterior shoulder       Allergies  Review of patient's  allergies indicates:  No Known Allergies    Family History  Family History   Problem Relation Name Age of Onset    Cataracts Mother      Hypertension Mother      Stroke Father      Cataracts Sister      Cataracts Brother peri     Heart disease Brother peri 35        MI    Hyperlipidemia Brother earnest     Hypertension Brother earnest     Hypertension Brother Gene     Heart disease Brother Gene         cardiac stents    Heart disease Other nephew,sis son 33        MI    Kidney disease Other niece         dialysis    Alcohol abuse Neg Hx      Asthma Neg Hx      Birth defects Neg Hx      Cancer Neg Hx      COPD Neg Hx      Depression Neg Hx      Diabetes Neg Hx      Drug abuse Neg Hx      Intellectual disability Neg Hx      Mental illness Neg Hx      Learning disabilities Neg Hx      Miscarriages / Stillbirths Neg Hx      Vision loss Neg Hx         Social History  Social History[1]    Vital Signs  There were no vitals filed for this visit.    Physical Exam  Constitutional: Appears well-developed and well-nourished. No distress.   HENT:   Head: Normocephalic.   Eyes: EOM are normal.   Pulmonary/Chest: Effort normal.   Neurological: Oriented to person, place, and time.   Psychiatric: Normal mood and affect.     Right Upper Extremity:  No abrasions, lacerations, wounds.  No swelling.  No erythema.  No drainage.  No ecchymosis.  Patient has generalized tenderness along the dorsal radiocarpal articulation.  There is noted to be bony prominences as well dorsally at the level of the carpus.  Patient is able to make a fist and extend all his fingers.  Active and passive wrist flexion to 20° and extension to 10°.  Full pronation and supination.  Sensation is intact in the median, radial, ulnar nerve distributions.  Palpable radial pulse.    Left Upper Extremity:  No abrasions, lacerations, wounds.  No swelling.  No erythema.  No drainage.  No ecchymosis.  Patient has generalized tenderness along the dorsal radiocarpal  articulation.  Patient is able to make a fist and extend all his fingers.  Active and passive wrist flexion to 30 ° and extension to 15°.  Full pronation and supination.  Sensation is intact in the median, radial, ulnar nerve distributions.  Palpable radial pulse.    Imaging  Right wrist x-rays five views and right-hand x-rays three views were obtained yesterday and independently reviewed by myself.  Severe khalil carpal arthritis is noted.  In particular there is complete loss of joint space along the radial styloid in the body of the scaphoid.  There is complete loss of joint space between the capitate, lunate, and scaphoid articulations.  There are large osteophytes/dorsal bony prominences present.  Calcifications are visualized along the course of the radial and ulnar arteries.  Severe arthritic changes are noted at the thumb CMC joint with complete loss of joint space and osteophyte formation.    Left wrist x-rays five views and left-hand x-rays three views were obtained yesterday and independently reviewed by myself.  Severe arthritic changes are noted along the body of the scaphoid in the radial styloid.  Additionally, complete loss of joint spaces noted between the capitate and the lunate.  There is scapholunate widening.  Additionally severe arthritic changes are noted at the left thumb CMC joint with complete loss of joint space and osteophyte formation.  Calcifications are visualized along the course of the radial and ulnar arteries.    Assessment and Plan  74-year-old male presents with severe bilateral wrist arthritis.  I had a long discussion with the patient.  I discussed both operative and nonoperative treatment options with the patient.  After discussion of treatment options, patient elected to proceed with bilateral wrist joint injections.  He tolerated procedure well.  See procedure note.  Discussed that he should give the injections 2 weeks to work.  Additionally, patient was fitted for bilateral  wrist braces to be worn as needed during functional activities. At least 10 minutes were spent sizing, fitting, and educating for durable medical equipment application today.  This service was performed under the direction of Marilyn Guerra MD.  CPT 10803.  Follow up in clinic as needed if symptoms recur or do not resolve.    Marilyn Guerra MD  Orthopaedic Hand Surgery       [1]   Social History  Socioeconomic History    Marital status:    Tobacco Use    Smoking status: Former     Current packs/day: 0.50     Average packs/day: 0.5 packs/day for 46.3 years (23.1 ttl pk-yrs)     Types: Cigarettes     Start date: 1/1/1979    Smokeless tobacco: Never    Tobacco comments:     Smokes 6-7 cigarettes per day   Substance and Sexual Activity    Alcohol use: Yes     Alcohol/week: 28.0 standard drinks of alcohol     Types: 28 Cans of beer per week     Comment: case per week or up to 30 per week    Drug use: No    Sexual activity: Yes     Partners: Female     Social Drivers of Health     Financial Resource Strain: Low Risk  (7/26/2023)    Overall Financial Resource Strain (CARDIA)     Difficulty of Paying Living Expenses: Not hard at all   Food Insecurity: No Food Insecurity (7/26/2023)    Hunger Vital Sign     Worried About Running Out of Food in the Last Year: Never true     Ran Out of Food in the Last Year: Never true   Transportation Needs: No Transportation Needs (7/26/2023)    PRAPARE - Transportation     Lack of Transportation (Medical): No     Lack of Transportation (Non-Medical): No   Physical Activity: Insufficiently Active (7/26/2023)    Exercise Vital Sign     Days of Exercise per Week: 7 days     Minutes of Exercise per Session: 20 min   Stress: No Stress Concern Present (7/26/2023)    [employer] - Occupational Stress Questionnaire     Feeling of Stress : Only a little   Housing Stability: Low Risk  (7/26/2023)    Housing Stability Vital Sign     Unable to Pay for Housing  in the Last Year: No     Number of Places Lived in the Last Year: 1     Unstable Housing in the Last Year: No

## 2025-04-09 NOTE — PROCEDURES
Intermediate Joint Aspiration/Injection: L radiocarpal    Date/Time: 4/9/2025 8:20 AM    Performed by: Marilyn Guerra MD  Authorized by: Marilyn Guerra MD    Consent Done?:  Yes (Verbal)  Indications:  Pain  Timeout: Prior to procedure the correct patient, procedure, and site was verified      Location:  Wrist  Site:  L radiocarpal  Ultrasonic Guidance for needle placement: No  Needle size:  25 G  Approach:  Dorsal  Medications:  6 mg betamethasone acetate-betamethasone sodium phosphate 6 mg/mL  Patient tolerance:  Patient tolerated the procedure well with no immediate complications

## 2025-04-09 NOTE — PROCEDURES
Intermediate Joint Aspiration/Injection: R radiocarpal    Date/Time: 4/9/2025 8:20 AM    Performed by: Marilyn Guerra MD  Authorized by: Marilyn Guerra MD    Consent Done?:  Yes (Verbal)  Indications:  Pain  Timeout: Prior to procedure the correct patient, procedure, and site was verified      Location:  Wrist  Site:  R radiocarpal  Ultrasonic Guidance for needle placement: No  Needle size:  25 G  Approach:  Dorsal  Medications:  6 mg betamethasone acetate-betamethasone sodium phosphate 6 mg/mL  Patient tolerance:  Patient tolerated the procedure well with no immediate complications

## 2025-05-07 ENCOUNTER — HOSPITAL ENCOUNTER (OUTPATIENT)
Dept: RADIOLOGY | Facility: HOSPITAL | Age: 75
Discharge: HOME | End: 2025-05-07
Attending: INTERNAL MEDICINE
Payer: MEDICARE

## 2025-05-07 ENCOUNTER — RESULTS FOLLOW-UP (OUTPATIENT)
Dept: INTERNAL MEDICINE | Facility: CLINIC | Age: 75
End: 2025-05-07

## 2025-05-07 DIAGNOSIS — Z87.891 STOPPED SMOKING WITH GREATER THAN 20 PACK YEAR HISTORY: ICD-10-CM

## 2025-05-07 PROCEDURE — 71271 CT THORAX LUNG CANCER SCR C-: CPT | Mod: TC,HCNC

## 2025-05-07 PROCEDURE — 71271 CT THORAX LUNG CANCER SCR C-: CPT | Mod: 26,HCNC,, | Performed by: RADIOLOGY

## 2025-08-05 ENCOUNTER — PATIENT OUTREACH (OUTPATIENT)
Dept: ADMINISTRATIVE | Facility: HOSPITAL | Age: 75
End: 2025-08-05
Payer: MEDICARE

## 2025-08-13 ENCOUNTER — LAB VISIT (OUTPATIENT)
Dept: LAB | Facility: HOSPITAL | Age: 75
End: 2025-08-13
Attending: INTERNAL MEDICINE
Payer: MEDICARE

## 2025-08-13 DIAGNOSIS — E78.5 HYPERLIPIDEMIA, UNSPECIFIED HYPERLIPIDEMIA TYPE: ICD-10-CM

## 2025-08-13 DIAGNOSIS — E05.00 GRAVES DISEASE: ICD-10-CM

## 2025-08-13 DIAGNOSIS — I10 PRIMARY HYPERTENSION: ICD-10-CM

## 2025-08-13 LAB
ABSOLUTE EOSINOPHIL (OHS): 0.32 K/UL
ABSOLUTE MONOCYTE (OHS): 0.65 K/UL (ref 0.3–1)
ABSOLUTE NEUTROPHIL COUNT (OHS): 2.78 K/UL (ref 1.8–7.7)
ALBUMIN SERPL BCP-MCNC: 3.9 G/DL (ref 3.5–5.2)
ALP SERPL-CCNC: 83 UNIT/L (ref 40–150)
ALT SERPL W/O P-5'-P-CCNC: 23 UNIT/L (ref 0–55)
ANION GAP (OHS): 7 MMOL/L (ref 8–16)
AST SERPL-CCNC: 32 UNIT/L (ref 0–50)
BASOPHILS # BLD AUTO: 0.06 K/UL
BASOPHILS NFR BLD AUTO: 1.1 %
BILIRUB SERPL-MCNC: 0.8 MG/DL (ref 0.1–1)
BUN SERPL-MCNC: 15 MG/DL (ref 8–23)
CALCIUM SERPL-MCNC: 8.8 MG/DL (ref 8.7–10.5)
CHLORIDE SERPL-SCNC: 106 MMOL/L (ref 95–110)
CHOLEST SERPL-MCNC: 149 MG/DL (ref 120–199)
CHOLEST/HDLC SERPL: 2.2 {RATIO} (ref 2–5)
CO2 SERPL-SCNC: 26 MMOL/L (ref 23–29)
CREAT SERPL-MCNC: 1.3 MG/DL (ref 0.5–1.4)
ERYTHROCYTE [DISTWIDTH] IN BLOOD BY AUTOMATED COUNT: 13.9 % (ref 11.5–14.5)
GFR SERPLBLD CREATININE-BSD FMLA CKD-EPI: 57 ML/MIN/1.73/M2
GLUCOSE SERPL-MCNC: 76 MG/DL (ref 70–110)
HCT VFR BLD AUTO: 39 % (ref 40–54)
HDLC SERPL-MCNC: 67 MG/DL (ref 40–75)
HDLC SERPL: 45 % (ref 20–50)
HGB BLD-MCNC: 13.1 GM/DL (ref 14–18)
IMM GRANULOCYTES # BLD AUTO: 0.02 K/UL (ref 0–0.04)
IMM GRANULOCYTES NFR BLD AUTO: 0.4 % (ref 0–0.5)
LDLC SERPL CALC-MCNC: 66.6 MG/DL (ref 63–159)
LYMPHOCYTES # BLD AUTO: 1.81 K/UL (ref 1–4.8)
MCH RBC QN AUTO: 31.2 PG (ref 27–31)
MCHC RBC AUTO-ENTMCNC: 33.6 G/DL (ref 32–36)
MCV RBC AUTO: 93 FL (ref 82–98)
NONHDLC SERPL-MCNC: 82 MG/DL
NUCLEATED RBC (/100WBC) (OHS): 0 /100 WBC
PLATELET # BLD AUTO: 205 K/UL (ref 150–450)
PMV BLD AUTO: 10.4 FL (ref 9.2–12.9)
POTASSIUM SERPL-SCNC: 4.1 MMOL/L (ref 3.5–5.1)
PROT SERPL-MCNC: 6.9 GM/DL (ref 6–8.4)
RBC # BLD AUTO: 4.2 M/UL (ref 4.6–6.2)
RELATIVE EOSINOPHIL (OHS): 5.7 %
RELATIVE LYMPHOCYTE (OHS): 32.1 % (ref 18–48)
RELATIVE MONOCYTE (OHS): 11.5 % (ref 4–15)
RELATIVE NEUTROPHIL (OHS): 49.2 % (ref 38–73)
SODIUM SERPL-SCNC: 139 MMOL/L (ref 136–145)
T4 FREE SERPL-MCNC: 0.91 NG/DL (ref 0.71–1.51)
TRIGL SERPL-MCNC: 77 MG/DL (ref 30–150)
TSH SERPL-ACNC: 2.77 UIU/ML (ref 0.4–4)
WBC # BLD AUTO: 5.64 K/UL (ref 3.9–12.7)

## 2025-08-13 PROCEDURE — 85025 COMPLETE CBC W/AUTO DIFF WBC: CPT | Mod: HCNC

## 2025-08-13 PROCEDURE — 84439 ASSAY OF FREE THYROXINE: CPT | Mod: HCNC

## 2025-08-13 PROCEDURE — 82465 ASSAY BLD/SERUM CHOLESTEROL: CPT | Mod: HCNC

## 2025-08-13 PROCEDURE — 36415 COLL VENOUS BLD VENIPUNCTURE: CPT | Mod: HCNC

## 2025-08-13 PROCEDURE — 84132 ASSAY OF SERUM POTASSIUM: CPT | Mod: HCNC

## 2025-08-13 PROCEDURE — 84443 ASSAY THYROID STIM HORMONE: CPT | Mod: HCNC

## 2025-08-20 ENCOUNTER — OFFICE VISIT (OUTPATIENT)
Dept: INTERNAL MEDICINE | Facility: CLINIC | Age: 75
End: 2025-08-20
Payer: MEDICARE

## 2025-08-20 VITALS
BODY MASS INDEX: 24.47 KG/M2 | OXYGEN SATURATION: 98 % | HEIGHT: 71 IN | DIASTOLIC BLOOD PRESSURE: 74 MMHG | TEMPERATURE: 97 F | WEIGHT: 174.81 LBS | SYSTOLIC BLOOD PRESSURE: 158 MMHG | HEART RATE: 40 BPM

## 2025-08-20 DIAGNOSIS — E78.5 HYPERLIPIDEMIA, UNSPECIFIED HYPERLIPIDEMIA TYPE: ICD-10-CM

## 2025-08-20 DIAGNOSIS — I70.0 AORTIC ATHEROSCLEROSIS: ICD-10-CM

## 2025-08-20 DIAGNOSIS — E05.00 GRAVES DISEASE: ICD-10-CM

## 2025-08-20 DIAGNOSIS — I25.10 CORONARY ARTERY CALCIFICATION SEEN ON CAT SCAN: ICD-10-CM

## 2025-08-20 DIAGNOSIS — I10 PRIMARY HYPERTENSION: ICD-10-CM

## 2025-08-20 DIAGNOSIS — Z00.00 ROUTINE GENERAL MEDICAL EXAMINATION AT A HEALTH CARE FACILITY: Primary | ICD-10-CM

## 2025-08-20 PROCEDURE — 99999 PR PBB SHADOW E&M-EST. PATIENT-LVL IV: CPT | Mod: PBBFAC,HCNC,, | Performed by: INTERNAL MEDICINE

## 2025-08-20 PROCEDURE — 1159F MED LIST DOCD IN RCRD: CPT | Mod: CPTII,HCNC,S$GLB, | Performed by: INTERNAL MEDICINE

## 2025-08-20 PROCEDURE — 99397 PER PM REEVAL EST PAT 65+ YR: CPT | Mod: HCNC,S$GLB,, | Performed by: INTERNAL MEDICINE

## 2025-08-20 PROCEDURE — 3288F FALL RISK ASSESSMENT DOCD: CPT | Mod: CPTII,HCNC,S$GLB, | Performed by: INTERNAL MEDICINE

## 2025-08-20 PROCEDURE — 3078F DIAST BP <80 MM HG: CPT | Mod: CPTII,HCNC,S$GLB, | Performed by: INTERNAL MEDICINE

## 2025-08-20 PROCEDURE — 99214 OFFICE O/P EST MOD 30 MIN: CPT | Mod: HCNC,25,S$GLB, | Performed by: INTERNAL MEDICINE

## 2025-08-20 PROCEDURE — 1101F PT FALLS ASSESS-DOCD LE1/YR: CPT | Mod: CPTII,HCNC,S$GLB, | Performed by: INTERNAL MEDICINE

## 2025-08-20 PROCEDURE — 1126F AMNT PAIN NOTED NONE PRSNT: CPT | Mod: CPTII,HCNC,S$GLB, | Performed by: INTERNAL MEDICINE

## 2025-08-20 PROCEDURE — 3077F SYST BP >= 140 MM HG: CPT | Mod: CPTII,HCNC,S$GLB, | Performed by: INTERNAL MEDICINE

## 2025-08-20 RX ORDER — LISINOPRIL AND HYDROCHLOROTHIAZIDE 12.5; 2 MG/1; MG/1
1 TABLET ORAL DAILY
Qty: 90 TABLET | Refills: 2 | Status: SHIPPED | OUTPATIENT
Start: 2025-08-20 | End: 2026-08-20

## 2025-08-20 RX ORDER — LISINOPRIL 20 MG/1
20 TABLET ORAL DAILY
Qty: 90 TABLET | Refills: 3 | Status: SHIPPED | OUTPATIENT
Start: 2025-08-20 | End: 2026-08-20

## 2025-08-25 RX ORDER — PANTOPRAZOLE SODIUM 40 MG/1
40 TABLET, DELAYED RELEASE ORAL
Qty: 90 TABLET | Refills: 3 | Status: SHIPPED | OUTPATIENT
Start: 2025-08-25